# Patient Record
Sex: FEMALE | Race: BLACK OR AFRICAN AMERICAN | NOT HISPANIC OR LATINO | Employment: OTHER | URBAN - METROPOLITAN AREA
[De-identification: names, ages, dates, MRNs, and addresses within clinical notes are randomized per-mention and may not be internally consistent; named-entity substitution may affect disease eponyms.]

---

## 2017-09-17 ENCOUNTER — HOSPITAL ENCOUNTER (EMERGENCY)
Facility: OTHER | Age: 62
Discharge: HOME | End: 2017-09-17
Attending: EMERGENCY MEDICINE
Payer: MEDICAID

## 2017-09-17 VITALS
BODY MASS INDEX: 28.34 KG/M2 | TEMPERATURE: 98 F | DIASTOLIC BLOOD PRESSURE: 62 MMHG | HEART RATE: 58 BPM | WEIGHT: 154 LBS | HEIGHT: 62 IN | OXYGEN SATURATION: 96 % | SYSTOLIC BLOOD PRESSURE: 131 MMHG | RESPIRATION RATE: 19 BRPM

## 2017-09-17 DIAGNOSIS — R07.9 CHEST PAIN OF UNKNOWN ETIOLOGY: Primary | ICD-10-CM

## 2017-09-17 DIAGNOSIS — R07.9 CHEST PAIN: ICD-10-CM

## 2017-09-17 LAB
ANION GAP SERPL CALC-SCNC: 8 MMOL/L
ANISOCYTOSIS BLD QL SMEAR: SLIGHT
BASOPHILS # BLD AUTO: ABNORMAL K/UL
BASOPHILS NFR BLD: 2 %
BUN SERPL-MCNC: 16 MG/DL
CALCIUM SERPL-MCNC: 8.9 MG/DL
CHLORIDE SERPL-SCNC: 111 MMOL/L
CO2 SERPL-SCNC: 24 MMOL/L
CREAT SERPL-MCNC: 0.7 MG/DL
DIFFERENTIAL METHOD: ABNORMAL
EOSINOPHIL # BLD AUTO: ABNORMAL K/UL
EOSINOPHIL NFR BLD: 2 %
ERYTHROCYTE [DISTWIDTH] IN BLOOD BY AUTOMATED COUNT: 14.1 %
EST. GFR  (AFRICAN AMERICAN): >60 ML/MIN/1.73 M^2
EST. GFR  (NON AFRICAN AMERICAN): >60 ML/MIN/1.73 M^2
GIANT PLATELETS BLD QL SMEAR: PRESENT
GLUCOSE SERPL-MCNC: 103 MG/DL
HCT VFR BLD AUTO: 42.8 %
HGB BLD-MCNC: 14.3 G/DL
LYMPHOCYTES # BLD AUTO: ABNORMAL K/UL
LYMPHOCYTES NFR BLD: 36 %
MCH RBC QN AUTO: 30.3 PG
MCHC RBC AUTO-ENTMCNC: 33.4 G/DL
MCV RBC AUTO: 91 FL
MONOCYTES # BLD AUTO: ABNORMAL K/UL
MONOCYTES NFR BLD: 6 %
NEUTROPHILS NFR BLD: 54 %
PLATELET # BLD AUTO: 244 K/UL
PLATELET BLD QL SMEAR: ABNORMAL
PMV BLD AUTO: 11 FL
POTASSIUM SERPL-SCNC: 3.3 MMOL/L
RBC # BLD AUTO: 4.72 M/UL
SODIUM SERPL-SCNC: 143 MMOL/L
TROPONIN I SERPL DL<=0.01 NG/ML-MCNC: <0.006 NG/ML
TROPONIN I SERPL DL<=0.01 NG/ML-MCNC: <0.006 NG/ML
WBC # BLD AUTO: 14.06 K/UL

## 2017-09-17 PROCEDURE — 93010 ELECTROCARDIOGRAM REPORT: CPT | Mod: ,,, | Performed by: INTERNAL MEDICINE

## 2017-09-17 PROCEDURE — 99284 EMERGENCY DEPT VISIT MOD MDM: CPT | Mod: 25

## 2017-09-17 PROCEDURE — 84484 ASSAY OF TROPONIN QUANT: CPT | Mod: 91

## 2017-09-17 PROCEDURE — 80048 BASIC METABOLIC PNL TOTAL CA: CPT

## 2017-09-17 PROCEDURE — 93005 ELECTROCARDIOGRAM TRACING: CPT

## 2017-09-17 PROCEDURE — 85027 COMPLETE CBC AUTOMATED: CPT

## 2017-09-17 PROCEDURE — 25000003 PHARM REV CODE 250: Performed by: EMERGENCY MEDICINE

## 2017-09-17 PROCEDURE — 36415 COLL VENOUS BLD VENIPUNCTURE: CPT

## 2017-09-17 PROCEDURE — 85007 BL SMEAR W/DIFF WBC COUNT: CPT

## 2017-09-17 RX ORDER — METFORMIN HYDROCHLORIDE 500 MG/1
500 TABLET ORAL 2 TIMES DAILY WITH MEALS
COMMUNITY
End: 2019-08-30 | Stop reason: SDUPTHER

## 2017-09-17 RX ORDER — IBUPROFEN 400 MG/1
800 TABLET ORAL
Status: COMPLETED | OUTPATIENT
Start: 2017-09-17 | End: 2017-09-17

## 2017-09-17 RX ORDER — ASPIRIN 81 MG/1
81 TABLET ORAL DAILY
COMMUNITY

## 2017-09-17 RX ORDER — ASPIRIN 325 MG
TABLET ORAL
Status: DISCONTINUED
Start: 2017-09-17 | End: 2017-09-17 | Stop reason: HOSPADM

## 2017-09-17 RX ORDER — NITROGLYCERIN 0.4 MG/1
TABLET SUBLINGUAL
Status: DISCONTINUED
Start: 2017-09-17 | End: 2017-09-17 | Stop reason: HOSPADM

## 2017-09-17 RX ADMIN — IBUPROFEN 800 MG: 400 TABLET, FILM COATED ORAL at 05:09

## 2017-09-17 NOTE — ED PROVIDER NOTES
Encounter Date: 9/17/2017    SCRIBE #1 NOTE: I, Jagruti Eddy , am scribing for, and in the presence of, Dr. Mota.       History     Chief Complaint   Patient presents with    Chest Pain     L side chest pressuer with bilateral shoulder pain x 2 days.      Time seen by provider: 1:55 AM    This is a 62 y.o. female who presents with complaint of chest pain that began yesterday. Onset of pain occurred four hours ago while the patient was lying down. Chest pain is described as intermittent and radiating to the shoulders. Pt has no other complaints, and denies fever, chills, diaphoresis, nausea, vomiting, abdominal pain, cough, leg swelling, numbness, weakness, or myalgias. She reports taking aspirin daily. Pain is consistent with a previous episode. Pt last underwent stress test six months ago at [facility]. She admits to daily use of tobacco.        The history is provided by the patient.     Review of patient's allergies indicates:  No Known Allergies  Past Medical History:   Diagnosis Date    Diabetes mellitus     High cholesterol      No past surgical history on file.  No family history on file.  Social History   Substance Use Topics    Smoking status: Current Every Day Smoker     Packs/day: 0.25     Types: Cigarettes    Smokeless tobacco: Never Used    Alcohol use No     Review of Systems   Constitutional: Negative for chills, diaphoresis and fever.   HENT: Negative for congestion and sore throat.    Eyes: Negative for photophobia and redness.   Respiratory: Negative for cough and shortness of breath.    Cardiovascular: Positive for chest pain. Negative for leg swelling.   Gastrointestinal: Negative for abdominal pain, nausea and vomiting.   Genitourinary: Negative for dysuria.   Musculoskeletal: Negative for back pain and myalgias.   Skin: Negative for rash.   Neurological: Negative for weakness, light-headedness, numbness and headaches.   Psychiatric/Behavioral: Negative for confusion.       Physical Exam      Initial Vitals [09/17/17 0146]   BP Pulse Resp Temp SpO2   (!) 182/76 80 18 98.1 °F (36.7 °C) 100 %      MAP       111.33         Physical Exam    Nursing note and vitals reviewed.  Constitutional: She appears well-developed and well-nourished. She is not diaphoretic. No distress.   Well-appearing.   HENT:   Head: Normocephalic and atraumatic.   Right Ear: External ear normal.   Left Ear: External ear normal.   Eyes: EOM are normal. Pupils are equal, round, and reactive to light. Right eye exhibits no discharge. Left eye exhibits no discharge.   Neck: Normal range of motion.   Cardiovascular: Normal rate, regular rhythm and normal heart sounds. Exam reveals no gallop and no friction rub.    No murmur heard.  Pulses:       Radial pulses are 2+ on the right side, and 2+ on the left side.        Dorsalis pedis pulses are 2+ on the right side, and 2+ on the left side.   Pulmonary/Chest: Breath sounds normal. No respiratory distress. She has no wheezes. She has no rhonchi. She has no rales. She exhibits no tenderness.   Abdominal: Soft. She exhibits no mass. There is no tenderness. There is no rebound and no guarding.   No pulsatile mass.   Musculoskeletal: Normal range of motion. She exhibits no edema or tenderness.   Neurological: She is alert and oriented to person, place, and time.   Skin: Skin is warm and dry. No rash and no abscess noted. No erythema. No pallor.   Psychiatric: She has a normal mood and affect. Her behavior is normal. Judgment and thought content normal.         ED Course   Procedures  Labs Reviewed   BASIC METABOLIC PANEL - Abnormal; Notable for the following:        Result Value    Potassium 3.3 (*)     Chloride 111 (*)     All other components within normal limits   CBC W/ AUTO DIFFERENTIAL - Abnormal; Notable for the following:     WBC 14.06 (*)     Basophil% 2.0 (*)     All other components within normal limits   TROPONIN I   TROPONIN I     Imaging Results          X-Ray Chest 1 View (In  process)                 EKG Readings: (Independently Interpreted)   Initial Reading: No STEMI.   Normal sinus rhythm at a rate of 68 bpm. No ST-T wave changes. No prior EKG for comparison.           Medical Decision Making:   Clinical Tests:   Lab Tests: Ordered and Reviewed  Radiological Study: Ordered and Reviewed  Medical Tests: Ordered and Reviewed    Additional MDM:   Comments: 62-year-old female with history of diabetes and hyperlipidemia presents complaining of chest pressure like pain that radiates into both shoulders.  She reports having the first episode yesterday and then again tonight.  The time of my assessment she had pain for approximately 3-1/2-4 hours.  She denied any other associated symptoms.  Her exam was unremarkable.  Her initial EKG showed no acute ischemic changes.  Troponin was obtained and within normal limits.  Chest x-ray was also unremarkable.  Repeat troponin was again within normal limits.  Repeat EKG also remained unchanged.  I did attempt to obtain her records from [facility], however, there were no records found through [facility].  She does report having a recent stress test (approximately 6 months ago) which was normal, but I could not confirm this.  At this time I do feel the patient is appropriate for discharge home with instructions to follow-up with her primary care doctor for further evaluation of this chest pain as the etiology at this time is unclear..          Scribe Attestation:   Scribe #1: I performed the above scribed service and the documentation accurately describes the services I performed. I attest to the accuracy of the note.    Attending Attestation:           Physician Attestation for Scribe:  Physician Attestation Statement for Scribe #1: I, Dr. Mota, reviewed documentation, as scribed by Jagruti Eddy  in my presence, and it is both accurate and complete.                 ED Course      Clinical Impression:     1. Chest pain                                  Ivet Mota MD  09/17/17 0592

## 2017-09-17 NOTE — ED TRIAGE NOTES
Pt presents to ED with c/o left sided chest pain that radiates to her shoulders starting yesterday. Pain is constant. Pt has hx of chest pain, denies previous MI.

## 2019-08-15 ENCOUNTER — HOSPITAL ENCOUNTER (EMERGENCY)
Facility: OTHER | Age: 64
Discharge: HOME | End: 2019-08-15
Attending: EMERGENCY MEDICINE
Payer: COMMERCIAL

## 2019-08-15 VITALS
TEMPERATURE: 99 F | WEIGHT: 157 LBS | RESPIRATION RATE: 18 BRPM | DIASTOLIC BLOOD PRESSURE: 66 MMHG | OXYGEN SATURATION: 99 % | SYSTOLIC BLOOD PRESSURE: 160 MMHG | HEIGHT: 62 IN | BODY MASS INDEX: 28.89 KG/M2 | HEART RATE: 52 BPM

## 2019-08-15 DIAGNOSIS — M79.604 LEG PAIN, MEDIAL, RIGHT: ICD-10-CM

## 2019-08-15 DIAGNOSIS — R07.9 CHEST PAIN: ICD-10-CM

## 2019-08-15 DIAGNOSIS — M54.12 CERVICAL RADICULOPATHY: Primary | ICD-10-CM

## 2019-08-15 LAB
ANION GAP SERPL CALC-SCNC: 9 MMOL/L (ref 8–16)
BASOPHILS # BLD AUTO: 0.05 K/UL (ref 0–0.2)
BASOPHILS NFR BLD: 0.5 % (ref 0–1.9)
BUN SERPL-MCNC: 18 MG/DL (ref 8–23)
CALCIUM SERPL-MCNC: 9.6 MG/DL (ref 8.7–10.5)
CHLORIDE SERPL-SCNC: 108 MMOL/L (ref 95–110)
CO2 SERPL-SCNC: 25 MMOL/L (ref 23–29)
CREAT SERPL-MCNC: 0.8 MG/DL (ref 0.5–1.4)
DIFFERENTIAL METHOD: NORMAL
EOSINOPHIL # BLD AUTO: 0.2 K/UL (ref 0–0.5)
EOSINOPHIL NFR BLD: 2.3 % (ref 0–8)
ERYTHROCYTE [DISTWIDTH] IN BLOOD BY AUTOMATED COUNT: 13.2 % (ref 11.5–14.5)
EST. GFR  (AFRICAN AMERICAN): >60 ML/MIN/1.73 M^2
EST. GFR  (NON AFRICAN AMERICAN): >60 ML/MIN/1.73 M^2
GLUCOSE SERPL-MCNC: 89 MG/DL (ref 70–110)
HCT VFR BLD AUTO: 39.4 % (ref 37–48.5)
HGB BLD-MCNC: 12.9 G/DL (ref 12–16)
IMM GRANULOCYTES # BLD AUTO: 0.04 K/UL (ref 0–0.04)
IMM GRANULOCYTES NFR BLD AUTO: 0.4 % (ref 0–0.5)
LYMPHOCYTES # BLD AUTO: 3.9 K/UL (ref 1–4.8)
LYMPHOCYTES NFR BLD: 40.2 % (ref 18–48)
MCH RBC QN AUTO: 29.9 PG (ref 27–31)
MCHC RBC AUTO-ENTMCNC: 32.7 G/DL (ref 32–36)
MCV RBC AUTO: 91 FL (ref 82–98)
MONOCYTES # BLD AUTO: 0.5 K/UL (ref 0.3–1)
MONOCYTES NFR BLD: 4.7 % (ref 4–15)
NEUTROPHILS # BLD AUTO: 5 K/UL (ref 1.8–7.7)
NEUTROPHILS NFR BLD: 51.9 % (ref 38–73)
NRBC BLD-RTO: 0 /100 WBC
PLATELET # BLD AUTO: 277 K/UL (ref 150–350)
PMV BLD AUTO: 10.4 FL (ref 9.2–12.9)
POTASSIUM SERPL-SCNC: 4.1 MMOL/L (ref 3.5–5.1)
RBC # BLD AUTO: 4.31 M/UL (ref 4–5.4)
SODIUM SERPL-SCNC: 142 MMOL/L (ref 136–145)
TROPONIN I SERPL DL<=0.01 NG/ML-MCNC: <0.006 NG/ML (ref 0–0.03)
TROPONIN I SERPL DL<=0.01 NG/ML-MCNC: <0.006 NG/ML (ref 0–0.03)
WBC # BLD AUTO: 9.71 K/UL (ref 3.9–12.7)

## 2019-08-15 PROCEDURE — 93010 EKG 12-LEAD: ICD-10-PCS | Mod: ,,, | Performed by: INTERNAL MEDICINE

## 2019-08-15 PROCEDURE — 93010 ELECTROCARDIOGRAM REPORT: CPT | Mod: ,,, | Performed by: INTERNAL MEDICINE

## 2019-08-15 PROCEDURE — 99285 EMERGENCY DEPT VISIT HI MDM: CPT

## 2019-08-15 PROCEDURE — 85025 COMPLETE CBC W/AUTO DIFF WBC: CPT

## 2019-08-15 PROCEDURE — 93005 ELECTROCARDIOGRAM TRACING: CPT

## 2019-08-15 PROCEDURE — 25000003 PHARM REV CODE 250: Performed by: PHYSICIAN ASSISTANT

## 2019-08-15 PROCEDURE — 84484 ASSAY OF TROPONIN QUANT: CPT | Mod: 91

## 2019-08-15 PROCEDURE — 36415 COLL VENOUS BLD VENIPUNCTURE: CPT

## 2019-08-15 PROCEDURE — 80048 BASIC METABOLIC PNL TOTAL CA: CPT

## 2019-08-15 RX ORDER — ASPIRIN 325 MG
325 TABLET ORAL
Status: COMPLETED | OUTPATIENT
Start: 2019-08-15 | End: 2019-08-15

## 2019-08-15 RX ORDER — DICLOFENAC SODIUM 10 MG/G
2 GEL TOPICAL
Status: COMPLETED | OUTPATIENT
Start: 2019-08-15 | End: 2019-08-15

## 2019-08-15 RX ORDER — LISINOPRIL 10 MG/1
10 TABLET ORAL
Status: COMPLETED | OUTPATIENT
Start: 2019-08-15 | End: 2019-08-15

## 2019-08-15 RX ORDER — ATORVASTATIN CALCIUM 10 MG/1
10 TABLET, FILM COATED ORAL DAILY
COMMUNITY
End: 2019-08-30 | Stop reason: ALTCHOICE

## 2019-08-15 RX ADMIN — ASPIRIN 325 MG ORAL TABLET 325 MG: 325 PILL ORAL at 11:08

## 2019-08-15 RX ADMIN — LISINOPRIL 10 MG: 10 TABLET ORAL at 11:08

## 2019-08-15 RX ADMIN — DICLOFENAC 2 G: 10 GEL TOPICAL at 11:08

## 2019-08-15 NOTE — ED PROVIDER NOTES
Encounter Date: 8/15/2019       History     Chief Complaint   Patient presents with    Shoulder Pain     Pt reports right shoulder pain, right leg pain for the past three weeks with chest pain since last night. Pt was seen at [facility] on 8/3/ and 8/9 for the same s/s.      Patient is a 64-year-old female with pre-diabetes, hypertension, and high cholesterol who presents to the emergency department with multiple complaints, including chronic right shoulder pain, right thigh pain and acute chest pain.   Patient reports chronic right shoulder pain that radiates into her right neck.  She states she has had an EMG, MRI and CT of her neck and does not know what is wrong with her.  She does admit to being diagnosed with arthritis.  Patient also reports right thigh pain for the past 3 weeks.  She denies injury.  She believes she had an ultrasound that ruled out DVT.  She was evaluated for this at [facility] approximately 1 week ago and was diagnosed with a musculoskeletal strain.  She states she received 2 injections in this temporarily helped.  Patient also reports left-sided chest wall pressure upon waking this morning.  She denies radiation of the pain.  She denies nausea or emesis.  She denies previous cardiac history but states she has experienced similar chest pain and has been evaluated in the emergency department before.    The history is provided by the patient.     Review of patient's allergies indicates:  No Known Allergies  Past Medical History:   Diagnosis Date    Diabetes mellitus     High cholesterol     Hypertension      History reviewed. No pertinent surgical history.  History reviewed. No pertinent family history.  Social History     Tobacco Use    Smoking status: Current Every Day Smoker     Packs/day: 0.25     Types: Cigarettes    Smokeless tobacco: Never Used   Substance Use Topics    Alcohol use: No    Drug use: No     Review of Systems   Constitutional: Negative for chills and fever.   Eyes: Negative for  pain.   Respiratory: Negative for shortness of breath.    Cardiovascular: Positive for chest pain.   Gastrointestinal: Negative for abdominal pain, diarrhea, nausea and vomiting.   Genitourinary: Negative for dysuria.   Musculoskeletal: Positive for arthralgias and myalgias.   Skin: Negative for rash.   Neurological: Negative for headaches.       Physical Exam     Initial Vitals [08/15/19 0957]   BP Pulse Resp Temp SpO2   (!) 206/105 65 18 98.1 °F (36.7 °C) 100 %      MAP       --         Physical Exam    Constitutional: Vital signs are normal. She is cooperative. No distress.   HENT:   Head: Normocephalic and atraumatic.   Eyes: EOM are normal. Pupils are equal, round, and reactive to light.   Neck: Normal range of motion. Neck supple.   Cardiovascular: Normal rate, regular rhythm and intact distal pulses.   Pulmonary/Chest: Breath sounds normal. She has no wheezes. She has no rhonchi. She has no rales.   Abdominal: Soft. Bowel sounds are normal. There is no tenderness.   Musculoskeletal:   Right shoulder pain reproduced with flexion, internal rotation external rotation of the right shoulder.  No CTL midline tenderness, crepitus, masses, step-offs or deformities.  No focal right hip tenderness.  Right lower extremity  Has normal range of motion with no edema or bony tenderness.   Neurological: She is alert and oriented to person, place, and time. GCS eye subscore is 4. GCS verbal subscore is 5. GCS motor subscore is 6.   Skin: Skin is warm and dry. No rash noted.         ED Course   Procedures  Labs Reviewed   TROPONIN I   CBC W/ AUTO DIFFERENTIAL   BASIC METABOLIC PANEL   TROPONIN I     EKG Readings: (Independently Interpreted)     Normal sinus rhythm at a rate of 63 beats per minute.  No STEMI.  T-wave inversion to V1.  No previous EKG to compare to.       Imaging Results          US Lower Extremity Veins Right (Final result)  Result time 08/15/19 12:19:31    Final result by Reyes Garland MD (08/15/19 12:19:31)                  Impression:      No evidence of deep venous thrombosis in the right lower extremity.      Electronically signed by: Reyes Garland MD  Date:    08/15/2019  Time:    12:19             Narrative:    EXAMINATION:  US LOWER EXTREMITY VEINS RIGHT    CLINICAL HISTORY:  Pain in right leg    TECHNIQUE:  Duplex and color flow Doppler evaluation and graded compression of the right lower extremity veins was performed.    COMPARISON:  None    FINDINGS:  Right thigh veins: The common femoral, femoral, popliteal, upper greater saphenous, and deep femoral veins are patent and free of thrombus. The veins are normally compressible and have normal phasic flow and augmentation response.    Right calf veins: The visualized calf veins are patent.    Contralateral CFV: The contralateral (left) common femoral vein is patent and free of thrombus.    Miscellaneous: None                               X-Ray Chest PA And Lateral (Final result)  Result time 08/15/19 11:35:54    Final result by Nicko Lopez MD (08/15/19 11:35:54)                 Impression:      No acute abnormality.      Electronically signed by: Nicko Lopez MD  Date:    08/15/2019  Time:    11:35             Narrative:    EXAMINATION:  XR CHEST PA AND LATERAL    CLINICAL HISTORY:  Chest pain, unspecified    TECHNIQUE:  PA and lateral views of the chest were performed.    COMPARISON:  None    FINDINGS:  The lungs are clear, with normal appearance of pulmonary vasculature and no pleural effusion or pneumothorax.    The cardiac silhouette is normal in size. The hilar and mediastinal contours are unremarkable.    Bones are intact.                                 Medical Decision Making:   History:   Old Records Summarized: other records.       <> Summary of Records: Patient evaluated at [facility] 1 week ago for right thigh pain, diagnosed with musculoskeletal strain.  No imaging performed.  Initial Assessment:   Urgent evaluation of a 64 y.o. female presenting  to the emergency department complaining of   Chronic right shoulder pain, right thigh pain for 3 weeks and onset of mild chest pressure this morning upon waking.. Patient is afebrile, nontoxic appearing and hemodynamically stable.  EKG reveals no STEMI with isolated T-wave inversion to lead V1.  Patient is describing atypical chest pain.  She does have risk factors for ACS, will obtain 2 troponins to rule out.  Patient has had extensive workup for right shoulder and neck pain, including MRI, CT and EMG studies is been diagnosed with cervical spinal stenosis and degenerative disc disease.  ED Management:  Chest x-ray reveals no acute cardiopulmonary process.   Venous ultrasound of right lower extremity reveals no evidence of DVT.  CBC and chemistry within normal limits.  Initial troponin is negative.  Patient's heart score is 3.  She was given aspirin in the emergency department and states her chest pain resolved.  Repeat troponin is negative.  Patient was advised on symptomatic care and advised follow up with her specialist.    She has no other complaints today and was stable at discharge.  Other:   I have discussed this case with another health care provider.                      Clinical Impression:     1. Cervical radiculopathy    2. Chest pain    3. Leg pain, medial, right                               Manuel Cuevas PA-C  08/15/19 [id number]       Manuel Cuevas PA-C  08/15/19 [id number]

## 2019-08-15 NOTE — ED TRIAGE NOTES
"Pt presents to ED for evaluation of right shoulder and right inner thigh pain x 3 weeks. She reports that she has been seen and treated at [facility] ED, followed up w. PCP, had an MRI, "but nobody is telling me what's wrong with me." Pt reports some relief from prescribed anti-inflammatories and muscle relaxer, she reports 6 out of 10 pain. Pt is AAO x 3, answers questions appropriately, ambulatory w/ steady gait  "

## 2019-08-15 NOTE — ED NOTES
"ROUNDING:  Sitting in RWR; AAOx4.  States chest pressure 0/10 "it went away when I went to ultrasound." States R shoulder pain 3/10 and RLE pain 6/10. Denies sob, dizziness, lightheadedness, n/v or any other discomfort at this time. Skin is warm and dry. Resp:18even and unlabored. Comfort and BR needs addressed. Plan of care updated. NADN. Recliner wheels locked and call light within reach. Will continue to monitor.    "

## 2019-08-28 NOTE — PROGRESS NOTES
Subjective:       Patient ID: Leticia Brown is a 64 y.o. female with a PMH significant for T2D, HLD, HTN who presents today for a Hospital Discharge Follow up.  Patient is followed by Dr. Dudley at [facility] and last seen 8/9/2019.    Chief Complaint: Establish Care and Generalized Body Aches    HPI   Patient with complaints of generalized body aches - shoulder pain 1-2 months ago and s/p PT and improved.  Then with right inner thigh pain and seen at [facility] ED and [facility] - this has now resolved.  Then with right wrist pain - s/p EMG/NCS.  States she was told she had CTS and wrist improved with wrist brace.m  Now with 3 days or right Sternoclavicular joint pain.      Patient denies f/c, n/v/d.  No chest pain or SOB.  No abdominal pain or dysuria.  No headaches or change in vision.  No dizziness.  No significant  weight gain or weight loss.  Remaining ROS negative.    Review of Systems   Constitutional: Negative for appetite change, chills, diaphoresis, fatigue, fever and unexpected weight change.   HENT: Negative for ear pain, hearing loss, sinus pain, tinnitus, trouble swallowing and voice change.    Eyes: Negative for photophobia, pain and visual disturbance.   Respiratory: Negative for chest tightness, shortness of breath and wheezing.    Cardiovascular: Negative for chest pain, palpitations and leg swelling.   Gastrointestinal: Negative for abdominal pain, blood in stool, constipation, diarrhea, nausea and vomiting.   Endocrine: Negative for cold intolerance, heat intolerance, polydipsia, polyphagia and polyuria.   Genitourinary: Negative for decreased urine volume, difficulty urinating, dysuria, flank pain, hematuria, pelvic pain, vaginal bleeding, vaginal discharge and vaginal pain.   Musculoskeletal: Positive for arthralgias and joint swelling (right SC joint). Negative for gait problem, myalgias and neck pain.   Skin: Negative for rash.   Neurological: Negative for dizziness, tremors, syncope,  "weakness, numbness and headaches.   Hematological: Does not bruise/bleed easily.   Psychiatric/Behavioral: Negative for agitation, confusion and sleep disturbance. The patient is not nervous/anxious.        Objective:      Physical Exam   Constitutional: She is oriented to person, place, and time. She appears well-developed and well-nourished. No distress.   HENT:   Head: Normocephalic and atraumatic.   Nose: Nose normal.   Mouth/Throat: Oropharynx is clear and moist.   Eyes: Pupils are equal, round, and reactive to light. Conjunctivae and EOM are normal. No scleral icterus.   Neck: Normal range of motion. Neck supple. No JVD present. No thyromegaly present.   Cardiovascular: Normal rate, regular rhythm and intact distal pulses. Exam reveals no gallop and no friction rub.   No murmur heard.  Pulmonary/Chest: Effort normal and breath sounds normal. No respiratory distress. She has no wheezes. She has no rales.   Abdominal: Soft. Bowel sounds are normal. She exhibits no distension. There is no tenderness. There is no rebound and no guarding.   Musculoskeletal: Normal range of motion. She exhibits no edema.   Lymphadenopathy:     She has no cervical adenopathy.   Neurological: She is alert and oriented to person, place, and time. No cranial nerve deficit or sensory deficit.   Skin: Skin is warm and dry. No rash noted. No erythema.   Psychiatric: She has a normal mood and affect. Her behavior is normal. Thought content normal.       Assessment:       1. Hospital discharge follow-up    2. Breast cancer screening    3. Essential hypertension    4. High cholesterol    5. Type 2 diabetes mellitus without complication, without long-term current use of insulin    6. Postmenopausal        Plan:   -Hospital Discharge Follow Up - Seen in ED at [facility] on 8/3/2019 for Leg Pain - "Leticia Brown is a 64 y.o. female who presents to the ED complaining of right thigh pain x 2 weeks. Denies trauma or injury. Woke up with the " "pain. No recent travel or sitting for long periods of time. No groin pain. No bony TTP. No erythema, warmth or swelling. Pain with touch and walking. No numbness or tingling. No chest pain or SOB. No other associated symptoms to report".      She was seen at [facility] ED on 8/15/2019 with chest pain - "Urgent evaluation of a 64 y.o. female presenting to the emergency department complaining of   Chronic right shoulder pain, right thigh pain for 3 weeks and onset of mild chest pressure this morning upon waking.. Patient is afebrile, nontoxic appearing and hemodynamically stable.  EKG reveals no STEMI with isolated T-wave inversion to lead V1.  Patient is describing atypical chest pain.  She does have risk factors for ACS, will obtain 2 troponins to rule out.  Patient has had extensive workup for right shoulder and neck pain, including MRI, CT and EMG studies is been diagnosed with cervical spinal stenosis and degenerative disc disease.  Chest x-ray reveals no acute cardiopulmonary process.   Venous ultrasound of right lower extremity reveals no evidence of DVT.  CBC and chemistry within normal limits.  Initial troponin is negative.  Patient's heart score is 3.  She was given aspirin in the emergency department and states her chest pain resolved.  Repeat troponin is negative.  Patient was advised on symptomatic care and advised follow up with her specialist.    She has no other complaints today and was stable at discharge".    -Cards - HTN - on Lisinopril.  BP today is 138/68.  Will discuss change off the Lisinopril when she returns in 4 weeks.  Given chest pain above, will repeat stress Echo.    EKG on 8/15/2019 with NSR and possible LAE.  Stress Echo on 6/3/2014 - 1. Good exercise tolerance for age.  2. No clinical, electrocardiographic, or echocardiographic evidence for ischemia at the workload achieved on treadmill testing.  Repeat on 2/13/2017 . Excellent exercise tolerance for age.  2. No clinical, " electrocardiographic, or echocardiographic evidence for ischemia at the workload achieved on treadmill testing.    HLD - on Atorvastatin.  Lipids on 8/9/2019 with , TG 75, HDL 46, LDL 54.    -Endocrine - Pre-Diabetes  -A1C was 6.0 on 8/9/2019.  On Metformin twice a day, but only taking once a day.  Will change to once a day for now.    Microalbumin - UA in 5/2018 negative for protein.  Order today.   Eye - 7/2019 - [facility].  Normal.  Has Mild Cataracts.  Foot - Good today.    -MSK - Polyarthralgias - Cervical DD - MRI of C-spine done 3/2019 - Cervical spondylosis with prominent anterior osteophytes.  Osteophyte/disc complexes causing moderate stenosis of the spinal canal and mild compression of the cord. No evidence of myelopathy is observed.  Multiple neural foramina stenosis as above.    Patient with complaints of generalized body aches - shoulder pain 1-2 months ago and s/p PT and improved.  Then with right inner thigh pain and seen at [facility] ED and [facility] ED - this has now resolved.  Then with right wrist pain - s/p EMG/NCS.  States she was told she had CTS and wrist improved with wrist brace.m  Now with 3 days or right Sternoclavicular joint pain.      Will order ESR, CRP, RF, DANIELLE, CPK (given statin).  Will refer to Rheumatology given above and per patient wishes.    Aleve twice a day for now.  Monitor.    -GI -  We discussed colon cancer screening - colonoscopy done 3/8/2017 - Surgeon: Kaushal Irving MD; Location: [facility] GI LAB; Service: Gastroenterology.  Told to repeat in 10 years.    S/p Cholecystectomy in 2016.    -GYN - Last Pap was negative on 2/9/2018.    Last Mammo was BI-RADS 2 benign on 6/8/2018.  Repeat.  We discussed DXA screening for osteoporosis - DEXA normal on 7/10/2015.  Discussed repeat.  Check Vitamin D.    -Psych - Depression - was on Wellbutrin, but off a year and stable.      Smoker - quit 1 year ago and smoked for 30 years.  LDCT Scan in 10/2018 - No clearly suspicious nodules  identified.  BI-RADS Category 1 benign finding: No suspicious nodules. Less than 1 percent chance of malignancy. Routine follow-up in one year's time low-dose CT is recommended.  Needs follow up    -HCM - We discussed Flu (today) and Tdap (today) vaccinations.  We discussed Shingles (waiting for Shingrix Availability - advised to get on a waiting list at pharmacy) and Pneumococcal (23 done in 1/2015 and 13 at age 65) vaccinations.     -Follow Up - 1 month

## 2019-08-30 ENCOUNTER — LAB VISIT (OUTPATIENT)
Dept: LAB | Facility: HOSPITAL | Age: 64
End: 2019-08-30
Attending: INTERNAL MEDICINE
Payer: COMMERCIAL

## 2019-08-30 ENCOUNTER — OFFICE VISIT (OUTPATIENT)
Dept: PRIMARY CARE CLINIC | Facility: CLINIC | Age: 64
End: 2019-08-30
Payer: COMMERCIAL

## 2019-08-30 VITALS
DIASTOLIC BLOOD PRESSURE: 68 MMHG | SYSTOLIC BLOOD PRESSURE: 138 MMHG | HEART RATE: 76 BPM | BODY MASS INDEX: 31.2 KG/M2 | WEIGHT: 158.94 LBS | HEIGHT: 60 IN

## 2019-08-30 DIAGNOSIS — Z09 HOSPITAL DISCHARGE FOLLOW-UP: Primary | ICD-10-CM

## 2019-08-30 DIAGNOSIS — Z12.39 BREAST CANCER SCREENING: ICD-10-CM

## 2019-08-30 DIAGNOSIS — E78.00 HIGH CHOLESTEROL: ICD-10-CM

## 2019-08-30 DIAGNOSIS — M25.50 POLYARTHRALGIA: ICD-10-CM

## 2019-08-30 DIAGNOSIS — I10 ESSENTIAL HYPERTENSION: ICD-10-CM

## 2019-08-30 DIAGNOSIS — E11.9 TYPE 2 DIABETES MELLITUS WITHOUT COMPLICATION, WITHOUT LONG-TERM CURRENT USE OF INSULIN: ICD-10-CM

## 2019-08-30 DIAGNOSIS — R07.89 ATYPICAL CHEST PAIN: ICD-10-CM

## 2019-08-30 DIAGNOSIS — Z12.2 ENCOUNTER FOR SCREENING FOR LUNG CANCER: ICD-10-CM

## 2019-08-30 DIAGNOSIS — Z12.9 SCREENING FOR CANCER: ICD-10-CM

## 2019-08-30 DIAGNOSIS — Z23 NEED FOR TDAP VACCINATION: ICD-10-CM

## 2019-08-30 DIAGNOSIS — Z23 NEED FOR INFLUENZA VACCINATION: ICD-10-CM

## 2019-08-30 DIAGNOSIS — Z78.0 POSTMENOPAUSAL: ICD-10-CM

## 2019-08-30 DIAGNOSIS — E55.9 VITAMIN D INSUFFICIENCY: ICD-10-CM

## 2019-08-30 LAB
ALBUMIN/CREAT UR: 4.3 UG/MG (ref 0–30)
CREAT UR-MCNC: 138 MG/DL (ref 15–325)
MICROALBUMIN UR DL<=1MG/L-MCNC: 6 UG/ML

## 2019-08-30 PROCEDURE — 99999 PR PBB SHADOW E&M-EST. PATIENT-LVL V: CPT | Mod: PBBFAC,,, | Performed by: INTERNAL MEDICINE

## 2019-08-30 PROCEDURE — 3008F PR BODY MASS INDEX (BMI) DOCUMENTED: ICD-10-PCS | Mod: CPTII,S$GLB,, | Performed by: INTERNAL MEDICINE

## 2019-08-30 PROCEDURE — 90715 TDAP VACCINE GREATER THAN OR EQUAL TO 7YO IM: ICD-10-PCS | Mod: S$GLB,,, | Performed by: INTERNAL MEDICINE

## 2019-08-30 PROCEDURE — 99204 OFFICE O/P NEW MOD 45 MIN: CPT | Mod: 25,S$GLB,, | Performed by: INTERNAL MEDICINE

## 2019-08-30 PROCEDURE — 90472 IMMUNIZATION ADMIN EACH ADD: CPT | Mod: S$GLB,,, | Performed by: INTERNAL MEDICINE

## 2019-08-30 PROCEDURE — 90686 FLU VACCINE (QUAD) GREATER THAN OR EQUAL TO 3YO PRESERVATIVE FREE IM: ICD-10-PCS | Mod: S$GLB,,, | Performed by: INTERNAL MEDICINE

## 2019-08-30 PROCEDURE — 99999 PR PBB SHADOW E&M-EST. PATIENT-LVL V: ICD-10-PCS | Mod: PBBFAC,,, | Performed by: INTERNAL MEDICINE

## 2019-08-30 PROCEDURE — 82043 UR ALBUMIN QUANTITATIVE: CPT

## 2019-08-30 PROCEDURE — 90686 IIV4 VACC NO PRSV 0.5 ML IM: CPT | Mod: S$GLB,,, | Performed by: INTERNAL MEDICINE

## 2019-08-30 PROCEDURE — 90472 TDAP VACCINE GREATER THAN OR EQUAL TO 7YO IM: ICD-10-PCS | Mod: S$GLB,,, | Performed by: INTERNAL MEDICINE

## 2019-08-30 PROCEDURE — 3008F BODY MASS INDEX DOCD: CPT | Mod: CPTII,S$GLB,, | Performed by: INTERNAL MEDICINE

## 2019-08-30 PROCEDURE — 90715 TDAP VACCINE 7 YRS/> IM: CPT | Mod: S$GLB,,, | Performed by: INTERNAL MEDICINE

## 2019-08-30 PROCEDURE — 99204 PR OFFICE/OUTPT VISIT, NEW, LEVL IV, 45-59 MIN: ICD-10-PCS | Mod: 25,S$GLB,, | Performed by: INTERNAL MEDICINE

## 2019-08-30 PROCEDURE — 90471 IMMUNIZATION ADMIN: CPT | Mod: S$GLB,,, | Performed by: INTERNAL MEDICINE

## 2019-08-30 PROCEDURE — 90471 FLU VACCINE (QUAD) GREATER THAN OR EQUAL TO 3YO PRESERVATIVE FREE IM: ICD-10-PCS | Mod: S$GLB,,, | Performed by: INTERNAL MEDICINE

## 2019-08-30 RX ORDER — OXYBUTYNIN CHLORIDE 10 MG/1
10 TABLET, EXTENDED RELEASE ORAL
COMMUNITY
Start: 2019-08-09 | End: 2019-08-30 | Stop reason: SDUPTHER

## 2019-08-30 RX ORDER — LISINOPRIL 10 MG/1
10 TABLET ORAL
COMMUNITY
Start: 2019-01-22 | End: 2019-09-27 | Stop reason: ALTCHOICE

## 2019-08-30 RX ORDER — GABAPENTIN 300 MG/1
300 CAPSULE ORAL
COMMUNITY
Start: 2019-01-22 | End: 2021-03-19

## 2019-08-30 RX ORDER — LORATADINE 10 MG/1
10 TABLET ORAL
COMMUNITY
Start: 2014-07-23 | End: 2024-01-09

## 2019-08-30 RX ORDER — OXYBUTYNIN CHLORIDE 10 MG/1
10 TABLET, EXTENDED RELEASE ORAL DAILY
Qty: 30 TABLET | Refills: 3 | Status: SHIPPED | OUTPATIENT
Start: 2019-08-30 | End: 2020-01-21 | Stop reason: SDUPTHER

## 2019-08-30 RX ORDER — METFORMIN HYDROCHLORIDE 500 MG/1
500 TABLET ORAL
Qty: 30 TABLET | Refills: 3 | Status: SHIPPED | OUTPATIENT
Start: 2019-08-30 | End: 2020-01-16 | Stop reason: SDUPTHER

## 2019-08-30 RX ORDER — IBUPROFEN 600 MG/1
TABLET ORAL
Refills: 0 | COMMUNITY
Start: 2019-08-05 | End: 2020-09-10

## 2019-08-30 RX ORDER — ATORVASTATIN CALCIUM 40 MG/1
40 TABLET, FILM COATED ORAL
COMMUNITY
Start: 2018-09-18 | End: 2019-11-19 | Stop reason: SDUPTHER

## 2019-08-30 RX ORDER — METHOCARBAMOL 500 MG/1
TABLET, FILM COATED ORAL
Refills: 0 | COMMUNITY
Start: 2019-08-05 | End: 2020-09-10

## 2019-08-30 NOTE — PROGRESS NOTES
"Patient was given vaccine information sheet for the Tdap immunization. The area of injection was palpated using the acromion process as a landmark. This area was cleaned with alcohol. Using a 25g 1" safety needle, 0.5mL of the vaccine was placed into the right muscle. The injection site was dressed with a bandage. Patient experienced no complications and was discharged in stable condition. Tdap Lot: [id number] Exp: 04/26/2021.    Patient was given vaccine information sheet for the Flu Vaccine. The area of injection was palpated using the acromion process as a landmark. This area was cleaned with alcohol. Using a 25g 1" safety needle, 0.5mL of the vaccine was placed into the left muscle. The injection site was dressed with a bandage. Patient experienced no complications and was discharged in stable condition. Fluzone vaccine Lot: [id number] Exp: 06/30/2020.  Lázaro Mackey LPN        "

## 2019-08-30 NOTE — PATIENT INSTRUCTIONS
Your exam was overall normal today.    Your blood pressure was borderline high.  We will discuss changing your Lisinopril when you return.    I will order routine urine today.    We will give you the Flu Vaccine today.  We will give you the Tdap Vaccine today.  I recommend getting on a waiting list at your local pharmacy for the Shingles vaccine (Shingrix) is interested.    We will order Mammogram.    We will order repeat Bone Density test.    I will refer you to Rheumatology for your arthritis pains.    I will schedule a repeat stress Echocardiogram to work up you chest pain further.    We will check a follow up Lung CT to screen for Lung Cancer.    Continue Metformin 500mg once a day.    Return in 1 month - sooner if needed.  Please come at least 15-20 minutes before your scheduled appointment time.

## 2019-08-31 RX ORDER — ERGOCALCIFEROL 1.25 MG/1
50000 CAPSULE ORAL
Qty: 12 CAPSULE | Refills: 1
Start: 2019-08-31 | End: 2019-09-30 | Stop reason: SDUPTHER

## 2019-09-03 ENCOUNTER — TELEPHONE (OUTPATIENT)
Dept: INTERNAL MEDICINE | Facility: CLINIC | Age: 64
End: 2019-09-03

## 2019-09-03 NOTE — TELEPHONE ENCOUNTER
----- Message from Marcus Simons MD sent at 8/31/2019 10:07 AM CDT -----  Please let patient know the following:    I have reviewed your recent labs and have the following recommendations:    Your vitamin D level was low.   I will prescribe Vitamin D2 at 50,000 units to take once a week for 24 weeks, then change to over the counter Vitamin D3 at 2000 units once a day. We will follow the levels periodically.  Please let me know what pharmacy you would like this sent.    Your inflammatory markers are normal.  Your arthritis screen is negative.  Your muscle enzyme is normal.  Your autoimmune screen is still in process - I will let you know if this returns abnormal.    Please let me know if you have any questions or concerns.

## 2019-09-10 ENCOUNTER — HOSPITAL ENCOUNTER (OUTPATIENT)
Dept: RADIOLOGY | Facility: OTHER | Age: 64
Discharge: HOME | End: 2019-09-10
Attending: INTERNAL MEDICINE
Payer: COMMERCIAL

## 2019-09-10 ENCOUNTER — PATIENT MESSAGE (OUTPATIENT)
Dept: PRIMARY CARE CLINIC | Facility: CLINIC | Age: 64
End: 2019-09-10

## 2019-09-10 DIAGNOSIS — Z12.39 BREAST CANCER SCREENING: ICD-10-CM

## 2019-09-10 DIAGNOSIS — Z12.2 ENCOUNTER FOR SCREENING FOR LUNG CANCER: ICD-10-CM

## 2019-09-10 DIAGNOSIS — Z12.9 SCREENING FOR CANCER: ICD-10-CM

## 2019-09-10 PROCEDURE — 77063 BREAST TOMOSYNTHESIS BI: CPT | Mod: 26,,, | Performed by: RADIOLOGY

## 2019-09-10 PROCEDURE — 77067 SCR MAMMO BI INCL CAD: CPT | Mod: 26,,, | Performed by: RADIOLOGY

## 2019-09-10 PROCEDURE — G0297 CT CHEST LUNG SCREENING LOW DOSE: ICD-10-PCS | Mod: 26,,, | Performed by: RADIOLOGY

## 2019-09-10 PROCEDURE — G0297 LDCT FOR LUNG CA SCREEN: HCPCS | Mod: 26,,, | Performed by: RADIOLOGY

## 2019-09-10 PROCEDURE — 77067 MAMMO DIGITAL SCREENING BILAT WITH TOMOSYNTHESIS_CAD: ICD-10-PCS | Mod: 26,,, | Performed by: RADIOLOGY

## 2019-09-10 PROCEDURE — 77067 SCR MAMMO BI INCL CAD: CPT | Mod: TC

## 2019-09-10 PROCEDURE — G0297 LDCT FOR LUNG CA SCREEN: HCPCS | Mod: TC

## 2019-09-10 PROCEDURE — 77063 MAMMO DIGITAL SCREENING BILAT WITH TOMOSYNTHESIS_CAD: ICD-10-PCS | Mod: 26,,, | Performed by: RADIOLOGY

## 2019-09-13 ENCOUNTER — HOSPITAL ENCOUNTER (OUTPATIENT)
Dept: CARDIOLOGY | Facility: OTHER | Age: 64
Discharge: HOME | End: 2019-09-13
Attending: INTERNAL MEDICINE
Payer: COMMERCIAL

## 2019-09-13 ENCOUNTER — HOSPITAL ENCOUNTER (OUTPATIENT)
Dept: RADIOLOGY | Facility: OTHER | Age: 64
Discharge: HOME | End: 2019-09-13
Attending: INTERNAL MEDICINE
Payer: COMMERCIAL

## 2019-09-13 ENCOUNTER — PATIENT MESSAGE (OUTPATIENT)
Dept: PRIMARY CARE CLINIC | Facility: CLINIC | Age: 64
End: 2019-09-13

## 2019-09-13 DIAGNOSIS — E78.00 HIGH CHOLESTEROL: ICD-10-CM

## 2019-09-13 DIAGNOSIS — Z78.0 POSTMENOPAUSAL: ICD-10-CM

## 2019-09-13 DIAGNOSIS — R07.89 ATYPICAL CHEST PAIN: ICD-10-CM

## 2019-09-13 PROBLEM — E78.5 HYPERLIPIDEMIA: Status: ACTIVE | Noted: 2019-08-30

## 2019-09-13 PROBLEM — M50.30 DEGENERATIVE DISC DISEASE, CERVICAL: Status: ACTIVE | Noted: 2019-07-01

## 2019-09-13 PROBLEM — M54.2 NECK PAIN: Status: ACTIVE | Noted: 2019-03-19

## 2019-09-13 PROBLEM — Z87.891 HISTORY OF TOBACCO ABUSE: Status: ACTIVE | Noted: 2018-09-18

## 2019-09-13 PROCEDURE — 77080 DXA BONE DENSITY AXIAL: CPT | Mod: 26,,, | Performed by: RADIOLOGY

## 2019-09-13 PROCEDURE — 77080 DEXA BONE DENSITY SPINE HIP: ICD-10-PCS | Mod: 26,,, | Performed by: RADIOLOGY

## 2019-09-13 PROCEDURE — 77080 DXA BONE DENSITY AXIAL: CPT | Mod: TC

## 2019-09-25 NOTE — PROGRESS NOTES
Subjective:       Patient ID: Leticia Brown is a 64 y.o. female with a PMH significant for T2D, HLD, HTN who was seen initially by me on 8/30/2019.    Patient is followed by Dr. Dudley at [facility] and last seen 8/9/2019.    Chief Complaint: Hyperlipidemia; Diabetes; Results (discuss test); Generalized Body Aches (right arm pain); and Hypertension    HPI     Patient overall stable.  Continued generalized aches.  No left sided chest pain.  Did not have her Stress Echo yet.  No new complaints.    Patient denies f/c, n/v/d.  No chest pain or SOB.  No abdominal pain or dysuria.  No headaches or change in vision.  No dizziness.  No significant  weight gain or weight loss.  Remaining ROS negative.    Review of Systems   Constitutional: Negative for appetite change, chills, diaphoresis, fatigue, fever and unexpected weight change.   HENT: Negative for ear pain, hearing loss, sinus pain, tinnitus, trouble swallowing and voice change.    Eyes: Negative for photophobia, pain and visual disturbance.   Respiratory: Negative for chest tightness, shortness of breath and wheezing.    Cardiovascular: Negative for chest pain, palpitations and leg swelling.   Gastrointestinal: Negative for abdominal pain, blood in stool, constipation, diarrhea, nausea and vomiting.   Endocrine: Negative for cold intolerance, heat intolerance, polydipsia, polyphagia and polyuria.   Genitourinary: Negative for decreased urine volume, difficulty urinating, dysuria, flank pain, hematuria, pelvic pain, vaginal bleeding, vaginal discharge and vaginal pain.   Musculoskeletal: Positive for arthralgias, joint swelling (right SC joint) and neck pain. Negative for gait problem and myalgias.   Skin: Negative for rash.   Neurological: Negative for dizziness, tremors, syncope, weakness, numbness and headaches.   Hematological: Does not bruise/bleed easily.   Psychiatric/Behavioral: Negative for agitation, confusion and sleep disturbance. The patient is not  "nervous/anxious.        Objective:      Physical Exam   Constitutional: She is oriented to person, place, and time. She appears well-developed and well-nourished. No distress.   HENT:   Head: Normocephalic and atraumatic.   Nose: Nose normal.   Mouth/Throat: Oropharynx is clear and moist.   Eyes: Pupils are equal, round, and reactive to light. Conjunctivae and EOM are normal. No scleral icterus.   Neck: Normal range of motion. Neck supple. No JVD present. No thyromegaly present.   Cardiovascular: Normal rate, regular rhythm and intact distal pulses. Exam reveals no gallop and no friction rub.   No murmur heard.  Pulmonary/Chest: Effort normal and breath sounds normal. No respiratory distress. She has no wheezes. She has no rales.   Abdominal: Soft. Bowel sounds are normal. She exhibits no distension. There is no tenderness. There is no rebound and no guarding.   Musculoskeletal: Normal range of motion. She exhibits no edema.   Lymphadenopathy:     She has no cervical adenopathy.   Neurological: She is alert and oriented to person, place, and time. No cranial nerve deficit or sensory deficit.   Skin: Skin is warm and dry. No rash noted. No erythema.   Psychiatric: She has a normal mood and affect. Her behavior is normal. Thought content normal.       Assessment:       1. Essential hypertension    2. Hyperlipidemia, unspecified hyperlipidemia type    3. Depression, unspecified depression type    4. Anxiety    5. Degenerative disc disease, cervical    6. Prediabetes    7. Gastroesophageal reflux disease, esophagitis presence not specified    8. History of tobacco abuse        Plan:   -Today's Visit -     -----------------------------------------------------  -Hospital Discharge Follow Up - Seen in ED at [facility] on 8/3/2019 for Leg Pain - "Leticia Brown is a 64 y.o. female who presents to the ED complaining of right thigh pain x 2 weeks. Denies trauma or injury. Woke up with the pain. No recent travel or sitting " "for long periods of time. No groin pain. No bony TTP. No erythema, warmth or swelling. Pain with touch and walking. No numbness or tingling. No chest pain or SOB. No other associated symptoms to report".      She was seen at [facility] ED on 8/15/2019 with chest pain - "Urgent evaluation of a 64 y.o. female presenting to the emergency department complaining of   Chronic right shoulder pain, right thigh pain for 3 weeks and onset of mild chest pressure this morning upon waking.. Patient is afebrile, nontoxic appearing and hemodynamically stable.  EKG reveals no STEMI with isolated T-wave inversion to lead V1.  Patient is describing atypical chest pain.  She does have risk factors for ACS, will obtain 2 troponins to rule out.  Patient has had extensive workup for right shoulder and neck pain, including MRI, CT and EMG studies is been diagnosed with cervical spinal stenosis and degenerative disc disease.  Chest x-ray reveals no acute cardiopulmonary process.   Venous ultrasound of right lower extremity reveals no evidence of DVT.  CBC and chemistry within normal limits.  Initial troponin is negative.  Patient's heart score is 3.  She was given aspirin in the emergency department and states her chest pain resolved.  Repeat troponin is negative.  Patient was advised on symptomatic care and advised follow up with her specialist.    She has no other complaints today and was stable at discharge".  ----------------------------------------------------------  -Nutrition - BMI 28.91 today.  Discussed diet and exercise.    -Cards - HTN - on Lisinopril.  BP last visit was 138/68.  Will discuss change off the Lisinopril when she returns in 4 weeks - discussed today (9/2019) and will d/c Lisinopril and start Losartan 50mg once a day.  Return in 2 weeks for BP check.      Given chest pain last visit, will repeat stress Echo - ordered but not yet completed.  Will reschedule.    EKG on 8/15/2019 with NSR and possible LAE.  Stress Echo on " 6/3/2014 - 1. Good exercise tolerance for age.  2. No clinical, electrocardiographic, or echocardiographic evidence for ischemia at the workload achieved on treadmill testing.    Repeat on 2/13/2017 . Excellent exercise tolerance for age.  2. No clinical, electrocardiographic, or echocardiographic evidence for ischemia at the workload achieved on treadmill testing.    HLD - on Atorvastatin.  Lipids on 8/9/2019 with , TG 75, HDL 46, LDL 54.    -Endocrine - Pre-Diabetes  - A1C was 6.0 on 8/9/2019.  On Metformin twice a day, but only taking once a day.  Will change to once a day for now.    Microalbumin - UA in 5/2018 negative for protein.  Microalbumin normal in 8/2019.  Eye - 7/2019 - [place].  Normal.  Has Mild Cataracts.  Foot - done 8/2019.    Vitamin D Deficiency - 11 in 8/2019 - treated with high dose weekly D2.    -MSK - Polyarthralgias - Cervical DD - MRI of C-spine done 3/2019 - Cervical spondylosis with prominent anterior osteophytes.  Osteophyte/disc complexes causing moderate stenosis of the spinal canal and mild compression of the cord. No evidence of myelopathy is observed.  Multiple neural foramina stenosis as above.    Patient with complaints of generalized body aches - shoulder pain 1-2 months ago and s/p PT and improved.  Then with right inner thigh pain and seen at [facility] ED and [facility] - this has now resolved.  Then with right wrist pain - s/p EMG/NCS.  States she was told she had CTS and wrist improved with wrist brace.m  Now with 3 days or right Sternoclavicular joint pain.      Will order ESR, CRP, RF, DANIELLE, CPK (given statin) - all normal in 8/2019.  Will refer to Rheumatology given above and per patient wishes - has appointment on 10/4/2019.    She has a scheduled follow up with Neurosurgery at [facility], but wants to change to [facility].  Will refer to Back and Spine.    Aleve twice a day for now.  Monitor.    -GI -  We discussed colon cancer screening - colonoscopy done 3/8/2017 - Surgeon: Kaushal  Keon Irving MD; Location: [facility] GI LAB; Service: Gastroenterology.  Told to repeat in 10 years.    S/p Cholecystectomy in 2016.    -GYN - Last Pap was negative on 2/9/2018.    Last Mammo was BI-RADS 2 benign on 6/8/2018.  Repeat negative in 9/2019.  We discussed DXA screening for osteoporosis - DEXA normal on 7/10/2015.  Discussed repeat - normal in 9/2019.  Check Vitamin D - level 11 in 8/2019 and treated with high-dose D2 weekly.    -Psych - Depression - was on Wellbutrin, but off a year and stable.      Smoker - quit 1 year ago and smoked for 30 years.  LDCT Scan in 10/2018 - No clearly suspicious nodules identified.  BI-RADS Category 1 benign finding: No suspicious nodules. Less than 1 percent chance of malignancy. Routine follow-up in one year's time low-dose CT is recommended.      Needs follow up - done 9/2019 - Lung-RADS Category:  2 - Benign Appearance or Behavior - continue annual screening with LDCT in 12 months.    -HCM - We discussed Flu (8/30/2019) and Tdap (8/30/2019) vaccinations.  We discussed Shingles (waiting for Shingrix Availability - advised to get on a waiting list at pharmacy) and Pneumococcal (23 done in 1/2015 and 13 at age 65) vaccinations.     -Follow Up - 6 months

## 2019-09-27 ENCOUNTER — OFFICE VISIT (OUTPATIENT)
Dept: PRIMARY CARE CLINIC | Facility: CLINIC | Age: 64
End: 2019-09-27
Payer: COMMERCIAL

## 2019-09-27 VITALS
SYSTOLIC BLOOD PRESSURE: 134 MMHG | BODY MASS INDEX: 29.08 KG/M2 | HEART RATE: 60 BPM | WEIGHT: 158.06 LBS | HEIGHT: 62 IN | DIASTOLIC BLOOD PRESSURE: 78 MMHG

## 2019-09-27 DIAGNOSIS — R07.89 ATYPICAL CHEST PAIN: ICD-10-CM

## 2019-09-27 DIAGNOSIS — E78.5 HYPERLIPIDEMIA, UNSPECIFIED HYPERLIPIDEMIA TYPE: ICD-10-CM

## 2019-09-27 DIAGNOSIS — F32.A DEPRESSION, UNSPECIFIED DEPRESSION TYPE: ICD-10-CM

## 2019-09-27 DIAGNOSIS — M50.30 DEGENERATIVE DISC DISEASE, CERVICAL: ICD-10-CM

## 2019-09-27 DIAGNOSIS — K21.9 GASTROESOPHAGEAL REFLUX DISEASE, ESOPHAGITIS PRESENCE NOT SPECIFIED: ICD-10-CM

## 2019-09-27 DIAGNOSIS — I10 ESSENTIAL HYPERTENSION: Primary | ICD-10-CM

## 2019-09-27 DIAGNOSIS — F41.9 ANXIETY: ICD-10-CM

## 2019-09-27 DIAGNOSIS — Z12.11 COLON CANCER SCREENING: ICD-10-CM

## 2019-09-27 DIAGNOSIS — R73.03 PREDIABETES: ICD-10-CM

## 2019-09-27 DIAGNOSIS — Z87.891 HISTORY OF TOBACCO ABUSE: ICD-10-CM

## 2019-09-27 PROBLEM — E11.9 DIABETES MELLITUS: Status: RESOLVED | Noted: 2019-08-30 | Resolved: 2019-09-27

## 2019-09-27 PROCEDURE — 3078F DIAST BP <80 MM HG: CPT | Mod: CPTII,S$GLB,, | Performed by: INTERNAL MEDICINE

## 2019-09-27 PROCEDURE — 3008F PR BODY MASS INDEX (BMI) DOCUMENTED: ICD-10-PCS | Mod: CPTII,S$GLB,, | Performed by: INTERNAL MEDICINE

## 2019-09-27 PROCEDURE — 3075F PR MOST RECENT SYSTOLIC BLOOD PRESS GE 130-139MM HG: ICD-10-PCS | Mod: CPTII,S$GLB,, | Performed by: INTERNAL MEDICINE

## 2019-09-27 PROCEDURE — 99214 OFFICE O/P EST MOD 30 MIN: CPT | Mod: S$GLB,,, | Performed by: INTERNAL MEDICINE

## 2019-09-27 PROCEDURE — 99999 PR PBB SHADOW E&M-EST. PATIENT-LVL IV: CPT | Mod: PBBFAC,,, | Performed by: INTERNAL MEDICINE

## 2019-09-27 PROCEDURE — 3075F SYST BP GE 130 - 139MM HG: CPT | Mod: CPTII,S$GLB,, | Performed by: INTERNAL MEDICINE

## 2019-09-27 PROCEDURE — 3078F PR MOST RECENT DIASTOLIC BLOOD PRESSURE < 80 MM HG: ICD-10-PCS | Mod: CPTII,S$GLB,, | Performed by: INTERNAL MEDICINE

## 2019-09-27 PROCEDURE — 3008F BODY MASS INDEX DOCD: CPT | Mod: CPTII,S$GLB,, | Performed by: INTERNAL MEDICINE

## 2019-09-27 PROCEDURE — 99999 PR PBB SHADOW E&M-EST. PATIENT-LVL IV: ICD-10-PCS | Mod: PBBFAC,,, | Performed by: INTERNAL MEDICINE

## 2019-09-27 PROCEDURE — 99214 PR OFFICE/OUTPT VISIT, EST, LEVL IV, 30-39 MIN: ICD-10-PCS | Mod: S$GLB,,, | Performed by: INTERNAL MEDICINE

## 2019-09-27 RX ORDER — LOSARTAN POTASSIUM 50 MG/1
50 TABLET ORAL DAILY
Qty: 30 TABLET | Refills: 3 | Status: SHIPPED | OUTPATIENT
Start: 2019-09-27 | End: 2020-01-16 | Stop reason: SDUPTHER

## 2019-09-27 NOTE — PATIENT INSTRUCTIONS
Your weight and BMI are elevated today.  Target BMI is less than 25.  Please start or countinue diet changes and exercise.     Your blood pressure was good.  As discussed, we will stop the Lisinopril and start Losartan 50mg once a day.  Return in 2 weeks for a blood pressure check.  We will reschedule the Stress Echo.    I recommend getting on a waiting list at your local pharmacy for the Shingles vaccine (Shingrix) is interested.    Keep your Rheumatology appointment on 10/7/2019.  I will refer you to Back and Spine as requested.    Return in 6 months - sooner if needed.  Please come at least 15-20 minutes before your scheduled appointment time.      Losartan tablets  What is this medicine?  LOSARTAN (layton TERRELL tan) is used to treat high blood pressure and to reduce the risk of stroke in certain patients. This drug also slows the progression of kidney disease in patients with diabetes.  How should I use this medicine?  Take this medicine by mouth with a glass of water. Follow the directions on the prescription label. This medicine can be taken with or without food. Take your doses at regular intervals. Do not take your medicine more often than directed.  Talk to your pediatrician regarding the use of this medicine in children. Special care may be needed.  What side effects may I notice from receiving this medicine?  Side effects that you should report to your doctor or health care professional as soon as possible:  · confusion, dizziness, light headedness or fainting spells  · decreased amount of urine passed  · difficulty breathing or swallowing, hoarseness, or tightening of the throat  · fast or irregular heart beat, palpitations, or chest pain  · skin rash, itching  · swelling of your face, lips, tongue, hands, or feet  Side effects that usually do not require medical attention (report to your doctor or health care professional if they continue or are bothersome):  · cough  · decreased sexual function or  desire  · headache  · nasal congestion or stuffiness  · nausea or stomach pain  · sore or cramping muscles  What may interact with this medicine?  · blood pressure medicines  · diuretics, especially triamterene, spironolactone, or amiloride  · fluconazole  · NSAIDs, medicines for pain and inflammation, like ibuprofen or naproxen  · potassium salts or potassium supplements  · rifampin  What if I miss a dose?  If you miss a dose, take it as soon as you can. If it is almost time for your next dose, take only that dose. Do not take double or extra doses.  Where should I keep my medicine?  Keep out of the reach of children.  Store at room temperature between 15 and 30 degrees C (59 and 86 degrees F). Protect from light. Keep container tightly closed. Throw away any unused medicine after the expiration date.  What should I tell my health care provider before I take this medicine?  They need to know if you have any of these conditions:  · heart failure  · kidney or liver disease  · an unusual or allergic reaction to losartan, other medicines, foods, dyes, or preservatives  · pregnant or trying to get pregnant  · breast-feeding  What should I watch for while using this medicine?  Visit your doctor or health care professional for regular checks on your progress. Check your blood pressure as directed. Ask your doctor or health care professional what your blood pressure should be and when you should contact him or her. Call your doctor or health care professional if you notice an irregular or fast heart beat.  Women should inform their doctor if they wish to become pregnant or think they might be pregnant. There is a potential for serious side effects to an unborn child, particularly in the second or third trimester. Talk to your health care professional or pharmacist for more information.  You may get drowsy or dizzy. Do not drive, use machinery, or do anything that needs mental alertness until you know how this drug affects  you. Do not stand or sit up quickly, especially if you are an older patient. This reduces the risk of dizzy or fainting spells. Alcohol can make you more drowsy and dizzy. Avoid alcoholic drinks.  Avoid salt substitutes unless you are told otherwise by your doctor or health care professional.  Do not treat yourself for coughs, colds, or pain while you are taking this medicine without asking your doctor or health care professional for advice. Some ingredients may increase your blood pressure.  NOTE:This sheet is a summary. It may not cover all possible information. If you have questions about this medicine, talk to your doctor, pharmacist, or health care provider. Copyright© 2017 [employer]

## 2019-09-30 RX ORDER — ERGOCALCIFEROL 1.25 MG/1
50000 CAPSULE ORAL
Qty: 12 CAPSULE | Refills: 1
Start: 2019-09-30 | End: 2019-10-08 | Stop reason: SDUPTHER

## 2019-10-02 ENCOUNTER — PATIENT OUTREACH (OUTPATIENT)
Dept: ADMINISTRATIVE | Facility: OTHER | Age: 64
End: 2019-10-02

## 2019-10-04 ENCOUNTER — OFFICE VISIT (OUTPATIENT)
Dept: RHEUMATOLOGY | Facility: CLINIC | Age: 64
End: 2019-10-04
Attending: INTERNAL MEDICINE
Payer: COMMERCIAL

## 2019-10-04 ENCOUNTER — HOSPITAL ENCOUNTER (OUTPATIENT)
Dept: RADIOLOGY | Facility: HOSPITAL | Age: 64
Discharge: HOME | End: 2019-10-04
Attending: INTERNAL MEDICINE
Payer: COMMERCIAL

## 2019-10-04 ENCOUNTER — OFFICE VISIT (OUTPATIENT)
Dept: SPINE | Facility: CLINIC | Age: 64
End: 2019-10-04
Payer: COMMERCIAL

## 2019-10-04 VITALS
WEIGHT: 159.63 LBS | BODY MASS INDEX: 29.38 KG/M2 | SYSTOLIC BLOOD PRESSURE: 126 MMHG | DIASTOLIC BLOOD PRESSURE: 62 MMHG | HEART RATE: 65 BPM | HEIGHT: 62 IN

## 2019-10-04 VITALS
HEART RATE: 65 BPM | BODY MASS INDEX: 28.72 KG/M2 | WEIGHT: 156.06 LBS | DIASTOLIC BLOOD PRESSURE: 61 MMHG | HEIGHT: 62 IN | SYSTOLIC BLOOD PRESSURE: 125 MMHG

## 2019-10-04 DIAGNOSIS — M54.12 CERVICAL RADICULOPATHY: ICD-10-CM

## 2019-10-04 DIAGNOSIS — M47.812 SPONDYLOSIS OF CERVICAL REGION WITHOUT MYELOPATHY OR RADICULOPATHY: Primary | ICD-10-CM

## 2019-10-04 DIAGNOSIS — M25.50 POLYARTHRALGIA: ICD-10-CM

## 2019-10-04 DIAGNOSIS — M75.41 ROTATOR CUFF IMPINGEMENT SYNDROME OF RIGHT SHOULDER: ICD-10-CM

## 2019-10-04 DIAGNOSIS — M75.81 TENDINITIS OF RIGHT ROTATOR CUFF: ICD-10-CM

## 2019-10-04 DIAGNOSIS — M25.50 POLYARTHRALGIA: Primary | ICD-10-CM

## 2019-10-04 DIAGNOSIS — M62.838 NECK MUSCLE SPASM: ICD-10-CM

## 2019-10-04 PROCEDURE — 99205 OFFICE O/P NEW HI 60 MIN: CPT | Mod: S$GLB,,, | Performed by: INTERNAL MEDICINE

## 2019-10-04 PROCEDURE — 73130 XR HAND COMPLETE 3 VIEWS BILATERAL: ICD-10-PCS | Mod: 26,,, | Performed by: RADIOLOGY

## 2019-10-04 PROCEDURE — 73110 X-RAY EXAM OF WRIST: CPT | Mod: 26,,, | Performed by: RADIOLOGY

## 2019-10-04 PROCEDURE — 99999 PR PBB SHADOW E&M-EST. PATIENT-LVL III: CPT | Mod: PBBFAC,,, | Performed by: INTERNAL MEDICINE

## 2019-10-04 PROCEDURE — 99999 PR PBB SHADOW E&M-EST. PATIENT-LVL III: ICD-10-PCS | Mod: PBBFAC,,, | Performed by: INTERNAL MEDICINE

## 2019-10-04 PROCEDURE — 99999 PR PBB SHADOW E&M-EST. PATIENT-LVL III: CPT | Mod: PBBFAC,,, | Performed by: PHYSICAL MEDICINE & REHABILITATION

## 2019-10-04 PROCEDURE — 73030 X-RAY EXAM OF SHOULDER: CPT | Mod: TC,50

## 2019-10-04 PROCEDURE — 3008F PR BODY MASS INDEX (BMI) DOCUMENTED: ICD-10-PCS | Mod: CPTII,S$GLB,, | Performed by: PHYSICAL MEDICINE & REHABILITATION

## 2019-10-04 PROCEDURE — 73110 XR WRIST COMPLETE 3 VIEWS BILATERAL: ICD-10-PCS | Mod: 26,,, | Performed by: RADIOLOGY

## 2019-10-04 PROCEDURE — 99204 OFFICE O/P NEW MOD 45 MIN: CPT | Mod: S$GLB,,, | Performed by: PHYSICAL MEDICINE & REHABILITATION

## 2019-10-04 PROCEDURE — 3008F BODY MASS INDEX DOCD: CPT | Mod: CPTII,S$GLB,, | Performed by: INTERNAL MEDICINE

## 2019-10-04 PROCEDURE — 99205 PR OFFICE/OUTPT VISIT, NEW, LEVL V, 60-74 MIN: ICD-10-PCS | Mod: S$GLB,,, | Performed by: INTERNAL MEDICINE

## 2019-10-04 PROCEDURE — 3008F PR BODY MASS INDEX (BMI) DOCUMENTED: ICD-10-PCS | Mod: CPTII,S$GLB,, | Performed by: INTERNAL MEDICINE

## 2019-10-04 PROCEDURE — 73110 X-RAY EXAM OF WRIST: CPT | Mod: 50,TC

## 2019-10-04 PROCEDURE — 73130 X-RAY EXAM OF HAND: CPT | Mod: 26,,, | Performed by: RADIOLOGY

## 2019-10-04 PROCEDURE — 99999 PR PBB SHADOW E&M-EST. PATIENT-LVL III: ICD-10-PCS | Mod: PBBFAC,,, | Performed by: PHYSICAL MEDICINE & REHABILITATION

## 2019-10-04 PROCEDURE — 73130 X-RAY EXAM OF HAND: CPT | Mod: 50,TC

## 2019-10-04 PROCEDURE — 73030 X-RAY EXAM OF SHOULDER: CPT | Mod: 26,,, | Performed by: RADIOLOGY

## 2019-10-04 PROCEDURE — 99204 PR OFFICE/OUTPT VISIT, NEW, LEVL IV, 45-59 MIN: ICD-10-PCS | Mod: S$GLB,,, | Performed by: PHYSICAL MEDICINE & REHABILITATION

## 2019-10-04 PROCEDURE — 3008F BODY MASS INDEX DOCD: CPT | Mod: CPTII,S$GLB,, | Performed by: PHYSICAL MEDICINE & REHABILITATION

## 2019-10-04 PROCEDURE — 73030 XR SHOULDER COMPLETE 2 OR MORE VIEWS BILATERAL: ICD-10-PCS | Mod: 26,,, | Performed by: RADIOLOGY

## 2019-10-04 NOTE — LETTER
October 4, 2019      Marcus Simons MD  [address]   [address]  [facility] [address]           [facility] [place] [address] [facility] [address]  [address], [address]  [facility] [address]  Phone: [phone]  Fax: [phone]          Patient: Leticia Brown   MR Number: [id number]   YOB: 1955   Date of Visit: 10/4/2019       Dear Dr. Marcus Simons:    Thank you for referring Leticia Brown to me for evaluation. Attached you will find relevant portions of my assessment and plan of care.    If you have questions, please do not hesitate to call me. I look forward to following Leticia Brown along with you.    Sincerely,    Jf Akbar, DO    Enclosure  CC:  No Recipients    If you would like to receive this communication electronically, please contact [email or web address] or [phone] to request more information on [employer] Link access.    For providers and/or their staff who would like to refer a patient to [facility], please contact us through our one-stop-shop provider referral line, [facility], at [phone].    If you feel you have received this communication in error or would no longer like to receive these types of communications, please e-mail [email or web address]

## 2019-10-04 NOTE — LETTER
October 7, 2019      Marcus Simons MD  [address] [address]           [facility]  [address]  [place] [address]  Phone: [phone]  Fax: [phone]          Patient: Leticia Brown   MR Number: [id number]   YOB: 1955   Date of Visit: 10/4/2019       Dear Dr. Marcus Simons:    Thank you for referring Leticia Brown to me for evaluation. Attached you will find relevant portions of my assessment and plan of care.    If you have questions, please do not hesitate to call me. I look forward to following Leticia Brown along with you.    Sincerely,    Ivone Rodriguez MD    Enclosure  CC:  No Recipients    If you would like to receive this communication electronically, please contact [email or web address] or [phone] to request more information on [employer] Link access.    For providers and/or their staff who would like to refer a patient to [facility], please contact us through our one-stop-shop provider referral line, [facility], at [phone].    If you feel you have received this communication in error or would no longer like to receive these types of communications, please e-mail [email or web address]

## 2019-10-04 NOTE — PROGRESS NOTES
Subjective:       Patient ID: Leticia Brown is a 64 y.o. female.    Chief Complaint: Disease Management    HPI 64 year old F with PMH of HL (on lipitor for a year), HTN, smoking, pre-diabetic, hepatitis C antibody + here for evaluation.   Reports that she had pain in right inner thigh around august.   She reports that she took tylenol arthritis for 5 days and then it went away. She then had chest pain and pain in right arm and now its goes from one location to other. Reports she has pain in neck and right shoulder.   She also has pain in right sternoclavicular joint, both hands. Pain level now is 3/10 but it can 10/10. Pain can wake her up at night.Reports pain is achy. Gabapentin improves her pain. She takes gabapentin 300mg po qhs.   Denies joint swelling. Denies joint stiffness.  Denies limitations due to her pain.  Denies any rashes, oral ulcers, fevers, hair loss, changes in weight, or night sweats.  She smokes 3 to  4 cigarettes per week and has smoked since her teens. Denies weakness.      family hx: mother: RA     Review of Systems   Constitutional: Negative for activity change, appetite change, chills, diaphoresis, fatigue, fever and unexpected weight change.   HENT: Negative for congestion, ear discharge, ear pain, facial swelling, mouth sores, sinus pressure, sneezing, sore throat, tinnitus and trouble swallowing.    Eyes: Negative for photophobia, pain, discharge, redness, itching and visual disturbance.   Respiratory: Negative for apnea, cough, chest tightness, shortness of breath, wheezing and stridor.    Cardiovascular: Negative for chest pain and leg swelling.   Gastrointestinal: Negative for abdominal distention, abdominal pain, anal bleeding, blood in stool, constipation, diarrhea and nausea.   Endocrine: Negative for cold intolerance and heat intolerance.   Genitourinary: Negative for difficulty urinating, dysuria and genital sores.   Musculoskeletal: Positive for arthralgias and joint  "swelling. Negative for back pain, gait problem, neck pain and neck stiffness.   Skin: Negative for color change, pallor, rash and wound.   Neurological: Negative for dizziness, seizures, light-headedness, numbness and headaches.   Hematological: Negative for adenopathy. Does not bruise/bleed easily.   Psychiatric/Behavioral: Negative for sleep disturbance. The patient is not nervous/anxious.                Objective:   /62   Pulse 65   Ht 5' 2" (1.575 m)   Wt 72.4 kg (159 lb 9.8 oz)   BMI 29.19 kg/m²      Physical Exam     No data to display       Electronically Signed By: Sudeep Moffett MD 5/16/2018 9:35 AM CDT   Result Narrative   EXAM: LCMC XR SHOULDER 2+ VW LEFT, LCMC XR HAND 3+ VW RIGHT, LCMC XR HAND 3+ VW LEFT, LCMC XR SHOULDER 2+ VW RIGHT  HISTORY:  DIAGNOSIS:M25.511   Chronic pain of both shoulders (accession [id number]), M79.641   Bilateral hand pain (accession [id number]), M79.641   Bilateral hand pain (accession [id number]), M25.511   Chronic pain of both shoulders (accession [id number])  REASON FOR STUDY:Bilateral Shoulder Pain  ADDITIONAL HISTORY: None.    COMPARISON:None provided  VIEWS:    FINDINGS:  Left shoulder: 3 views demonstrate normal bone alignment and mineralization. The glenohumeral joint is normal. There are minimal degenerative changes in the acromioclavicular joint. There is no acute fracture or subluxation. The visualized left upper lobe and the left the upper lung are unremarkable.    Right shoulder: 3 views demonstrate normal bone alignment and mineralization. There are mild DJD changes in the acromioclavicular joint. There is no acute fracture or subluxation. The visualized right upper rib in the right upper lungs are unremarkable.    Left hand: 3 views. The bone alignment is anatomical. There is no periarticular erosions or calcification. The soft tissues unremarkable. Mild subcortical cystic changes are noticed along the proximal 3rd interphalangeal joint.    Right " hand: 3 views demonstrate normal bone alignment and mineralization. There is no acute fracture or subluxation. Mild interphalangeal joint degenerative changes are noticed without erosions.               MRI cervical *(3/2019) done at [facility]:IMPRESSION:   Cervical spondylosis with prominent anterior osteophytes.    Osteophyte/disc complexes causing moderate stenosis of the spinal canal and mild compression of the cord. No evidence of myelopathy is observed.    Multiple neural foramina stenosis as above.    Electronically Signed By: Seth Kimball MD 3/12/2019 12         Assessment:     64 year old F with PMH of HL (on lipitor for a year), HTN, smoking, pre-diabetic, hepatitis C antibody + here for evaluation of arthralias.  No diagnosis found.    Will work her up for inflammatory arthritis but have low suspicion. Suspect her discomfort is from cervical radiculopathy and DJD.    Plan:       Problem List Items Addressed This Visit     None      *  labs  xrays

## 2019-10-04 NOTE — PROGRESS NOTES
[facility] Initial Visit Note    Chief Complaint: neck pain    HPI: Leticia Brown is a 64 y.o. female who presents for evaluation of neck pain, referred by Dr. Marcus Simons.    Pain is located at right anterior neck, with radiation to the right shoulder.    It has been present for 1 years, and began without inciting injury.  The pain is described as achey    It is aggravated by turning/rotation  It is alleviated by PT, NSAIDs, gabapentin    (--) numbness/tingling  (+) weakness- difficulty opening jars on the right  (--) bowel/bladder incontinence, + chronic bladder urgency (takes oxybutynin)  (--) recent fevers    Therapeutic interventions thus far:  Medications: NSAID, gabapentin  Bracing/Modalities: ice (helps a little), heat (aggravated the pain)  Therapy: PT (did help significantly, 60%)- hasn't done HEP since  Injections: none  Surgery: none      PMH:   Past Medical History:   Diagnosis Date    Diabetes mellitus     High cholesterol     Hypertension                  Past Surgical History:   Procedure Laterality Date    BREAST BIOPSY         Family Hx:   Family History   Problem Relation Age of Onset    No Known Problems Mother     No Known Problems Father     No Known Problems Sister     No Known Problems Brother     No Known Problems Daughter     No Known Problems Son     No Known Problems Maternal Aunt     No Known Problems Maternal Uncle     No Known Problems Paternal Aunt     No Known Problems Paternal Uncle     No Known Problems Maternal Grandmother     No Known Problems Maternal Grandfather     No Known Problems Paternal Grandmother     No Known Problems Paternal Grandfather         Social History:   Social History     Socioeconomic History    Marital status:      Spouse name: Not on file    Number of children: Not on file    Years of education: Not on file    Highest education level: Not on file   Occupational History    Not on file   Social Needs     Financial resource strain: Not on file    Food insecurity:     Worry: Not on file     Inability: Not on file    Transportation needs:     Medical: Not on file     Non-medical: Not on file   Tobacco Use    Smoking status: Current Every Day Smoker     Packs/day: 0.25     Types: Cigarettes    Smokeless tobacco: Never Used   Substance and Sexual Activity    Alcohol use: No    Drug use: No    Sexual activity: Never   Lifestyle    Physical activity:     Days per week: Not on file     Minutes per session: Not on file    Stress: Not on file   Relationships    Social connections:     Talks on phone: Not on file     Gets together: Not on file     Attends Restoration service: Not on file     Active member of club or organization: Not on file     Attends meetings of clubs or organizations: Not on file     Relationship status: Not on file   Other Topics Concern    Not on file   Social History Narrative    Not on file       Allergies: Review of patient's allergies indicates:  No Known Allergies    Current Meds:   Current Outpatient Medications   Medication Sig Dispense Refill    aspirin (ECOTRIN) 81 MG EC tablet Take 81 mg by mouth once daily.      atorvastatin (LIPITOR) 40 MG tablet Take 40 mg by mouth.      ergocalciferol (ERGOCALCIFEROL) 50,000 unit Cap Take 1 capsule (50,000 Units total) by mouth every 7 days. 12 capsule 1    gabapentin (NEURONTIN) 300 MG capsule Take 300 mg by mouth.      ibuprofen (ADVIL,MOTRIN) 600 MG tablet TK 1 T PO Q 6 H PRN P FOR UP TO 10 DAYS  0    loratadine (CLARITIN) 10 mg tablet Take 10 mg by mouth.      losartan (COZAAR) 50 MG tablet Take 1 tablet (50 mg total) by mouth once daily. 30 tablet 3    metFORMIN (GLUCOPHAGE) 500 MG tablet Take 1 tablet (500 mg total) by mouth daily with breakfast. 30 tablet 3    methocarbamol (ROBAXIN) 500 MG Tab TK 1 T PO TID PRN MUSCLE SPASMS  0    oxybutynin (DITROPAN XL) 10 MG 24 hr tablet Take 1 tablet (10 mg total) by mouth once daily. 30  "tablet 3     No current facility-administered medications for this visit.        Review of Systems:  11 Point ROS (constitutional,HEENT,Resp,CV,GI,,Skin,Endo/Heme/Allergy,Psych,Neuro,MSK) negative aside from what is mentioned in HPI above.      Physical Exam:  /61   Pulse 65   Ht 5' 2" (1.575 m)   Wt 70.8 kg (156 lb 1.4 oz)   BMI 28.55 kg/m²   General/Constitutional: Awake, in no acute distress  Psych: Normal affect  CV: Warm, well perfused extremities  Resp: Normal rate, unlabored breathing  Skin: No erythema or rash on exposed skin  Lymph: No lympedema  Cervical Spine Exam:  Inspection: Shoulder girdles are asymmetric,  Cervical lordotic curvature is decreased.  Palpation: There was not tenderness to palpation.  Right anterior strap muscles are tight  ROM: ROM was Moderately decreased.  There was pain with cervical rotation in anterior neck    Provocative tests:   Spurling's was Negative but does cause anterior neck pain without radiculopathy    Romberg negative  Neurovascular test:  Hands are warm and well perfused.    Reflexes are normal and symmetric in the upper extremities.  (--) Wiggins   Sensation is normal to light touch in all dermatomes and peripheral nerve distributions in the upper extremities  Motor strength is 5/5 in the bilateral shoulder abductors, elbow flexors, elbow extensors, wrist extensors, and hand   Right Shoulder Exam:  Inspection: Shoulder girdles are asymmetric.   Palpation: There was tenderness to palpation over subdeltoid bursa  ROM: ROM was Slightly decreased.  There was pain with ROM.   Provocative tests:   (+) Hawkin-Jose R- concordant shoulder pain  (+) Neers- concordant shoulder pain  (--) Yurgesons  (--) Scarf  (+) Empty Can  (--) Hornblowers  (+) Belly Press    Labs:  Sodium   Date Value Ref Range Status   08/15/2019 142 136 - 145 mmol/L Final     Potassium   Date Value Ref Range Status   08/15/2019 4.1 3.5 - 5.1 mmol/L Final     Chloride   Date Value Ref Range " Status   08/15/2019 108 95 - 110 mmol/L Final     CO2   Date Value Ref Range Status   08/15/2019 25 23 - 29 mmol/L Final     Glucose   Date Value Ref Range Status   08/15/2019 89 70 - 110 mg/dL Final     BUN, Bld   Date Value Ref Range Status   08/15/2019 18 8 - 23 mg/dL Final     Creatinine   Date Value Ref Range Status   08/15/2019 0.8 0.5 - 1.4 mg/dL Final     Calcium   Date Value Ref Range Status   08/15/2019 9.6 8.7 - 10.5 mg/dL Final     Anion Gap   Date Value Ref Range Status   08/15/2019 9 8 - 16 mmol/L Final     eGFR if    Date Value Ref Range Status   08/15/2019 >60 >60 mL/min/1.73 m^2 Final     eGFR if non    Date Value Ref Range Status   08/15/2019 >60 >60 mL/min/1.73 m^2 Final     Comment:     Calculation used to obtain the estimated glomerular filtration  rate (eGFR) is the CKD-EPI equation.        Lab Results   Component Value Date    WBC 9.71 08/15/2019    HGB 12.9 08/15/2019    HCT 39.4 08/15/2019    MCV 91 08/15/2019     08/15/2019     Lab Results   Component Value Date    CRP 3.7 08/30/2019     Lab Results   Component Value Date    SEDRATE 35 08/30/2019     No results found for: LABA1C, HGBA1C      Imaging: I personally reviewed imaging today  MRI C spine (3/12/2019 from [facility]):  There is a straightening of the lordotic curvature. The alignment is normal.  Degenerative changes with prominent anterior osteophytes from C3-C4 through C6-C7, uncovertebral osteophytes causing moderate spinal canal stenosis and mild compression of the cord at C3-C4, C4-C5, and C6-C7.  Small central disc protrusion at T2-T3, contacting spinal cord.  The signal intensity of the cord remains within normal limits.  There is right neural foramina stenosis at C2-C3 secondary to hypertrophy of the zygapophyseal joint.  Bilateral neural foramina stenosis at C3-C4, C4-C5, C5-C6, and C6-C7.  The craniocervical junction is unremarkable.  Soft tissues, without significant findings.   XR  Bilateral shoulder (10/4/2019): Mild DJD    Impression: Leticia Brown is a 64 y.o. female who presents with:   1. Chronic right anterior neck pain without radiculopathy.  MRI done at [facility] showed multilevel degenerative changes (anterior osteophytes, facet artopathy) with mild cord compression at C3-4, C4-5, C6-7, and multilevel bilateral foraminal stenosis.  She has no symptoms of spinal cord compression and no signs on physical exam.  Her anterior strap muscles are moderately tight on the right.      2. Right shoulder pain- exam consistent with rotator cuff tendonitis with impingement.  Specifically, the subscap and supraspinatus are painful to activate.  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  I think this is the most likely cause of her shoulder pain.  However, a C4 radiculopathy is also possible    Plan:   - She just completed PT 1 month ago at [facility] and found it very helpful.  She feels comfortable doing HEP without refresher course, so I encouraged her to do HEP daily  - Currently taking gabapentin 300mg QHS and tylenol for pain.  She tried to uptitrate the gabapentin, but got very sleepy and settled back to QHS dosing only.  I recommend to keep her current pharmacotherapy the same for this reason.      - Heat or ice PRN ok  - Discussed the mild spinal cord compression on MRI in the context of no signs/symptoms of myleopathy.  Discussed that I could refer her to a spine surgeon to discuss decompression, but she prefers to try the conservative management, which is reasonable.   Discussed warning signs and symptoms of cord impingement, and that she should seek immediate medical attention if this should occur.  Discussed that she is at higher risk for cord injury in an accident or fall as well.    - Discussed subacromial bursa injection as next step for the shoulder if the HEP does not help.    - Follow up with me in 3 months    Jf Akbar, DO  Physical  Medicine and Rehabilitation

## 2019-10-07 ENCOUNTER — PATIENT MESSAGE (OUTPATIENT)
Dept: PRIMARY CARE CLINIC | Facility: CLINIC | Age: 64
End: 2019-10-07

## 2019-10-07 ENCOUNTER — PATIENT MESSAGE (OUTPATIENT)
Dept: RHEUMATOLOGY | Facility: CLINIC | Age: 64
End: 2019-10-07

## 2019-10-08 ENCOUNTER — PATIENT MESSAGE (OUTPATIENT)
Dept: RHEUMATOLOGY | Facility: CLINIC | Age: 64
End: 2019-10-08

## 2019-10-08 DIAGNOSIS — M25.50 POLYARTHRALGIA: Primary | ICD-10-CM

## 2019-10-08 RX ORDER — ERGOCALCIFEROL 1.25 MG/1
50000 CAPSULE ORAL
Qty: 12 CAPSULE | Refills: 1 | Status: SHIPPED | OUTPATIENT
Start: 2019-10-08 | End: 2020-01-16 | Stop reason: SDUPTHER

## 2019-10-18 ENCOUNTER — HOSPITAL ENCOUNTER (OUTPATIENT)
Dept: CARDIOLOGY | Facility: OTHER | Age: 64
Discharge: HOME | End: 2019-10-18
Attending: INTERNAL MEDICINE
Payer: COMMERCIAL

## 2019-10-18 ENCOUNTER — CLINICAL SUPPORT (OUTPATIENT)
Dept: PRIMARY CARE CLINIC | Facility: CLINIC | Age: 64
End: 2019-10-18
Payer: COMMERCIAL

## 2019-10-18 VITALS — OXYGEN SATURATION: 98 % | SYSTOLIC BLOOD PRESSURE: 130 MMHG | HEART RATE: 86 BPM | DIASTOLIC BLOOD PRESSURE: 78 MMHG

## 2019-10-18 DIAGNOSIS — R07.89 ATYPICAL CHEST PAIN: ICD-10-CM

## 2019-10-18 DIAGNOSIS — I10 ESSENTIAL HYPERTENSION: ICD-10-CM

## 2019-10-18 PROCEDURE — 99999 PR PBB SHADOW E&M-EST. PATIENT-LVL III: CPT | Mod: PBBFAC,,,

## 2019-10-18 PROCEDURE — 99999 PR PBB SHADOW E&M-EST. PATIENT-LVL III: ICD-10-PCS | Mod: PBBFAC,,,

## 2019-10-18 NOTE — PROGRESS NOTES
Leticia Brown 64 y.o. female is here today for Blood Pressure check.   History of HTN yes.    Review of patient's allergies indicates:  No Known Allergies  Creatinine   Date Value Ref Range Status   08/15/2019 0.8 0.5 - 1.4 mg/dL Final     Sodium   Date Value Ref Range Status   08/15/2019 142 136 - 145 mmol/L Final     Potassium   Date Value Ref Range Status   08/15/2019 4.1 3.5 - 5.1 mmol/L Final   ]  Patient verifies taking blood pressure medications on a regular basis at the same time of the day.     Current Outpatient Medications:     losartan (COZAAR) 50 MG tablet, Take 1 tablet (50 mg total) by mouth once daily., Disp: 30 tablet, Rfl: 3    aspirin (ECOTRIN) 81 MG EC tablet, Take 81 mg by mouth once daily., Disp: , Rfl:     atorvastatin (LIPITOR) 40 MG tablet, Take 40 mg by mouth., Disp: , Rfl:     ergocalciferol (ERGOCALCIFEROL) 50,000 unit Cap, Take 1 capsule (50,000 Units total) by mouth every 7 days., Disp: 12 capsule, Rfl: 1    gabapentin (NEURONTIN) 300 MG capsule, Take 300 mg by mouth., Disp: , Rfl:     ibuprofen (ADVIL,MOTRIN) 600 MG tablet, TK 1 T PO Q 6 H PRN P FOR UP TO 10 DAYS, Disp: , Rfl: 0    loratadine (CLARITIN) 10 mg tablet, Take 10 mg by mouth., Disp: , Rfl:     metFORMIN (GLUCOPHAGE) 500 MG tablet, Take 1 tablet (500 mg total) by mouth daily with breakfast., Disp: 30 tablet, Rfl: 3    methocarbamol (ROBAXIN) 500 MG Tab, TK 1 T PO TID PRN MUSCLE SPASMS, Disp: , Rfl: 0    oxybutynin (DITROPAN XL) 10 MG 24 hr tablet, Take 1 tablet (10 mg total) by mouth once daily., Disp: 30 tablet, Rfl: 3  Does patient have record of home blood pressure readings no. Readings have been averaging N / A.   Last dose of blood pressure medication was taken at 7:30 am.  Patient is asymptomatic.   Denies any c/o headaches, chest pains, dizziness, sob, blurred vision, leg swelling , numbness or tingling to extremities.    BP: 130/78 , Pulse: 86 .    Dr. Simons notified.

## 2019-11-05 ENCOUNTER — PATIENT MESSAGE (OUTPATIENT)
Dept: INTERNAL MEDICINE | Facility: CLINIC | Age: 64
End: 2019-11-05

## 2019-11-05 ENCOUNTER — HOSPITAL ENCOUNTER (OUTPATIENT)
Dept: CARDIOLOGY | Facility: CLINIC | Age: 64
Discharge: HOME | End: 2019-11-05
Attending: INTERNAL MEDICINE
Payer: COMMERCIAL

## 2019-11-05 VITALS — BODY MASS INDEX: 28.71 KG/M2 | WEIGHT: 156 LBS | HEIGHT: 62 IN

## 2019-11-05 LAB
ASCENDING AORTA: 3.02 CM
BSA FOR ECHO PROCEDURE: 1.76 M2
CV ECHO LV RWT: 0.35 CM
CV STRESS BASE HR: 60 BPM
DIASTOLIC BLOOD PRESSURE: 52 MMHG
DOP CALC LVOT AREA: 3 CM2
DOP CALC LVOT DIAMETER: 1.95 CM
DOP CALC LVOT PEAK VEL: 1.02 M/S
DOP CALC LVOT STROKE VOLUME: 63.76 CM3
DOP CALCLVOT PEAK VEL VTI: 21.36 CM
E WAVE DECELERATION TIME: 170.06 MSEC
E/A RATIO: 1.06
E/E' RATIO: 8.29 M/S
ECHO LV POSTERIOR WALL: 0.78 CM (ref 0.6–1.1)
FRACTIONAL SHORTENING: 32 % (ref 28–44)
INTERVENTRICULAR SEPTUM: 0.77 CM (ref 0.6–1.1)
IVRT: 0.09 MSEC
LA MAJOR: 4.43 CM
LA MINOR: 4.26 CM
LA WIDTH: 3.7 CM
LEFT ATRIUM SIZE: 3.82 CM
LEFT ATRIUM VOLUME INDEX: 30.3 ML/M2
LEFT ATRIUM VOLUME: 52.18 CM3
LEFT INTERNAL DIMENSION IN SYSTOLE: 3.01 CM (ref 2.1–4)
LEFT VENTRICLE DIASTOLIC VOLUME INDEX: 52.39 ML/M2
LEFT VENTRICLE DIASTOLIC VOLUME: 90.11 ML
LEFT VENTRICLE MASS INDEX: 62 G/M2
LEFT VENTRICLE SYSTOLIC VOLUME INDEX: 20.5 ML/M2
LEFT VENTRICLE SYSTOLIC VOLUME: 35.19 ML
LEFT VENTRICULAR INTERNAL DIMENSION IN DIASTOLE: 4.45 CM (ref 3.5–6)
LEFT VENTRICULAR MASS: 107 G
LV LATERAL E/E' RATIO: 7.91 M/S
LV SEPTAL E/E' RATIO: 8.7 M/S
MV PEAK A VEL: 0.82 M/S
MV PEAK E VEL: 0.87 M/S
OHS CV CPX 1 MINUTE RECOVERY HEART RATE: 83 BPM
OHS CV CPX 85 PERCENT MAX PREDICTED HEART RATE MALE: 127
OHS CV CPX ESTIMATED METS: 10
OHS CV CPX MAX PREDICTED HEART RATE: 150
OHS CV CPX PATIENT IS FEMALE: 1
OHS CV CPX PATIENT IS MALE: 0
OHS CV CPX PEAK DIASTOLIC BLOOD PRESSURE: 61 MMHG
OHS CV CPX PEAK HEAR RATE: 131 BPM
OHS CV CPX PEAK RATE PRESSURE PRODUCT: NORMAL
OHS CV CPX PEAK SYSTOLIC BLOOD PRESSURE: 166 MMHG
OHS CV CPX PERCENT MAX PREDICTED HEART RATE ACHIEVED: 88
OHS CV CPX RATE PRESSURE PRODUCT PRESENTING: 7080
PULM VEIN S/D RATIO: 1.25
PV PEAK D VEL: 0.4 M/S
PV PEAK S VEL: 0.5 M/S
RA MAJOR: 4.13 CM
RA PRESSURE: 3 MMHG
RA WIDTH: 2.76 CM
RIGHT VENTRICULAR END-DIASTOLIC DIMENSION: 2.71 CM
RV TISSUE DOPPLER FREE WALL SYSTOLIC VELOCITY 1 (APICAL 4 CHAMBER VIEW): 11.89 CM/S
SINUS: 3.23 CM
STJ: 3.05 CM
STRESS ECHO POST EXERCISE DUR MIN: 6 MINUTES
STRESS ECHO POST EXERCISE DUR SEC: 16 SECONDS
SYSTOLIC BLOOD PRESSURE: 118 MMHG
TDI LATERAL: 0.11 M/S
TDI SEPTAL: 0.1 M/S
TDI: 0.11 M/S
TRICUSPID ANNULAR PLANE SYSTOLIC EXCURSION: 1.65 CM

## 2019-11-05 PROCEDURE — 93351 STRESS TTE COMPLETE: CPT | Mod: S$GLB,,, | Performed by: INTERNAL MEDICINE

## 2019-11-05 PROCEDURE — 93351 STRESS ECHO (CUPID ONLY): ICD-10-PCS | Mod: S$GLB,,, | Performed by: INTERNAL MEDICINE

## 2019-11-19 ENCOUNTER — PATIENT MESSAGE (OUTPATIENT)
Dept: INTERNAL MEDICINE | Facility: CLINIC | Age: 64
End: 2019-11-19

## 2019-11-19 RX ORDER — ATORVASTATIN CALCIUM 40 MG/1
40 TABLET, FILM COATED ORAL DAILY
Qty: 90 TABLET | Refills: 1 | Status: SHIPPED | OUTPATIENT
Start: 2019-11-19 | End: 2020-01-16 | Stop reason: SDUPTHER

## 2019-11-22 ENCOUNTER — PATIENT MESSAGE (OUTPATIENT)
Dept: ADMINISTRATIVE | Facility: HOSPITAL | Age: 64
End: 2019-11-22

## 2019-12-05 ENCOUNTER — PATIENT MESSAGE (OUTPATIENT)
Dept: PRIMARY CARE CLINIC | Facility: CLINIC | Age: 64
End: 2019-12-05

## 2019-12-05 NOTE — TELEPHONE ENCOUNTER
lvm to call us back or reply on portal    MD advise to schedule a appt soon with him or if needed  A faster evaluation

## 2019-12-06 ENCOUNTER — PATIENT MESSAGE (OUTPATIENT)
Dept: PRIMARY CARE CLINIC | Facility: CLINIC | Age: 64
End: 2019-12-06

## 2019-12-09 ENCOUNTER — PATIENT MESSAGE (OUTPATIENT)
Dept: PRIMARY CARE CLINIC | Facility: CLINIC | Age: 64
End: 2019-12-09

## 2019-12-18 NOTE — PROGRESS NOTES
Subjective:       Patient ID: Leticia Brown is a 64 y.o. female with a PMH significant for T2D, HLD, HTN who was seen initially by me on 8/30/2019 and for follow up on 9/27/2019.    Patient is followed by Dr. Dudley at [facility] and last seen 8/9/2019.    Chief Complaint: Mass (right neck) and GI Problem    HPI     Patient with complaints of pain in her right clavicle (sternoclavicular joint) and difficulty swallowing for the past 3 weeks.  Able to swallow, but feels like it is sticking (solids only).    Patient denies f/c, n/v/d.  No chest pain or SOB.  No abdominal pain or dysuria.  No headaches or change in vision.  No dizziness.  No significant  weight gain or weight loss.  Remaining ROS negative.    Review of Systems   Constitutional: Negative for appetite change, chills, diaphoresis, fatigue, fever and unexpected weight change.   HENT: Negative for ear pain, hearing loss, sinus pain, tinnitus, trouble swallowing and voice change.    Eyes: Negative for photophobia, pain and visual disturbance.   Respiratory: Negative for chest tightness, shortness of breath and wheezing.    Cardiovascular: Negative for chest pain, palpitations and leg swelling.   Gastrointestinal: Negative for abdominal pain, blood in stool, constipation, diarrhea, nausea and vomiting.   Endocrine: Negative for cold intolerance, heat intolerance, polydipsia, polyphagia and polyuria.   Genitourinary: Negative for decreased urine volume, difficulty urinating, dysuria, flank pain, hematuria, pelvic pain, vaginal bleeding, vaginal discharge and vaginal pain.   Musculoskeletal: Positive for arthralgias, joint swelling (right SC joint) and neck pain. Negative for gait problem and myalgias.   Skin: Negative for rash.   Neurological: Negative for dizziness, tremors, syncope, weakness, numbness and headaches.   Hematological: Does not bruise/bleed easily.   Psychiatric/Behavioral: Negative for agitation, confusion and sleep disturbance. The  patient is not nervous/anxious.        Objective:      Physical Exam   Constitutional: She is oriented to person, place, and time. She appears well-developed and well-nourished. No distress.   HENT:   Head: Normocephalic and atraumatic.   Nose: Nose normal.   Mouth/Throat: Oropharynx is clear and moist.   Eyes: Pupils are equal, round, and reactive to light. Conjunctivae and EOM are normal. No scleral icterus.   Neck: Normal range of motion. Neck supple. No JVD present. No thyromegaly present.   Cardiovascular: Normal rate, regular rhythm and intact distal pulses. Exam reveals no gallop and no friction rub.   No murmur heard.  Pulmonary/Chest: Effort normal and breath sounds normal. No respiratory distress. She has no wheezes. She has no rales.   Abdominal: Soft. Bowel sounds are normal. She exhibits no distension. There is no tenderness. There is no rebound and no guarding.   Musculoskeletal: Normal range of motion. She exhibits no edema.   Lymphadenopathy:     She has no cervical adenopathy.   Neurological: She is alert and oriented to person, place, and time. No cranial nerve deficit or sensory deficit.   Skin: Skin is warm and dry. No rash noted. No erythema.   Psychiatric: She has a normal mood and affect. Her behavior is normal. Thought content normal.       Assessment:       1. Sternoclavicular joint pain, right    2. Esophageal dysphagia        Plan:   -Today's Visit - Patient with complaints of pain in her right clavicle (sternoclavicular joint) and difficulty swallowing for the past 3 weeks.  Able to swallow, but feels like it is sticking (solids only).    Exam with right SC joint prominence and mild tenderness to palpation.      I will order ESR/CRP/  In addition, I would like to order an x-ray.  Please start over the counter Aleve twice a day with food for at least 1 week.  If no improvement, we will consider an Orthopedic consult.    I will refer you to GI to evaluate your swallowing  "(Dysphagia).    Follow up in 3/2020 at [facility].  -----------------------------------------------------  -Hospital Discharge Follow Up - Seen in ED at [facility] on 8/3/2019 for Leg Pain - "Leticia Brown is a 64 y.o. female who presents to the ED complaining of right thigh pain x 2 weeks. Denies trauma or injury. Woke up with the pain. No recent travel or sitting for long periods of time. No groin pain. No bony TTP. No erythema, warmth or swelling. Pain with touch and walking. No numbness or tingling. No chest pain or SOB. No other associated symptoms to report".      She was seen at [facility] ED on 8/15/2019 with chest pain - "Urgent evaluation of a 64 y.o. female presenting to the emergency department complaining of   Chronic right shoulder pain, right thigh pain for 3 weeks and onset of mild chest pressure this morning upon waking.. Patient is afebrile, nontoxic appearing and hemodynamically stable.  EKG reveals no STEMI with isolated T-wave inversion to lead V1.  Patient is describing atypical chest pain.  She does have risk factors for ACS, will obtain 2 troponins to rule out.  Patient has had extensive workup for right shoulder and neck pain, including MRI, CT and EMG studies is been diagnosed with cervical spinal stenosis and degenerative disc disease.  Chest x-ray reveals no acute cardiopulmonary process.   Venous ultrasound of right lower extremity reveals no evidence of DVT.  CBC and chemistry within normal limits.  Initial troponin is negative.  Patient's heart score is 3.  She was given aspirin in the emergency department and states her chest pain resolved.  Repeat troponin is negative.  Patient was advised on symptomatic care and advised follow up with her specialist.    She has no other complaints today and was stable at discharge".  ----------------------------------------------------------  -Nutrition - BMI 28.91 in 9/2019.  Discussed diet and exercise.    -Cards - HTN - on Lisinopril.  BP last " visit was 138/68.  Will discuss change off the Lisinopril when she returns in 4 weeks - discussed today (9/2019) and will d/c Lisinopril and start Losartan 50mg once a day.  Return in 2 weeks for BP check.      Given chest pain last visit, will repeat stress Echo - done 11/5/2019 - Normal left ventricular systolic function. The estimated ejection fraction is 60%  · No wall motion abnormalities.  · Normal LV diastolic function.  · Normal right ventricular systolic function.  · Normal central venous pressure (3 mm Hg).  · The stress echo portion of this study is negative for myocardial ischemia. Target heart rate was achieved.  · The EKG portion of this study is negative for myocardial ischemia.  · There were no arrhythmias during stress.  · Hypertensive response to exercise with max /118.  · The test was stopped because the patient experienced shortness of breath.  The patient's exercise capacity was average. Achieved 10 METs.  EKG on 8/15/2019 with NSR and possible LAE.  Stress Echo on 6/3/2014 - 1. Good exercise tolerance for age.  2. No clinical, electrocardiographic, or echocardiographic evidence for ischemia at the workload achieved on treadmill testing.    Repeat on 2/13/2017 . Excellent exercise tolerance for age.  2. No clinical, electrocardiographic, or echocardiographic evidence for ischemia at the workload achieved on treadmill testing.    HLD - on Atorvastatin.  Lipids on 8/9/2019 with , TG 75, HDL 46, LDL 54.    -Endocrine - Pre-Diabetes  - A1C was 6.0 on 8/9/2019.  On Metformin twice a day, but only taking once a day.  Will change to once a day for now.    Microalbumin - UA in 5/2018 negative for protein.  Microalbumin normal in 8/2019.  Eye - 7/2019 - [place].  Normal.  Has Mild Cataracts.  Foot - done 8/2019.    Vitamin D Deficiency - 11 in 8/2019 - treated with high dose weekly D2.    -MSK - Polyarthralgias - Cervical DD - MRI of C-spine done 3/2019 - Cervical spondylosis with prominent  "anterior osteophytes.  Osteophyte/disc complexes causing moderate stenosis of the spinal canal and mild compression of the cord. No evidence of myelopathy is observed.  Multiple neural foramina stenosis as above.    Patient with complaints of generalized body aches - shoulder pain 1-2 months ago and s/p PT and improved.  Then with right inner thigh pain and seen at [facility] ED and [facility] - this has now resolved.  Then with right wrist pain - s/p EMG/NCS.  States she was told she had CTS and wrist improved with wrist brace.m  Now with 3 days or right Sternoclavicular joint pain.      Will order ESR, CRP, RF, DANIELLE, CPK (given statin) - all normal in 8/2019.  Will refer to Rheumatology given above and per patient wishes - seen 10/4/2019 - "64 year old F with PMH of HL (on lipitor for a year), HTN, smoking, pre-diabetic, hepatitis C antibody + here for evaluation of arthralias.  No diagnosis found.    Will work her up for inflammatory arthritis but have low suspicion. Suspect her discomfort is from cervical radiculopathy and DJD".    She had a scheduled follow up with Neurosurgery at [facility], but [facility].  Will refer to Back and Spine - seen 10/4/2019 - "Leticia Brown is a 64 y.o. female who presents with:   1. Chronic right anterior neck pain without radiculopathy.  MRI done at [facility] showed multilevel degenerative changes (anterior osteophytes, facet artopathy) with mild cord compression at C3-4, C4-5, C6-7, and multilevel bilateral foraminal stenosis.  She has no symptoms of spinal cord compression and no signs on physical exam.  Her anterior strap muscles are moderately tight on the right.      2. Right shoulder pain- exam consistent with rotator cuff tendonitis with impingement.  Specifically, the subscap and supraspinatus are painful to activate.  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  I think this is the most likely cause of her shoulder pain.  " "However, a C4 radiculopathy is also possible.  She just completed PT 1 month ago at [facility] and found it very helpful.  She feels comfortable doing HEP without refresher course, so I encouraged her to do HEP daily  - Currently taking gabapentin 300mg QHS and tylenol for pain.  She tried to uptitrate the gabapentin, but got very sleepy and settled back to QHS dosing only.  I recommend to keep her current pharmacotherapy the same for this reason.      - Heat or ice PRN ok  - Discussed the mild spinal cord compression on MRI in the context of no signs/symptoms of myleopathy.  Discussed that I could refer her to a spine surgeon to discuss decompression, but she prefers to try the conservative management, which is reasonable.   Discussed warning signs and symptoms of cord impingement, and that she should seek immediate medical attention if this should occur.  Discussed that she is at higher risk for cord injury in an accident or fall as well.    - Discussed subacromial bursa injection as next step for the shoulder if the HEP does not help.    - Follow up with me in 3 months".    -GI -  We discussed colon cancer screening - colonoscopy done 3/8/2017 - Surgeon: Kaushal Irving MD; Location: [facility]; Service: Gastroenterology.  Told to repeat in 10 years.  FIT ordered in 9/2019 not yet completed.    S/p Cholecystectomy in 2016.    HCV Ab positive in 10/2019, but PCR negative.  HBsAb positive in 10/2019.    -GYN - Last Pap was negative in 2/2018.      Last Mammo was BI-RADS 2 benign on 6/8/2018.  Repeat negative in 9/2019.    We discussed DXA screening for osteoporosis - DEXA normal on 7/10/2015.  Discussed repeat - normal in 9/2019.  Check Vitamin D - level 11 in 8/2019 and treated with high-dose D2 weekly.    -Psych - Depression - was on Wellbutrin, but off a year and stable.      Smoker - quit 1 year ago and smoked for 30 years.  LDCT Scan in 10/2018 - No clearly suspicious nodules identified.  BI-RADS Category 1 " benign finding: No suspicious nodules. Less than 1 percent chance of malignancy. Routine follow-up in one year's time low-dose CT is recommended.      Needs follow up - done 9/2019 - Lung-RADS Category:  2 - Benign Appearance or Behavior - continue annual screening with LDCT in 12 months.    -HCM - We discussed Flu (8/30/2019) and Tdap (8/30/2019) vaccinations.  We discussed Shingles (waiting for Shingrix Availability - advised to get on a waiting list at pharmacy) and Pneumococcal (23 done in 1/2015 and 13 at age 65) vaccinations.     -Follow Up - 6 months

## 2019-12-23 ENCOUNTER — OFFICE VISIT (OUTPATIENT)
Dept: PRIMARY CARE CLINIC | Facility: CLINIC | Age: 64
End: 2019-12-23
Payer: COMMERCIAL

## 2019-12-23 ENCOUNTER — APPOINTMENT (OUTPATIENT)
Dept: RADIOLOGY | Facility: OTHER | Age: 64
End: 2019-12-23
Attending: INTERNAL MEDICINE
Payer: COMMERCIAL

## 2019-12-23 ENCOUNTER — PATIENT MESSAGE (OUTPATIENT)
Dept: PRIMARY CARE CLINIC | Facility: CLINIC | Age: 64
End: 2019-12-23

## 2019-12-23 VITALS
DIASTOLIC BLOOD PRESSURE: 78 MMHG | WEIGHT: 162.81 LBS | HEIGHT: 62 IN | BODY MASS INDEX: 29.96 KG/M2 | HEART RATE: 72 BPM | OXYGEN SATURATION: 99 % | SYSTOLIC BLOOD PRESSURE: 130 MMHG

## 2019-12-23 DIAGNOSIS — R13.19 ESOPHAGEAL DYSPHAGIA: ICD-10-CM

## 2019-12-23 DIAGNOSIS — M25.511 STERNOCLAVICULAR JOINT PAIN, RIGHT: ICD-10-CM

## 2019-12-23 DIAGNOSIS — M25.511 STERNOCLAVICULAR JOINT PAIN, RIGHT: Primary | ICD-10-CM

## 2019-12-23 PROCEDURE — 73000 XR CLAVICLE RIGHT: ICD-10-PCS | Mod: 26,RT,, | Performed by: RADIOLOGY

## 2019-12-23 PROCEDURE — 99999 PR PBB SHADOW E&M-EST. PATIENT-LVL IV: CPT | Mod: PBBFAC,,, | Performed by: INTERNAL MEDICINE

## 2019-12-23 PROCEDURE — 99213 PR OFFICE/OUTPT VISIT, EST, LEVL III, 20-29 MIN: ICD-10-PCS | Mod: S$GLB,,, | Performed by: INTERNAL MEDICINE

## 2019-12-23 PROCEDURE — 3008F BODY MASS INDEX DOCD: CPT | Mod: CPTII,S$GLB,, | Performed by: INTERNAL MEDICINE

## 2019-12-23 PROCEDURE — 99213 OFFICE O/P EST LOW 20 MIN: CPT | Mod: S$GLB,,, | Performed by: INTERNAL MEDICINE

## 2019-12-23 PROCEDURE — 3008F PR BODY MASS INDEX (BMI) DOCUMENTED: ICD-10-PCS | Mod: CPTII,S$GLB,, | Performed by: INTERNAL MEDICINE

## 2019-12-23 PROCEDURE — 99999 PR PBB SHADOW E&M-EST. PATIENT-LVL IV: ICD-10-PCS | Mod: PBBFAC,,, | Performed by: INTERNAL MEDICINE

## 2019-12-23 PROCEDURE — 73000 X-RAY EXAM OF COLLAR BONE: CPT | Mod: 26,RT,, | Performed by: RADIOLOGY

## 2019-12-23 PROCEDURE — 73000 X-RAY EXAM OF COLLAR BONE: CPT | Mod: TC,RT

## 2019-12-23 NOTE — PATIENT INSTRUCTIONS
Your exam was overall normal today.    Your blood pressure was good.    I will order routine non-fasting labs to assess your collar bone pain.  In addition, I would like to order an x-ray.  Please start over the counter Aleve twice a day with food for at least 1 week.  If no improvement, we will consider an Orthopedic consult.    I will refer you to GI to evaluate your swallowing.    Keep Follow Up in March - sooner if needed.  Please come at least 15-20 minutes before your scheduled appointment time.

## 2019-12-24 ENCOUNTER — TELEPHONE (OUTPATIENT)
Dept: FAMILY MEDICINE | Facility: CLINIC | Age: 64
End: 2019-12-24

## 2020-01-14 ENCOUNTER — TELEPHONE (OUTPATIENT)
Dept: GASTROENTEROLOGY | Facility: CLINIC | Age: 65
End: 2020-01-14

## 2020-01-14 ENCOUNTER — TELEPHONE (OUTPATIENT)
Dept: SURGERY | Facility: CLINIC | Age: 65
End: 2020-01-14

## 2020-01-14 NOTE — TELEPHONE ENCOUNTER
----- Message from Katie Soler RN sent at 1/14/2020  3:01 PM CST -----      ----- Message -----  From: Elin Mackey MA  Sent: 1/14/2020  11:34 AM CST  To: [facility] Gastro Clinical Staff    Needs to be scheduled with GI for Esophageal dysphagia. Incorrectly scheduled with [facility].

## 2020-01-16 ENCOUNTER — PATIENT MESSAGE (OUTPATIENT)
Dept: INTERNAL MEDICINE | Facility: CLINIC | Age: 65
End: 2020-01-16

## 2020-01-21 ENCOUNTER — PATIENT MESSAGE (OUTPATIENT)
Dept: INTERNAL MEDICINE | Facility: CLINIC | Age: 65
End: 2020-01-21

## 2020-01-21 DIAGNOSIS — R32 URINARY INCONTINENCE, UNSPECIFIED TYPE: Primary | ICD-10-CM

## 2020-01-22 RX ORDER — OXYBUTYNIN CHLORIDE 10 MG/1
10 TABLET, EXTENDED RELEASE ORAL DAILY
Qty: 30 TABLET | Refills: 3 | Status: SHIPPED | OUTPATIENT
Start: 2020-01-22 | End: 2020-04-29 | Stop reason: SDUPTHER

## 2020-02-03 ENCOUNTER — TELEPHONE (OUTPATIENT)
Dept: SURGERY | Facility: CLINIC | Age: 65
End: 2020-02-03

## 2020-02-04 ENCOUNTER — OFFICE VISIT (OUTPATIENT)
Dept: SURGERY | Facility: CLINIC | Age: 65
End: 2020-02-04
Payer: COMMERCIAL

## 2020-02-04 VITALS
DIASTOLIC BLOOD PRESSURE: 72 MMHG | SYSTOLIC BLOOD PRESSURE: 145 MMHG | HEIGHT: 62 IN | WEIGHT: 164.25 LBS | BODY MASS INDEX: 30.22 KG/M2 | HEART RATE: 63 BPM

## 2020-02-04 DIAGNOSIS — Z13.9 VISIT FOR SCREENING: Primary | ICD-10-CM

## 2020-02-04 PROCEDURE — 99999 PR PBB SHADOW E&M-EST. PATIENT-LVL III: ICD-10-PCS | Mod: PBBFAC,,, | Performed by: NURSE PRACTITIONER

## 2020-02-04 PROCEDURE — 99024 PR POST-OP FOLLOW-UP VISIT: ICD-10-PCS | Mod: S$GLB,,, | Performed by: NURSE PRACTITIONER

## 2020-02-04 PROCEDURE — 99024 POSTOP FOLLOW-UP VISIT: CPT | Mod: S$GLB,,, | Performed by: NURSE PRACTITIONER

## 2020-02-04 PROCEDURE — 99999 PR PBB SHADOW E&M-EST. PATIENT-LVL III: CPT | Mod: PBBFAC,,, | Performed by: NURSE PRACTITIONER

## 2020-02-04 NOTE — PROGRESS NOTES
Patient scheduled for hemorrhoids. Denies any issues with hemorrhoids, rectal pain, rectal bleeding. Thought she was seeing GI today for dysphagia. No charge for visit. Apt with GI scheduled for next month.

## 2020-02-17 ENCOUNTER — TELEPHONE (OUTPATIENT)
Dept: GASTROENTEROLOGY | Facility: CLINIC | Age: 65
End: 2020-02-17

## 2020-02-17 NOTE — TELEPHONE ENCOUNTER
Message left informing patient that appointment scheduled for 3/2/20 at 3:30 pm will have to be changed due to changes in provider schedule. Appointment will be moved to 8 am on 3/2/20. Instructed patient to call if new appointment time does not and needs to be rescheduled.

## 2020-03-02 ENCOUNTER — TELEPHONE (OUTPATIENT)
Dept: ENDOSCOPY | Facility: HOSPITAL | Age: 65
End: 2020-03-02

## 2020-03-02 ENCOUNTER — TELEPHONE (OUTPATIENT)
Dept: GASTROENTEROLOGY | Facility: CLINIC | Age: 65
End: 2020-03-02

## 2020-03-02 ENCOUNTER — OFFICE VISIT (OUTPATIENT)
Dept: GASTROENTEROLOGY | Facility: CLINIC | Age: 65
End: 2020-03-02
Payer: COMMERCIAL

## 2020-03-02 VITALS
HEIGHT: 62 IN | WEIGHT: 162.25 LBS | HEART RATE: 76 BPM | DIASTOLIC BLOOD PRESSURE: 66 MMHG | SYSTOLIC BLOOD PRESSURE: 132 MMHG | BODY MASS INDEX: 29.86 KG/M2

## 2020-03-02 DIAGNOSIS — Z86.19 HISTORY OF HELICOBACTER PYLORI INFECTION: Primary | ICD-10-CM

## 2020-03-02 DIAGNOSIS — K21.9 GASTROESOPHAGEAL REFLUX DISEASE, ESOPHAGITIS PRESENCE NOT SPECIFIED: ICD-10-CM

## 2020-03-02 DIAGNOSIS — R13.10 DYSPHAGIA, UNSPECIFIED TYPE: ICD-10-CM

## 2020-03-02 PROCEDURE — 3075F PR MOST RECENT SYSTOLIC BLOOD PRESS GE 130-139MM HG: ICD-10-PCS | Mod: CPTII,S$GLB,, | Performed by: NURSE PRACTITIONER

## 2020-03-02 PROCEDURE — 3008F BODY MASS INDEX DOCD: CPT | Mod: CPTII,S$GLB,, | Performed by: NURSE PRACTITIONER

## 2020-03-02 PROCEDURE — 99999 PR PBB SHADOW E&M-EST. PATIENT-LVL III: ICD-10-PCS | Mod: PBBFAC,,, | Performed by: NURSE PRACTITIONER

## 2020-03-02 PROCEDURE — 99204 OFFICE O/P NEW MOD 45 MIN: CPT | Mod: S$GLB,,, | Performed by: NURSE PRACTITIONER

## 2020-03-02 PROCEDURE — 3078F DIAST BP <80 MM HG: CPT | Mod: CPTII,S$GLB,, | Performed by: NURSE PRACTITIONER

## 2020-03-02 PROCEDURE — 99999 PR PBB SHADOW E&M-EST. PATIENT-LVL III: CPT | Mod: PBBFAC,,, | Performed by: NURSE PRACTITIONER

## 2020-03-02 PROCEDURE — 3008F PR BODY MASS INDEX (BMI) DOCUMENTED: ICD-10-PCS | Mod: CPTII,S$GLB,, | Performed by: NURSE PRACTITIONER

## 2020-03-02 PROCEDURE — 3078F PR MOST RECENT DIASTOLIC BLOOD PRESSURE < 80 MM HG: ICD-10-PCS | Mod: CPTII,S$GLB,, | Performed by: NURSE PRACTITIONER

## 2020-03-02 PROCEDURE — 99204 PR OFFICE/OUTPT VISIT, NEW, LEVL IV, 45-59 MIN: ICD-10-PCS | Mod: S$GLB,,, | Performed by: NURSE PRACTITIONER

## 2020-03-02 PROCEDURE — 3075F SYST BP GE 130 - 139MM HG: CPT | Mod: CPTII,S$GLB,, | Performed by: NURSE PRACTITIONER

## 2020-03-02 RX ORDER — OMEPRAZOLE 20 MG/1
20 CAPSULE, DELAYED RELEASE ORAL DAILY
Qty: 90 CAPSULE | Refills: 1 | Status: SHIPPED | OUTPATIENT
Start: 2020-03-02 | End: 2022-08-11

## 2020-03-02 NOTE — TELEPHONE ENCOUNTER
MA contacted pharmacy and spoke with Emily in regards to pt Omeprazole prescription. pharmacy was clarifying sig on prescript because it didn't come over. MA told pharmacist that it was   Sig - Route: Take 1 capsule (20 mg total) by mouth once daily. - Oral . Emily from pharmacy verbalized understanding           ----- Message from Harry Chong sent at 3/2/2020 10:56 AM CST -----  Contact: Rosalba ([facility]): [phone]  Calling to clarify omeprazole (PRILOSEC) 20 MG       Please contact Rosalba ([facility]): [phone]

## 2020-03-02 NOTE — LETTER
March 2, 2020      Marcus Simons MD  [address] [address]           [facility] - Gastroenterology  [address]  [place] [address]  Phone: [phone]  Fax: [phone]          Patient: Leticia Brown   MR Number: [id number]   YOB: 1955   Date of Visit: 3/2/2020       Dear Dr. Marcus Simons:    Thank you for referring Leticia Brown to me for evaluation. Attached you will find relevant portions of my assessment and plan of care.    If you have questions, please do not hesitate to call me. I look forward to following Leticia Brown along with you.    Sincerely,    Raquel Khanna, CASSANDRA    Enclosure  CC:  No Recipients    If you would like to receive this communication electronically, please contact [email or web address] or [phone] to request more information on [facility] access.    For providers and/or their staff who would like to refer a patient to [facility], please contact us through our one-stop-shop provider referral line, [facility], [place], at [phone].    If you feel you have received this communication in error or would no longer like to receive these types of communications, please e-mail [email or web address]

## 2020-03-02 NOTE — PROGRESS NOTES
[facility] Consultation Note    Reason for Consult:  The primary encounter diagnosis was History of Helicobacter pylori infection. Diagnoses of Dysphagia, unspecified type and Gastroesophageal reflux disease, esophagitis presence not specified were also pertinent to this visit.    PCP:   Marcus Simnos   [address] [address] / [facility] [id number]    Referring MD:  Marcus Simons Md  [address] [facility] [address]  [place] [address]    Initial History of Present Illness (HPI):  This is a 64 y.o. female here for evaluation of dysphagia, GERD, and H. Pylori. New patient. Was being managed by GI at [facility] in 2017 for the same.  Today she reports she has supraclavicular dysphagia to both solids and liquids. Happens several times per month. EGD in 2017 unremarkable for sources for dysphagia, but she was pos for H. Pylori. Treated, but was not checked for eradication. Does reports GERD, several year hx. Takes OTC acid suppresser PRN. Denies n/v, regurgitation.   Abdominal pain - no  Bowel habits - normal  GI bleeding - none  NSAID usage - aleve twice a mo, baby ASA Q day      ROS:  Constitutional: No fevers, chills, No weight loss  ENT:  No sore throat, no PND  CV: No chest pain, no palpitation  Pulm:+ cough, No shortness of breath  Ophtho: No vision changes  GI: see HPI  Derm: No rash, no itching  Heme: No easy bruising  MSK:+ arthritis  : No dysuria, No hematuria  Neuro: No syncope, No seizure  Psych: No uncontrolled anxiety, No uncontrolled depression    Medical History:  has a past medical history of Diabetes mellitus, High cholesterol, and Hypertension.    Surgical History:  has a past surgical history that includes Breast biopsy.    Family History: family history includes No Known Problems in her brother, daughter, father, maternal aunt, maternal grandfather, maternal grandmother, maternal uncle, mother, paternal aunt, paternal grandfather, paternal grandmother,  "paternal uncle, sister, and son..     Social History:  reports that she has been smoking cigarettes. She has been smoking about 0.25 packs per day. She has never used smokeless tobacco. She reports that she does not drink alcohol or use drugs.    Review of patient's allergies indicates:  No Known Allergies    Medication List with Changes/Refills   New Medications    OMEPRAZOLE (PRILOSEC) 20 MG CAPSULE    Take 1 capsule (20 mg total) by mouth once daily.   Current Medications    ASPIRIN (ECOTRIN) 81 MG EC TABLET    Take 81 mg by mouth once daily.    ATORVASTATIN (LIPITOR) 40 MG TABLET    Take 1 tablet (40 mg total) by mouth once daily.    ERGOCALCIFEROL (ERGOCALCIFEROL) 50,000 UNIT CAP    Take 1 capsule (50,000 Units total) by mouth every 7 days.    GABAPENTIN (NEURONTIN) 300 MG CAPSULE    Take 300 mg by mouth.    IBUPROFEN (ADVIL,MOTRIN) 600 MG TABLET    TK 1 T PO Q 6 H PRN P FOR UP TO 10 DAYS    LORATADINE (CLARITIN) 10 MG TABLET    Take 10 mg by mouth.    LOSARTAN (COZAAR) 50 MG TABLET    Take 1 tablet (50 mg total) by mouth once daily.    METFORMIN (GLUCOPHAGE) 500 MG TABLET    Take 1 tablet (500 mg total) by mouth daily with breakfast.    METHOCARBAMOL (ROBAXIN) 500 MG TAB    TK 1 T PO TID PRN MUSCLE SPASMS    OXYBUTYNIN (DITROPAN XL) 10 MG 24 HR TABLET    Take 1 tablet (10 mg total) by mouth once daily.         Objective Findings:    Vital Signs:  /66   Pulse 76   Ht 5' 2" (1.575 m)   Wt 73.6 kg (162 lb 4.1 oz)   BMI 29.68 kg/m²   Body mass index is 29.68 kg/m².    Physical Exam:  General Appearance: Well appearing, NAD noted  Eyes:    No scleral icterus  ENT:  No lesions or masses   Lungs: BBS CTA ,  no wheezes  Heart:  HRRR, S1 & S2 normal, no murmurs heard  Abdomen:  Non distended, soft, no guarding, no rebound, no tenderness, no appreciated ascites  Musculoskeletal:  No major joint deformities  Skin: No petechiae or rash on exposed skin areas  Neurologic:  Alert and oriented x4  Psychiatric:  " Normal speech mentation and affect    Labs reviewed:  Lab Results   Component Value Date    WBC 9.71 08/15/2019    HGB 12.9 08/15/2019    HCT 39.4 08/15/2019     08/15/2019     08/15/2019    K 4.1 08/15/2019     08/15/2019    CREATININE 0.8 08/15/2019    BUN 18 08/15/2019    CO2 25 08/15/2019           Imaging reviewed:  None  Endoscopy reviewed:    EGD and colonoscopy in 2017     Endoscopy 3/8/2017  Findings:  1. Esophagus  -normal visualized esophagus.   2. Stomach  -mild antral erythema. Biopsies performed to evaluate for H. Pylori.   3. Duodenum  -normal visualized duodenum.    Impression:   Gastritis. Biopsies performed to evaluate for H. Pylori.  History of weight loss, early satiety and post prandial fullness without cause found on EGD.    S/P Colonoscopy    Prep Quality: excellent.      Findings:  Anus/EMMETT:   -negative without mass, lesions or tenderness     Rectum:   -polyp(s) #1, 2 mm in size, located in the rectum removed with cold forcep and sent for pathology and placed in Jar 9.  -Random rectal biopsies performed to evaluate for microscopic colitis and placed in Jar 8. The visualized mucosa appeared normal.     Sigmoid:   --Random left colon biopsies performed to evaluate for microscopic colitis and placed in Jar 7. The visualized mucosa appeared normal.   -One sigmoid 3 mm polyp was removed with cold forceps and placed in Jar 7.     Descending Colon:   --Random left colon biopsies performed to evaluate for microscopic colitis and placed in Jar 7. The visualized mucosa appeared normal.     Transverse Colon:   --Random transverse colon biopsies performed to evaluate for microscopic colitis and placed in Jar 6. The visualized mucosa appeared normal.     Ascending Colon:   --Random right colon biopsies performed at the hepatic flexure and ascending colon to evaluate for microscopic colitis and placed in Jar 4 and 5. The visualized mucosa appeared normal.   -One large diverticulum was  seen in the Ascending colon.     Cecum:   --Random Cecal colon biopsies performed to evaluate for microscopic colitis and placed in Jar 3. The visualized mucosa appeared normal.        TI:   --Random terminal ileum biopsies performed to evaluate for microscopic colitis and placed in Jar 2. The visualized mucosa appeared normal.      Medical Decision Making:    Assessment:    Leticia Brown is a 64 y.o. female here for:    1. History of Helicobacter pylori infection    2. Dysphagia, unspecified type    3. Gastroesophageal reflux disease, esophagitis presence not specified       See above GI scopes and path form 2017 at [facility]. Treated for HP but eradication not checked.     Recommendations:  1. Stool for H. Pylori eradication. Once stool is collected she can start omeprazole 20 mg prescribed today  2. EGD. If this is unremarkable we will order esophagram and manometry, this was briefly discussed today in clinic.    F/u pending EGD    Order summary:  Orders Placed This Encounter    H. pylori antigen, stool    omeprazole (PRILOSEC) 20 MG capsule    Case request GI: EGD (ESOPHAGOGASTRODUODENOSCOPY)         Thank you for allowing me to participate in the care of Leticia Brown    Raquel Khanna, MARIBELP-C

## 2020-03-10 ENCOUNTER — LAB VISIT (OUTPATIENT)
Dept: LAB | Facility: OTHER | Age: 65
End: 2020-03-10
Payer: COMMERCIAL

## 2020-03-10 DIAGNOSIS — Z86.19 HISTORY OF HELICOBACTER PYLORI INFECTION: ICD-10-CM

## 2020-03-10 PROCEDURE — 87338 HPYLORI STOOL AG IA: CPT

## 2020-03-13 LAB — H PYLORI AG STL QL IA: NOT DETECTED

## 2020-03-26 ENCOUNTER — TELEPHONE (OUTPATIENT)
Dept: ENDOSCOPY | Facility: HOSPITAL | Age: 65
End: 2020-03-26

## 2020-03-26 NOTE — TELEPHONE ENCOUNTER
Called pt. And 's phone several times to confirm appt. With Dr. Montes on 4/9/20.. No response back from leaving several voicemail messages.

## 2020-03-30 ENCOUNTER — TELEPHONE (OUTPATIENT)
Dept: ENDOSCOPY | Facility: HOSPITAL | Age: 65
End: 2020-03-30

## 2020-03-30 NOTE — TELEPHONE ENCOUNTER
Dr Rodrick Montes, should this patient's EGD remain scheduled 4/9/20 or be rescheduled?  If she needs to be rescheduled, would you say the procedure is high priority, medium priority or low priority?  Thank you.

## 2020-04-06 ENCOUNTER — PATIENT MESSAGE (OUTPATIENT)
Dept: INTERNAL MEDICINE | Facility: CLINIC | Age: 65
End: 2020-04-06

## 2020-04-29 PROBLEM — M54.2 NECK PAIN: Status: RESOLVED | Noted: 2019-03-19 | Resolved: 2020-04-29

## 2020-04-29 PROBLEM — E78.2 MIXED HYPERLIPIDEMIA: Status: ACTIVE | Noted: 2019-08-30

## 2020-04-29 PROBLEM — M75.81 RIGHT ROTATOR CUFF TENDINITIS: Status: ACTIVE | Noted: 2020-04-29

## 2020-04-29 PROBLEM — K63.5 COLON POLYP: Status: ACTIVE | Noted: 2020-04-29

## 2020-04-29 PROBLEM — E55.9 VITAMIN D DEFICIENCY: Status: ACTIVE | Noted: 2020-04-29

## 2020-04-29 NOTE — PROGRESS NOTES
The patient location is: home  The chief complaint leading to consultation is: insomnia.  The patient elected for a virtual visit to decrease risk of exposure to Coronavirus.  Visit type: Virtual visit with synchronous audio and video  Total time spent with patient: 20 minutes  Each patient to whom he or she provides medical services by telemedicine is:  (1) informed of the relationship between the physician and patient and the respective role of any other health care provider with respect to management of the patient; and (2) notified that he or she may decline to receive medical services by telemedicine and may withdraw from such care at any time.    Subjective:       Patient ID: Leticia Brown is a 64 y.o. female who  has a past medical history of Diabetes mellitus, H. pylori infection (2017), High cholesterol, and Hypertension.    Chief Complaint: Insomnia     History was obtained from the patient and supplemented through chart review  She was previously seen by Dr. Marcus Simons  for dysphagia.    -Reviewed outside records from [facility] regarding cscope    Was recently laid off her job.    HPI    Insomnia:    Chronic, but worsened x 1 week.  No recent triggers, other than increased worriness.  Mood is OK.  Has racing thoughts at night.  The patient reports difficulty falling asleep.  Goes to bed at 9 PM, but finally falls asleep at 3 AM and wakes up at 6 AM.  Sometimes naps.  Tried turning off the TV at night.  The patient drinks decaffeinated drinks.  She does not drink.  Decreased walking to 2/week.  Has not tried anything OTC for sleep.     Tobacco use:  Quit in 2018 cold turkey.  Smoked for 30 years.  However, restarted recently d/t stress from being laid off.  Spiral CT  benign.     DM2 is controlled.  A1C was 6.0 on 8/9/2019.  Currently pt is taking metformin 500 q.a.m.     Without elevated microalbumin creatinine ratio.  On an ACE/ARB.   Retinal exams: 7/2019 - [facility].  Normal.  Has  Mild Cataracts.  Foot exams:    No results found for: HGBA1C    HTN:    Stress test  with no WMA, negative for ischemia.  On losartan 50.     Vitamin D deficiency: Is taking ergocalciferol.  DEXA normal .  Lab Results   Component Value Date    [id number] 11 (L) 08/30/2019     HLD:  Is currently taking Lipitor 40 and ASA 81 daily.  Lipids on 8/9/2019 with , TG 75, HDL 46, LDL 54.  No results found for: LDLCALC  The ASCVD Risk score ([place] NASH Jr., et al., 2013) failed to calculate for the following reasons:    Cannot find a previous HDL lab    Cannot find a previous total cholesterol lab    Urinary incontinence:  On oxybutynin            Not addressed today.  HA:  X 3 days    H pylori, dysphagia to solids:  EGD 2017 normal other than positive biopsies.  Test of eradication neg.  Started Prilosec.  Planning on repeat EGD; considering esophagram if EGD is normal.    Colon polyp:  C scope 2017.  Unknown pathology.  Following with [facility].   Will plan to repeat in 5 years, .    Right rotator cuff tendinitis:  Likely a CJ DJD or C4 radiculopathy.  Completed PT at [facility].  Neurosurgery at [facility] recommended home exercises.    Cervical DDD:  RF, DANIELLE WNL .  Saw Rheumatology and had low suspicion for inflammatory arthritis.  MRI cscope 3/2019 was cervical spondylosis, DDD without cord compression.  EMG with cervical spinal stenosis, degenerative disc disease.      On gabapentin 300 q.h.s., Tylenol p.r.n..  Unable to titrate gabapentin due to sedation.  Robaxin    Depression:  Doing well off of Wellbutrin.    HCV:  Antibody positive, but viral level negative. +HBsAB 10/2019.  [facility] NR at [facility]   Viral level negative.  No treatment needed.    Review of Systems   Constitutional: Negative for activity change and unexpected weight change.   HENT: Negative for hearing loss, rhinorrhea and trouble swallowing.    Eyes: Negative for discharge and visual disturbance.   Respiratory: Negative for chest  tightness and wheezing.    Cardiovascular: Negative for chest pain and palpitations.   Gastrointestinal: Negative for blood in stool, constipation, diarrhea and vomiting.   Endocrine: Negative for polydipsia and polyuria.   Genitourinary: Negative for difficulty urinating, dysuria, hematuria and menstrual problem.   Musculoskeletal: Positive for neck pain. Negative for arthralgias and joint swelling.   Skin: Negative for rash and wound.   Neurological: Positive for headaches. Negative for weakness.   Hematological: Negative for adenopathy.   Psychiatric/Behavioral: Positive for sleep disturbance. Negative for confusion and dysphoric mood. The patient is nervous/anxious.          Past Medical History:   Diagnosis Date    Diabetes mellitus     H. pylori infection 2017    [facility] EGD 2017. test of eradication neg.    High cholesterol     Hypertension      Past Surgical History:   Procedure Laterality Date    BREAST BIOPSY      CHOLECYSTECTOMY  2016     Family History   Problem Relation Age of Onset    No Known Problems Mother     No Known Problems Father     No Known Problems Sister     No Known Problems Brother     No Known Problems Daughter     No Known Problems Son     No Known Problems Maternal Aunt     No Known Problems Maternal Uncle     No Known Problems Paternal Aunt     No Known Problems Paternal Uncle     No Known Problems Maternal Grandmother     No Known Problems Maternal Grandfather     No Known Problems Paternal Grandmother     No Known Problems Paternal Grandfather     Colon cancer Neg Hx     Rectal cancer Neg Hx     Stomach cancer Neg Hx      Social History     Socioeconomic History    Marital status:      Spouse name: Not on file    Number of children: Not on file    Years of education: Not on file    Highest education level: Not on file   Occupational History    Not on file   Social Needs    Financial resource strain: Somewhat hard    Food insecurity:     Worry:  Sometimes true     Inability: Sometimes true    Transportation needs:     Medical: No     Non-medical: No   Tobacco Use    Smoking status: Current Every Day Smoker     Packs/day: 0.25     Types: Cigarettes    Smokeless tobacco: Never Used   Substance and Sexual Activity    Alcohol use: No     Frequency: Monthly or less     Drinks per session: 1 or 2     Binge frequency: Never    Drug use: No    Sexual activity: Never   Lifestyle    Physical activity:     Days per week: 3 days     Minutes per session: 30 min    Stress: To some extent   Relationships    Social connections:     Talks on phone: Twice a week     Gets together: Patient refused     Attends [employer] service: Not on file     Active member of club or organization: Yes     Attends meetings of clubs or organizations: 1 to 4 times per year     Relationship status:    Other Topics Concern    Not on file   Social History Narrative    Not on file     Objective:      There were no vitals filed for this visit.   Physical Exam   Constitutional: She appears well-developed and well-nourished. No distress.   HENT:   Head: Normocephalic and atraumatic.   Eyes: Right eye exhibits no discharge. Left eye exhibits no discharge.   Pulmonary/Chest: Effort normal. No respiratory distress.   Speaking in complete sentences.   Neurological: She is alert.   Skin: She is not diaphoretic. No erythema. No pallor.   Psychiatric: She has a normal mood and affect. Her behavior is normal.         Lab Results   Component Value Date    WBC 9.71 08/15/2019    HGB 12.9 08/15/2019    HCT 39.4 08/15/2019     08/15/2019     08/15/2019    K 4.1 08/15/2019     08/15/2019    CREATININE 0.8 08/15/2019    BUN 18 08/15/2019    CO2 25 08/15/2019       The ASCVD Risk score ([place] NASH Jr., et al., 2013) failed to calculate for the following reasons:    Cannot find a previous HDL lab    Cannot find a previous total cholesterol lab    (Imaging have been independently  reviewed)  Right clavicle x-ray with calcific tendinopathy, DJD of the AC joint    Assessment:       1. Insomnia, unspecified type    2. Light cigarette smoker (1-9 cigs/day)    3. Anxiety    4. Type 2 diabetes mellitus with diabetic polyneuropathy, without long-term current use of insulin    5. Essential hypertension    6. Vitamin D deficiency    7. Mixed hyperlipidemia    8. Urinary incontinence, unspecified type          Plan:       Leticia was seen today for insomnia.    Diagnoses and all orders for this visit:    Insomnia, unspecified type  Comments:  Discussed sleep hygiene, exercise, decrease screen time, avoid naps. Melatonin PRN. Could consider Vistaril PRN d/t anxiety.  Orders:  -     Ambulatory referral/consult to Psychology; Future    Light cigarette smoker (1-9 cigs/day)  Comments:  Restarted d/t stress. Refer to psychology, tobacco cessation. Counseled for 5 min on tobacco cessation.  Will need repeat spiral CT  x15 yrs after quit  Orders:  -     Ambulatory referral/consult to Psychology; Future  -     Ambulatory referral/consult to Smoking Cessation Program; Future    Anxiety  Comments:  Referrals as above to discuss healthy ways of coping with anxiety.  Orders:  -     Ambulatory referral/consult to Psychology; Future  -     Ambulatory referral/consult to Smoking Cessation Program; Future    Type 2 diabetes mellitus with diabetic polyneuropathy, without long-term current use of insulin  Comments:  Controlled.  Continue metformin 500 q.a.m.. Will need to schedule A1c after COVID  Orders:  -     metFORMIN (GLUCOPHAGE) 500 MG tablet; Take 1 tablet (500 mg total) by mouth daily with breakfast.  -     losartan (COZAAR) 50 MG tablet; Take 1 tablet (50 mg total) by mouth once daily.    Essential hypertension  Comments:  Refilled losartan.  Will address during future visit.  Encouraged walking.  Orders:  -     losartan (COZAAR) 50 MG tablet; Take 1 tablet (50 mg total) by mouth once daily.    Vitamin D  deficiency  Comments:  Refilled ergocalciferol.  DEXA normal.  Orders:  -     ergocalciferol (ERGOCALCIFEROL) 50,000 unit Cap; Take 1 capsule (50,000 Units total) by mouth every 7 days.    Mixed hyperlipidemia  Comments:  Will need to check FLP.  Continue Lipitor 40, ASA 81.    Urinary incontinence, unspecified type  Comments:  Refilled oxybutynin.  Orders:  -     oxybutynin (DITROPAN XL) 10 MG 24 hr tablet; Take 1 tablet (10 mg total) by mouth once daily.    Other orders  -     Cancel: Microalbumin/creatinine urine ratio; Future  -     Cancel: Hemoglobin A1c; Future  -     Cancel: Ambulatory referral/consult to Podiatry; Future  -     Cancel: Diabetic Eye Screening Photo; Future  -     Cancel: Ambulatory referral/consult to Diabetes Education; Future  -     Cancel: Lipid panel; Future         Side effects of medication(s) were discussed in detail and patient voiced understanding.  Patient will call back for any issues or complications.     RTC in 1 month(s) or sooner PRN to establish care.

## 2020-04-30 ENCOUNTER — TELEPHONE (OUTPATIENT)
Dept: INTERNAL MEDICINE | Facility: CLINIC | Age: 65
End: 2020-04-30

## 2020-04-30 ENCOUNTER — OFFICE VISIT (OUTPATIENT)
Dept: INTERNAL MEDICINE | Facility: CLINIC | Age: 65
End: 2020-04-30
Payer: COMMERCIAL

## 2020-04-30 DIAGNOSIS — F41.9 ANXIETY: ICD-10-CM

## 2020-04-30 DIAGNOSIS — E11.42 TYPE 2 DIABETES MELLITUS WITH DIABETIC POLYNEUROPATHY, WITHOUT LONG-TERM CURRENT USE OF INSULIN: ICD-10-CM

## 2020-04-30 DIAGNOSIS — E78.2 MIXED HYPERLIPIDEMIA: ICD-10-CM

## 2020-04-30 DIAGNOSIS — I10 ESSENTIAL HYPERTENSION: ICD-10-CM

## 2020-04-30 DIAGNOSIS — F17.210 LIGHT CIGARETTE SMOKER (1-9 CIGS/DAY): ICD-10-CM

## 2020-04-30 DIAGNOSIS — G47.00 INSOMNIA, UNSPECIFIED TYPE: Primary | ICD-10-CM

## 2020-04-30 DIAGNOSIS — E55.9 VITAMIN D DEFICIENCY: ICD-10-CM

## 2020-04-30 DIAGNOSIS — R32 URINARY INCONTINENCE, UNSPECIFIED TYPE: ICD-10-CM

## 2020-04-30 PROCEDURE — 99406 BEHAV CHNG SMOKING 3-10 MIN: CPT | Mod: 95,,, | Performed by: INTERNAL MEDICINE

## 2020-04-30 PROCEDURE — 99214 PR OFFICE/OUTPT VISIT, EST, LEVL IV, 30-39 MIN: ICD-10-PCS | Mod: 95,,, | Performed by: INTERNAL MEDICINE

## 2020-04-30 PROCEDURE — 99214 OFFICE O/P EST MOD 30 MIN: CPT | Mod: 95,,, | Performed by: INTERNAL MEDICINE

## 2020-04-30 PROCEDURE — 99406 PR TOBACCO USE CESSATION INTERMEDIATE 3-10 MINUTES: ICD-10-PCS | Mod: 95,,, | Performed by: INTERNAL MEDICINE

## 2020-04-30 RX ORDER — ERGOCALCIFEROL 1.25 MG/1
50000 CAPSULE ORAL
Qty: 12 CAPSULE | Refills: 1 | Status: SHIPPED | OUTPATIENT
Start: 2020-04-30 | End: 2020-09-28 | Stop reason: SDUPTHER

## 2020-04-30 RX ORDER — LOSARTAN POTASSIUM 50 MG/1
50 TABLET ORAL DAILY
Qty: 30 TABLET | Refills: 3 | Status: SHIPPED | OUTPATIENT
Start: 2020-04-30 | End: 2020-09-28 | Stop reason: SDUPTHER

## 2020-04-30 RX ORDER — METFORMIN HYDROCHLORIDE 500 MG/1
500 TABLET ORAL
Qty: 30 TABLET | Refills: 3 | Status: SHIPPED | OUTPATIENT
Start: 2020-04-30 | End: 2020-10-12

## 2020-04-30 RX ORDER — OXYBUTYNIN CHLORIDE 10 MG/1
10 TABLET, EXTENDED RELEASE ORAL DAILY
Qty: 30 TABLET | Refills: 3 | Status: SHIPPED | OUTPATIENT
Start: 2020-04-30 | End: 2020-10-27

## 2020-04-30 NOTE — TELEPHONE ENCOUNTER
----- Message from Sarah Antonio MD sent at 4/30/2020  8:50 AM CDT -----  1. Please schedule virtual visit in 1 month to establish care  2. Schedule virtual visit with tobacco cessation clinic if possible.  Thanks!

## 2020-04-30 NOTE — PATIENT INSTRUCTIONS
Call 1-800-QUIT-NOW.  [email or web address]  Shellie:  My Quit     May take Melatonin 5 mg by mouth 3-4 hours before sleep.    https://www.cci.health.wa.gov.au/~/media/cci/mental%20health%20professionals/sleep/sleep%20-%20information%20sheets/sleep%20information%20sheet%20-%2004%20-%20sleep%20hygiene.pdf    Sleep Hygiene:  1. Avoid watching TV, eating, and discussing emotional issues in bed. The bed should be used for sleep and sex only. If not, we can associate the bed with other activities and it often becomes difficult to fall asleep.  2. Minimize noise, light, and temperature extremes during sleep with ear plugs, window blinds, or an electric blanket or air conditioner. Even the slightest nighttime noises or luminescent lights can disrupt the quality of your sleep. Try to keep your bedroom at a comfortable temperature -- not too hot (above 75 degrees) or too cold (below 54 degrees).  3. Try not to drink fluids after 8 p.m. This may reduce awakenings due to urination.  4. Avoid naps, but if you do nap, make it no more than about 25 minutes about eight hours after you awake. But if you have problems falling asleep, then no naps for you.  5. Do not expose your self to bright light if you need to get up at night. Use a small night-light instead.  6. Nicotine is a stimulant and should be avoided particularly near bedtime and upon night awakenings. Having a smoke before bed, although it may feel relaxing, is actually putting a stimulant into your bloodstream.  7. Caffeine is also a stimulant and is present in coffee (100-200 mg), soda (50-75 mg), tea (50-75 mg), and various over-the-counter medications. Caffeine should be discontinued at least four to six hours before bedtime. If you consume large amounts of caffeine and you cut your self off too quickly, beware; you may get headaches that could keep you awake.  8. Although alcohol is a depressant and may help you fall asleep, the subsequent  metabolism that clears it from your body when you are sleeping causes a withdrawal syndrome. This withdrawal causes awakenings and is often associated with nightmares and sweats.  9. A light snack may be sleep-inducing, but a heavy meal too close to bedtime interferes with sleep. Stay away from protein and stick to carbohydrates or dairy products. Milk contains the amino acid L-tryptophan, which has been shown in research to help people go to sleep. So milk and cookies or crackers (without chocolate) may be useful and taste good as well.

## 2020-05-04 ENCOUNTER — TELEPHONE (OUTPATIENT)
Dept: PAIN MEDICINE | Facility: OTHER | Age: 65
End: 2020-05-04

## 2020-05-20 ENCOUNTER — TELEPHONE (OUTPATIENT)
Dept: PAIN MEDICINE | Facility: OTHER | Age: 65
End: 2020-05-20

## 2020-06-12 DIAGNOSIS — E11.9 TYPE 2 DIABETES MELLITUS WITHOUT COMPLICATION: ICD-10-CM

## 2020-06-30 DIAGNOSIS — R13.10 DYSPHAGIA, UNSPECIFIED TYPE: Primary | ICD-10-CM

## 2020-07-02 ENCOUNTER — PATIENT OUTREACH (OUTPATIENT)
Dept: ADMINISTRATIVE | Facility: HOSPITAL | Age: 65
End: 2020-07-02

## 2020-07-18 ENCOUNTER — LAB VISIT (OUTPATIENT)
Dept: URGENT CARE | Facility: CLINIC | Age: 65
End: 2020-07-18
Payer: MEDICARE

## 2020-07-18 VITALS — OXYGEN SATURATION: 96 % | TEMPERATURE: 98 F | RESPIRATION RATE: 16 BRPM | HEART RATE: 97 BPM

## 2020-07-18 DIAGNOSIS — R13.10 DYSPHAGIA, UNSPECIFIED TYPE: ICD-10-CM

## 2020-07-18 PROCEDURE — U0003 INFECTIOUS AGENT DETECTION BY NUCLEIC ACID (DNA OR RNA); SEVERE ACUTE RESPIRATORY SYNDROME CORONAVIRUS 2 (SARS-COV-2) (CORONAVIRUS DISEASE [COVID-19]), AMPLIFIED PROBE TECHNIQUE, MAKING USE OF HIGH THROUGHPUT TECHNOLOGIES AS DESCRIBED BY CMS-2020-01-R: HCPCS

## 2020-07-18 NOTE — PROGRESS NOTES
Subjective:       Patient ID: Leticia Brown is a 65 y.o. female.    Vitals:  temperature is 98.3 °F (36.8 °C). Her pulse is 97. Her respiration is 16 and oxygen saturation is 96%.     Chief Complaint: COVID-19 Concerns    Pt is here for pre-op COVID swab.  Asymptomatic.    ROS    Objective:      Physical Exam      Assessment:       1. Dysphagia, unspecified type        Plan:         Dysphagia, unspecified type  -     COVID-19 Routine Screening

## 2020-07-19 LAB — SARS-COV-2 RNA RESP QL NAA+PROBE: NOT DETECTED

## 2020-07-20 ENCOUNTER — TELEPHONE (OUTPATIENT)
Dept: ENDOSCOPY | Facility: HOSPITAL | Age: 65
End: 2020-07-20

## 2020-07-20 NOTE — TELEPHONE ENCOUNTER
Returned Pt call, no answer, left message for Pt to call endoscopy schedulers with any questions regarding the scheduled procedure at [phone].

## 2020-07-21 ENCOUNTER — ANESTHESIA (OUTPATIENT)
Dept: ENDOSCOPY | Facility: HOSPITAL | Age: 65
End: 2020-07-21
Payer: MEDICARE

## 2020-07-21 ENCOUNTER — ANESTHESIA EVENT (OUTPATIENT)
Dept: ENDOSCOPY | Facility: HOSPITAL | Age: 65
End: 2020-07-21
Payer: MEDICARE

## 2020-07-21 ENCOUNTER — HOSPITAL ENCOUNTER (OUTPATIENT)
Facility: HOSPITAL | Age: 65
Discharge: HOME | End: 2020-07-21
Attending: INTERNAL MEDICINE | Admitting: INTERNAL MEDICINE
Payer: MEDICARE

## 2020-07-21 VITALS
HEART RATE: 66 BPM | WEIGHT: 163 LBS | BODY MASS INDEX: 30 KG/M2 | RESPIRATION RATE: 19 BRPM | TEMPERATURE: 98 F | DIASTOLIC BLOOD PRESSURE: 60 MMHG | SYSTOLIC BLOOD PRESSURE: 135 MMHG | OXYGEN SATURATION: 96 % | HEIGHT: 62 IN

## 2020-07-21 DIAGNOSIS — R13.10 DYSPHAGIA: ICD-10-CM

## 2020-07-21 LAB — POCT GLUCOSE: 117 MG/DL (ref 70–110)

## 2020-07-21 PROCEDURE — 82962 GLUCOSE BLOOD TEST: CPT | Performed by: INTERNAL MEDICINE

## 2020-07-21 PROCEDURE — 27201012 HC FORCEPS, HOT/COLD, DISP: Performed by: INTERNAL MEDICINE

## 2020-07-21 PROCEDURE — 88305 TISSUE EXAM BY PATHOLOGIST: CPT | Performed by: PATHOLOGY

## 2020-07-21 PROCEDURE — E9220 PRA ENDO ANESTHESIA: ICD-10-PCS | Mod: ,,, | Performed by: NURSE ANESTHETIST, CERTIFIED REGISTERED

## 2020-07-21 PROCEDURE — E9220 PRA ENDO ANESTHESIA: HCPCS | Mod: ,,, | Performed by: NURSE ANESTHETIST, CERTIFIED REGISTERED

## 2020-07-21 PROCEDURE — 43450 DILATE ESOPHAGUS 1/MULT PASS: CPT | Mod: 59,,, | Performed by: INTERNAL MEDICINE

## 2020-07-21 PROCEDURE — 88305 TISSUE EXAM BY PATHOLOGIST: ICD-10-PCS | Mod: 26,,, | Performed by: PATHOLOGY

## 2020-07-21 PROCEDURE — 88342 IMHCHEM/IMCYTCHM 1ST ANTB: CPT | Performed by: PATHOLOGY

## 2020-07-21 PROCEDURE — 63600175 PHARM REV CODE 636 W HCPCS: Performed by: NURSE ANESTHETIST, CERTIFIED REGISTERED

## 2020-07-21 PROCEDURE — 88305 TISSUE EXAM BY PATHOLOGIST: CPT | Mod: 26,,, | Performed by: PATHOLOGY

## 2020-07-21 PROCEDURE — 88342 IMHCHEM/IMCYTCHM 1ST ANTB: CPT | Mod: 26,,, | Performed by: PATHOLOGY

## 2020-07-21 PROCEDURE — 88342 CHG IMMUNOCYTOCHEMISTRY: ICD-10-PCS | Mod: 26,,, | Performed by: PATHOLOGY

## 2020-07-21 PROCEDURE — 37000009 HC ANESTHESIA EA ADD 15 MINS: Performed by: INTERNAL MEDICINE

## 2020-07-21 PROCEDURE — 43239 PR EGD, FLEX, W/BIOPSY, SGL/MULTI: ICD-10-PCS | Mod: ,,, | Performed by: INTERNAL MEDICINE

## 2020-07-21 PROCEDURE — 43450 DILATE ESOPHAGUS 1/MULT PASS: CPT | Performed by: INTERNAL MEDICINE

## 2020-07-21 PROCEDURE — 37000008 HC ANESTHESIA 1ST 15 MINUTES: Performed by: INTERNAL MEDICINE

## 2020-07-21 PROCEDURE — 25000003 PHARM REV CODE 250: Performed by: INTERNAL MEDICINE

## 2020-07-21 PROCEDURE — 43239 EGD BIOPSY SINGLE/MULTIPLE: CPT | Mod: ,,, | Performed by: INTERNAL MEDICINE

## 2020-07-21 PROCEDURE — 43239 EGD BIOPSY SINGLE/MULTIPLE: CPT | Performed by: INTERNAL MEDICINE

## 2020-07-21 PROCEDURE — 43450 PR DILATE ESOPHAGUS: ICD-10-PCS | Mod: 59,,, | Performed by: INTERNAL MEDICINE

## 2020-07-21 RX ORDER — LIDOCAINE HYDROCHLORIDE 20 MG/ML
INJECTION INTRAVENOUS
Status: DISCONTINUED | OUTPATIENT
Start: 2020-07-21 | End: 2020-07-21

## 2020-07-21 RX ORDER — SODIUM CHLORIDE 9 MG/ML
INJECTION, SOLUTION INTRAVENOUS CONTINUOUS
Status: DISCONTINUED | OUTPATIENT
Start: 2020-07-21 | End: 2020-07-21 | Stop reason: HOSPADM

## 2020-07-21 RX ORDER — PROPOFOL 10 MG/ML
VIAL (ML) INTRAVENOUS
Status: DISCONTINUED | OUTPATIENT
Start: 2020-07-21 | End: 2020-07-21

## 2020-07-21 RX ORDER — PROPOFOL 10 MG/ML
VIAL (ML) INTRAVENOUS CONTINUOUS PRN
Status: DISCONTINUED | OUTPATIENT
Start: 2020-07-21 | End: 2020-07-21

## 2020-07-21 RX ADMIN — PROPOFOL 20 MG: 10 INJECTION, EMULSION INTRAVENOUS at 09:07

## 2020-07-21 RX ADMIN — PROPOFOL 150 MCG/KG/MIN: 10 INJECTION, EMULSION INTRAVENOUS at 09:07

## 2020-07-21 RX ADMIN — LIDOCAINE HYDROCHLORIDE 80 MG: 20 INJECTION, SOLUTION INTRAVENOUS at 09:07

## 2020-07-21 RX ADMIN — SODIUM CHLORIDE: 0.9 INJECTION, SOLUTION INTRAVENOUS at 08:07

## 2020-07-21 RX ADMIN — PROPOFOL 70 MG: 10 INJECTION, EMULSION INTRAVENOUS at 09:07

## 2020-07-21 NOTE — H&P
[facility]  Gastroenterology  H&P    Patient Name: Leticia Brown  MRN: [id number]  Admission Date: 7/21/2020  Code Status: No Order    Attending Provider: shanika valle  Primary Care Physician: Sarah Antonio MD  Principal Problem:<principal problem not specified>    Subjective:     History of Present Illness: dysphagia    Past Medical History:   Diagnosis Date    Diabetes mellitus     H. pylori infection 2017    [facility] EGD 2017. test of eradication neg.    High cholesterol     Hypertension        Past Surgical History:   Procedure Laterality Date    BREAST BIOPSY      CHOLECYSTECTOMY  2016       Review of patient's allergies indicates:  No Known Allergies  Family History     Problem Relation (Age of Onset)    No Known Problems Mother, Father, Sister, Brother, Daughter, Son, Maternal Aunt, Maternal Uncle, Paternal Aunt, Paternal Uncle, Maternal Grandmother, Maternal Grandfather, Paternal Grandmother, Paternal Grandfather        Tobacco Use    Smoking status: Current Every Day Smoker     Packs/day: 0.25     Types: Cigarettes    Smokeless tobacco: Never Used   Substance and Sexual Activity    Alcohol use: No     Frequency: Monthly or less     Drinks per session: 1 or 2     Binge frequency: Never    Drug use: No    Sexual activity: Never     Review of Systems   Respiratory: Negative.    Cardiovascular: Negative.      Objective:     Vital Signs (Most Recent):  Temp: 97.9 °F (36.6 °C) (07/21/20 0821)  Pulse: 91 (07/21/20 0821)  Resp: 14 (07/21/20 0821)  BP: (!) 154/70 (07/21/20 0821)  SpO2: 99 % (07/21/20 0821) Vital Signs (24h Range):  Temp:  [97.9 °F (36.6 °C)] 97.9 °F (36.6 °C)  Pulse:  [91] 91  Resp:  [14] 14  SpO2:  [99 %] 99 %  BP: (154)/(70) 154/70     Weight: 73.9 kg (163 lb) (07/21/20 0821)  Body mass index is 29.81 kg/m².    No intake or output data in the 24 hours ending 07/21/20 0859    Lines/Drains/Airways     Peripheral Intravenous Line                 Peripheral IV -  Single Lumen 07/21/20 0823 22 G Right Hand less than 1 day                Physical Exam  Cardiovascular:      Rate and Rhythm: Normal rate and regular rhythm.   Pulmonary:      Effort: Pulmonary effort is normal.      Breath sounds: Normal breath sounds.         Significant Labs:      Significant Imaging:      Assessment/Plan:     There are no hospital problems to display for this patient.      Indication for procedure:    ASA:II  Airway normal  Malampati class:per anes    Personal and family history negative for anesthesia problems    Plan:egd  Anesthesia plan: general      Rodrick Montes MD  Gastroenterology  [facility]

## 2020-07-21 NOTE — ANESTHESIA PREPROCEDURE EVALUATION
07/21/2020  Leticia Brown is a 65 y.o., female.    Patient Active Problem List   Diagnosis    Essential hypertension    Mixed hyperlipidemia    Anxiety    Bilateral shoulder pain    Hepatitis C antibody test positive    H/O allergic rhinitis    GERD (gastroesophageal reflux disease)    Recurrent major depressive disorder, in full remission    DDD (degenerative disc disease), cervical    Right wrist pain    Type 2 diabetes mellitus with diabetic polyneuropathy, without long-term current use of insulin    Persistent cough    Incontinence of urine    History of tobacco abuse    Vitamin D deficiency    Colon polyp    Right rotator cuff tendinitis    Insomnia    Light cigarette smoker (1-9 cigs/day)     Past Surgical History:   Procedure Laterality Date    BREAST BIOPSY      CHOLECYSTECTOMY  2016     Past Medical History:   Diagnosis Date    Diabetes mellitus     H. pylori infection 2017    [facility]GD 2017. test of eradication neg.    High cholesterol     Hypertension          Anesthesia Evaluation    I have reviewed the Patient Summary Reports.    I have reviewed the Nursing Notes.    I have reviewed the Medications.     Review of Systems  Anesthesia Hx:  No problems with previous Anesthesia Denies Hx of Anesthetic complications    Social:  Smoker    Hematology/Oncology:  Hematology Normal   Oncology Normal     EENT/Dental:EENT/Dental Normal   Cardiovascular:   Hypertension    Pulmonary:  Pulmonary Normal    Renal/:  Renal/ Normal     Hepatic/GI:   GERD    Musculoskeletal:   Arthritis     Neurological:   Neuromuscular Disease,    Endocrine:   Diabetes    Dermatological:  Skin Normal    Psych:   Psychiatric History          Physical Exam  General:  Well nourished    Airway/Jaw/Neck:  Airway Findings: Mouth Opening: Normal Tongue: Normal  General Airway Assessment: Adult   Mallampati: II  TM Distance: 4 - 6 cm        Eyes/Ears/Nose:  EYES/EARS/NOSE FINDINGS: Normal   Dental:  DENTAL FINDINGS: Normal   Chest/Lungs:  Chest/Lungs Clear    Heart/Vascular:  Heart Findings: Normal Heart murmur: negative Vascular Findings: Normal    Abdomen:  Abdomen Findings: Normal    Musculoskeletal:  Musculoskeletal Findings: Normal   Skin:  Skin Findings: Normal    Mental Status:  Mental Status Findings: Normal        Anesthesia Plan  Type of Anesthesia, risks & benefits discussed:  Anesthesia Type:  general  Patient's Preference: General  Intra-op Monitoring Plan: standard ASA monitors  Intra-op Monitoring Plan Comments:   Post Op Pain Control Plan:   Post Op Pain Control Plan Comments:   Induction:   IV  Beta Blocker:  Patient is not currently on a Beta-Blocker (No further documentation required).       Informed Consent: Patient understands risks and agrees with Anesthesia plan.  Questions answered. Anesthesia consent signed with patient.  ASA Score: 2     Day of Surgery Review of History & Physical: I have interviewed and examined the patient. I have reviewed the patient's H&P dated:  There are no significant changes.  H&P update referred to the surgeon.         Ready For Surgery From Anesthesia Perspective.

## 2020-07-21 NOTE — TRANSFER OF CARE
"Anesthesia Transfer of Care Note    Patient: Leticia Brown    Procedure(s) Performed: Procedure(s) (LRB):  EGD (ESOPHAGOGASTRODUODENOSCOPY) (N/A)    Patient location: GI    Anesthesia Type: general    Transport from OR: Transported from OR on 6-10 L/min O2 by face mask with adequate spontaneous ventilation. Transported from OR on 2-3 L/min O2 by NC with adequate spontaneous ventilation    Post pain: adequate analgesia    Post assessment: no apparent anesthetic complications and tolerated procedure well    Post vital signs: stable    Level of consciousness: sedated    Nausea/Vomiting: no nausea/vomiting    Complications: none    Transfer of care protocol was followed      Last vitals:   Visit Vitals  BP (!) 154/70 (BP Location: Left arm, Patient Position: Lying)   Pulse 91   Temp 36.6 °C (97.9 °F)   Resp 14   Ht 5' 2" (1.575 m)   Wt 73.9 kg (163 lb)   SpO2 99%   Breastfeeding No   BMI 29.81 kg/m²     "

## 2020-07-21 NOTE — [employer] PATIENT INSTRUCTIONS
Discharge Summary/Instructions after an Endoscopic Procedure  Patient Name: Leticia Brown  Patient MRN: [id number]  Patient YOB: 1955 Tuesday, July 21, 2020  Rodrick Montes MD  RESTRICTIONS:  During your procedure today, you received medications for sedation.  These   medications may affect your judgment, balance and coordination.  Therefore,   for 24 hours, you have the following restrictions:   - DO NOT drive a car, operate machinery, make legal/financial decisions,   sign important papers or drink alcohol.    ACTIVITY:  Today: no heavy lifting, straining or running due to procedural   sedation/anesthesia.  The following day: return to full activity including work.  DIET:  Eat and drink normally unless instructed otherwise.     TREATMENT FOR COMMON SIDE EFFECTS:  - Mild abdominal pain, nausea, belching, bloating or excessive gas:  rest,   eat lightly and use a heating pad.  - Sore Throat: treat with throat lozenges and/or gargle with warm salt   water.  - Because air was used during the procedure, expelling large amounts of air   from your rectum or belching is normal.  - If a bowel prep was taken, you may not have a bowel movement for 1-3 days.    This is normal.  SYMPTOMS TO WATCH FOR AND REPORT TO YOUR PHYSICIAN:  1. Abdominal pain or bloating, other than gas cramps.  2. Chest pain.  3. Back pain.  4. Signs of infection such as: chills or fever occurring within 24 hours   after the procedure.  5. Rectal bleeding, which would show as bright red, maroon, or black stools.   (A tablespoon of blood from the rectum is not serious, especially if   hemorrhoids are present.)  6. Vomiting.  7. Weakness or dizziness.  GO DIRECTLY TO THE NEAREST EMERGENCY ROOM IF YOU HAVE ANY OF THE FOLLOWING:      Difficulty breathing              Chills and/or fever over 101 F   Persistent vomiting and/or vomiting blood   Severe abdominal pain   Severe chest pain   Black, tarry stools   Bleeding- more than one tablespoon   Any  other symptom or condition that you feel may need urgent attention  Your doctor recommends these additional instructions:  If any biopsies were taken, your doctors clinic will contact you in 1 to 2   weeks with any results.  - Patient has a contact number available for emergencies.  The signs and   symptoms of potential delayed complications were discussed with the   patient.  Return to normal activities tomorrow.  Written discharge   instructions were provided to the patient.   - Discharge patient to home (ambulatory).   - Resume previous diet.   - Continue present medications.   - Await pathology results.   - Return to GI clinic after studies are complete.  For questions, problems or results please call your physician - Rodrick Montes MD at Work:  [phone].  [facility], EMERGENCY ROOM PHONE NUMBER: [phone]  IF A COMPLICATION OR EMERGENCY SITUATION ARISES AND YOU ARE UNABLE TO REACH   YOUR PHYSICIAN - GO DIRECTLY TO THE EMERGENCY ROOM.  Rodrick Montes MD  7/21/2020 9:18:44 AM  This report has been verified and signed electronically.  [employer]

## 2020-07-22 NOTE — ANESTHESIA POSTPROCEDURE EVALUATION
Anesthesia Post Evaluation    Patient: Leticia Brown    Procedure(s) Performed: Procedure(s) (LRB):  EGD (ESOPHAGOGASTRODUODENOSCOPY) (N/A)    Final Anesthesia Type: general    Patient location during evaluation: GI PACU  Patient participation: Yes- Able to Participate  Level of consciousness: awake  Post-procedure vital signs: reviewed and stable  Pain management: adequate  Airway patency: patent    PONV status at discharge: No PONV  Anesthetic complications: no      Cardiovascular status: blood pressure returned to baseline  Respiratory status: unassisted  Hydration status: euvolemic  Follow-up not needed.          Vitals Value Taken Time   /60 07/21/20 0947   Temp 36.6 °C (97.9 °F) 07/21/20 0922   Pulse 66 07/21/20 0947   Resp 19 07/21/20 0947   SpO2 96 % 07/21/20 0947         Event Time   Out of Recovery 10:14:44         Pain/Vilma Score: Vilma Score: 10 (7/21/2020  9:47 AM)

## 2020-07-24 LAB
FINAL PATHOLOGIC DIAGNOSIS: NORMAL
GROSS: NORMAL

## 2020-08-21 DIAGNOSIS — E11.9 TYPE 2 DIABETES MELLITUS WITHOUT COMPLICATION: ICD-10-CM

## 2020-09-08 ENCOUNTER — TELEPHONE (OUTPATIENT)
Dept: INTERNAL MEDICINE | Facility: CLINIC | Age: 65
End: 2020-09-08

## 2020-09-08 DIAGNOSIS — Z12.31 ENCOUNTER FOR SCREENING MAMMOGRAM FOR BREAST CANCER: Primary | ICD-10-CM

## 2020-09-08 NOTE — TELEPHONE ENCOUNTER
----- Message from Frida Landaverde sent at 9/8/2020  8:11 AM CDT -----  Regarding: mammo order  Pt is requesting mammo order. Please call patient and advise thanks

## 2020-09-08 NOTE — TELEPHONE ENCOUNTER
lov 04/2020  Last mammo due 09/09/2020  Order placed lvm to call [phone] to schedule mammogram

## 2020-09-10 ENCOUNTER — HOSPITAL ENCOUNTER (EMERGENCY)
Facility: OTHER | Age: 65
Discharge: HOME | End: 2020-09-10
Attending: EMERGENCY MEDICINE
Payer: MEDICARE

## 2020-09-10 ENCOUNTER — PATIENT MESSAGE (OUTPATIENT)
Dept: INTERNAL MEDICINE | Facility: CLINIC | Age: 65
End: 2020-09-10

## 2020-09-10 VITALS
OXYGEN SATURATION: 98 % | HEIGHT: 62 IN | BODY MASS INDEX: 30.36 KG/M2 | RESPIRATION RATE: 18 BRPM | DIASTOLIC BLOOD PRESSURE: 74 MMHG | HEART RATE: 59 BPM | WEIGHT: 165 LBS | SYSTOLIC BLOOD PRESSURE: 183 MMHG | TEMPERATURE: 99 F

## 2020-09-10 DIAGNOSIS — S46.911A RIGHT SHOULDER STRAIN, INITIAL ENCOUNTER: Primary | ICD-10-CM

## 2020-09-10 DIAGNOSIS — T14.90XA INJURY: ICD-10-CM

## 2020-09-10 DIAGNOSIS — M25.519 SHOULDER PAIN: ICD-10-CM

## 2020-09-10 PROCEDURE — 93010 EKG 12-LEAD: ICD-10-PCS | Mod: ,,, | Performed by: INTERNAL MEDICINE

## 2020-09-10 PROCEDURE — 93010 ELECTROCARDIOGRAM REPORT: CPT | Mod: ,,, | Performed by: INTERNAL MEDICINE

## 2020-09-10 PROCEDURE — 93005 ELECTROCARDIOGRAM TRACING: CPT

## 2020-09-10 PROCEDURE — 99284 EMERGENCY DEPT VISIT MOD MDM: CPT | Mod: 25

## 2020-09-10 RX ORDER — IBUPROFEN 600 MG/1
600 TABLET ORAL EVERY 6 HOURS PRN
Qty: 30 TABLET | Refills: 0 | Status: SHIPPED | OUTPATIENT
Start: 2020-09-10 | End: 2020-11-18

## 2020-09-10 RX ORDER — METHOCARBAMOL 500 MG/1
1000 TABLET, FILM COATED ORAL 3 TIMES DAILY PRN
Qty: 20 TABLET | Refills: 0 | Status: SHIPPED | OUTPATIENT
Start: 2020-09-10 | End: 2020-09-15

## 2020-09-10 NOTE — ED PROVIDER NOTES
Encounter Date: 9/10/2020    SCRIBE #1 NOTE: I, Nishi Gautam, am scribing for, and in the presence of, Dr. Argueta.       History     Chief Complaint   Patient presents with    Shoulder Pain     pt with c/o right shoulder , neck pain starting today. pt states pain increases with movenent of right shoulder and arm      Time seen by provider: 5:00 PM    This is a 65 y.o. female with hx of HTN, DM, and reported arthritis who presents with complaint of sudden onset right shoulder pain approximately 3 hours ago. The pain has been constant, is described as a pressure, and worsens with ranging the shoulder. No alleviating factors. No recent repeated arm movements or heavy lifting. She had similar episode in the past and has had acupuncture with temporary relief. She denies having arthritis to the left shoulder. She is right hand dominant. She denies fever, chills, shortness of breath, chest pain, numbness, or weakness. NKDA. She reports smoking 1 pack of cigarettes in 3 days. This is the extent of the patient's complaints at this time.    The history is provided by the patient.     Review of patient's allergies indicates:  No Known Allergies  Past Medical History:   Diagnosis Date    Diabetes mellitus     H. pylori infection 2017    [facility] EGD 2017. test of eradication neg.    High cholesterol     Hypertension      Past Surgical History:   Procedure Laterality Date    BREAST BIOPSY      CHOLECYSTECTOMY  2016    ESOPHAGOGASTRODUODENOSCOPY N/A 7/21/2020    Procedure: EGD (ESOPHAGOGASTRODUODENOSCOPY);  Surgeon: Rodrick Montes MD;  Location: [address]);  Service: Endoscopy;  Laterality: N/A;  3/30/20 - removed from 4/9/20, 3/30/20 & 3/31/20LM pt &  ph & sent email, request call back to reschedule June/July.  Pt called back, rescheduled 7/21/20 - pg  7/6/20 - 7/9/20- LM x 2 - waiting for cb to set up COVID appt. - [facility]  COVID screening on     Family History   Problem Relation Age of Onset    No Known  Problems Mother     No Known Problems Father     No Known Problems Sister     No Known Problems Brother     No Known Problems Daughter     No Known Problems Son     No Known Problems Maternal Aunt     No Known Problems Maternal Uncle     No Known Problems Paternal Aunt     No Known Problems Paternal Uncle     No Known Problems Maternal Grandmother     No Known Problems Maternal Grandfather     No Known Problems Paternal Grandmother     No Known Problems Paternal Grandfather     Colon cancer Neg Hx     Rectal cancer Neg Hx     Stomach cancer Neg Hx      Social History     Tobacco Use    Smoking status: Current Every Day Smoker     Packs/day: 0.25     Types: Cigarettes    Smokeless tobacco: Never Used   Substance Use Topics    Alcohol use: No     Frequency: Monthly or less     Drinks per session: 1 or 2     Binge frequency: Never    Drug use: No     Review of Systems   Constitutional: Negative for chills and fever.   HENT: Negative for sore throat.    Respiratory: Negative for shortness of breath.    Cardiovascular: Negative for chest pain.   Gastrointestinal: Negative for nausea.   Genitourinary: Negative for dysuria.   Musculoskeletal: Positive for arthralgias (right shoulder). Negative for back pain.   Skin: Negative for rash.   Neurological: Negative for weakness and numbness.   Hematological: Does not bruise/bleed easily.       Physical Exam     Initial Vitals [09/10/20 1636]   BP Pulse Resp Temp SpO2   (!) 169/71 83 18 99.1 °F (37.3 °C) 96 %      MAP       --         Physical Exam    Nursing note and vitals reviewed.  Constitutional: She appears well-developed and well-nourished. She is not diaphoretic. No distress.   HENT:   Head: Normocephalic and atraumatic.   Eyes: EOM are normal.   Neck: Normal range of motion. Neck supple.   Cardiovascular: Normal rate, regular rhythm and normal heart sounds. Exam reveals no gallop and no friction rub.    No murmur heard.  Pulmonary/Chest: Breath sounds  normal. No respiratory distress. She has no wheezes. She has no rhonchi. She has no rales.   Musculoskeletal:      Comments: RUE: Right shoulder pain reproduced by internal and external rotation as well as abduction beyond 90 degrees, but able to perform full ROM. No focal bony tenderness. NVID. No distal edema.   Neurological: She is alert and oriented to person, place, and time.   Skin: Skin is warm and dry.   Psychiatric: She has a normal mood and affect. Her behavior is normal. Judgment and thought content normal.         ED Course   Procedures  Labs Reviewed - No data to display  EKG Readings: (Independently Interpreted)   Sinus rhythm at rate of 80. No ischemia or arrhythmia.       Imaging Results          X-Ray Shoulder Trauma Right (Final result)  Result time 09/10/20 18:33:15    Final result by Reyes Garland MD (09/10/20 18:33:15)                 Impression:      No acute displaced fracture-dislocation identified.      Electronically signed by: Reyes Garland MD  Date:    09/10/2020  Time:    18:33             Narrative:    EXAMINATION:  XR SHOULDER TRAUMA 3 VIEW RIGHT    CLINICAL HISTORY:  Injury, unspecified, initial encounter    TECHNIQUE:  Three or four views of the right shoulder were performed.    COMPARISON:  Right shoulder series 10/04/2019 and right clavicle series 12/23/2019    FINDINGS:  Overall alignment is within normal limits.  No displaced fracture, dislocation or destructive osseous process.  Mild degenerative change about the shoulder.  No subcutaneous emphysema or radiodense retained foreign body.                              X-Rays:   Independently Interpreted Readings:   Other Readings:  Right Shoulder: No fracture or dislocation.    Medical Decision Making:   History:   Old Medical Records: I decided to obtain old medical records.  Independently Interpreted Test(s):   I have ordered and independently interpreted X-rays - see prior notes.  I have ordered and independently interpreted EKG  Reading(s) - see prior notes  Clinical Tests:   Radiological Study: Ordered and Reviewed  Medical Tests: Ordered and Reviewed            Scribe Attestation:   Scribe #1: I performed the above scribed service and the documentation accurately describes the services I performed. I attest to the accuracy of the note.    Attending Attestation:           Physician Attestation for Scribe:  Physician Attestation Statement for Scribe #1: I, Dr. Argueta, reviewed documentation, as scribed by Nishi Gautam in my presence, and it is both accurate and complete.                    Patient presents with atraumatic right shoulder pain.  Started earlier today and since then has been exacerbated with movement especially abduction beyond 90°.  No chest pain shortness of breath nausea diaphoresis.  No weakness of the arm.  She has had similar pain in the past has previously been referred to physical therapy which helped his also had acupuncture which helped.  My exam she does have pain with rotation as well as abduction no focal bony tenderness however and x-ray negative for fracture.  Treat with anti-inflammatory, muscle relaxant for shoulder strain.  Encouraged follow-up with primary care, especially symptoms persist as symptoms could come from tendonitis rotator cuff or other similar process and patient may need further workup, or referral to physical therapy again.  Return precautions discussed for the interim       Clinical Impression:     1. Right shoulder strain, initial encounter    2. Shoulder pain    3. Injury              ED Disposition Condition    Discharge Stable        ED Prescriptions     Medication Sig Dispense Start Date End Date Auth. Provider    ibuprofen (ADVIL,MOTRIN) 600 MG tablet Take 1 tablet (600 mg total) by mouth every 6 (six) hours as needed. 30 tablet 9/10/2020  Clay Argueta II, MD    methocarbamoL (ROBAXIN) 500 MG Tab Take 2 tablets (1,000 mg total) by mouth 3 (three) times daily as needed. 20 tablet  9/10/2020 9/15/2020 Clay Argueta II, MD        Follow-up Information     Follow up With Specialties Details Why Contact Info    Sarah Antonio MD Internal Medicine In 1 week  [address] [address]  [phone]                                         Clay Argueta II, MD  09/10/20 2021

## 2020-09-10 NOTE — ED TRIAGE NOTES
The pt presents to ed c/o Neck pain that started today. She states not taking anything for the pain and that it is an 8/10, radaiting down to her r shoulder. She denies any trauma to that side. She denies any n/v/d, fever or chills. Pt is awake and alert, resp pattern even and non labored.

## 2020-09-10 NOTE — FIRST PROVIDER EVALUATION
Emergency Department TeleTriage Encounter Note      CHIEF COMPLAINT    Chief Complaint   Patient presents with    Shoulder Pain     pt with c/o right shoulder , neck pain starting today. pt states pain increases with movenent of right shoulder and arm        VITAL SIGNS   Initial Vitals [09/10/20 1636]   BP Pulse Resp Temp SpO2   (!) 169/71 83 18 99.1 °F (37.3 °C) 96 %      MAP       --            ALLERGIES    Review of patient's allergies indicates:  No Known Allergies    PROVIDER TRIAGE NOTE  Pt is a 66 yo female presenting for right sided neck, shoulder and chest pain today while at work.  Pt states pain worse with movement.  No change with inspiration.  Pt denies taking anything for symptoms.        ORDERS  Labs Reviewed - No data to display    ED Orders (720h ago, onward)    Start Ordered     Status Ordering Provider    09/10/20 1710 09/10/20 1709  X-Ray Shoulder Trauma Right  1 time imaging      Ordered DALIA ALEXANDER II    09/10/20 1639 09/10/20 1639  EKG 12-lead  Once  Completed    Completed by VIRGINIA SANDOVAL on 9/10/2020 at  4:53 PM DALIA ALEXANDER II            Virtual Visit Note: The provider triage portion of this emergency department evaluation and documentation was performed via [employer], a HIPAA-compliant telemedicine application, in concert with a tele-presenter in the room. A face to face patient evaluation with one of my colleagues will occur once the patient is placed in an emergency department room.      DISCLAIMER: This note was prepared with [email or web address] voice recognition transcription software. Garbled syntax, mangled pronouns, and other bizarre constructions may be attributed to that software system.

## 2020-09-11 DIAGNOSIS — E11.9 TYPE 2 DIABETES MELLITUS WITHOUT COMPLICATION, UNSPECIFIED WHETHER LONG TERM INSULIN USE: ICD-10-CM

## 2020-09-14 ENCOUNTER — HOSPITAL ENCOUNTER (OUTPATIENT)
Dept: RADIOLOGY | Facility: OTHER | Age: 65
Discharge: HOME | End: 2020-09-14
Attending: INTERNAL MEDICINE
Payer: MEDICARE

## 2020-09-14 DIAGNOSIS — Z12.31 ENCOUNTER FOR SCREENING MAMMOGRAM FOR BREAST CANCER: ICD-10-CM

## 2020-09-14 PROCEDURE — 77063 MAMMO DIGITAL SCREENING BILAT WITH TOMOSYNTHESIS_CAD: ICD-10-PCS | Mod: 26,,, | Performed by: RADIOLOGY

## 2020-09-14 PROCEDURE — 77067 MAMMO DIGITAL SCREENING BILAT WITH TOMOSYNTHESIS_CAD: ICD-10-PCS | Mod: 26,,, | Performed by: RADIOLOGY

## 2020-09-14 PROCEDURE — 77063 BREAST TOMOSYNTHESIS BI: CPT | Mod: 26,,, | Performed by: RADIOLOGY

## 2020-09-14 PROCEDURE — 77067 SCR MAMMO BI INCL CAD: CPT | Mod: TC

## 2020-09-14 PROCEDURE — 77067 SCR MAMMO BI INCL CAD: CPT | Mod: 26,,, | Performed by: RADIOLOGY

## 2020-09-16 ENCOUNTER — PATIENT MESSAGE (OUTPATIENT)
Dept: INTERNAL MEDICINE | Facility: CLINIC | Age: 65
End: 2020-09-16

## 2020-09-24 ENCOUNTER — PATIENT MESSAGE (OUTPATIENT)
Dept: INTERNAL MEDICINE | Facility: CLINIC | Age: 65
End: 2020-09-24

## 2020-09-28 ENCOUNTER — PATIENT MESSAGE (OUTPATIENT)
Dept: INTERNAL MEDICINE | Facility: CLINIC | Age: 65
End: 2020-09-28

## 2020-09-28 ENCOUNTER — CLINICAL SUPPORT (OUTPATIENT)
Dept: INTERNAL MEDICINE | Facility: CLINIC | Age: 65
End: 2020-09-28
Payer: MEDICARE

## 2020-09-28 VITALS
RESPIRATION RATE: 12 BRPM | SYSTOLIC BLOOD PRESSURE: 152 MMHG | DIASTOLIC BLOOD PRESSURE: 70 MMHG | BODY MASS INDEX: 31.24 KG/M2 | HEART RATE: 68 BPM | HEIGHT: 62 IN | WEIGHT: 169.75 LBS | OXYGEN SATURATION: 98 %

## 2020-09-28 DIAGNOSIS — E55.9 VITAMIN D DEFICIENCY: ICD-10-CM

## 2020-09-28 DIAGNOSIS — I10 ESSENTIAL HYPERTENSION: ICD-10-CM

## 2020-09-28 DIAGNOSIS — E11.42 TYPE 2 DIABETES MELLITUS WITH DIABETIC POLYNEUROPATHY, WITHOUT LONG-TERM CURRENT USE OF INSULIN: ICD-10-CM

## 2020-09-28 PROCEDURE — 99999 PR PBB SHADOW E&M-EST. PATIENT-LVL III: CPT | Mod: PBBFAC,,,

## 2020-09-28 PROCEDURE — 99999 PR PBB SHADOW E&M-EST. PATIENT-LVL III: ICD-10-PCS | Mod: PBBFAC,,,

## 2020-09-28 RX ORDER — ERGOCALCIFEROL 1.25 MG/1
50000 CAPSULE ORAL
Qty: 12 CAPSULE | Refills: 1 | Status: SHIPPED | OUTPATIENT
Start: 2020-09-28 | End: 2021-03-14 | Stop reason: SDUPTHER

## 2020-09-28 RX ORDER — LOSARTAN POTASSIUM 100 MG/1
100 TABLET ORAL DAILY
Qty: 30 TABLET | Refills: 3 | Status: SHIPPED | OUTPATIENT
Start: 2020-09-28 | End: 2020-11-18

## 2020-09-28 NOTE — Clinical Note
INA. We negotiated on her follow up appointments, so she's going to see you on 10/12/20 for f/u instead of two appts.

## 2020-09-28 NOTE — PROGRESS NOTES
Leticia Brown 65 y.o. female is here today for Blood Pressure check.   History of HTN yes.    Review of patient's allergies indicates:  No Known Allergies  Creatinine   Date Value Ref Range Status   08/15/2019 0.8 0.5 - 1.4 mg/dL Final     Sodium   Date Value Ref Range Status   08/15/2019 142 136 - 145 mmol/L Final     Potassium   Date Value Ref Range Status   08/15/2019 4.1 3.5 - 5.1 mmol/L Final   ]  Patient verifies taking blood pressure medications on a regular bases at the same time of the day.     Current Outpatient Medications:     aspirin (ECOTRIN) 81 MG EC tablet, Take 81 mg by mouth once daily., Disp: , Rfl:     atorvastatin (LIPITOR) 40 MG tablet, Take 1 tablet (40 mg total) by mouth once daily., Disp: 90 tablet, Rfl: 1    ergocalciferol (ERGOCALCIFEROL) 50,000 unit Cap, Take 1 capsule (50,000 Units total) by mouth every 7 days., Disp: 12 capsule, Rfl: 1    gabapentin (NEURONTIN) 300 MG capsule, Take 300 mg by mouth., Disp: , Rfl:     ibuprofen (ADVIL,MOTRIN) 600 MG tablet, Take 1 tablet (600 mg total) by mouth every 6 (six) hours as needed., Disp: 30 tablet, Rfl: 0    loratadine (CLARITIN) 10 mg tablet, Take 10 mg by mouth., Disp: , Rfl:     losartan (COZAAR) 50 MG tablet, Take 1 tablet (50 mg total) by mouth once daily., Disp: 30 tablet, Rfl: 3    metFORMIN (GLUCOPHAGE) 500 MG tablet, Take 1 tablet (500 mg total) by mouth daily with breakfast., Disp: 30 tablet, Rfl: 3    omeprazole (PRILOSEC) 20 MG capsule, Take 1 capsule (20 mg total) by mouth once daily., Disp: 90 capsule, Rfl: 1    oxybutynin (DITROPAN XL) 10 MG 24 hr tablet, Take 1 tablet (10 mg total) by mouth once daily., Disp: 30 tablet, Rfl: 3  Does patient have record of home blood pressure readings yes. Readings have been ranging from 160-177/68-71.   Last dose of blood pressure medication was taken at 0800 this morning (losartan).  Patient is symptomatic.   Complains of Headache, chest tightness, and swelling in bilateral  ankles. She reports she has been having these symptoms since she drove to [place], [place] on 9/6/20. She was seen in the ED on 9/10/20 as she was concerned about her symptoms. Denies any additional salt in diet and reports she'll have a coffee in the morning and water throughout the day. She had coffee this morning. She has not eaten since dinner last night. Last night she had lamb chops, potato salad, and mac and cheese. She has had approximately 34 oz of water already today. She reports her urine is running clear.    BP: (!) 152/70 , Pulse: 68.    Blood pressure reading after 15 minutes was 148/62, Pulse 66.  Dr. Antonio notified. She advised the patient increase her losartan to 100mg qdaily. She will take two 50mg tablets until she runs out and Dr. Antonio sent in a new rx for her. Dr. Antonio requested she  return to clinic in one week and follow up with Dr. Antonio in one month as it has been a while since she was seen. Patient reports she only has Mondays off and was supposed to see Dr. Antonio on 10/5/20 but her appt was rescheduled. Scheduled a f/u with Dr. Antonio on 10/12/20 for BP check. She was happy with this.

## 2020-10-05 ENCOUNTER — PATIENT MESSAGE (OUTPATIENT)
Dept: INTERNAL MEDICINE | Facility: CLINIC | Age: 65
End: 2020-10-05

## 2020-10-12 ENCOUNTER — OFFICE VISIT (OUTPATIENT)
Dept: INTERNAL MEDICINE | Facility: CLINIC | Age: 65
End: 2020-10-12
Payer: MEDICARE

## 2020-10-12 VITALS
WEIGHT: 168.63 LBS | HEART RATE: 68 BPM | HEIGHT: 62 IN | SYSTOLIC BLOOD PRESSURE: 162 MMHG | OXYGEN SATURATION: 99 % | DIASTOLIC BLOOD PRESSURE: 84 MMHG | BODY MASS INDEX: 31.03 KG/M2

## 2020-10-12 DIAGNOSIS — Z12.2 ENCOUNTER FOR SCREENING FOR MALIGNANT NEOPLASM OF RESPIRATORY ORGANS: ICD-10-CM

## 2020-10-12 DIAGNOSIS — F17.210 LIGHT CIGARETTE SMOKER (1-9 CIGS/DAY): ICD-10-CM

## 2020-10-12 DIAGNOSIS — E55.9 VITAMIN D DEFICIENCY: ICD-10-CM

## 2020-10-12 DIAGNOSIS — E11.42 TYPE 2 DIABETES MELLITUS WITH DIABETIC POLYNEUROPATHY, WITHOUT LONG-TERM CURRENT USE OF INSULIN: Primary | ICD-10-CM

## 2020-10-12 DIAGNOSIS — Z23 INFLUENZA VACCINE NEEDED: ICD-10-CM

## 2020-10-12 DIAGNOSIS — I10 ESSENTIAL HYPERTENSION: ICD-10-CM

## 2020-10-12 DIAGNOSIS — Z87.891 PERSONAL HISTORY OF NICOTINE DEPENDENCE: ICD-10-CM

## 2020-10-12 DIAGNOSIS — Z11.4 SCREENING FOR HIV (HUMAN IMMUNODEFICIENCY VIRUS): ICD-10-CM

## 2020-10-12 DIAGNOSIS — E78.2 MIXED HYPERLIPIDEMIA: ICD-10-CM

## 2020-10-12 PROCEDURE — 3008F BODY MASS INDEX DOCD: CPT | Mod: S$GLB,,, | Performed by: INTERNAL MEDICINE

## 2020-10-12 PROCEDURE — 3008F PR BODY MASS INDEX (BMI) DOCUMENTED: ICD-10-PCS | Mod: S$GLB,,, | Performed by: INTERNAL MEDICINE

## 2020-10-12 PROCEDURE — 3077F SYST BP >= 140 MM HG: CPT | Mod: S$GLB,,, | Performed by: INTERNAL MEDICINE

## 2020-10-12 PROCEDURE — 3077F PR MOST RECENT SYSTOLIC BLOOD PRESSURE >= 140 MM HG: ICD-10-PCS | Mod: S$GLB,,, | Performed by: INTERNAL MEDICINE

## 2020-10-12 PROCEDURE — 1101F PR PT FALLS ASSESS DOC 0-1 FALLS W/OUT INJ PAST YR: ICD-10-PCS | Mod: S$GLB,,, | Performed by: INTERNAL MEDICINE

## 2020-10-12 PROCEDURE — 99215 PR OFFICE/OUTPT VISIT, EST, LEVL V, 40-54 MIN: ICD-10-PCS | Mod: S$GLB,,, | Performed by: INTERNAL MEDICINE

## 2020-10-12 PROCEDURE — 3079F PR MOST RECENT DIASTOLIC BLOOD PRESSURE 80-89 MM HG: ICD-10-PCS | Mod: S$GLB,,, | Performed by: INTERNAL MEDICINE

## 2020-10-12 PROCEDURE — 99999 PR PBB SHADOW E&M-EST. PATIENT-LVL IV: CPT | Mod: PBBFAC,,, | Performed by: INTERNAL MEDICINE

## 2020-10-12 PROCEDURE — 99999 PR PBB SHADOW E&M-EST. PATIENT-LVL IV: ICD-10-PCS | Mod: PBBFAC,,, | Performed by: INTERNAL MEDICINE

## 2020-10-12 PROCEDURE — 99215 OFFICE O/P EST HI 40 MIN: CPT | Mod: S$GLB,,, | Performed by: INTERNAL MEDICINE

## 2020-10-12 PROCEDURE — 99406 PR TOBACCO USE CESSATION INTERMEDIATE 3-10 MINUTES: ICD-10-PCS | Mod: S$GLB,,, | Performed by: INTERNAL MEDICINE

## 2020-10-12 PROCEDURE — 3079F DIAST BP 80-89 MM HG: CPT | Mod: S$GLB,,, | Performed by: INTERNAL MEDICINE

## 2020-10-12 PROCEDURE — 99406 BEHAV CHNG SMOKING 3-10 MIN: CPT | Mod: S$GLB,,, | Performed by: INTERNAL MEDICINE

## 2020-10-12 PROCEDURE — 1101F PT FALLS ASSESS-DOCD LE1/YR: CPT | Mod: S$GLB,,, | Performed by: INTERNAL MEDICINE

## 2020-10-12 RX ORDER — ATORVASTATIN CALCIUM 40 MG/1
40 TABLET, FILM COATED ORAL DAILY
Qty: 90 TABLET | Refills: 3 | Status: SHIPPED | OUTPATIENT
Start: 2020-10-12 | End: 2021-03-14 | Stop reason: SDUPTHER

## 2020-10-12 RX ORDER — AMLODIPINE BESYLATE 5 MG/1
5 TABLET ORAL DAILY
Qty: 30 TABLET | Refills: 3 | Status: SHIPPED | OUTPATIENT
Start: 2020-10-12 | End: 2021-02-04 | Stop reason: SDUPTHER

## 2020-10-12 RX ORDER — HYDROCHLOROTHIAZIDE 12.5 MG/1
12.5 TABLET ORAL DAILY
Qty: 30 TABLET | Refills: 3 | Status: CANCELLED | OUTPATIENT
Start: 2020-10-12 | End: 2021-10-12

## 2020-10-12 RX ORDER — METFORMIN HYDROCHLORIDE 500 MG/1
500 TABLET, EXTENDED RELEASE ORAL
Qty: 30 TABLET | Refills: 11 | Status: SHIPPED | OUTPATIENT
Start: 2020-10-12 | End: 2021-02-18

## 2020-10-12 NOTE — PROGRESS NOTES
Subjective:       Patient ID: Leticia Brown is a 65 y.o. female who  has a past medical history of Diabetes mellitus, H. pylori infection (2017), High cholesterol, and Hypertension.    Chief Complaint: Hypertension     History was obtained from the patient and supplemented through chart review  She was previously seen by Dr. Marcus Simons  for dysphagia.    -s/p EGD with web.    Was laid off her job. 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  Lost her mom, sister recently.    HPI    DM2 is controlled.  A1C was 6.0 on 8/9/2019.  Stopped taking metformin 500 q.a.m. d/t stomach upset.     Breakfast: not often. Maybe a slice of toast  Dinner: fish. Eats rice, but not daily.  Mainly 1 meal a day. sometimes beef.  Snacks: cookies  Drinks: water, no juices.  ETOH:  none    Exercise: Used to walk 3 miles. walks sometimes.     Without elevated microalbumin creatinine ratio.  On an ACE/ARB.   Retinal exams: 10/2020 [place].  Normal.  Has Mild Cataracts.  Foot exams:    No results found for: HGBA1C    HTN:    Stress test  with no WMA, negative for ischemia.  On losartan increased to 100. Compliant with meds. Pt denies CP, SOB, lightheadedness, dizziness, leg edema.  Denies HA, visual changes, weakness.   Nurse /62, Pulse 66.    Home /70s    Vitamin D deficiency:   Is taking ergocalciferol.  DEXA normal .  Lab Results   Component Value Date    BLFNZZHY35SS 11 (L) 08/30/2019     HLD:  Is currently taking Lipitor 40 and ASA 81 daily.  Lipids on 8/9/2019 with , TG 75, HDL 46, LDL 54.  No results found for: LDLCALC  The ASCVD Risk score ([place] NASH Jr., et al., 2013) failed to calculate for the following reasons:    Cannot find a previous HDL lab    Cannot find a previous total cholesterol lab    Tobacco use:  Smoked close to 1 ppd for 30 years.  Quit in 2018 cold turkey.  Now 1/2 ppd d/t stress. Mom and sister passed away. Spiral CT  benign.  Tobacco cessation clinic was not  helpful in the past.                Not addressed today.  Insomnia:    Chronic, but worsened. No recent triggers, other than increased worriness.  Mood is OK.  Has racing thoughts at night.  The patient reports difficulty falling asleep.  Goes to bed at 9 PM, but finally falls asleep at 3 AM and wakes up at 6 AM.  Sometimes naps.  Tried turning off the TV at night.  The patient drinks decaffeinated drinks.  She does not drink.  Decreased walking to 2/week.  Has not tried anything OTC for sleep.  Discussed sleep hygiene, exercise, decrease screen time, avoid naps. Melatonin PRN. Could consider Vistaril PRN d/t anxiety.     Anxiety:  Referred to therapy.  Referrals as above to discuss healthy ways of coping with anxiety.    Urinary incontinence:  On oxybutynin    H/o H pylori, dysphagia to solids:  EGD 2017 normal other than positive biopsies.  Test of eradication neg.  Started Prilosec.  EGD  with web at the cricopharyngeus I was dilated.  Erythema mucosa in the stomach.  Biopsy was negative.    Colon polyp:  C scope 2017.  Unknown pathology.  Following with [facility].   Will plan to repeat in 5 years, .    Right rotator cuff tendinitis:  Likely a CJ DJD or C4 radiculopathy.  Completed PT at [facility].  Neurosurgery at [facility] recommended home exercises.    Cervical DDD:  RF, DANIELLE WNL .  Saw Rheumatology and had low suspicion for inflammatory arthritis.  MRI cscope 3/2019 was cervical spondylosis, DDD without cord compression.  EMG with cervical spinal stenosis, degenerative disc disease.      On gabapentin 300 q.h.s., Tylenol p.r.n..  Unable to titrate gabapentin due to sedation.  Robaxin    Depression:    Doing well off of Wellbutrin.    HCV:    Antibody positive, but viral level negative. +HBsAB 10/2019.  [facility] NR at [facility]   Viral level negative.  No treatment needed.    Review of Systems   Constitutional: Negative for fever and unexpected weight change.   HENT: Negative for rhinorrhea and sneezing.   "  Eyes: Negative for redness and itching.   Respiratory: Negative for shortness of breath and wheezing.    Cardiovascular: Negative for chest pain and leg swelling.   Gastrointestinal: Negative for abdominal pain and constipation.   Genitourinary: Negative for dysuria and hematuria.   Musculoskeletal: Negative for gait problem and joint swelling.   Skin: Negative for color change and rash.   Neurological: Negative for dizziness and light-headedness.   Hematological: Negative for adenopathy.   Psychiatric/Behavioral: Negative for confusion. The patient is not nervous/anxious.        I personally reviewed Past Medical History, Past Surgical History, Social History, and Family History.    Objective:      Vitals:    10/12/20 1021   BP: (!) 162/84   Pulse: 68   SpO2: 99%   Weight: 76.5 kg (168 lb 10.4 oz)   Height: 5' 2" (1.575 m)      Physical Exam  Constitutional:       General: She is not in acute distress.     Appearance: She is well-developed. She is not diaphoretic.   HENT:      Head: Normocephalic and atraumatic.      Nose: Nose normal.      Mouth/Throat:      Pharynx: No oropharyngeal exudate.   Eyes:      General: No scleral icterus.        Right eye: No discharge.         Left eye: No discharge.   Neck:      Musculoskeletal: Neck supple.      Thyroid: No thyromegaly.      Trachea: No tracheal deviation.   Cardiovascular:      Rate and Rhythm: Normal rate and regular rhythm.      Heart sounds: Normal heart sounds. No murmur.   Pulmonary:      Effort: Pulmonary effort is normal. No respiratory distress.      Breath sounds: Normal breath sounds. No wheezing.   Abdominal:      General: Bowel sounds are normal. There is no distension.      Palpations: Abdomen is soft.      Tenderness: There is no abdominal tenderness.   Musculoskeletal:         General: No deformity.      Right lower leg: No edema.      Left lower leg: No edema.   Lymphadenopathy:      Cervical: No cervical adenopathy.   Skin:     General: Skin is " warm and dry.      Findings: No erythema.   Neurological:      Mental Status: She is alert.      Cranial Nerves: No cranial nerve deficit.      Gait: Gait normal.   Psychiatric:         Behavior: Behavior normal.           Lab Results   Component Value Date    WBC 9.71 08/15/2019    HGB 12.9 08/15/2019    HCT 39.4 08/15/2019     08/15/2019     08/15/2019    K 4.1 08/15/2019     08/15/2019    CREATININE 0.8 08/15/2019    BUN 18 08/15/2019    CO2 25 08/15/2019       The ASCVD Risk score ([place] NASH Jr., et al., 2013) failed to calculate for the following reasons:    Cannot find a previous HDL lab    Cannot find a previous total cholesterol lab    (Imaging have been independently reviewed)  Shoulder x-ray without fracture or dislocation.    Assessment:       1. Type 2 diabetes mellitus with diabetic polyneuropathy, without long-term current use of insulin    2. Essential hypertension    3. Vitamin D deficiency    4. Mixed hyperlipidemia    5. Light cigarette smoker (1-9 cigs/day)    6. Screening for HIV (human immunodeficiency virus)    7. Encounter for screening for malignant neoplasm of respiratory organs    8. Personal history of nicotine dependence     9. Influenza vaccine needed          Plan:       Leticia was seen today for hypertension.    Diagnoses and all orders for this visit:    Type 2 diabetes mellitus with diabetic polyneuropathy, without long-term current use of insulin  Comments:  Controlled. Unable to tolerate metformin d/t GI SE; try Metformin 500 XR. Reschedule A1c, foot, exam. Discussed well balanced diet, reduce sweets  Orders:  -     CBC auto differential; Future  -     Comprehensive Metabolic Panel; Future  -     Ambulatory referral/consult to Podiatry; Future  -     metFORMIN (GLUCOPHAGE-XR) 500 MG ER 24hr tablet; Take 1 tablet (500 mg total) by mouth daily with breakfast.  -     Hemoglobin A1C; Future  -     Microalbumin/Creatinine Ratio, Urine; Future    Essential  hypertension  Comments:  Uncontrolled. Cont losartan 100.  Add Norvasc 5. Nurse visit for BP check in 1 week. Encouraged walking regularly.   Orders:  -     Comprehensive Metabolic Panel; Future  -     amLODIPine (NORVASC) 5 MG tablet; Take 1 tablet (5 mg total) by mouth once daily.    Vitamin D deficiency  Comments:  Stable. On ergocalciferol.  DEXA normal.  Recheck vitamin-D level.  Orders:  -     Vitamin D; Future    Mixed hyperlipidemia  Comments:  Controlled. Continue Lipitor 40, ASA 81. Check FLP. Discussed well balanced diet.  Orders:  -     Lipid Panel; Future  -     atorvastatin (LIPITOR) 40 MG tablet; Take 1 tablet (40 mg total) by mouth once daily.    Light cigarette smoker (1-9 cigs/day)  Comments:  Persistent. Counseled for 5 min on tobacco cessation.  Check annual spiral CT.    Screening for HIV (human immunodeficiency virus)  -     HIV 1/2 Ag/Ab (4th Gen); Future    Encounter for screening for malignant neoplasm of respiratory organs  -     CT Chest Lung Screening Low Dose; Future    Personal history of nicotine dependence   -     CT Chest Lung Screening Low Dose; Future    Influenza vaccine needed  Comments:  Advised to obtain vaccine at Pharmacy.    Other orders  -     Cancel: hydroCHLOROthiazide (HYDRODIURIL) 12.5 MG Tab; Take 1 tablet (12.5 mg total) by mouth once daily.         Side effects of medication(s) were discussed in detail and patient voiced understanding.  Patient will call back for any issues or complications.     RTC in 1-2 month(s) or sooner PRN for HTN, establish care. Nurse visit for BP check in 1 week.

## 2020-10-13 ENCOUNTER — IMMUNIZATION (OUTPATIENT)
Dept: PHARMACY | Facility: CLINIC | Age: 65
End: 2020-10-13
Payer: MEDICARE

## 2020-10-19 ENCOUNTER — HOSPITAL ENCOUNTER (OUTPATIENT)
Dept: RADIOLOGY | Facility: OTHER | Age: 65
Discharge: HOME | End: 2020-10-19
Attending: INTERNAL MEDICINE
Payer: MEDICARE

## 2020-10-19 ENCOUNTER — TELEPHONE (OUTPATIENT)
Dept: INTERNAL MEDICINE | Facility: CLINIC | Age: 65
End: 2020-10-19

## 2020-10-19 ENCOUNTER — CLINICAL SUPPORT (OUTPATIENT)
Dept: INTERNAL MEDICINE | Facility: CLINIC | Age: 65
End: 2020-10-19
Payer: MEDICARE

## 2020-10-19 VITALS — HEART RATE: 68 BPM | OXYGEN SATURATION: 100 % | SYSTOLIC BLOOD PRESSURE: 134 MMHG | DIASTOLIC BLOOD PRESSURE: 64 MMHG

## 2020-10-19 DIAGNOSIS — R93.89 ABNORMAL CHEST CT: Primary | ICD-10-CM

## 2020-10-19 DIAGNOSIS — Z87.891 PERSONAL HISTORY OF NICOTINE DEPENDENCE: ICD-10-CM

## 2020-10-19 DIAGNOSIS — Z12.2 ENCOUNTER FOR SCREENING FOR MALIGNANT NEOPLASM OF RESPIRATORY ORGANS: ICD-10-CM

## 2020-10-19 PROCEDURE — G0297 LDCT FOR LUNG CA SCREEN: HCPCS | Mod: TC

## 2020-10-19 PROCEDURE — 99999 PR PBB SHADOW E&M-EST. PATIENT-LVL III: ICD-10-PCS | Mod: PBBFAC,,,

## 2020-10-19 PROCEDURE — 99999 PR PBB SHADOW E&M-EST. PATIENT-LVL III: CPT | Mod: PBBFAC,,,

## 2020-10-19 PROCEDURE — G0297 CT CHEST LUNG SCREENING LOW DOSE: ICD-10-PCS | Mod: 26,,, | Performed by: RADIOLOGY

## 2020-10-19 PROCEDURE — G0297 LDCT FOR LUNG CA SCREEN: HCPCS | Mod: 26,,, | Performed by: RADIOLOGY

## 2020-10-19 NOTE — PROGRESS NOTES
Leticia Brown 65 y.o. female is here today for Blood Pressure check.   History of HTN yes.    Review of patient's allergies indicates:  No Known Allergies  Creatinine   Date Value Ref Range Status   10/19/2020 0.8 0.5 - 1.4 mg/dL Final     Sodium   Date Value Ref Range Status   10/19/2020 141 136 - 145 mmol/L Final     Potassium   Date Value Ref Range Status   10/19/2020 4.0 3.5 - 5.1 mmol/L Final   ]  Patient verifies taking blood pressure medications on a regular bases at the same time of the day.     Current Outpatient Medications:     amLODIPine (NORVASC) 5 MG tablet, Take 1 tablet (5 mg total) by mouth once daily., Disp: 30 tablet, Rfl: 3    losartan (COZAAR) 100 MG tablet, Take 1 tablet (100 mg total) by mouth once daily., Disp: 30 tablet, Rfl: 3    aspirin (ECOTRIN) 81 MG EC tablet, Take 81 mg by mouth once daily., Disp: , Rfl:     atorvastatin (LIPITOR) 40 MG tablet, Take 1 tablet (40 mg total) by mouth once daily., Disp: 90 tablet, Rfl: 3    ergocalciferol (ERGOCALCIFEROL) 50,000 unit Cap, Take 1 capsule (50,000 Units total) by mouth every 7 days., Disp: 12 capsule, Rfl: 1    gabapentin (NEURONTIN) 300 MG capsule, Take 300 mg by mouth., Disp: , Rfl:     ibuprofen (ADVIL,MOTRIN) 600 MG tablet, Take 1 tablet (600 mg total) by mouth every 6 (six) hours as needed., Disp: 30 tablet, Rfl: 0    loratadine (CLARITIN) 10 mg tablet, Take 10 mg by mouth., Disp: , Rfl:     metFORMIN (GLUCOPHAGE-XR) 500 MG ER 24hr tablet, Take 1 tablet (500 mg total) by mouth daily with breakfast., Disp: 30 tablet, Rfl: 11    omeprazole (PRILOSEC) 20 MG capsule, Take 1 capsule (20 mg total) by mouth once daily., Disp: 90 capsule, Rfl: 1    oxybutynin (DITROPAN XL) 10 MG 24 hr tablet, Take 1 tablet (10 mg total) by mouth once daily., Disp: 30 tablet, Rfl: 3  Does patient have record of home blood pressure readings yes. Readings have been averaging high sbp 160s and dbp wnl below 80, 60-80.   Last dose of blood  pressure medication was taken at 8:30am.  Patient is asymptomatic.   Pt denies h/a, dizziness, shortness of breath, chest pain, numbness and tingling of hands and feet.    BP: 134/64 , Pulse: 68.    Dr. Antonio notified.

## 2020-10-19 NOTE — TELEPHONE ENCOUNTER
Does patient have record of home blood pressure readings yes. Readings have been averaging high sbp 160s and dbp wnl below 80, 60-80.   Last dose of blood pressure medication was taken at 8:30am.  Patient is asymptomatic.   Pt denies h/a, dizziness, shortness of breath, chest pain, numbness and tingling of hands and feet.    BP: 134/64 , Pulse: 68.    Dr. Antonio notified.

## 2020-10-20 ENCOUNTER — PATIENT MESSAGE (OUTPATIENT)
Dept: INTERNAL MEDICINE | Facility: CLINIC | Age: 65
End: 2020-10-20

## 2020-10-20 ENCOUNTER — TELEPHONE (OUTPATIENT)
Dept: INTERNAL MEDICINE | Facility: CLINIC | Age: 65
End: 2020-10-20

## 2020-10-20 NOTE — TELEPHONE ENCOUNTER
----- Message from Sarah Antonio MD sent at 10/19/2020  2:18 PM CDT -----  -Please schedule repeat CT chest.  Thanks!          -------------------------  Tobacco use:  Spiral CT chest with soft tissue focus along the right major fissure; difficult to distinguish from pulmonary artery.  May be vascular.  Will repeat CT chest with contrast to exclude nodule.

## 2020-10-20 NOTE — TELEPHONE ENCOUNTER
----- Message from Niyah Lynch sent at 10/20/2020  1:03 PM CDT -----  Regarding: Self/  [phone]  Type: Patient Call Back    Who called:  Patient    What is the request in detail:   Pt returned staff call and  is needing a call back.  Thank you    Would the patient rather a call back or a response via [facility]?   Call back    Best call back number:  [phone] (home)     Thank you

## 2020-10-26 ENCOUNTER — HOSPITAL ENCOUNTER (OUTPATIENT)
Dept: RADIOLOGY | Facility: OTHER | Age: 65
Discharge: HOME | End: 2020-10-26
Attending: INTERNAL MEDICINE
Payer: MEDICARE

## 2020-10-26 DIAGNOSIS — R93.89 ABNORMAL CHEST CT: ICD-10-CM

## 2020-10-26 PROCEDURE — 71260 CT THORAX DX C+: CPT | Mod: 26,,, | Performed by: RADIOLOGY

## 2020-10-26 PROCEDURE — 71260 CT CHEST WITH CONTRAST: ICD-10-PCS | Mod: 26,,, | Performed by: RADIOLOGY

## 2020-10-26 PROCEDURE — 25500020 PHARM REV CODE 255: Performed by: INTERNAL MEDICINE

## 2020-10-26 PROCEDURE — 71260 CT THORAX DX C+: CPT | Mod: TC

## 2020-10-26 RX ADMIN — IOHEXOL 75 ML: 350 INJECTION, SOLUTION INTRAVENOUS at 03:10

## 2020-10-27 ENCOUNTER — PATIENT MESSAGE (OUTPATIENT)
Dept: INTERNAL MEDICINE | Facility: CLINIC | Age: 65
End: 2020-10-27

## 2020-10-27 DIAGNOSIS — F17.210 LIGHT CIGARETTE SMOKER (1-9 CIGS/DAY): ICD-10-CM

## 2020-10-27 DIAGNOSIS — R93.89 ABNORMAL CHEST CT: Primary | ICD-10-CM

## 2020-10-29 ENCOUNTER — TELEPHONE (OUTPATIENT)
Dept: INTERNAL MEDICINE | Facility: CLINIC | Age: 65
End: 2020-10-29

## 2020-10-29 NOTE — LETTER
October 30, 2020    Leticia Brown  [address] [facility] [place] [address]             [facility] [id number] [facility] [place] [address]  Phone: [phone]  Fax: [phone] Dear Ms. Leticia Brown:    Below are the results from your recent visit:    Resulted Orders   CT Chest With Contrast    Narrative    EXAMINATION:  CT CHEST WITH CONTRAST    CLINICAL HISTORY:  Tobacco use. Screening spiral CT with difficulty distinguishing nodule vs vascular; Abnormal findings on diagnostic imaging of other specified body structures    TECHNIQUE:  Low dose axial images, sagittal and coronal reformations were obtained from the thoracic inlet to the lung bases following the IV administration of 75 mL of Omnipaque 350.    COMPARISON:  Noncontrast low-dose CT 10/19/2020    FINDINGS  Heart and mediastinum: Aortic and coronary atherosclerosis is present.  There are multiple noncalcified borderline caliber to mildly enlarged bilateral hilar lymph nodes measuring up to 13 mm in short axis at the right hilum and the area in question in on the prior study corresponds to a 9 mm short axis right infrahilar lymph node.  There is a 1 cm right paratracheal lymph node.    Lungs/Pleura: Emphysematous changes are present.  A a a no intraparenchymal nodules.    Upper Abdomen: No abnormality of the partially imaged upper abdomen.    Bones: Unremarkable.      Impression    Borderline caliber mediastinal and hilar lymphadenopathy, which could be reactive or neoplastic in nature.  Given the patient's history of smoking, short-term follow-up study with repeat contrast enhanced chest CT in 3 months is suggested.    Emphysema and atherosclerosis      Electronically signed by: Fredy Stone Jr  Date:    10/26/2020  Time:    16:18     Dr. Antonio recommends that we schedule a CT in 3 months. Please contact he office so we can schedule you with the CT department. Please don't hesitate to call our office if you  have any questions or concerns.      Sincerely,        Sarah Antonio MD

## 2020-10-29 NOTE — TELEPHONE ENCOUNTER
----- Message from Sarah Antonio MD sent at 10/27/2020  5:52 PM CDT -----  -Please schedule CT chest in 3 months.  Thanks!        -------------------------  Tobacco use:  Spiral CT chest with soft tissue focus along the right major fissure; difficult to distinguish from pulmonary artery.  May be vascular.    Repeat CT chest  with contrast: unsure LAD was reactive or neoplastic.  Recommended repeat CT chest in 3 months.

## 2020-11-01 ENCOUNTER — PATIENT OUTREACH (OUTPATIENT)
Dept: ADMINISTRATIVE | Facility: OTHER | Age: 65
End: 2020-11-01

## 2020-11-01 NOTE — PROGRESS NOTES
Health Maintenance Due   Topic Date Due    Foot Exam  05/04/1965    Shingles Vaccine (1 of 2) 05/04/2005    Pneumococcal Vaccine (65+ Low/Medium Risk) (1 of 2 - PCV13) 05/04/2020     Updates were requested from care everywhere.  Chart was reviewed for overdue Proactive [facility] Encounters ([facility]) topics (CRS, Breast Cancer Screening, Eye exam)  Health Maintenance has been updated.  LINKS immunization registry triggered.  Immunizations were reconciled.

## 2020-11-02 ENCOUNTER — OFFICE VISIT (OUTPATIENT)
Dept: PODIATRY | Facility: CLINIC | Age: 65
End: 2020-11-02
Payer: MEDICARE

## 2020-11-02 VITALS
HEART RATE: 69 BPM | WEIGHT: 168.63 LBS | HEIGHT: 62 IN | BODY MASS INDEX: 31.03 KG/M2 | SYSTOLIC BLOOD PRESSURE: 164 MMHG | DIASTOLIC BLOOD PRESSURE: 69 MMHG

## 2020-11-02 DIAGNOSIS — E11.9 ENCOUNTER FOR DIABETIC FOOT EXAM: Primary | ICD-10-CM

## 2020-11-02 DIAGNOSIS — E11.42 TYPE 2 DIABETES MELLITUS WITH DIABETIC POLYNEUROPATHY, WITHOUT LONG-TERM CURRENT USE OF INSULIN: ICD-10-CM

## 2020-11-02 PROCEDURE — 99203 OFFICE O/P NEW LOW 30 MIN: CPT | Mod: S$GLB,,, | Performed by: PODIATRIST

## 2020-11-02 PROCEDURE — 3008F PR BODY MASS INDEX (BMI) DOCUMENTED: ICD-10-PCS | Mod: S$GLB,,, | Performed by: PODIATRIST

## 2020-11-02 PROCEDURE — 3044F HG A1C LEVEL LT 7.0%: CPT | Mod: S$GLB,,, | Performed by: PODIATRIST

## 2020-11-02 PROCEDURE — 99999 PR PBB SHADOW E&M-EST. PATIENT-LVL V: CPT | Mod: PBBFAC,,, | Performed by: PODIATRIST

## 2020-11-02 PROCEDURE — 99203 PR OFFICE/OUTPT VISIT, NEW, LEVL III, 30-44 MIN: ICD-10-PCS | Mod: S$GLB,,, | Performed by: PODIATRIST

## 2020-11-02 PROCEDURE — 3008F BODY MASS INDEX DOCD: CPT | Mod: S$GLB,,, | Performed by: PODIATRIST

## 2020-11-02 PROCEDURE — 3078F PR MOST RECENT DIASTOLIC BLOOD PRESSURE < 80 MM HG: ICD-10-PCS | Mod: S$GLB,,, | Performed by: PODIATRIST

## 2020-11-02 PROCEDURE — 1101F PT FALLS ASSESS-DOCD LE1/YR: CPT | Mod: S$GLB,,, | Performed by: PODIATRIST

## 2020-11-02 PROCEDURE — 3077F SYST BP >= 140 MM HG: CPT | Mod: S$GLB,,, | Performed by: PODIATRIST

## 2020-11-02 PROCEDURE — 3077F PR MOST RECENT SYSTOLIC BLOOD PRESSURE >= 140 MM HG: ICD-10-PCS | Mod: S$GLB,,, | Performed by: PODIATRIST

## 2020-11-02 PROCEDURE — 1101F PR PT FALLS ASSESS DOC 0-1 FALLS W/OUT INJ PAST YR: ICD-10-PCS | Mod: S$GLB,,, | Performed by: PODIATRIST

## 2020-11-02 PROCEDURE — 3078F DIAST BP <80 MM HG: CPT | Mod: S$GLB,,, | Performed by: PODIATRIST

## 2020-11-02 PROCEDURE — 3044F PR MOST RECENT HEMOGLOBIN A1C LEVEL <7.0%: ICD-10-PCS | Mod: S$GLB,,, | Performed by: PODIATRIST

## 2020-11-02 PROCEDURE — 99999 PR PBB SHADOW E&M-EST. PATIENT-LVL V: ICD-10-PCS | Mod: PBBFAC,,, | Performed by: PODIATRIST

## 2020-11-02 NOTE — LETTER
November 2, 2020      Sarah Antonio MD  [address]  [address]  [facility] [address]           [facility]  [address] [address]  Phone: [phone]  Fax: [phone]          Patient: Leticia Brown   MR Number: [id number]   YOB: 1955   Date of Visit: 11/2/2020       Dear Dr. Sarah Antonio:    Thank you for referring Leticia Brown to me for evaluation. Attached you will find relevant portions of my assessment and plan of care.    If you have questions, please do not hesitate to call me. I look forward to following Leticia Brown along with you.    Sincerely,    Mignon Trivedi, STACEY    Enclosure  CC:  No Recipients    If you would like to receive this communication electronically, please contact [email or web address] or [phone] to request more information on [facility] Link access.    For providers and/or their staff who would like to refer a patient to [facility], please contact us through our one-stop-shop provider referral line, [facility], at [phone].    If you feel you have received this communication in error or would no longer like to receive these types of communications, please e-mail [email or web address]

## 2020-11-02 NOTE — PATIENT INSTRUCTIONS
How to Check Your Feet    Below are tips to help you look for foot problems. Try to check your feet at the same time each day, such as when you get out of bed in the morning.    · Check the top of each foot. The tops of toes, back of the heel, and outer edge of the foot can get a lot of rubbing from poor-fitting shoes.    · Check the bottom of each foot. Daily wear and tear often leads to problems at pressure spots.    · Check the toes and nails. Fungal infections often occur between toes. Toenail problems can also be a sign of fungal infections or lead to breaks in the skin.    · Check your shoes, too. Loose objects inside a shoe can injure the foot. Use your hand to feel inside your shoes for things like milvia, loose stitching, or rough areas that could irritate your skin.        Diabetic Foot Care    Diabetes can lead to a number of different foot complications. Fortunately, most of these complications can be prevented with a little extra foot care. If diabetes is not well controlled, the high blood sugar can cause damage to blood vessels and result in poor circulation to the foot. When the skin does not get enough blood flow, it becomes prone to pressure sores and ulcers, which heal slowly.  High blood sugar can also damage nerves, interfering with the ability to feel pain and pressure. When you cant feel your foot normally, it is easy to injure your skin, bones and joints without knowing it. For these reasons diabetes increases the risk of fungal infections, bunions and ulcers. Deep ulcers can lead to bone infection. Gangrene is the most serious foot complication of diabetes. It usually occurs on the tips of the toes as blacked areas of skin. The black area is dead tissue. In severe cases, gangrene spreads to involve the entire toe, other toes and the entire foot. Foot or toe amputation may be required. Good foot care and blood sugar control can prevent this.    Home Care  1. Wear comfortable, proper fitting  shoes.  2. Wash your feet daily with warm water and mild soap.  3. After drying, apply a moisturizing cream or lotion.  4. Check your feet daily for skin breaks, blisters, swelling, or redness. Look between your toes also.  5. Wear cotton socks and change them every day.  6. Trim toe nails carefully and do not cut your cuticles.  7. Strive to keep your blood sugar under control with a combination of medicines, diet and activity.  8. If you smoke and have diabetes, it is very important that you stop. Smoking reduces blood flow to your foot.  9. Avoid activities that increase your risk of foot injury:  · Do not walk barefoot.  · Do not use heating pads or hot water bottles on your feet.  · Do not put your foot in a hot tub without first checking the temperature with your hand.  10) Schedule yearly foot exams.    Follow Up  with your doctor or as advised by our staff. Report any cut, puncture, scrape, other injury, blister, ingrown toenail or ulcer on your foot.    Get Prompt Medical Attention  if any of the following occur:  -- Open ulcer with pus draining from the wound  -- Increasing foot or leg pain  -- New areas of redness or swelling or tender areas of the foot    © 0300-3612 [employer]. [address] [place] [address], [place] [address]. All rights reserved. This information is not intended as a substitute for professional medical care. Always follow your healthcare professional's instructions.      General nail care measures for abnormal nails include:    ? Keeping nails trimmed short    ? Avoiding trauma     ? Avoiding contact irritants     ? Keeping nails dry (avoiding wet work)    ? Avoiding all nail cosmetics

## 2020-11-02 NOTE — PROGRESS NOTES
Chief Complaint   Patient presents with    Diabetes Mellitus     Sarah Antonio 10/12/2020 A1c 5.9 10/19/2020              HPI:   The patient is a 65 y.o.  female  who presents for a diabetic foot exam.     Patient reports no presence of abnormal sensation to the feet .    History of diabetic foot ulcers: none   History of foot surgery: none.     Shoes worn today:  Flat boots.    She usually gets a salon pedicure about once a month without issues.   She reports occasional pain to the callused area on her feet and the salon files them for her.   She states that she does not walk barefoot.       The patient is under the Active Care of Sarah Antonio MD for the diagnosis of diabetes mellitus.  Patient was last seen on 10/12/2020.            Patient Active Problem List   Diagnosis    Essential hypertension    Mixed hyperlipidemia    Anxiety    Bilateral shoulder pain    Hepatitis C antibody test positive    H/O allergic rhinitis    GERD (gastroesophageal reflux disease)    Recurrent major depressive disorder, in full remission    DDD (degenerative disc disease), cervical    Right wrist pain    Type 2 diabetes mellitus with diabetic polyneuropathy, without long-term current use of insulin    Persistent cough    Incontinence of urine    History of tobacco abuse    Vitamin D deficiency    Colon polyp    Right rotator cuff tendinitis    Insomnia    Light cigarette smoker (1-9 cigs/day)    Dysphagia    Abnormal chest CT           Current Outpatient Medications on File Prior to Visit   Medication Sig Dispense Refill    amLODIPine (NORVASC) 5 MG tablet Take 1 tablet (5 mg total) by mouth once daily. 30 tablet 3    aspirin (ECOTRIN) 81 MG EC tablet Take 81 mg by mouth once daily.      atorvastatin (LIPITOR) 40 MG tablet Take 1 tablet (40 mg total) by mouth once daily. 90 tablet 3    ergocalciferol (ERGOCALCIFEROL) 50,000 unit Cap Take 1 capsule (50,000 Units total) by mouth every 7 days. 12 capsule 1     ibuprofen (ADVIL,MOTRIN) 600 MG tablet Take 1 tablet (600 mg total) by mouth every 6 (six) hours as needed. 30 tablet 0    loratadine (CLARITIN) 10 mg tablet Take 10 mg by mouth.      losartan (COZAAR) 100 MG tablet Take 1 tablet (100 mg total) by mouth once daily. 30 tablet 3    metFORMIN (GLUCOPHAGE-XR) 500 MG ER 24hr tablet Take 1 tablet (500 mg total) by mouth daily with breakfast. 30 tablet 11    oxybutynin (DITROPAN-XL) 10 MG 24 hr tablet TAKE ONE TABLET BY MOUTH ONE TIME DAILY  30 tablet 11    gabapentin (NEURONTIN) 300 MG capsule Take 300 mg by mouth.      omeprazole (PRILOSEC) 20 MG capsule Take 1 capsule (20 mg total) by mouth once daily. 90 capsule 1     No current facility-administered medications on file prior to visit.            Review of patient's allergies indicates:  No Known Allergies        Social History     Socioeconomic History    Marital status:      Spouse name: Not on file    Number of children: Not on file    Years of education: Not on file    Highest education level: Not on file   Occupational History    Not on file   Social Needs    Financial resource strain: Somewhat hard    Food insecurity     Worry: Sometimes true     Inability: Sometimes true    Transportation needs     Medical: No     Non-medical: No   Tobacco Use    Smoking status: Current Every Day Smoker     Packs/day: 1.00     Years: 30.00     Pack years: 30.00     Types: Cigarettes    Smokeless tobacco: Never Used   Substance and Sexual Activity    Alcohol use: No     Frequency: Monthly or less     Drinks per session: 1 or 2     Binge frequency: Never    Drug use: No    Sexual activity: Not Currently   Lifestyle    Physical activity     Days per week: 3 days     Minutes per session: 30 min    Stress: To some extent   Relationships    Social connections     Talks on phone: Twice a week     Gets together: Patient refused     Attends Jain service: Not on file     Active member of club or  "organization: Yes     Attends meetings of clubs or organizations: 1 to 4 times per year     Relationship status:    Other Topics Concern    Not on file   Social History Narrative    Not on file           ROS:  General ROS: negative  Respiratory ROS: no cough, shortness of breath, or wheezing  Cardiovascular ROS: no chest pain or dyspnea on exertion  Musculoskeletal ROS: negative  Neurological ROS:   negative for - impaired coordination/balance or numbness/tingling  Dermatological ROS: negative      LAST HbA1c:   Lab Results   Component Value Date    HGBA1C 5.9 (H) 10/19/2020           EXAM:   Vitals:    11/02/20 1431   BP: (!) 164/69   BP Location: Right arm   Patient Position: Sitting   BP Method: Medium (Automatic)   Pulse: 69   Weight: 76.5 kg (168 lb 10.4 oz)   Height: 5' 2" (1.575 m)       General: alert, no distress, cooperative    Vascular:    Dorsalis Pedis:  present      Posterior Tibial:  present   Capillary refill time:  3 seconds   Temperature of toes cool to touch   Edema: Absent bilaterally      Neurological:      Sharp touch:  normal   Light touch: normal   Tinels Sign:  Absent   Mulders Click:   Absent   [place]:  (no) deficits to sharp/dull, light touch or vibratory sensation feet, ten points tested.   (no)paresthesias (Abnormal spontaneous sensations in feet)        Dermatological:    Skin: thin and shiny   Hair growth:  decreased   Wounds/Ulcers:  Absent   Bruising:  Absent   Erythema:  Absent   Toenails:  Short and polished red. without subungual debris.   Absent paronychia      Musculoskeletal:    Metatarsophalangeal range of motion:   full range of motion   Subtalar joint range of motion: full range of motion   Ankle joint range of motion:  full range of motion   Bunions:  Absent   Hammertoes: Absent               ASSESSMENT/PLAN:          Problem List Items Addressed This Visit     Type 2 diabetes mellitus with diabetic polyneuropathy, without long-term current " use of insulin      Other Visit Diagnoses     Encounter for diabetic foot exam    -  Primary            I counseled the patient on the patient's conditions, their implications and medical management.     Foot exam performed.  Shoe inspection. Diabetic Foot Education. Patient reminded of the importance of good nutrition and blood sugar control to help prevent podiatric complications of diabetes. Patient instructed on proper foot hygiene. We discussed wearing proper shoe gear, daily foot inspections, never walking without protective shoe gear, never putting sharp instruments to feet.    Patient was given written and verbal instructions regarding foot condition.     Annual diabetes foot exam.                Mignon Trivedi DPM  NPI: [id number]       [facility] - PODIATRY  [address] [address]  Dept: [phone]  Dept Fax: [phone]

## 2020-11-04 ENCOUNTER — HOSPITAL ENCOUNTER (OUTPATIENT)
Facility: HOSPITAL | Age: 65
Discharge: HOME | End: 2020-11-05
Attending: EMERGENCY MEDICINE | Admitting: INTERNAL MEDICINE
Payer: MEDICARE

## 2020-11-04 DIAGNOSIS — R07.9 CHEST PAIN: ICD-10-CM

## 2020-11-04 DIAGNOSIS — R55 SYNCOPE: Primary | ICD-10-CM

## 2020-11-04 LAB
ALBUMIN SERPL BCP-MCNC: 4 G/DL (ref 3.5–5.2)
ALP SERPL-CCNC: 98 U/L (ref 55–135)
ALT SERPL W/O P-5'-P-CCNC: 15 U/L (ref 10–44)
ANION GAP SERPL CALC-SCNC: 11 MMOL/L (ref 8–16)
AST SERPL-CCNC: 19 U/L (ref 10–40)
BASOPHILS # BLD AUTO: 0.06 K/UL (ref 0–0.2)
BASOPHILS NFR BLD: 0.5 % (ref 0–1.9)
BILIRUB SERPL-MCNC: 0.4 MG/DL (ref 0.1–1)
BUN SERPL-MCNC: 19 MG/DL (ref 8–23)
CALCIUM SERPL-MCNC: 9.7 MG/DL (ref 8.7–10.5)
CHLORIDE SERPL-SCNC: 106 MMOL/L (ref 95–110)
CO2 SERPL-SCNC: 22 MMOL/L (ref 23–29)
CREAT SERPL-MCNC: 0.8 MG/DL (ref 0.5–1.4)
CTP QC/QA: YES
DIFFERENTIAL METHOD: ABNORMAL
EOSINOPHIL # BLD AUTO: 0.2 K/UL (ref 0–0.5)
EOSINOPHIL NFR BLD: 1.9 % (ref 0–8)
ERYTHROCYTE [DISTWIDTH] IN BLOOD BY AUTOMATED COUNT: 14.2 % (ref 11.5–14.5)
EST. GFR  (AFRICAN AMERICAN): >60 ML/MIN/1.73 M^2
EST. GFR  (NON AFRICAN AMERICAN): >60 ML/MIN/1.73 M^2
GLUCOSE SERPL-MCNC: 87 MG/DL (ref 70–110)
HCT VFR BLD AUTO: 41.1 % (ref 37–48.5)
HGB BLD-MCNC: 12.8 G/DL (ref 12–16)
IMM GRANULOCYTES # BLD AUTO: 0.03 K/UL (ref 0–0.04)
IMM GRANULOCYTES NFR BLD AUTO: 0.3 % (ref 0–0.5)
LYMPHOCYTES # BLD AUTO: 4.9 K/UL (ref 1–4.8)
LYMPHOCYTES NFR BLD: 41.5 % (ref 18–48)
MCH RBC QN AUTO: 28.4 PG (ref 27–31)
MCHC RBC AUTO-ENTMCNC: 31.1 G/DL (ref 32–36)
MCV RBC AUTO: 91 FL (ref 82–98)
MONOCYTES # BLD AUTO: 0.7 K/UL (ref 0.3–1)
MONOCYTES NFR BLD: 5.6 % (ref 4–15)
NEUTROPHILS # BLD AUTO: 6 K/UL (ref 1.8–7.7)
NEUTROPHILS NFR BLD: 50.2 % (ref 38–73)
NRBC BLD-RTO: 0 /100 WBC
PLATELET # BLD AUTO: 261 K/UL (ref 150–350)
PMV BLD AUTO: 10.7 FL (ref 9.2–12.9)
POCT GLUCOSE: 103 MG/DL (ref 70–110)
POCT GLUCOSE: 90 MG/DL (ref 70–110)
POTASSIUM SERPL-SCNC: 3.8 MMOL/L (ref 3.5–5.1)
PROT SERPL-MCNC: 7.6 G/DL (ref 6–8.4)
RBC # BLD AUTO: 4.51 M/UL (ref 4–5.4)
SARS-COV-2 RDRP RESP QL NAA+PROBE: NEGATIVE
SODIUM SERPL-SCNC: 139 MMOL/L (ref 136–145)
TROPONIN I SERPL DL<=0.01 NG/ML-MCNC: <0.006 NG/ML (ref 0–0.03)
WBC # BLD AUTO: 11.86 K/UL (ref 3.9–12.7)

## 2020-11-04 PROCEDURE — 80053 COMPREHEN METABOLIC PANEL: CPT

## 2020-11-04 PROCEDURE — 93010 EKG 12-LEAD: ICD-10-PCS | Mod: ,,, | Performed by: INTERNAL MEDICINE

## 2020-11-04 PROCEDURE — 94761 N-INVAS EAR/PLS OXIMETRY MLT: CPT

## 2020-11-04 PROCEDURE — 99220 PR INITIAL OBSERVATION CARE,LEVL III: ICD-10-PCS | Mod: ,,, | Performed by: FAMILY MEDICINE

## 2020-11-04 PROCEDURE — 82962 GLUCOSE BLOOD TEST: CPT

## 2020-11-04 PROCEDURE — 25000003 PHARM REV CODE 250: Performed by: FAMILY MEDICINE

## 2020-11-04 PROCEDURE — 96361 HYDRATE IV INFUSION ADD-ON: CPT

## 2020-11-04 PROCEDURE — 85025 COMPLETE CBC W/AUTO DIFF WBC: CPT

## 2020-11-04 PROCEDURE — 99285 EMERGENCY DEPT VISIT HI MDM: CPT | Mod: ,,, | Performed by: PHYSICIAN ASSISTANT

## 2020-11-04 PROCEDURE — 93010 ELECTROCARDIOGRAM REPORT: CPT | Mod: ,,, | Performed by: INTERNAL MEDICINE

## 2020-11-04 PROCEDURE — 99285 PR EMERGENCY DEPT VISIT,LEVEL V: ICD-10-PCS | Mod: ,,, | Performed by: PHYSICIAN ASSISTANT

## 2020-11-04 PROCEDURE — 84484 ASSAY OF TROPONIN QUANT: CPT

## 2020-11-04 PROCEDURE — 99285 EMERGENCY DEPT VISIT HI MDM: CPT | Mod: 25

## 2020-11-04 PROCEDURE — G0378 HOSPITAL OBSERVATION PER HR: HCPCS

## 2020-11-04 PROCEDURE — 93005 ELECTROCARDIOGRAM TRACING: CPT

## 2020-11-04 PROCEDURE — 96360 HYDRATION IV INFUSION INIT: CPT

## 2020-11-04 PROCEDURE — 25000003 PHARM REV CODE 250: Performed by: PHYSICIAN ASSISTANT

## 2020-11-04 PROCEDURE — 99220 PR INITIAL OBSERVATION CARE,LEVL III: CPT | Mod: ,,, | Performed by: FAMILY MEDICINE

## 2020-11-04 PROCEDURE — U0002 COVID-19 LAB TEST NON-CDC: HCPCS | Performed by: PHYSICIAN ASSISTANT

## 2020-11-04 RX ORDER — AMLODIPINE BESYLATE 5 MG/1
5 TABLET ORAL DAILY
Status: DISCONTINUED | OUTPATIENT
Start: 2020-11-05 | End: 2020-11-05 | Stop reason: HOSPADM

## 2020-11-04 RX ORDER — IPRATROPIUM BROMIDE AND ALBUTEROL SULFATE 2.5; .5 MG/3ML; MG/3ML
3 SOLUTION RESPIRATORY (INHALATION) EVERY 6 HOURS PRN
Status: DISCONTINUED | OUTPATIENT
Start: 2020-11-04 | End: 2020-11-05 | Stop reason: HOSPADM

## 2020-11-04 RX ORDER — OXYCODONE HYDROCHLORIDE 5 MG/1
5 TABLET ORAL EVERY 6 HOURS PRN
Status: DISCONTINUED | OUTPATIENT
Start: 2020-11-04 | End: 2020-11-05 | Stop reason: HOSPADM

## 2020-11-04 RX ORDER — SODIUM CHLORIDE 0.9 % (FLUSH) 0.9 %
10 SYRINGE (ML) INJECTION
Status: CANCELLED | OUTPATIENT
Start: 2020-11-04

## 2020-11-04 RX ORDER — ACETAMINOPHEN 500 MG
1000 TABLET ORAL EVERY 8 HOURS PRN
Status: DISCONTINUED | OUTPATIENT
Start: 2020-11-04 | End: 2020-11-05 | Stop reason: HOSPADM

## 2020-11-04 RX ORDER — PANTOPRAZOLE SODIUM 40 MG/1
40 TABLET, DELAYED RELEASE ORAL DAILY
Status: DISCONTINUED | OUTPATIENT
Start: 2020-11-05 | End: 2020-11-05 | Stop reason: HOSPADM

## 2020-11-04 RX ORDER — IBUPROFEN 200 MG
24 TABLET ORAL
Status: DISCONTINUED | OUTPATIENT
Start: 2020-11-04 | End: 2020-11-05 | Stop reason: HOSPADM

## 2020-11-04 RX ORDER — LOSARTAN POTASSIUM 50 MG/1
100 TABLET ORAL DAILY
Status: DISCONTINUED | OUTPATIENT
Start: 2020-11-05 | End: 2020-11-05 | Stop reason: HOSPADM

## 2020-11-04 RX ORDER — ASPIRIN 81 MG/1
81 TABLET ORAL DAILY
Status: DISCONTINUED | OUTPATIENT
Start: 2020-11-05 | End: 2020-11-05 | Stop reason: HOSPADM

## 2020-11-04 RX ORDER — OXYBUTYNIN CHLORIDE 10 MG/1
10 TABLET, EXTENDED RELEASE ORAL DAILY
Status: DISCONTINUED | OUTPATIENT
Start: 2020-11-05 | End: 2020-11-05 | Stop reason: HOSPADM

## 2020-11-04 RX ORDER — IBUPROFEN 200 MG
16 TABLET ORAL
Status: DISCONTINUED | OUTPATIENT
Start: 2020-11-04 | End: 2020-11-05 | Stop reason: HOSPADM

## 2020-11-04 RX ORDER — ATORVASTATIN CALCIUM 40 MG/1
40 TABLET, FILM COATED ORAL DAILY
Status: DISCONTINUED | OUTPATIENT
Start: 2020-11-05 | End: 2020-11-05 | Stop reason: HOSPADM

## 2020-11-04 RX ORDER — SODIUM CHLORIDE 0.9 % (FLUSH) 0.9 %
10 SYRINGE (ML) INJECTION
Status: DISCONTINUED | OUTPATIENT
Start: 2020-11-04 | End: 2020-11-05 | Stop reason: HOSPADM

## 2020-11-04 RX ORDER — TALC
6 POWDER (GRAM) TOPICAL NIGHTLY PRN
Status: DISCONTINUED | OUTPATIENT
Start: 2020-11-04 | End: 2020-11-05 | Stop reason: HOSPADM

## 2020-11-04 RX ORDER — GLUCAGON 1 MG
1 KIT INJECTION
Status: DISCONTINUED | OUTPATIENT
Start: 2020-11-04 | End: 2020-11-05 | Stop reason: HOSPADM

## 2020-11-04 RX ORDER — INSULIN ASPART 100 [IU]/ML
0-5 INJECTION, SOLUTION INTRAVENOUS; SUBCUTANEOUS
Status: DISCONTINUED | OUTPATIENT
Start: 2020-11-04 | End: 2020-11-05 | Stop reason: HOSPADM

## 2020-11-04 RX ORDER — ONDANSETRON 2 MG/ML
4 INJECTION INTRAMUSCULAR; INTRAVENOUS EVERY 8 HOURS PRN
Status: DISCONTINUED | OUTPATIENT
Start: 2020-11-04 | End: 2020-11-05 | Stop reason: HOSPADM

## 2020-11-04 RX ADMIN — SODIUM CHLORIDE 1000 ML: 0.9 INJECTION, SOLUTION INTRAVENOUS at 04:11

## 2020-11-04 RX ADMIN — ACETAMINOPHEN 1000 MG: 500 TABLET ORAL at 08:11

## 2020-11-04 RX ADMIN — SODIUM CHLORIDE 500 ML: 0.9 INJECTION, SOLUTION INTRAVENOUS at 07:11

## 2020-11-04 RX ADMIN — MELATONIN TAB 3 MG 6 MG: 3 TAB at 08:11

## 2020-11-04 RX ADMIN — OXYCODONE HYDROCHLORIDE 5 MG: 5 TABLET ORAL at 08:11

## 2020-11-04 NOTE — ED NOTES
Patient provided extra blankets. Patient on stretcher, Bed placed in low/locked position, side rails up x 2, call light is within reach of patient or family, Patient placed into position of comfort. Patient in NAD and offers no complaints at this time. Pain goals addressed. Will continue to monitor.

## 2020-11-04 NOTE — ED NOTES
Attempt to complete orthostatic BP, pt symptomatically orthostatic upon standing. Tech and RN at bedside to catch patient upon standing, without injury. Pt eased and assisted onto bed. Leona HAMILTON at bedside as witness.

## 2020-11-04 NOTE — ED NOTES
Patient on stretcher, Bed placed in low/locked position, side rails up x 2, call light is within reach of patient or family, Patient placed into position of comfort. Patient in NAD and offers no complaints at this time. Pain goals addressed. Will continue to monitor.

## 2020-11-04 NOTE — ED NOTES
Leticia Brown, a 65 y.o. female presents to the ED w/ complaint of normal baseline health recently up until this morning while at work at [email or web address]. Pt states she felt lightheaded, dizzy, and weak when she called out for help and passerbys assisted her onto the ground. LOC+. No injury or trauma. Pt in the ED denies complaints other than light headache and dizziness with position changes. Denies recent illness, CP, SOB, NVD, incontinence.    Triage note:  Chief Complaint   Patient presents with    Loss of Consciousness     works at [employer], became dizzy weak and was assisted to the ground by coworkers; had symcope for a minute.  denies any chest pain or SOB; GCS 15     Review of patient's allergies indicates:  No Known Allergies  Past Medical History:   Diagnosis Date    Diabetes mellitus     H. pylori infection 2017    [facility] EGD 2017. test of eradication neg.    High cholesterol     Hypertension      Adult Physical Assessment  LOC: Leticia Brown, 65 y.o. female verified via two identifiers.  The patient is awake, alert, oriented and speaking appropriately at this time.  APPEARANCE: Patient resting comfortably and appears to be in no acute distress at this time. Patient is clean and well groomed, patient's clothing is properly fastened.  SKIN:The skin is warm and dry, color consistent with ethnicity, patient has normal skin turgor and moist mucus membranes, skin intact, no breakdown or brusing noted.  MUSCULOSKELETAL: Patient moving all extremities well, no obvious swelling or deformities noted.  RESPIRATORY: Airway is open and patent, respirations are spontaneous, patient has a normal effort and rate, no accessory muscle use noted.  CARDIAC: Patient has a normal rate and rhythm, no periphreal edema noted in any extremity, capillary refill < 3 seconds in all extremities  ABDOMEN: Soft and non tender to palpation, no abdominal distention noted. Bowel sounds present in all four  quadrants.  NEUROLOGIC: Eyes open spontaneously, behavior appropriate to situation, follows commands, facial expression symmetrical, bilateral hand grasp equal and even, purposeful motor response noted, normal sensation in all extremities when touched with a finger.

## 2020-11-04 NOTE — ED PROVIDER NOTES
Encounter Date: 11/4/2020       History     Chief Complaint   Patient presents with    Loss of Consciousness     works at [employer], became dizzy weak and was assisted to the ground by coworkers; had symcope for a minute.  denies any chest pain or SOB; GCS 15     This is a 65 year old female with a PMH of HTN and DM who presents to the ED with a chief complaint of syncope. Patient is a [employer] employee who reported to work in her usual state of health around 10 this morning. She states after standing on her feet performing her job tasks she began feeling lightheaded and weak. She motioned for a coworker to come over and she had a syncopal episode at that time. She was guided down to the floor. She reports feeling persistently weak and lightheaded. Patient reports her glucose has been well controlled and she is tolerating oral intake per usual. She denies fever, URI symtpoms, headache, vision changes, chest pain, palpitations, SOB, nausea/vomiting, abdominal pain, dysuria, facial asymmetry, speech changes, focal weakness or numbness. No prior hx of syncope.        Review of patient's allergies indicates:  No Known Allergies  Past Medical History:   Diagnosis Date    Diabetes mellitus     H. pylori infection 2017    [facility] EGD 2017. test of eradication neg.    High cholesterol     Hypertension      Past Surgical History:   Procedure Laterality Date    BREAST BIOPSY Right     CHOLECYSTECTOMY  2016    ESOPHAGOGASTRODUODENOSCOPY N/A 7/21/2020    Procedure: EGD (ESOPHAGOGASTRODUODENOSCOPY);  Surgeon: Rodrick Montes MD;  Location: [address]);  Service: Endoscopy;  Laterality: N/A;  3/30/20 - removed from 4/9/20, 3/30/20 & 3/31/20LM pt &  ph & sent email, request call back to reschedule June/July.  Pt called back, rescheduled 7/21/20 - pg  7/6/20 - 7/9/20- LM x 2 - waiting for cb to set up COVID appt. - ERW  COVID screening on     Family History   Problem Relation Age of Onset    No Known Problems Mother      No Known Problems Father     No Known Problems Sister     No Known Problems Brother     No Known Problems Daughter     No Known Problems Son     No Known Problems Maternal Aunt     No Known Problems Maternal Uncle     No Known Problems Paternal Aunt     No Known Problems Paternal Uncle     No Known Problems Maternal Grandmother     No Known Problems Maternal Grandfather     No Known Problems Paternal Grandmother     No Known Problems Paternal Grandfather     Colon cancer Neg Hx     Rectal cancer Neg Hx     Stomach cancer Neg Hx      Social History     Tobacco Use    Smoking status: Current Every Day Smoker     Packs/day: 0.50     Years: 30.00     Pack years: 15.00     Types: Cigarettes    Smokeless tobacco: Never Used   Substance Use Topics    Alcohol use: No     Frequency: Monthly or less     Drinks per session: 1 or 2     Binge frequency: Never    Drug use: No     Review of Systems   Constitutional: Negative for chills and fever.   HENT: Negative for sore throat.    Respiratory: Negative for shortness of breath.    Cardiovascular: Negative for chest pain.   Gastrointestinal: Negative for nausea and vomiting.   Genitourinary: Negative for dysuria.   Musculoskeletal: Negative for back pain.   Skin: Negative for rash.   Neurological: Positive for syncope, weakness and light-headedness.   Hematological: Does not bruise/bleed easily.       Physical Exam     Initial Vitals [11/04/20 1412]   BP Pulse Resp Temp SpO2   (!) 153/69 65 16 98.5 °F (36.9 °C) 96 %      MAP       --         Physical Exam    Constitutional: She appears well-developed and well-nourished. No distress.   HENT:   Head: Atraumatic.   Eyes: Conjunctivae and EOM are normal. Pupils are equal, round, and reactive to light.   Cardiovascular: Normal rate, regular rhythm and normal heart sounds.   Pulmonary/Chest: Breath sounds normal. No respiratory distress. She has no wheezes. She has no rhonchi. She has no rales.   Abdominal: Soft.  Bowel sounds are normal. There is no abdominal tenderness.   Neurological: She is alert and oriented to person, place, and time. She has normal strength and normal reflexes. No cranial nerve deficit or sensory deficit. Coordination normal.   Skin: Skin is warm and dry. No rash noted.         ED Course   Procedures  Labs Reviewed   CBC W/ AUTO DIFFERENTIAL - Abnormal; Notable for the following components:       Result Value    MCHC 31.1 (*)     Lymph # 4.9 (*)     All other components within normal limits   COMPREHENSIVE METABOLIC PANEL - Abnormal; Notable for the following components:    CO2 22 (*)     All other components within normal limits   TROPONIN I   SARS-COV-2 RDRP GENE    Narrative:     This test utilizes isothermal nucleic acid amplification   technology to detect the SARS-CoV-2 RdRp nucleic acid segment.   The analytical sensitivity (limit of detection) is 125 genome   equivalents/mL.   A POSITIVE result implies infection with the SARS-CoV-2 virus;   the patient is presumed to be contagious.     A NEGATIVE result means that SARS-CoV-2 nucleic acids are not   present above the limit of detection. A NEGATIVE result should be   treated as presumptive. It does not rule out the possibility of   COVID-19 and should not be the sole basis for treatment decisions.   If COVID-19 is strongly suspected based on clinical and exposure   history, re-testing using an alternate molecular assay should be   considered.   This test is only for use under the Food and Drug   Administration s Emergency Use Authorization (EUA).   Commercial kits are provided by [employer].   Performance characteristics of the EUA have been independently   verified by [facility] Department of   Pathology and Laboratory Medicine.   _________________________________________________________________   The authorized Fact Sheet for Healthcare Providers and the authorized Fact   Sheet for Patients of the ID NOW COVID-19 are  available on the FDA   website:     https://www.fda.gov/media/026046/download  https://www.fda.gov/media/738193/download         POCT GLUCOSE   POCT GLUCOSE MONITORING CONTINUOUS        ECG Results          EKG 12-lead (Final result)  Result time 11/04/20 15:22:47    Final result by Interface, Lab In [facility] (11/04/20 15:22:47)                 Narrative:    Test Reason : R55,    Vent. Rate : 072 BPM     Atrial Rate : 072 BPM     P-R Int : 128 ms          QRS Dur : 088 ms      QT Int : 380 ms       P-R-T Axes : 084 070 080 degrees     QTc Int : 416 ms    Age and gender specific analysis  Sinus rhythm with Premature atrial complexes  Nonspecific ST and T wave abnormality  Abnormal ECG  When compared with ECG of 10-SEP-2020 16:40,  Premature atrial complexes are now Present  Confirmed by Phil Andersen MD ([id number]) on 11/4/2020 3:22:34 PM    Referred By: [email or web address]   SELF           Confirmed By:Phil Andersen MD                            Imaging Results    None          Medical Decision Making:   History:   Old Medical Records: I decided to obtain old medical records.  Old Records Summarized: records from clinic visits.  Clinical Tests:   Lab Tests: Ordered and Reviewed       APC / Resident Notes:   65 y.o. year old female presenting with syncope.    DDx includes but is not limited to vasovagal syncope, dehydration, symptomatic anemia, arrhythmia.    Upon entering exam room patient experienced another syncopal event as the nursing staff stood her up to perform orthostatic vitals. She was guided down to the stretcher and came to, returned to baseline immediately.    ED course   Symptomatic orthostatic vitals   Covid negative   Labs are stable    Plan   Given IVFs   Upon standing following ED evaluation patient again collapsed onto stretcher in what appeared to be another syncopal event. She felt very weak. Her BP was reassessed at that time in the 170s. She reported dizziness and weakness.   Will place in observation  overnight on cardiac monitoring    Discussed findings and plan with patient who verbalized understanding and agrees with the plan and course of treatment. I discussed the care of this patient with my supervising physician.                          Clinical Impression:       ICD-10-CM ICD-9-CM   1. Syncope  R55 780.2   2. Chest pain  R07.9 786.50                      Disposition:   Disposition: Placed in Observation  Condition: Stable     ED Disposition Condition    Observation                             Leona Tai PA-C  11/04/20 0043

## 2020-11-05 VITALS
HEART RATE: 66 BPM | TEMPERATURE: 98 F | OXYGEN SATURATION: 93 % | DIASTOLIC BLOOD PRESSURE: 55 MMHG | BODY MASS INDEX: 32.02 KG/M2 | RESPIRATION RATE: 20 BRPM | SYSTOLIC BLOOD PRESSURE: 118 MMHG | HEIGHT: 62 IN | WEIGHT: 174 LBS

## 2020-11-05 LAB
ANION GAP SERPL CALC-SCNC: 10 MMOL/L (ref 8–16)
ASCENDING AORTA: 2.8 CM
AV INDEX (PROSTH): 0.9
AV MEAN GRADIENT: 4 MMHG
AV PEAK GRADIENT: 8 MMHG
AV VALVE AREA: 2.84 CM2
AV VELOCITY RATIO: 0.86
BASOPHILS # BLD AUTO: 0.08 K/UL (ref 0–0.2)
BASOPHILS NFR BLD: 0.7 % (ref 0–1.9)
BILIRUB UR QL STRIP: NEGATIVE
BSA FOR ECHO PROCEDURE: 1.86 M2
BUN SERPL-MCNC: 17 MG/DL (ref 8–23)
CALCIUM SERPL-MCNC: 9 MG/DL (ref 8.7–10.5)
CHLORIDE SERPL-SCNC: 108 MMOL/L (ref 95–110)
CLARITY UR REFRACT.AUTO: CLEAR
CO2 SERPL-SCNC: 21 MMOL/L (ref 23–29)
COLOR UR AUTO: YELLOW
CREAT SERPL-MCNC: 0.8 MG/DL (ref 0.5–1.4)
CV ECHO LV RWT: 0.27 CM
DIFFERENTIAL METHOD: ABNORMAL
DOP CALC AO PEAK VEL: 1.4 M/S
DOP CALC AO VTI: 31.91 CM
DOP CALC LVOT AREA: 3.2 CM2
DOP CALC LVOT DIAMETER: 2.01 CM
DOP CALC LVOT PEAK VEL: 1.2 M/S
DOP CALC LVOT STROKE VOLUME: 90.64 CM3
DOP CALCLVOT PEAK VEL VTI: 28.58 CM
E WAVE DECELERATION TIME: 187.64 MSEC
E/A RATIO: 1.17
E/E' RATIO: 12.8 M/S
ECHO LV POSTERIOR WALL: 0.59 CM (ref 0.6–1.1)
EOSINOPHIL # BLD AUTO: 0.3 K/UL (ref 0–0.5)
EOSINOPHIL NFR BLD: 2.5 % (ref 0–8)
ERYTHROCYTE [DISTWIDTH] IN BLOOD BY AUTOMATED COUNT: 14.4 % (ref 11.5–14.5)
EST. GFR  (AFRICAN AMERICAN): >60 ML/MIN/1.73 M^2
EST. GFR  (NON AFRICAN AMERICAN): >60 ML/MIN/1.73 M^2
FRACTIONAL SHORTENING: 32 % (ref 28–44)
GLUCOSE SERPL-MCNC: 101 MG/DL (ref 70–110)
GLUCOSE UR QL STRIP: NEGATIVE
HCT VFR BLD AUTO: 37.9 % (ref 37–48.5)
HGB BLD-MCNC: 11.7 G/DL (ref 12–16)
HGB UR QL STRIP: NEGATIVE
IMM GRANULOCYTES # BLD AUTO: 0.02 K/UL (ref 0–0.04)
IMM GRANULOCYTES NFR BLD AUTO: 0.2 % (ref 0–0.5)
INTERVENTRICULAR SEPTUM: 0.82 CM (ref 0.6–1.1)
IVRT: 77.07 MSEC
KETONES UR QL STRIP: NEGATIVE
LA MAJOR: 5.02 CM
LA MINOR: 4.94 CM
LA WIDTH: 3.84 CM
LEFT ATRIUM SIZE: 3.38 CM
LEFT ATRIUM VOLUME INDEX: 30.5 ML/M2
LEFT ATRIUM VOLUME: 54.94 CM3
LEFT INTERNAL DIMENSION IN SYSTOLE: 3.03 CM (ref 2.1–4)
LEFT VENTRICLE DIASTOLIC VOLUME INDEX: 49.64 ML/M2
LEFT VENTRICLE DIASTOLIC VOLUME: 89.43 ML
LEFT VENTRICLE MASS INDEX: 52 G/M2
LEFT VENTRICLE SYSTOLIC VOLUME INDEX: 19.9 ML/M2
LEFT VENTRICLE SYSTOLIC VOLUME: 35.93 ML
LEFT VENTRICULAR INTERNAL DIMENSION IN DIASTOLE: 4.44 CM (ref 3.5–6)
LEFT VENTRICULAR MASS: 94.34 G
LEUKOCYTE ESTERASE UR QL STRIP: NEGATIVE
LV LATERAL E/E' RATIO: 10.67 M/S
LV SEPTAL E/E' RATIO: 16 M/S
LYMPHOCYTES # BLD AUTO: 6.1 K/UL (ref 1–4.8)
LYMPHOCYTES NFR BLD: 54.5 % (ref 18–48)
MAGNESIUM SERPL-MCNC: 1.8 MG/DL (ref 1.6–2.6)
MCH RBC QN AUTO: 28.2 PG (ref 27–31)
MCHC RBC AUTO-ENTMCNC: 30.9 G/DL (ref 32–36)
MCV RBC AUTO: 91 FL (ref 82–98)
MONOCYTES # BLD AUTO: 0.6 K/UL (ref 0.3–1)
MONOCYTES NFR BLD: 5.6 % (ref 4–15)
MV PEAK A VEL: 0.82 M/S
MV PEAK E VEL: 0.96 M/S
MV STENOSIS PRESSURE HALF TIME: 54.42 MS
MV VALVE AREA P 1/2 METHOD: 4.04 CM2
NEUTROPHILS # BLD AUTO: 4.1 K/UL (ref 1.8–7.7)
NEUTROPHILS NFR BLD: 36.5 % (ref 38–73)
NITRITE UR QL STRIP: NEGATIVE
NRBC BLD-RTO: 0 /100 WBC
PH UR STRIP: 5 [PH] (ref 5–8)
PISA TR MAX VEL: 2.31 M/S
PLATELET # BLD AUTO: 226 K/UL (ref 150–350)
PMV BLD AUTO: 10.9 FL (ref 9.2–12.9)
POCT GLUCOSE: 105 MG/DL (ref 70–110)
POCT GLUCOSE: 93 MG/DL (ref 70–110)
POCT GLUCOSE: 98 MG/DL (ref 70–110)
POTASSIUM SERPL-SCNC: 4 MMOL/L (ref 3.5–5.1)
PROT UR QL STRIP: NEGATIVE
PULM VEIN S/D RATIO: 1.38
PV PEAK D VEL: 0.34 M/S
PV PEAK S VEL: 0.47 M/S
RA MAJOR: 4.86 CM
RA PRESSURE: 3 MMHG
RA WIDTH: 3.78 CM
RBC # BLD AUTO: 4.15 M/UL (ref 4–5.4)
RIGHT VENTRICULAR END-DIASTOLIC DIMENSION: 3.36 CM
RV TISSUE DOPPLER FREE WALL SYSTOLIC VELOCITY 1 (APICAL 4 CHAMBER VIEW): 10.51 CM/S
SINUS: 2.85 CM
SODIUM SERPL-SCNC: 139 MMOL/L (ref 136–145)
SP GR UR STRIP: 1.02 (ref 1–1.03)
STJ: 2.63 CM
TDI LATERAL: 0.09 M/S
TDI SEPTAL: 0.06 M/S
TDI: 0.08 M/S
TR MAX PG: 21 MMHG
TRICUSPID ANNULAR PLANE SYSTOLIC EXCURSION: 1.83 CM
TV REST PULMONARY ARTERY PRESSURE: 24 MMHG
URN SPEC COLLECT METH UR: NORMAL
WBC # BLD AUTO: 11.16 K/UL (ref 3.9–12.7)

## 2020-11-05 PROCEDURE — 25000003 PHARM REV CODE 250: Performed by: FAMILY MEDICINE

## 2020-11-05 PROCEDURE — 97535 SELF CARE MNGMENT TRAINING: CPT

## 2020-11-05 PROCEDURE — 99217 PR OBSERVATION CARE DISCHARGE: ICD-10-PCS | Mod: ,,, | Performed by: PHYSICIAN ASSISTANT

## 2020-11-05 PROCEDURE — 97165 OT EVAL LOW COMPLEX 30 MIN: CPT

## 2020-11-05 PROCEDURE — 97161 PT EVAL LOW COMPLEX 20 MIN: CPT

## 2020-11-05 PROCEDURE — 82962 GLUCOSE BLOOD TEST: CPT

## 2020-11-05 PROCEDURE — 81003 URINALYSIS AUTO W/O SCOPE: CPT

## 2020-11-05 PROCEDURE — 99217 PR OBSERVATION CARE DISCHARGE: CPT | Mod: ,,, | Performed by: PHYSICIAN ASSISTANT

## 2020-11-05 PROCEDURE — 97116 GAIT TRAINING THERAPY: CPT

## 2020-11-05 PROCEDURE — 85025 COMPLETE CBC W/AUTO DIFF WBC: CPT

## 2020-11-05 PROCEDURE — 80048 BASIC METABOLIC PNL TOTAL CA: CPT

## 2020-11-05 PROCEDURE — 83735 ASSAY OF MAGNESIUM: CPT

## 2020-11-05 PROCEDURE — 36415 COLL VENOUS BLD VENIPUNCTURE: CPT

## 2020-11-05 PROCEDURE — G0378 HOSPITAL OBSERVATION PER HR: HCPCS

## 2020-11-05 RX ORDER — BLOOD-GLUCOSE CONTROL, NORMAL
EACH MISCELLANEOUS
Qty: 200 EACH | Refills: 0 | Status: SHIPPED | OUTPATIENT
Start: 2020-11-05 | End: 2021-03-01 | Stop reason: SDUPTHER

## 2020-11-05 RX ORDER — DEXTROSE 4 G
TABLET,CHEWABLE ORAL
Qty: 1 EACH | Refills: 0 | Status: SHIPPED | OUTPATIENT
Start: 2020-11-05 | End: 2023-08-14

## 2020-11-05 RX ADMIN — OXYBUTYNIN CHLORIDE 10 MG: 10 TABLET, EXTENDED RELEASE ORAL at 10:11

## 2020-11-05 RX ADMIN — AMLODIPINE BESYLATE 5 MG: 5 TABLET ORAL at 10:11

## 2020-11-05 RX ADMIN — PANTOPRAZOLE SODIUM 40 MG: 40 TABLET, DELAYED RELEASE ORAL at 10:11

## 2020-11-05 RX ADMIN — OXYCODONE HYDROCHLORIDE 5 MG: 5 TABLET ORAL at 02:11

## 2020-11-05 RX ADMIN — ACETAMINOPHEN 1000 MG: 500 TABLET ORAL at 01:11

## 2020-11-05 RX ADMIN — ATORVASTATIN CALCIUM 40 MG: 40 TABLET, FILM COATED ORAL at 10:11

## 2020-11-05 RX ADMIN — ASPIRIN 81 MG: 81 TABLET, COATED ORAL at 10:11

## 2020-11-05 NOTE — PT/OT/SLP EVAL
Occupational Therapy   Evaluation and Discharge Note    Name: Leticia Brown  MRN: [id number]  Admitting Diagnosis:  Syncope      Recommendations:     Discharge Recommendations: home  Discharge Equipment Recommendations:  none  Barriers to discharge:       Assessment:     Leticia Brown is a 65 y.o. female with a medical diagnosis of Syncope. At this time, patient is functioning at their prior level of function and does not require further acute OT services.     Plan:     During this hospitalization, patient does not require further acute OT services.  Please re-consult if situation changes.    · Plan of Care Reviewed with: patient, spouse, daughter    Subjective     Chief Complaint: no complaints   Patient/Family Comments/goals: return to home and community access     Occupational Profile:  Living Environment: Pt lives in 1 story house with spouse with 5 steps to enter with Bilateral handrails  Previous level of function: indep  Roles and Routines: Pt works part time at [employer]  Equipment Used at home:  none  Assistance upon Discharge: pt with large family support     Pain/Comfort:  · Pain Rating 1: 0/10    Patients cultural, spiritual, Shinto conflicts given the current situation: no    Objective:     Communicated with: RN prior to session.  Patient found up in chair with telemetry, blood pressure cuff upon OT entry to room.    General Precautions: Standard, fall   Orthopedic Precautions:N/A   Braces: N/A     Occupational Performance:    Bed Mobility:    · Pt indep bed mobility     Activities of Daily Living:  · Pt indep with self care completion     Cognitive/Visual Perceptual:  Cognitive/Psychosocial Skills:     -       Oriented to: Person, Place, Time and Situation   -       Follows Commands/attention:Follows two-step commands  -       Communication: clear/fluent  -       Memory: No Deficits noted  -       Safety awareness/insight to disability: intact   -       Mood/Affect/Coping  skills/emotional control: Appropriate to situation    Physical Exam:  Upper Extremity Range of Motion:     -       Right Upper Extremity: WFL  -       Left Upper Extremity: WFL  Upper Extremity Strength:    -       Right Upper Extremity: WFL  -       Left Upper Extremity: WFL    AMPAC 6 Click ADL:  AMPAC Total Score: 24    Treatment & Education:  Evaluation complete.  Pt educated on safety, role of OT, importance of increased participation in self care for gains , expectations for participation, expectations for gains, POC, energy conservation, caregiver strain. White board updated.   - ADL training and safety   Education:    Patient left up in chair with all lines intact    GOALS:   Multidisciplinary Problems     Occupational Therapy Goals     Not on file                History:     Past Medical History:   Diagnosis Date    Diabetes mellitus     H. pylori infection 2017    [facility] EGD 2017. test of eradication neg.    High cholesterol     Hypertension        Past Surgical History:   Procedure Laterality Date    BREAST BIOPSY Right     CHOLECYSTECTOMY  2016    ESOPHAGOGASTRODUODENOSCOPY N/A 7/21/2020    Procedure: EGD (ESOPHAGOGASTRODUODENOSCOPY);  Surgeon: Rodrick Montes MD;  Location: [address]);  Service: Endoscopy;  Laterality: N/A;  3/30/20 - removed from 4/9/20, 3/30/20 & 3/31/20LM pt &  ph & sent email, request call back to reschedule June/July.  Pt called back, rescheduled 7/21/20 - pg  7/6/20 - 7/9/20- LM x 2 - waiting for cb to set up COVID appt. - ERW  COVID screening on       Time Tracking:     OT Date of Treatment: 11/05/20  OT Start Time: 1100  OT Stop Time: 1120  OT Total Time (min): 20 min    Billable Minutes:Evaluation 10  Self Care/Home Management 10    Brianda Herrera, OT  11/5/2020

## 2020-11-05 NOTE — ED NOTES
[facility] RN accepted report, nurse aware of pt status and last set of vitals. MD cleared patient for admission, pt stable for transport. Nurse informed of orders completed, outstanding orders, and orders unable to be completed in the ED. RN informed if any isolation precautions are required. RN agreeable with report and agrees to accept patient. Pt belongings and valuables remain with patient for transport.

## 2020-11-05 NOTE — H&P
[facility] Hospital Medicine  History & Physical    Patient Name: Leticia Brown  MRN: [id number]  Admission Date: 11/4/2020  Attending Physician: Earl Marc MD   Primary Care Provider: Sarah Antonio MD    [facility] Medicine Team: [facility] HOSP MED E Reyes Tineo MD     Patient information was obtained from patient, past medical records and ER records.     Subjective:     Principal Problem:Syncope    Chief Complaint:   Chief Complaint   Patient presents with    Loss of Consciousness     works at [employer], became dizzy weak and was assisted to the ground by coworkers; had symcope for a minute.  denies any chest pain or SOB; GCS 15        HPI: This is a 65 year old female with a past medical history of urge incontinence, GERD, HTN and DM who presents to the ED with a chief complaint of syncope. Patient is a [email or web address] employee who reported to work in her usual state of health around 10 this morning. She states after standing on her feet performing her job tasks she began feeling lightheaded and weak. She motioned for a coworker to come over and she had a syncopal episode at that time. She was guided down to the floor. She reports feeling persistently weak and lightheaded. Patient reports her glucose has been well controlled and she is tolerating oral intake per usual. She denies fever, URI symtpoms, headache, vision changes, chest pain, palpitations, SOB, nausea/vomiting, abdominal pain, dysuria, facial asymmetry, speech changes, focal weakness or numbness. No prior hx of syncope.    In the ED patient afebrile and hemodynamically stable. CBC and CMP largely unremarkable. Trop negative. EKG sinus rhythm with PACs and without ST elevation. While in the ED patient had a witnessed syncopal episode on standing and collapsed into stretcher without injury. She reported associated lightheadedness and generalized weakness. BP was reassessed at that time and found to be in the 170's. She has been  started on IVFs and admitted to the care of medicine for further evaluation and management.    Past Medical History:   Diagnosis Date    Diabetes mellitus     H. pylori infection 2017    [facility] EGD 2017. test of eradication neg.    High cholesterol     Hypertension        Past Surgical History:   Procedure Laterality Date    BREAST BIOPSY Right     CHOLECYSTECTOMY  2016    ESOPHAGOGASTRODUODENOSCOPY N/A 7/21/2020    Procedure: EGD (ESOPHAGOGASTRODUODENOSCOPY);  Surgeon: Rodrick Montes MD;  Location: [address]);  Service: Endoscopy;  Laterality: N/A;  3/30/20 - removed from 4/9/20, 3/30/20 & 3/31/20LM pt &  ph & sent email, request call back to reschedule June/July.  Pt called back, rescheduled 7/21/20 - pg  7/6/20 - 7/9/20- LM x 2 - waiting for cb to set up COVID appt. - ERW  COVID screening on       Review of patient's allergies indicates:  No Known Allergies    No current facility-administered medications on file prior to encounter.      Current Outpatient Medications on File Prior to Encounter   Medication Sig    amLODIPine (NORVASC) 5 MG tablet Take 1 tablet (5 mg total) by mouth once daily.    aspirin (ECOTRIN) 81 MG EC tablet Take 81 mg by mouth once daily.    atorvastatin (LIPITOR) 40 MG tablet Take 1 tablet (40 mg total) by mouth once daily.    gabapentin (NEURONTIN) 300 MG capsule Take 300 mg by mouth.    losartan (COZAAR) 100 MG tablet Take 1 tablet (100 mg total) by mouth once daily.    metFORMIN (GLUCOPHAGE-XR) 500 MG ER 24hr tablet Take 1 tablet (500 mg total) by mouth daily with breakfast.    omeprazole (PRILOSEC) 20 MG capsule Take 1 capsule (20 mg total) by mouth once daily.    oxybutynin (DITROPAN-XL) 10 MG 24 hr tablet TAKE ONE TABLET BY MOUTH ONE TIME DAILY     ergocalciferol (ERGOCALCIFEROL) 50,000 unit Cap Take 1 capsule (50,000 Units total) by mouth every 7 days.    ibuprofen (ADVIL,MOTRIN) 600 MG tablet Take 1 tablet (600 mg total) by mouth every 6 (six) hours  as needed.    loratadine (CLARITIN) 10 mg tablet Take 10 mg by mouth.     Family History     Problem Relation (Age of Onset)    No Known Problems Mother, Father, Sister, Brother, Daughter, Son, Maternal Aunt, Maternal Uncle, Paternal Aunt, Paternal Uncle, Maternal Grandmother, Maternal Grandfather, Paternal Grandmother, Paternal Grandfather        Tobacco Use    Smoking status: Current Every Day Smoker     Packs/day: 0.50     Years: 30.00     Pack years: 15.00     Types: Cigarettes    Smokeless tobacco: Never Used   Substance and Sexual Activity    Alcohol use: No     Frequency: Monthly or less     Drinks per session: 1 or 2     Binge frequency: Never    Drug use: No    Sexual activity: Not Currently     Review of Systems   Constitutional: Positive for fatigue. Negative for chills and fever.   HENT: Negative for sore throat and trouble swallowing.    Respiratory: Negative for cough, shortness of breath and wheezing.    Cardiovascular: Negative for chest pain, palpitations and leg swelling.   Gastrointestinal: Negative for abdominal distention, abdominal pain, diarrhea, nausea and vomiting.   Genitourinary: Negative for dysuria and hematuria.   Musculoskeletal: Negative for gait problem, neck pain and neck stiffness.   Skin: Negative for rash and wound.   Neurological: Positive for syncope, weakness (generalized) and light-headedness. Negative for seizures, facial asymmetry and headaches.   Psychiatric/Behavioral: Negative for confusion and decreased concentration.     Objective:     Vital Signs (Most Recent):  Temp: 96.6 °F (35.9 °C) (11/04/20 2059)  Pulse: 70 (11/04/20 2059)  Resp: 18 (11/04/20 2059)  BP: (!) 155/72 (11/04/20 2059)  SpO2: (!) 94 % (11/04/20 2059) Vital Signs (24h Range):  Temp:  [96.6 °F (35.9 °C)-98.5 °F (36.9 °C)] 96.6 °F (35.9 °C)  Pulse:  [62-76] 70  Resp:  [16-20] 18  SpO2:  [94 %-100 %] 94 %  BP: (151-166)/(65-72) 155/72     Weight: 78.7 kg (173 lb 8 oz)  Body mass index is 31.73  kg/m².    Physical Exam  Constitutional:       General: She is not in acute distress.     Appearance: She is not toxic-appearing.   HENT:      Head: Normocephalic and atraumatic.      Nose: Nose normal.   Eyes:      General: No scleral icterus.     Extraocular Movements: Extraocular movements intact.      Pupils: Pupils are equal, round, and reactive to light.   Cardiovascular:      Rate and Rhythm: Normal rate and regular rhythm.   Pulmonary:      Effort: Pulmonary effort is normal. No respiratory distress.      Breath sounds: No wheezing or rales.   Abdominal:      General: Abdomen is flat. There is no distension.      Palpations: Abdomen is soft.      Tenderness: There is no abdominal tenderness. There is no guarding.   Musculoskeletal: Normal range of motion.      Right lower leg: No edema.      Left lower leg: No edema.   Skin:     General: Skin is warm and dry.      Capillary Refill: Capillary refill takes less than 2 seconds.   Neurological:      General: No focal deficit present.      Mental Status: She is alert and oriented to person, place, and time.      Motor: No weakness.   Psychiatric:         Mood and Affect: Mood normal.         Behavior: Behavior normal.           CRANIAL NERVES     CN III, IV, VI   Pupils are equal, round, and reactive to light.       Significant Labs:   CBC:   Recent Labs   Lab 11/04/20  1457   WBC 11.86   HGB 12.8   HCT 41.1        CMP:   Recent Labs   Lab 11/04/20  1457      K 3.8      CO2 22*   GLU 87   BUN 19   CREATININE 0.8   CALCIUM 9.7   PROT 7.6   ALBUMIN 4.0   BILITOT 0.4   ALKPHOS 98   AST 19   ALT 15   ANIONGAP 11   EGFRNONAA >60.0     Troponin:   Recent Labs   Lab 11/04/20  1457   TROPONINI <0.006     Urine Studies: No results for input(s): COLORU, APPEARANCEUA, PHUR, SPECGRAV, PROTEINUA, GLUCUA, KETONESU, BILIRUBINUA, OCCULTUA, NITRITE, UROBILINOGEN, LEUKOCYTESUR, RBCUA, WBCUA, BACTERIA, SQUAMEPITHEL, HYALINECASTS in the last 48 hours.    Invalid  input(s): TRISHA  All pertinent labs within the past 24 hours have been reviewed.    Significant Imaging: I have reviewed all pertinent imaging results/findings within the past 24 hours.    Assessment/Plan:     * Syncope  Patient presenting with syncope while standing at work of unclear etiology while standing at work. Had second episode of syncope while in the ED upon on standing. Unclear etiology at this time but suspect vasovagal vs orthostatic. Other etiologies still considered.  - EKG sinus rhythm with PACs and without ST elevation  - CBC and CMP largely unremarkable  - UA pending  - bolused 1500ml NS  - monitor tele  - Orthostatics qshift  - PT/OT consulted  ;  Appreciate recs  - further management pending clinical course and further study review      Type 2 diabetes mellitus with diabetic polyneuropathy, without long-term current use of insulin  - SSI  - hypoglycemic protocol      GERD (gastroesophageal reflux disease)  - home ppi      Mixed hyperlipidemia  - home statin      Essential hypertension  - continue home meds amlodipine, losartan  - hydralazine prn  - home ASA        VTE Risk Mitigation (From admission, onward)         Ordered     IP VTE HIGH RISK PATIENT  Once      11/04/20 1801     Place sequential compression device  Until discontinued      11/04/20 1801     Place VALDEMAR hose  Until discontinued      11/04/20 1801                   Reyes Tineo MD  Department of Hospital Medicine   [facility]

## 2020-11-05 NOTE — ASSESSMENT & PLAN NOTE
Patient presenting with syncope while standing at work of unclear etiology while standing at work. Had second episode of syncope while in the ED upon on standing. Unclear etiology at this time but suspect vasovagal vs orthostatic. Other etiologies still considered.  - EKG sinus rhythm with PACs and without ST elevation  - CBC and CMP largely unremarkable  - UA pending  - bolused 1500ml NS  - monitor tele  - Orthostatics qshift  - PT/OT consulted  ;  Appreciate recs  - further management pending clinical course and further study review

## 2020-11-05 NOTE — PLAN OF CARE
11/05/20 1315   Post-Acute Status   Post-Acute Authorization Other   Other Status No Post-Acute Service Needs     Tavia Lara RN Case Manager   #[id number]

## 2020-11-05 NOTE — PLAN OF CARE
Pt resrting in bed in NAD. C/o feeling weak and shaky this morning, ambulated to bathroom with standby assist. VSS. No safety issues this shift

## 2020-11-05 NOTE — PLAN OF CARE
Problem: Physical Therapy Goal  Goal: Physical Therapy Goal  Description: Pt does not require acute PT services at this time and is safe to return home with family.    Outcome: TAMMY Quinn 11/05/2020

## 2020-11-05 NOTE — PLAN OF CARE
Problem: Adult Inpatient Plan of Care  Goal: Absence of Hospital-Acquired Illness or Injury  Outcome: Ongoing, Progressing     Problem: Adult Inpatient Plan of Care  Goal: Optimal Comfort and Wellbeing  Outcome: Ongoing, Progressing   Awake, alert and responsive. Oriented x 4.  Vitals stable. Able to ambulate with assist.  No distress noted.   Reviewed plan of care, verbalized understanding

## 2020-11-05 NOTE — HPI
This is a 65 year old female with a past medical history of urge incontinence, GERD, HTN and DM who presents to the ED with a chief complaint of syncope. Patient is a [employer] employee who reported to work in her usual state of health around 10 this morning. She states after standing on her feet performing her job tasks she began feeling lightheaded and weak. She motioned for a coworker to come over and she had a syncopal episode at that time. She was guided down to the floor. She reports feeling persistently weak and lightheaded. Patient reports her glucose has been well controlled and she is tolerating oral intake per usual. She denies fever, URI symtpoms, headache, vision changes, chest pain, palpitations, SOB, nausea/vomiting, abdominal pain, dysuria, facial asymmetry, speech changes, focal weakness or numbness. No prior hx of syncope.    In the ED patient afebrile and hemodynamically stable. CBC and CMP largely unremarkable. Trop negative. EKG sinus rhythm with PACs and without ST elevation. While in the ED patient had a witnessed syncopal episode on standing and collapsed into stretcher without injury. She reported associated lightheadedness and generalized weakness. BP was reassessed at that time and found to be in the 170's. She has been started on IVFs and admitted to the care of medicine for further evaluation and management.

## 2020-11-05 NOTE — SUBJECTIVE & OBJECTIVE
Past Medical History:   Diagnosis Date    Diabetes mellitus     H. pylori infection 2017    [facility] EGD 2017. test of eradication neg.    High cholesterol     Hypertension        Past Surgical History:   Procedure Laterality Date    BREAST BIOPSY Right     CHOLECYSTECTOMY  2016    ESOPHAGOGASTRODUODENOSCOPY N/A 7/21/2020    Procedure: EGD (ESOPHAGOGASTRODUODENOSCOPY);  Surgeon: Rodrick Montes MD;  Location: [address]);  Service: Endoscopy;  Laterality: N/A;  3/30/20 - removed from 4/9/20, 3/30/20 & 3/31/20LM pt &  ph & sent email, request call back to reschedule June/July.  Pt called back, rescheduled 7/21/20 - pg  7/6/20 - 7/9/20- LM x 2 - waiting for cb to set up COVID appt. - ERW  COVID screening on       Review of patient's allergies indicates:  No Known Allergies    No current facility-administered medications on file prior to encounter.      Current Outpatient Medications on File Prior to Encounter   Medication Sig    amLODIPine (NORVASC) 5 MG tablet Take 1 tablet (5 mg total) by mouth once daily.    aspirin (ECOTRIN) 81 MG EC tablet Take 81 mg by mouth once daily.    atorvastatin (LIPITOR) 40 MG tablet Take 1 tablet (40 mg total) by mouth once daily.    gabapentin (NEURONTIN) 300 MG capsule Take 300 mg by mouth.    losartan (COZAAR) 100 MG tablet Take 1 tablet (100 mg total) by mouth once daily.    metFORMIN (GLUCOPHAGE-XR) 500 MG ER 24hr tablet Take 1 tablet (500 mg total) by mouth daily with breakfast.    omeprazole (PRILOSEC) 20 MG capsule Take 1 capsule (20 mg total) by mouth once daily.    oxybutynin (DITROPAN-XL) 10 MG 24 hr tablet TAKE ONE TABLET BY MOUTH ONE TIME DAILY     ergocalciferol (ERGOCALCIFEROL) 50,000 unit Cap Take 1 capsule (50,000 Units total) by mouth every 7 days.    ibuprofen (ADVIL,MOTRIN) 600 MG tablet Take 1 tablet (600 mg total) by mouth every 6 (six) hours as needed.    loratadine (CLARITIN) 10 mg tablet Take 10 mg by mouth.     Family History      Problem Relation (Age of Onset)    No Known Problems Mother, Father, Sister, Brother, Daughter, Son, Maternal Aunt, Maternal Uncle, Paternal Aunt, Paternal Uncle, Maternal Grandmother, Maternal Grandfather, Paternal Grandmother, Paternal Grandfather        Tobacco Use    Smoking status: Current Every Day Smoker     Packs/day: 0.50     Years: 30.00     Pack years: 15.00     Types: Cigarettes    Smokeless tobacco: Never Used   Substance and Sexual Activity    Alcohol use: No     Frequency: Monthly or less     Drinks per session: 1 or 2     Binge frequency: Never    Drug use: No    Sexual activity: Not Currently     Review of Systems   Constitutional: Positive for fatigue. Negative for chills and fever.   HENT: Negative for sore throat and trouble swallowing.    Respiratory: Negative for cough, shortness of breath and wheezing.    Cardiovascular: Negative for chest pain, palpitations and leg swelling.   Gastrointestinal: Negative for abdominal distention, abdominal pain, diarrhea, nausea and vomiting.   Genitourinary: Negative for dysuria and hematuria.   Musculoskeletal: Negative for gait problem, neck pain and neck stiffness.   Skin: Negative for rash and wound.   Neurological: Positive for syncope, weakness (generalized) and light-headedness. Negative for seizures, facial asymmetry and headaches.   Psychiatric/Behavioral: Negative for confusion and decreased concentration.     Objective:     Vital Signs (Most Recent):  Temp: 96.6 °F (35.9 °C) (11/04/20 2059)  Pulse: 70 (11/04/20 2059)  Resp: 18 (11/04/20 2059)  BP: (!) 155/72 (11/04/20 2059)  SpO2: (!) 94 % (11/04/20 2059) Vital Signs (24h Range):  Temp:  [96.6 °F (35.9 °C)-98.5 °F (36.9 °C)] 96.6 °F (35.9 °C)  Pulse:  [62-76] 70  Resp:  [16-20] 18  SpO2:  [94 %-100 %] 94 %  BP: (151-166)/(65-72) 155/72     Weight: 78.7 kg (173 lb 8 oz)  Body mass index is 31.73 kg/m².    Physical Exam  Constitutional:       General: She is not in acute distress.      Appearance: She is not toxic-appearing.   HENT:      Head: Normocephalic and atraumatic.      Nose: Nose normal.   Eyes:      General: No scleral icterus.     Extraocular Movements: Extraocular movements intact.      Pupils: Pupils are equal, round, and reactive to light.   Cardiovascular:      Rate and Rhythm: Normal rate and regular rhythm.   Pulmonary:      Effort: Pulmonary effort is normal. No respiratory distress.      Breath sounds: No wheezing or rales.   Abdominal:      General: Abdomen is flat. There is no distension.      Palpations: Abdomen is soft.      Tenderness: There is no abdominal tenderness. There is no guarding.   Musculoskeletal: Normal range of motion.      Right lower leg: No edema.      Left lower leg: No edema.   Skin:     General: Skin is warm and dry.      Capillary Refill: Capillary refill takes less than 2 seconds.   Neurological:      General: No focal deficit present.      Mental Status: She is alert and oriented to person, place, and time.      Motor: No weakness.   Psychiatric:         Mood and Affect: Mood normal.         Behavior: Behavior normal.           CRANIAL NERVES     CN III, IV, VI   Pupils are equal, round, and reactive to light.       Significant Labs:   CBC:   Recent Labs   Lab 11/04/20  1457   WBC 11.86   HGB 12.8   HCT 41.1        CMP:   Recent Labs   Lab 11/04/20  1457      K 3.8      CO2 22*   GLU 87   BUN 19   CREATININE 0.8   CALCIUM 9.7   PROT 7.6   ALBUMIN 4.0   BILITOT 0.4   ALKPHOS 98   AST 19   ALT 15   ANIONGAP 11   EGFRNONAA >60.0     Troponin:   Recent Labs   Lab 11/04/20  1457   TROPONINI <0.006     Urine Studies: No results for input(s): COLORU, APPEARANCEUA, PHUR, SPECGRAV, PROTEINUA, GLUCUA, KETONESU, BILIRUBINUA, OCCULTUA, NITRITE, UROBILINOGEN, LEUKOCYTESUR, RBCUA, WBCUA, BACTERIA, SQUAMEPITHEL, HYALINECASTS in the last 48 hours.    Invalid input(s): WRIGHTSUR  All pertinent labs within the past 24 hours have been  reviewed.    Significant Imaging: I have reviewed all pertinent imaging results/findings within the past 24 hours.

## 2020-11-06 NOTE — ASSESSMENT & PLAN NOTE
- continue home meds amlodipine, losartan  - home ASA  - pt reports losartan increased from 50 to 100 approx 1 month ago. Consider possible etiology of lightheadedness/dizziness. Follow up with PCP in 1 week for BP medication management.

## 2020-11-06 NOTE — HOSPITAL COURSE
Ms. Brown was admitted to hospital medicine for evaluation of syncope. Pt afebrile without leukocytosis and hemodynamically stable on admission. Orthostatic vital signs negative in ED, but pt symptomatic when transitioning from sitting to standing position. EKG on admission with PACs, no further episodes on telemetry. UA unremarkable. TTE with EF 60%, normal systolic and diastolic function, mild tricuspid and aortic regurgitation. Repeat orthostatics negative. Pt stable for discharge with Holter monitor ordered, ambulatory referral to cardiology, and PCP follow up in 1 week. The plan was discussed with the patient, she verbalized understanding, all questions answered.

## 2020-11-06 NOTE — ASSESSMENT & PLAN NOTE
- SSI  - hypoglycemic protocol  - pt does not measure BG at home. Ordered glucometer, lancets, test strips at d/c  - glucose WNL on admission, no hypoglycemia noted

## 2020-11-06 NOTE — NURSING
Patient awake, alert and responsive at 6:30 pm.  Discharge instructions given and explained to patient.  Glucometer and lancets delivered by pharmacy at  Bedside.  Wheelchair provided to patient, escorted by  to parking garage.

## 2020-11-06 NOTE — DISCHARGE SUMMARY
[facility] Hospital Medicine  Discharge Summary      Patient Name: Leticia Brown  MRN: [id number]  Admission Date: 11/4/2020  Hospital Length of Stay: 0 days  Discharge Date and Time: 11/5/2020  6:35 PM  Attending Physician: No att. providers found   Discharging Provider: Conchita Isaac PA-C  Primary Care Provider: Sarah Antonio MD  [facility] Medicine Team: [facility] MED E Conchita Isaac PA-C    HPI:   This is a 65 year old female with a past medical history of urge incontinence, GERD, HTN and DM who presents to the ED with a chief complaint of syncope. Patient is a [employer] employee who reported to work in her usual state of health around 10 this morning. She states after standing on her feet performing her job tasks she began feeling lightheaded and weak. She motioned for a coworker to come over and she had a syncopal episode at that time. She was guided down to the floor. She reports feeling persistently weak and lightheaded. Patient reports her glucose has been well controlled and she is tolerating oral intake per usual. She denies fever, URI symtpoms, headache, vision changes, chest pain, palpitations, SOB, nausea/vomiting, abdominal pain, dysuria, facial asymmetry, speech changes, focal weakness or numbness. No prior hx of syncope.    In the ED patient afebrile and hemodynamically stable. CBC and CMP largely unremarkable. Trop negative. EKG sinus rhythm with PACs and without ST elevation. While in the ED patient had a witnessed syncopal episode on standing and collapsed into stretcher without injury. She reported associated lightheadedness and generalized weakness. BP was reassessed at that time and found to be in the 170's. She has been started on IVFs and admitted to the care of medicine for further evaluation and management.    * No surgery found *      Hospital Course:   Ms. Brown was admitted to hospital medicine for evaluation of syncope. Pt afebrile without  leukocytosis and hemodynamically stable on admission. Orthostatic vital signs negative in ED, but pt symptomatic when transitioning from sitting to standing position. EKG on admission with PACs, no further episodes on telemetry. UA unremarkable. TTE with EF 60%, normal systolic and diastolic function, mild tricuspid and aortic regurgitation. Repeat orthostatics negative. Pt stable for discharge with Holter monitor ordered, ambulatory referral to cardiology, and PCP follow up in 1 week. The plan was discussed with the patient, she verbalized understanding, all questions answered.      Consults:      * Syncope  Patient presenting with syncope while standing at work of unclear etiology while standing at work. Had second episode of syncope while in the ED upon on standing. Unclear etiology at this time but suspect vasovagal vs orthostatic.     - EKG sinus rhythm with PACs and without ST elevation on admission  - troponin WNL  - CBC and CMP largely unremarkable  - UA negative for infection  - bolused 1500ml NS in ED  - Orthostatics vital signs negative x 2  - pt reports dizziness/lightheadedness when standing up from seated position too quickly.   - PT/OT consulted; recommending home   - no further PACs or other arrhythmia noted on tele  - TTE with EF 60%, normal systolic and diastolic function, mild tricuspid and aortic regurgitation.  - Holter monitor ordered at discharge  - ambulatory referral to cardiology  - PCP follow up in 1 week.     Type 2 diabetes mellitus with diabetic polyneuropathy, without long-term current use of insulin  - SSI  - hypoglycemic protocol  - pt does not measure BG at home. Ordered glucometer, lancets, test strips at d/c  - glucose WNL on admission, no hypoglycemia noted    GERD (gastroesophageal reflux disease)  - continue ppi    Mixed hyperlipidemia  - continue statin    Essential hypertension  - continue home meds amlodipine, losartan  - home ASA  - pt reports losartan increased from 50 to  100 approx 1 month ago. Consider possible etiology of lightheadedness/dizziness. Follow up with PCP in 1 week for BP medication management.       Final Active Diagnoses:    Diagnosis Date Noted POA    PRINCIPAL PROBLEM:  Syncope [R55] 11/04/2020 Yes    Mixed hyperlipidemia [E78.2] 08/30/2019 Yes    Essential hypertension [I10] 11/13/2015 Yes    Type 2 diabetes mellitus with diabetic polyneuropathy, without long-term current use of insulin [E11.42] 05/26/2015 Yes    GERD (gastroesophageal reflux disease) [K21.9] 01/20/2015 Yes      Problems Resolved During this Admission:       Discharged Condition: stable    Disposition: Home or Self Care    Follow Up:  Follow-up Information     Sarah Antonio MD In 1 week.    Specialty: Internal Medicine  Contact information:  [address] [facility] [address]  [facility] [id number] [phone]                 Patient Instructions:      Ambulatory referral/consult to Cardiology   Standing Status: Future   Referral Priority: Routine Referral Type: Consultation   Referral Reason: Specialty Services Required   Requested Specialty: Cardiology   Number of Visits Requested: 1     Diet diabetic     Notify your health care provider if you experience any of the following:  difficulty breathing or increased cough     Notify your health care provider if you experience any of the following:  persistent dizziness, light-headedness, or visual disturbances     Notify your health care provider if you experience any of the following:  increased confusion or weakness     Holter Monitor - 3-14 Day Adult   Standing Status: Future Standing Exp. Date: 11/05/21     Activity as tolerated       Significant Diagnostic Studies: Labs:   CMP   Recent Labs   Lab 11/04/20  1457 11/05/20  0335    139   K 3.8 4.0    108   CO2 22* 21*   GLU 87 101   BUN 19 17   CREATININE 0.8 0.8   CALCIUM 9.7 9.0   PROT 7.6  --    ALBUMIN 4.0  --    BILITOT 0.4  --    ALKPHOS 98  --    AST 19  --    ALT 15  --     ANIONGAP 11 10   ESTGFRAFRICA >60.0 >60.0   EGFRNONAA >60.0 >60.0   , CBC   Recent Labs   Lab 11/04/20  1457 11/05/20  0335   WBC 11.86 11.16   HGB 12.8 11.7*   HCT 41.1 37.9    226    and Troponin   Recent Labs   Lab 11/04/20  1457   TROPONINI <0.006     Cardiac Graphics:   Transthoracic echo (TTE) complete (Cupid Only):   Results for orders placed or performed during the hospital encounter of 11/04/20   Echo Color Flow Doppler? Yes   Result Value Ref Range    Ascending aorta 2.80 cm    STJ 2.63 cm    AV mean gradient 4 mmHg    Ao peak alirio 1.40 m/s    Ao VTI 31.91 cm    IVRT 77.07 msec    IVS 0.82 0.6 - 1.1 cm    LA size 3.38 cm    Left Atrium Major Axis 5.02 cm    Left Atrium Minor Axis 4.94 cm    LVIDd 4.44 3.5 - 6.0 cm    LVIDs 3.03 2.1 - 4.0 cm    LVOT diameter 2.01 cm    LVOT peak VTI 28.58 cm    Posterior Wall 0.59 (A) 0.6 - 1.1 cm    MV Peak A Alirio 0.82 m/s    E wave decelartion time 187.64 msec    MV Peak E Alirio 0.96 m/s    PV Peak D Alirio 0.34 m/s    PV Peak S Alirio 0.47 m/s    RA Major Axis 4.86 cm    RA Width 3.78 cm    RVDD 3.36 cm    Sinus 2.85 cm    TAPSE 1.83 cm    TR Max Alirio 2.31 m/s    TDI LATERAL 0.09 m/s    TDI SEPTAL 0.06 m/s    LA WIDTH 3.84 cm    MV stenosis pressure 1/2 time 54.42 ms    LV Diastolic Volume 89.43 mL    LV Systolic Volume 35.93 mL    RV S' 10.51 cm/s    LVOT peak alirio 1.20 m/s    LV LATERAL E/E' RATIO 10.67 m/s    LV SEPTAL E/E' RATIO 16.00 m/s    FS 32 %    LA volume 54.94 cm3    LV mass 94.34 g    Left Ventricle Relative Wall Thickness 0.27 cm    AV valve area 2.84 cm2    AV Velocity Ratio 0.86     AV index (prosthetic) 0.90     MV valve area p 1/2 method 4.04 cm2    E/A ratio 1.17     Mean e' 0.08 m/s    Pulm vein S/D ratio 1.38     LVOT area 3.2 cm2    LVOT stroke volume 90.64 cm3    AV peak gradient 8 mmHg    E/E' ratio 12.80 m/s    LV Systolic Volume Index 19.9 mL/m2    LV Diastolic Volume Index 49.64 mL/m2    LA Volume Index 30.5 mL/m2    LV Mass Index 52 g/m2     Triscuspid Valve Regurgitation Peak Gradient 21 mmHg    BSA 1.86 m2    Right Atrial Pressure (from IVC) 3 mmHg    TV rest pulmonary artery pressure 24 mmHg    Narrative    · The left ventricle is normal in size with normal systolic function. The   estimated ejection fraction is 60%.  · Normal right ventricular size with normal right ventricular systolic   function.  · Normal left ventricular diastolic function.  · Mild tricuspid regurgitation.  · Mild aortic regurgitation.  · The estimated PA systolic pressure is 24 mmHg.  · Normal central venous pressure (3 mmHg).          Pending Diagnostic Studies:     None         Medications:  Reconciled Home Medications:      Medication List      START taking these medications    ONETOUCH DELICA PLUS LANCET 30 gauge Misc  Generic drug: lancets  To check BG 4 times daily.     ONETOUCH VERIO METER Misc  Generic drug: blood-glucose meter  Use as instructed     ONETOUCH VERIO TEST STRIPS Strp  Generic drug: blood sugar diagnostic  To check BG 4 times daily        CONTINUE taking these medications    amLODIPine 5 MG tablet  Commonly known as: NORVASC  Take 1 tablet (5 mg total) by mouth once daily.     aspirin 81 MG EC tablet  Commonly known as: ECOTRIN  Take 81 mg by mouth once daily.     atorvastatin 40 MG tablet  Commonly known as: LIPITOR  Take 1 tablet (40 mg total) by mouth once daily.     ergocalciferol 50,000 unit Cap  Commonly known as: ERGOCALCIFEROL  Take 1 capsule (50,000 Units total) by mouth every 7 days.     gabapentin 300 MG capsule  Commonly known as: NEURONTIN  Take 300 mg by mouth.     ibuprofen 600 MG tablet  Commonly known as: ADVIL,MOTRIN  Take 1 tablet (600 mg total) by mouth every 6 (six) hours as needed.     loratadine 10 mg tablet  Commonly known as: CLARITIN  Take 10 mg by mouth.     losartan 100 MG tablet  Commonly known as: COZAAR  Take 1 tablet (100 mg total) by mouth once daily.     metFORMIN 500 MG ER 24hr tablet  Commonly known as:  GLUCOPHAGE-XR  Take 1 tablet (500 mg total) by mouth daily with breakfast.     omeprazole 20 MG capsule  Commonly known as: PRILOSEC  Take 1 capsule (20 mg total) by mouth once daily.     oxybutynin 10 MG 24 hr tablet  Commonly known as: DITROPAN-XL  TAKE ONE TABLET BY MOUTH ONE TIME DAILY            Indwelling Lines/Drains at time of discharge:   Lines/Drains/Airways     None                 Time spent on the discharge of patient: 37 minutes  Patient was seen and examined on the date of discharge and determined to be suitable for discharge.       Discussed with staff, Phoenix Isaac PA-C  Department of Hospital Medicine  [facility]

## 2020-11-06 NOTE — ASSESSMENT & PLAN NOTE
Patient presenting with syncope while standing at work of unclear etiology while standing at work. Had second episode of syncope while in the ED upon on standing. Unclear etiology at this time but suspect vasovagal vs orthostatic.     - EKG sinus rhythm with PACs and without ST elevation on admission  - troponin WNL  - CBC and CMP largely unremarkable  - UA negative for infection  - bolused 1500ml NS in ED  - Orthostatics vital signs negative x 2  - pt reports dizziness/lightheadedness when standing up from seated position too quickly.   - PT/OT consulted; recommending home   - no further PACs or other arrhythmia noted on tele  - TTE with EF 60%, normal systolic and diastolic function, mild tricuspid and aortic regurgitation.  - Holter monitor ordered at discharge  - ambulatory referral to cardiology  - PCP follow up in 1 week.

## 2020-11-06 NOTE — PLAN OF CARE
11/06/20 1504   Final Note   Assessment Type Final Discharge Note   Anticipated Discharge Disposition Home   What phone number can be called within the next 1-3 days to see how you are doing after discharge? [phone]   Discharge plans and expectations educations in teach back method with documentation complete? Yes   Right Care Referral Info   Post Acute Recommendation No Care   Post-Acute Status   Post-Acute Authorization Other   Other Status No Post-Acute Service Needs     Future Appointments   Date Time Provider Department Center   11/18/2020  3:00 PM Delvis Hawk MD [id number]   1/11/2021  7:30 AM Sarah Antonio MD [facility]     Tavia Lara  RN Case Manager   #[phone]

## 2020-11-09 ENCOUNTER — PATIENT MESSAGE (OUTPATIENT)
Dept: INTERNAL MEDICINE | Facility: CLINIC | Age: 65
End: 2020-11-09

## 2020-11-09 ENCOUNTER — TELEPHONE (OUTPATIENT)
Dept: INTERNAL MEDICINE | Facility: CLINIC | Age: 65
End: 2020-11-09

## 2020-11-09 NOTE — TELEPHONE ENCOUNTER
----- Message from Amelia Shaw sent at 11/9/2020  8:38 AM CST -----  Regarding: Patient Returning Call  Who Called:CAREN NASH [[id number]]    Who Left Message for Patient:Dano     Does the patient know what this is regarding:Holter Monitor scheduling    Best Call Back Number:[phone]

## 2020-11-10 ENCOUNTER — TELEPHONE (OUTPATIENT)
Dept: INTERNAL MEDICINE | Facility: CLINIC | Age: 65
End: 2020-11-10

## 2020-11-10 NOTE — TELEPHONE ENCOUNTER
----- Message from Ana Paula Baltazar sent at 11/10/2020 10:17 AM CST -----  Regarding: Return call    Type:  Patient Returning Call    Who Called: CAREN NASH [[id number]]    Who Left Message for Patient: Dano     Does the patient know what this is regarding?: Y     Can the clinic reply in [facility]: No    Best Call Back Number: [phone]    Additional Information:call center does not schedule Holter monitor

## 2020-11-12 ENCOUNTER — PATIENT MESSAGE (OUTPATIENT)
Dept: INTERNAL MEDICINE | Facility: CLINIC | Age: 65
End: 2020-11-12

## 2020-11-18 ENCOUNTER — CLINICAL SUPPORT (OUTPATIENT)
Dept: CARDIOLOGY | Facility: HOSPITAL | Age: 65
End: 2020-11-18
Attending: PHYSICIAN ASSISTANT
Payer: MEDICARE

## 2020-11-18 ENCOUNTER — TELEPHONE (OUTPATIENT)
Dept: CARDIOLOGY | Facility: HOSPITAL | Age: 65
End: 2020-11-18

## 2020-11-18 ENCOUNTER — OFFICE VISIT (OUTPATIENT)
Dept: CARDIOLOGY | Facility: CLINIC | Age: 65
End: 2020-11-18
Payer: MEDICARE

## 2020-11-18 VITALS
SYSTOLIC BLOOD PRESSURE: 132 MMHG | HEART RATE: 77 BPM | HEIGHT: 62 IN | DIASTOLIC BLOOD PRESSURE: 68 MMHG | WEIGHT: 167.13 LBS | OXYGEN SATURATION: 99 % | BODY MASS INDEX: 30.76 KG/M2

## 2020-11-18 DIAGNOSIS — R55 SYNCOPE: ICD-10-CM

## 2020-11-18 DIAGNOSIS — R55 SYNCOPE, UNSPECIFIED SYNCOPE TYPE: ICD-10-CM

## 2020-11-18 DIAGNOSIS — E11.42 TYPE 2 DIABETES MELLITUS WITH DIABETIC POLYNEUROPATHY, WITHOUT LONG-TERM CURRENT USE OF INSULIN: ICD-10-CM

## 2020-11-18 DIAGNOSIS — I10 ESSENTIAL HYPERTENSION: Primary | ICD-10-CM

## 2020-11-18 PROCEDURE — 99999 PR PBB SHADOW E&M-EST. PATIENT-LVL V: CPT | Mod: PBBFAC,,, | Performed by: INTERNAL MEDICINE

## 2020-11-18 PROCEDURE — 3075F SYST BP GE 130 - 139MM HG: CPT | Mod: S$GLB,,, | Performed by: INTERNAL MEDICINE

## 2020-11-18 PROCEDURE — 1101F PR PT FALLS ASSESS DOC 0-1 FALLS W/OUT INJ PAST YR: ICD-10-PCS | Mod: S$GLB,,, | Performed by: INTERNAL MEDICINE

## 2020-11-18 PROCEDURE — 93272 ECG/REVIEW INTERPRET ONLY: CPT | Mod: ,,, | Performed by: STUDENT IN AN ORGANIZED HEALTH CARE EDUCATION/TRAINING PROGRAM

## 2020-11-18 PROCEDURE — 3008F PR BODY MASS INDEX (BMI) DOCUMENTED: ICD-10-PCS | Mod: S$GLB,,, | Performed by: INTERNAL MEDICINE

## 2020-11-18 PROCEDURE — 3078F PR MOST RECENT DIASTOLIC BLOOD PRESSURE < 80 MM HG: ICD-10-PCS | Mod: S$GLB,,, | Performed by: INTERNAL MEDICINE

## 2020-11-18 PROCEDURE — 93271 ECG/MONITORING AND ANALYSIS: CPT

## 2020-11-18 PROCEDURE — 99204 PR OFFICE/OUTPT VISIT, NEW, LEVL IV, 45-59 MIN: ICD-10-PCS | Mod: S$GLB,,, | Performed by: INTERNAL MEDICINE

## 2020-11-18 PROCEDURE — 93272 CARDIAC EVENT MONITOR (CUPID ONLY): ICD-10-PCS | Mod: ,,, | Performed by: STUDENT IN AN ORGANIZED HEALTH CARE EDUCATION/TRAINING PROGRAM

## 2020-11-18 PROCEDURE — 3044F PR MOST RECENT HEMOGLOBIN A1C LEVEL <7.0%: ICD-10-PCS | Mod: S$GLB,,, | Performed by: INTERNAL MEDICINE

## 2020-11-18 PROCEDURE — 3008F BODY MASS INDEX DOCD: CPT | Mod: S$GLB,,, | Performed by: INTERNAL MEDICINE

## 2020-11-18 PROCEDURE — 3044F HG A1C LEVEL LT 7.0%: CPT | Mod: S$GLB,,, | Performed by: INTERNAL MEDICINE

## 2020-11-18 PROCEDURE — 1101F PT FALLS ASSESS-DOCD LE1/YR: CPT | Mod: S$GLB,,, | Performed by: INTERNAL MEDICINE

## 2020-11-18 PROCEDURE — 99999 PR PBB SHADOW E&M-EST. PATIENT-LVL V: ICD-10-PCS | Mod: PBBFAC,,, | Performed by: INTERNAL MEDICINE

## 2020-11-18 PROCEDURE — 3075F PR MOST RECENT SYSTOLIC BLOOD PRESS GE 130-139MM HG: ICD-10-PCS | Mod: S$GLB,,, | Performed by: INTERNAL MEDICINE

## 2020-11-18 PROCEDURE — 1125F AMNT PAIN NOTED PAIN PRSNT: CPT | Mod: S$GLB,,, | Performed by: INTERNAL MEDICINE

## 2020-11-18 PROCEDURE — 3078F DIAST BP <80 MM HG: CPT | Mod: S$GLB,,, | Performed by: INTERNAL MEDICINE

## 2020-11-18 PROCEDURE — 3288F FALL RISK ASSESSMENT DOCD: CPT | Mod: S$GLB,,, | Performed by: INTERNAL MEDICINE

## 2020-11-18 PROCEDURE — 99204 OFFICE O/P NEW MOD 45 MIN: CPT | Mod: S$GLB,,, | Performed by: INTERNAL MEDICINE

## 2020-11-18 PROCEDURE — 1125F PR PAIN SEVERITY QUANTIFIED, PAIN PRESENT: ICD-10-PCS | Mod: S$GLB,,, | Performed by: INTERNAL MEDICINE

## 2020-11-18 PROCEDURE — 3288F PR FALLS RISK ASSESSMENT DOCUMENTED: ICD-10-PCS | Mod: S$GLB,,, | Performed by: INTERNAL MEDICINE

## 2020-11-18 RX ORDER — LOSARTAN POTASSIUM 50 MG/1
50 TABLET ORAL 2 TIMES DAILY
Qty: 180 TABLET | Refills: 3 | Status: SHIPPED | OUTPATIENT
Start: 2020-11-18 | End: 2021-09-23 | Stop reason: SDUPTHER

## 2020-11-18 NOTE — TELEPHONE ENCOUNTER
Attempted to return pt's call @ preferred # of [phone].  Message was left on the voice mail letting the pt know that it is ok is she comes in a little late as well as that she may have a wait should the other already scheduled pts arrive.  Pt's full name was on the voice mail.

## 2020-11-18 NOTE — LETTER
November 18, 2020      Conchita Isaac PA-C  [address] [facility] [address]           [facility] - Cardiology  [address] [place] [address], [facility] [address]  [facility] [address]  Phone: [phone]  Fax: [phone]          Patient: Leticia Brown   MR Number: [id number]   YOB: 1955   Date of Visit: 11/18/2020       Dear Conchita Isaac:    Thank you for referring Leticia Brown to me for evaluation. Attached you will find relevant portions of my assessment and plan of care.    If you have questions, please do not hesitate to call me. I look forward to following Leticia Brown along with you.    Sincerely,    Delvis Hawk MD    Enclosure  CC:  No Recipients    If you would like to receive this communication electronically, please contact [email or web address] or [phone] to request more information on [facility] access.    For providers and/or their staff who would like to refer a patient to [facility], please contact us through our one-stop-shop provider referral line, [facility], at [phone].    If you feel you have received this communication in error or would no longer like to receive these types of communications, please e-mail [email or web address]

## 2020-11-18 NOTE — TELEPHONE ENCOUNTER
----- Message from Jeana Peter sent at 11/18/2020 10:19 AM CST -----  Regarding: running late  Contact: patient  The pt is calling to see if she will be able to still come if she gets here for 11:00 am? Please call her back @ [phone]. Thanks, Jeana

## 2020-12-06 ENCOUNTER — PATIENT MESSAGE (OUTPATIENT)
Dept: INTERNAL MEDICINE | Facility: CLINIC | Age: 65
End: 2020-12-06

## 2021-01-05 ENCOUNTER — OFFICE VISIT (OUTPATIENT)
Dept: CARDIOLOGY | Facility: CLINIC | Age: 66
End: 2021-01-05
Payer: MEDICARE

## 2021-01-05 VITALS
OXYGEN SATURATION: 97 % | DIASTOLIC BLOOD PRESSURE: 70 MMHG | BODY MASS INDEX: 29.96 KG/M2 | SYSTOLIC BLOOD PRESSURE: 134 MMHG | WEIGHT: 163.81 LBS | HEART RATE: 63 BPM

## 2021-01-05 DIAGNOSIS — R55 SYNCOPE, UNSPECIFIED SYNCOPE TYPE: Primary | ICD-10-CM

## 2021-01-05 DIAGNOSIS — I10 ESSENTIAL HYPERTENSION: ICD-10-CM

## 2021-01-05 DIAGNOSIS — E11.42 TYPE 2 DIABETES MELLITUS WITH DIABETIC POLYNEUROPATHY, WITHOUT LONG-TERM CURRENT USE OF INSULIN: ICD-10-CM

## 2021-01-05 PROCEDURE — 3078F DIAST BP <80 MM HG: CPT | Mod: CPTII,S$GLB,, | Performed by: INTERNAL MEDICINE

## 2021-01-05 PROCEDURE — 3078F PR MOST RECENT DIASTOLIC BLOOD PRESSURE < 80 MM HG: ICD-10-PCS | Mod: CPTII,S$GLB,, | Performed by: INTERNAL MEDICINE

## 2021-01-05 PROCEDURE — 99214 OFFICE O/P EST MOD 30 MIN: CPT | Mod: S$GLB,,, | Performed by: INTERNAL MEDICINE

## 2021-01-05 PROCEDURE — 3075F SYST BP GE 130 - 139MM HG: CPT | Mod: CPTII,S$GLB,, | Performed by: INTERNAL MEDICINE

## 2021-01-05 PROCEDURE — 99999 PR PBB SHADOW E&M-EST. PATIENT-LVL IV: CPT | Mod: PBBFAC,,, | Performed by: INTERNAL MEDICINE

## 2021-01-05 PROCEDURE — 99999 PR PBB SHADOW E&M-EST. PATIENT-LVL IV: ICD-10-PCS | Mod: PBBFAC,,, | Performed by: INTERNAL MEDICINE

## 2021-01-05 PROCEDURE — 3008F PR BODY MASS INDEX (BMI) DOCUMENTED: ICD-10-PCS | Mod: CPTII,S$GLB,, | Performed by: INTERNAL MEDICINE

## 2021-01-05 PROCEDURE — 3075F PR MOST RECENT SYSTOLIC BLOOD PRESS GE 130-139MM HG: ICD-10-PCS | Mod: CPTII,S$GLB,, | Performed by: INTERNAL MEDICINE

## 2021-01-05 PROCEDURE — 3008F BODY MASS INDEX DOCD: CPT | Mod: CPTII,S$GLB,, | Performed by: INTERNAL MEDICINE

## 2021-01-05 PROCEDURE — 3044F HG A1C LEVEL LT 7.0%: CPT | Mod: CPTII,S$GLB,, | Performed by: INTERNAL MEDICINE

## 2021-01-05 PROCEDURE — 1126F PR PAIN SEVERITY QUANTIFIED, NO PAIN PRESENT: ICD-10-PCS | Mod: S$GLB,,, | Performed by: INTERNAL MEDICINE

## 2021-01-05 PROCEDURE — 1126F AMNT PAIN NOTED NONE PRSNT: CPT | Mod: S$GLB,,, | Performed by: INTERNAL MEDICINE

## 2021-01-05 PROCEDURE — 3044F PR MOST RECENT HEMOGLOBIN A1C LEVEL <7.0%: ICD-10-PCS | Mod: CPTII,S$GLB,, | Performed by: INTERNAL MEDICINE

## 2021-01-05 PROCEDURE — 99214 PR OFFICE/OUTPT VISIT, EST, LEVL IV, 30-39 MIN: ICD-10-PCS | Mod: S$GLB,,, | Performed by: INTERNAL MEDICINE

## 2021-02-01 ENCOUNTER — LAB VISIT (OUTPATIENT)
Dept: PRIMARY CARE CLINIC | Facility: OTHER | Age: 66
End: 2021-02-01
Payer: MEDICARE

## 2021-02-01 DIAGNOSIS — Z20.822 ENCOUNTER FOR LABORATORY TESTING FOR COVID-19 VIRUS: ICD-10-CM

## 2021-02-01 PROCEDURE — U0003 INFECTIOUS AGENT DETECTION BY NUCLEIC ACID (DNA OR RNA); SEVERE ACUTE RESPIRATORY SYNDROME CORONAVIRUS 2 (SARS-COV-2) (CORONAVIRUS DISEASE [COVID-19]), AMPLIFIED PROBE TECHNIQUE, MAKING USE OF HIGH THROUGHPUT TECHNOLOGIES AS DESCRIBED BY CMS-2020-01-R: HCPCS

## 2021-02-02 LAB — SARS-COV-2 RNA RESP QL NAA+PROBE: NOT DETECTED

## 2021-02-03 ENCOUNTER — PATIENT MESSAGE (OUTPATIENT)
Dept: INTERNAL MEDICINE | Facility: CLINIC | Age: 66
End: 2021-02-03

## 2021-02-03 DIAGNOSIS — I10 ESSENTIAL HYPERTENSION: ICD-10-CM

## 2021-02-03 DIAGNOSIS — R32 URINARY INCONTINENCE, UNSPECIFIED TYPE: ICD-10-CM

## 2021-02-04 RX ORDER — AMLODIPINE BESYLATE 5 MG/1
5 TABLET ORAL DAILY
Qty: 30 TABLET | Refills: 3 | Status: SHIPPED | OUTPATIENT
Start: 2021-02-04 | End: 2021-02-17 | Stop reason: SDUPTHER

## 2021-02-04 RX ORDER — OXYBUTYNIN CHLORIDE 10 MG/1
10 TABLET, EXTENDED RELEASE ORAL DAILY
Qty: 30 TABLET | Refills: 11 | Status: SHIPPED | OUTPATIENT
Start: 2021-02-04 | End: 2021-02-26 | Stop reason: SDUPTHER

## 2021-02-05 DIAGNOSIS — E11.9 TYPE 2 DIABETES MELLITUS WITHOUT COMPLICATION: ICD-10-CM

## 2021-02-10 ENCOUNTER — PATIENT MESSAGE (OUTPATIENT)
Dept: INTERNAL MEDICINE | Facility: CLINIC | Age: 66
End: 2021-02-10

## 2021-02-17 ENCOUNTER — LAB VISIT (OUTPATIENT)
Dept: LAB | Facility: OTHER | Age: 66
End: 2021-02-17
Attending: INTERNAL MEDICINE
Payer: MEDICARE

## 2021-02-17 ENCOUNTER — OFFICE VISIT (OUTPATIENT)
Dept: INTERNAL MEDICINE | Facility: CLINIC | Age: 66
End: 2021-02-17
Payer: MEDICARE

## 2021-02-17 VITALS
HEIGHT: 62 IN | HEART RATE: 66 BPM | WEIGHT: 167.75 LBS | BODY MASS INDEX: 30.87 KG/M2 | SYSTOLIC BLOOD PRESSURE: 135 MMHG | OXYGEN SATURATION: 93 % | DIASTOLIC BLOOD PRESSURE: 76 MMHG

## 2021-02-17 DIAGNOSIS — S16.1XXA STRAIN OF NECK MUSCLE, INITIAL ENCOUNTER: ICD-10-CM

## 2021-02-17 DIAGNOSIS — E11.42 TYPE 2 DIABETES MELLITUS WITH DIABETIC POLYNEUROPATHY, WITHOUT LONG-TERM CURRENT USE OF INSULIN: Primary | ICD-10-CM

## 2021-02-17 DIAGNOSIS — E11.42 TYPE 2 DIABETES MELLITUS WITH DIABETIC POLYNEUROPATHY, WITHOUT LONG-TERM CURRENT USE OF INSULIN: ICD-10-CM

## 2021-02-17 DIAGNOSIS — I10 ESSENTIAL HYPERTENSION: ICD-10-CM

## 2021-02-17 DIAGNOSIS — R13.10 DYSPHAGIA, UNSPECIFIED TYPE: ICD-10-CM

## 2021-02-17 DIAGNOSIS — E78.2 MIXED HYPERLIPIDEMIA: ICD-10-CM

## 2021-02-17 DIAGNOSIS — R93.89 ABNORMAL CHEST CT: ICD-10-CM

## 2021-02-17 DIAGNOSIS — E55.9 VITAMIN D DEFICIENCY: ICD-10-CM

## 2021-02-17 DIAGNOSIS — R32 URINARY INCONTINENCE, UNSPECIFIED TYPE: ICD-10-CM

## 2021-02-17 LAB
ESTIMATED AVG GLUCOSE: 120 MG/DL (ref 68–131)
HBA1C MFR BLD: 5.8 % (ref 4–5.6)

## 2021-02-17 PROCEDURE — 3044F PR MOST RECENT HEMOGLOBIN A1C LEVEL <7.0%: ICD-10-PCS | Mod: S$GLB,,, | Performed by: INTERNAL MEDICINE

## 2021-02-17 PROCEDURE — 83036 HEMOGLOBIN GLYCOSYLATED A1C: CPT

## 2021-02-17 PROCEDURE — 3078F DIAST BP <80 MM HG: CPT | Mod: S$GLB,,, | Performed by: INTERNAL MEDICINE

## 2021-02-17 PROCEDURE — 99215 OFFICE O/P EST HI 40 MIN: CPT | Mod: S$GLB,,, | Performed by: INTERNAL MEDICINE

## 2021-02-17 PROCEDURE — 3008F BODY MASS INDEX DOCD: CPT | Mod: S$GLB,,, | Performed by: INTERNAL MEDICINE

## 2021-02-17 PROCEDURE — 3078F PR MOST RECENT DIASTOLIC BLOOD PRESSURE < 80 MM HG: ICD-10-PCS | Mod: S$GLB,,, | Performed by: INTERNAL MEDICINE

## 2021-02-17 PROCEDURE — 3008F PR BODY MASS INDEX (BMI) DOCUMENTED: ICD-10-PCS | Mod: S$GLB,,, | Performed by: INTERNAL MEDICINE

## 2021-02-17 PROCEDURE — 3075F SYST BP GE 130 - 139MM HG: CPT | Mod: S$GLB,,, | Performed by: INTERNAL MEDICINE

## 2021-02-17 PROCEDURE — 1126F PR PAIN SEVERITY QUANTIFIED, NO PAIN PRESENT: ICD-10-PCS | Mod: S$GLB,,, | Performed by: INTERNAL MEDICINE

## 2021-02-17 PROCEDURE — 3044F HG A1C LEVEL LT 7.0%: CPT | Mod: S$GLB,,, | Performed by: INTERNAL MEDICINE

## 2021-02-17 PROCEDURE — 99215 PR OFFICE/OUTPT VISIT, EST, LEVL V, 40-54 MIN: ICD-10-PCS | Mod: S$GLB,,, | Performed by: INTERNAL MEDICINE

## 2021-02-17 PROCEDURE — 1126F AMNT PAIN NOTED NONE PRSNT: CPT | Mod: S$GLB,,, | Performed by: INTERNAL MEDICINE

## 2021-02-17 PROCEDURE — 99999 PR PBB SHADOW E&M-EST. PATIENT-LVL III: ICD-10-PCS | Mod: PBBFAC,,, | Performed by: INTERNAL MEDICINE

## 2021-02-17 PROCEDURE — 36415 COLL VENOUS BLD VENIPUNCTURE: CPT

## 2021-02-17 PROCEDURE — 99999 PR PBB SHADOW E&M-EST. PATIENT-LVL III: CPT | Mod: PBBFAC,,, | Performed by: INTERNAL MEDICINE

## 2021-02-17 PROCEDURE — 3075F PR MOST RECENT SYSTOLIC BLOOD PRESS GE 130-139MM HG: ICD-10-PCS | Mod: S$GLB,,, | Performed by: INTERNAL MEDICINE

## 2021-02-17 RX ORDER — METFORMIN HYDROCHLORIDE 500 MG/1
500 TABLET, EXTENDED RELEASE ORAL
Qty: 30 TABLET | Refills: 11 | Status: CANCELLED | OUTPATIENT
Start: 2021-02-17 | End: 2022-02-17

## 2021-02-17 RX ORDER — AMLODIPINE BESYLATE 5 MG/1
5 TABLET ORAL DAILY
Qty: 90 TABLET | Refills: 3 | Status: SHIPPED | OUTPATIENT
Start: 2021-02-17 | End: 2021-02-26 | Stop reason: SDUPTHER

## 2021-02-17 RX ORDER — CYCLOBENZAPRINE HCL 5 MG
5 TABLET ORAL 3 TIMES DAILY PRN
Qty: 30 TABLET | Refills: 3 | Status: SHIPPED | OUTPATIENT
Start: 2021-02-17 | End: 2021-09-23

## 2021-02-18 ENCOUNTER — PATIENT MESSAGE (OUTPATIENT)
Dept: INTERNAL MEDICINE | Facility: CLINIC | Age: 66
End: 2021-02-18

## 2021-02-24 ENCOUNTER — TELEPHONE (OUTPATIENT)
Dept: INTERNAL MEDICINE | Facility: CLINIC | Age: 66
End: 2021-02-24

## 2021-02-24 DIAGNOSIS — R93.89 ABNORMAL CHEST CT: Primary | ICD-10-CM

## 2021-02-25 ENCOUNTER — HOSPITAL ENCOUNTER (OUTPATIENT)
Dept: RADIOLOGY | Facility: OTHER | Age: 66
Discharge: HOME | End: 2021-02-25
Attending: INTERNAL MEDICINE
Payer: MEDICARE

## 2021-02-25 DIAGNOSIS — R93.89 ABNORMAL CHEST CT: ICD-10-CM

## 2021-02-25 DIAGNOSIS — F17.210 LIGHT CIGARETTE SMOKER (1-9 CIGS/DAY): ICD-10-CM

## 2021-02-25 PROCEDURE — 71260 CT THORAX DX C+: CPT | Mod: 26,,, | Performed by: RADIOLOGY

## 2021-02-25 PROCEDURE — 25500020 PHARM REV CODE 255: Performed by: INTERNAL MEDICINE

## 2021-02-25 PROCEDURE — 71260 CT CHEST WITH CONTRAST: ICD-10-PCS | Mod: 26,,, | Performed by: RADIOLOGY

## 2021-02-25 PROCEDURE — 71260 CT THORAX DX C+: CPT | Mod: TC

## 2021-02-25 RX ADMIN — IOHEXOL 75 ML: 350 INJECTION, SOLUTION INTRAVENOUS at 09:02

## 2021-02-26 ENCOUNTER — PATIENT MESSAGE (OUTPATIENT)
Dept: INTERNAL MEDICINE | Facility: CLINIC | Age: 66
End: 2021-02-26

## 2021-02-26 DIAGNOSIS — E11.42 TYPE 2 DIABETES MELLITUS WITH DIABETIC POLYNEUROPATHY, WITHOUT LONG-TERM CURRENT USE OF INSULIN: Primary | ICD-10-CM

## 2021-03-01 RX ORDER — BLOOD-GLUCOSE CONTROL, NORMAL
EACH MISCELLANEOUS
Qty: 200 EACH | Refills: 0 | Status: CANCELLED | OUTPATIENT
Start: 2021-03-01

## 2021-03-02 RX ORDER — BLOOD-GLUCOSE CONTROL, NORMAL
EACH MISCELLANEOUS
Qty: 200 EACH | Refills: 11 | Status: SHIPPED | OUTPATIENT
Start: 2021-03-02 | End: 2023-08-14 | Stop reason: SDUPTHER

## 2021-03-09 ENCOUNTER — PATIENT MESSAGE (OUTPATIENT)
Dept: INTERNAL MEDICINE | Facility: CLINIC | Age: 66
End: 2021-03-09

## 2021-03-10 ENCOUNTER — TELEPHONE (OUTPATIENT)
Dept: SMOKING CESSATION | Facility: CLINIC | Age: 66
End: 2021-03-10

## 2021-03-11 ENCOUNTER — TELEPHONE (OUTPATIENT)
Dept: SMOKING CESSATION | Facility: CLINIC | Age: 66
End: 2021-03-11

## 2021-03-18 ENCOUNTER — PATIENT OUTREACH (OUTPATIENT)
Dept: ADMINISTRATIVE | Facility: OTHER | Age: 66
End: 2021-03-18

## 2021-03-19 ENCOUNTER — OFFICE VISIT (OUTPATIENT)
Dept: UROGYNECOLOGY | Facility: CLINIC | Age: 66
End: 2021-03-19
Payer: MEDICARE

## 2021-03-19 VITALS
SYSTOLIC BLOOD PRESSURE: 166 MMHG | BODY MASS INDEX: 29.82 KG/M2 | WEIGHT: 162.06 LBS | HEIGHT: 62 IN | DIASTOLIC BLOOD PRESSURE: 74 MMHG

## 2021-03-19 DIAGNOSIS — N95.2 VAGINAL ATROPHY: ICD-10-CM

## 2021-03-19 DIAGNOSIS — Z01.419 WELL WOMAN EXAM: Primary | ICD-10-CM

## 2021-03-19 DIAGNOSIS — R32 URINARY INCONTINENCE, UNSPECIFIED TYPE: ICD-10-CM

## 2021-03-19 DIAGNOSIS — Z12.4 ENCOUNTER FOR SCREENING FOR CERVICAL CANCER: ICD-10-CM

## 2021-03-19 PROCEDURE — 3008F PR BODY MASS INDEX (BMI) DOCUMENTED: ICD-10-PCS | Mod: S$GLB,,, | Performed by: NURSE PRACTITIONER

## 2021-03-19 PROCEDURE — 99999 PR PBB SHADOW E&M-EST. PATIENT-LVL IV: ICD-10-PCS | Mod: PBBFAC,,, | Performed by: NURSE PRACTITIONER

## 2021-03-19 PROCEDURE — 3288F FALL RISK ASSESSMENT DOCD: CPT | Mod: S$GLB,,, | Performed by: NURSE PRACTITIONER

## 2021-03-19 PROCEDURE — 1126F AMNT PAIN NOTED NONE PRSNT: CPT | Mod: S$GLB,,, | Performed by: NURSE PRACTITIONER

## 2021-03-19 PROCEDURE — 3008F BODY MASS INDEX DOCD: CPT | Mod: S$GLB,,, | Performed by: NURSE PRACTITIONER

## 2021-03-19 PROCEDURE — G0101 CA SCREEN;PELVIC/BREAST EXAM: HCPCS | Mod: S$GLB,,, | Performed by: NURSE PRACTITIONER

## 2021-03-19 PROCEDURE — 87624 HPV HI-RISK TYP POOLED RSLT: CPT | Performed by: NURSE PRACTITIONER

## 2021-03-19 PROCEDURE — 1126F PR PAIN SEVERITY QUANTIFIED, NO PAIN PRESENT: ICD-10-PCS | Mod: S$GLB,,, | Performed by: NURSE PRACTITIONER

## 2021-03-19 PROCEDURE — 1101F PR PT FALLS ASSESS DOC 0-1 FALLS W/OUT INJ PAST YR: ICD-10-PCS | Mod: S$GLB,,, | Performed by: NURSE PRACTITIONER

## 2021-03-19 PROCEDURE — 3288F PR FALLS RISK ASSESSMENT DOCUMENTED: ICD-10-PCS | Mod: S$GLB,,, | Performed by: NURSE PRACTITIONER

## 2021-03-19 PROCEDURE — 88175 CYTOPATH C/V AUTO FLUID REDO: CPT | Performed by: NURSE PRACTITIONER

## 2021-03-19 PROCEDURE — G0101 PR CA SCREEN;PELVIC/BREAST EXAM: ICD-10-PCS | Mod: S$GLB,,, | Performed by: NURSE PRACTITIONER

## 2021-03-19 PROCEDURE — 99999 PR PBB SHADOW E&M-EST. PATIENT-LVL IV: CPT | Mod: PBBFAC,,, | Performed by: NURSE PRACTITIONER

## 2021-03-19 PROCEDURE — 1101F PT FALLS ASSESS-DOCD LE1/YR: CPT | Mod: S$GLB,,, | Performed by: NURSE PRACTITIONER

## 2021-03-26 ENCOUNTER — PATIENT MESSAGE (OUTPATIENT)
Dept: UROGYNECOLOGY | Facility: CLINIC | Age: 66
End: 2021-03-26

## 2021-03-26 LAB
CLINICAL INFO: NORMAL
CYTO CVX: NORMAL
CYTOLOGIST CVX/VAG CYTO: NORMAL
CYTOLOGY CMNT CVX/VAG CYTO-IMP: NORMAL
CYTOLOGY PAP THIN PREP EXPLANATION: NORMAL
DATE OF PREVIOUS PAP: NORMAL
DATE PREVIOUS BX: NO
HPV I/H RISK 4 DNA CVX QL NAA+PROBE: NOT DETECTED
LMP START DATE: NORMAL
SPECIMEN SOURCE CVX/VAG CYTO: NORMAL
STAT OF ADQ CVX/VAG CYTO-IMP: NORMAL

## 2021-03-27 ENCOUNTER — PATIENT MESSAGE (OUTPATIENT)
Dept: UROGYNECOLOGY | Facility: CLINIC | Age: 66
End: 2021-03-27

## 2021-03-27 ENCOUNTER — PATIENT MESSAGE (OUTPATIENT)
Dept: INTERNAL MEDICINE | Facility: CLINIC | Age: 66
End: 2021-03-27

## 2021-03-29 ENCOUNTER — TELEPHONE (OUTPATIENT)
Dept: SMOKING CESSATION | Facility: CLINIC | Age: 66
End: 2021-03-29

## 2021-03-30 ENCOUNTER — TELEPHONE (OUTPATIENT)
Dept: INTERNAL MEDICINE | Facility: CLINIC | Age: 66
End: 2021-03-30

## 2021-04-06 ENCOUNTER — OFFICE VISIT (OUTPATIENT)
Dept: INTERNAL MEDICINE | Facility: CLINIC | Age: 66
End: 2021-04-06
Payer: MEDICARE

## 2021-04-06 ENCOUNTER — LAB VISIT (OUTPATIENT)
Dept: LAB | Facility: OTHER | Age: 66
End: 2021-04-06
Attending: INTERNAL MEDICINE
Payer: MEDICARE

## 2021-04-06 VITALS
HEIGHT: 62 IN | DIASTOLIC BLOOD PRESSURE: 58 MMHG | OXYGEN SATURATION: 97 % | SYSTOLIC BLOOD PRESSURE: 138 MMHG | WEIGHT: 157.88 LBS | HEART RATE: 75 BPM | BODY MASS INDEX: 29.05 KG/M2

## 2021-04-06 DIAGNOSIS — F33.42 RECURRENT MAJOR DEPRESSIVE DISORDER, IN FULL REMISSION: ICD-10-CM

## 2021-04-06 DIAGNOSIS — R13.10 DYSPHAGIA, UNSPECIFIED TYPE: ICD-10-CM

## 2021-04-06 DIAGNOSIS — G47.00 INSOMNIA, UNSPECIFIED TYPE: ICD-10-CM

## 2021-04-06 DIAGNOSIS — R63.0 DECREASED APPETITE: Primary | ICD-10-CM

## 2021-04-06 DIAGNOSIS — F17.210 LIGHT CIGARETTE SMOKER (1-9 CIGS/DAY): ICD-10-CM

## 2021-04-06 DIAGNOSIS — F41.9 ANXIETY: ICD-10-CM

## 2021-04-06 DIAGNOSIS — R63.0 DECREASED APPETITE: ICD-10-CM

## 2021-04-06 LAB
ALBUMIN SERPL BCP-MCNC: 3.8 G/DL (ref 3.5–5.2)
ALP SERPL-CCNC: 97 U/L (ref 55–135)
ALT SERPL W/O P-5'-P-CCNC: 12 U/L (ref 10–44)
ANION GAP SERPL CALC-SCNC: 10 MMOL/L (ref 8–16)
AST SERPL-CCNC: 15 U/L (ref 10–40)
BASOPHILS # BLD AUTO: 0.05 K/UL (ref 0–0.2)
BASOPHILS NFR BLD: 0.5 % (ref 0–1.9)
BILIRUB SERPL-MCNC: 0.2 MG/DL (ref 0.1–1)
BUN SERPL-MCNC: 13 MG/DL (ref 8–23)
CALCIUM SERPL-MCNC: 9.3 MG/DL (ref 8.7–10.5)
CHLORIDE SERPL-SCNC: 106 MMOL/L (ref 95–110)
CO2 SERPL-SCNC: 26 MMOL/L (ref 23–29)
CREAT SERPL-MCNC: 1 MG/DL (ref 0.5–1.4)
DIFFERENTIAL METHOD: ABNORMAL
EOSINOPHIL # BLD AUTO: 0.3 K/UL (ref 0–0.5)
EOSINOPHIL NFR BLD: 2.7 % (ref 0–8)
ERYTHROCYTE [DISTWIDTH] IN BLOOD BY AUTOMATED COUNT: 14.1 % (ref 11.5–14.5)
EST. GFR  (AFRICAN AMERICAN): >60 ML/MIN/1.73 M^2
EST. GFR  (NON AFRICAN AMERICAN): 59 ML/MIN/1.73 M^2
GLUCOSE SERPL-MCNC: 75 MG/DL (ref 70–110)
HCT VFR BLD AUTO: 40.2 % (ref 37–48.5)
HGB BLD-MCNC: 12.7 G/DL (ref 12–16)
IMM GRANULOCYTES # BLD AUTO: 0.02 K/UL (ref 0–0.04)
IMM GRANULOCYTES NFR BLD AUTO: 0.2 % (ref 0–0.5)
LYMPHOCYTES # BLD AUTO: 4.6 K/UL (ref 1–4.8)
LYMPHOCYTES NFR BLD: 42.5 % (ref 18–48)
MCH RBC QN AUTO: 28.5 PG (ref 27–31)
MCHC RBC AUTO-ENTMCNC: 31.6 G/DL (ref 32–36)
MCV RBC AUTO: 90 FL (ref 82–98)
MONOCYTES # BLD AUTO: 0.7 K/UL (ref 0.3–1)
MONOCYTES NFR BLD: 6.1 % (ref 4–15)
NEUTROPHILS # BLD AUTO: 5.2 K/UL (ref 1.8–7.7)
NEUTROPHILS NFR BLD: 48 % (ref 38–73)
NRBC BLD-RTO: 0 /100 WBC
PLATELET # BLD AUTO: 277 K/UL (ref 150–450)
PMV BLD AUTO: 10.9 FL (ref 9.2–12.9)
POTASSIUM SERPL-SCNC: 3.7 MMOL/L (ref 3.5–5.1)
PROT SERPL-MCNC: 7.3 G/DL (ref 6–8.4)
RBC # BLD AUTO: 4.45 M/UL (ref 4–5.4)
SODIUM SERPL-SCNC: 142 MMOL/L (ref 136–145)
TSH SERPL DL<=0.005 MIU/L-ACNC: 0.54 UIU/ML (ref 0.4–4)
WBC # BLD AUTO: 10.78 K/UL (ref 3.9–12.7)

## 2021-04-06 PROCEDURE — 3288F PR FALLS RISK ASSESSMENT DOCUMENTED: ICD-10-PCS | Mod: S$GLB,,, | Performed by: INTERNAL MEDICINE

## 2021-04-06 PROCEDURE — 99999 PR PBB SHADOW E&M-EST. PATIENT-LVL IV: CPT | Mod: PBBFAC,,, | Performed by: INTERNAL MEDICINE

## 2021-04-06 PROCEDURE — 1101F PR PT FALLS ASSESS DOC 0-1 FALLS W/OUT INJ PAST YR: ICD-10-PCS | Mod: S$GLB,,, | Performed by: INTERNAL MEDICINE

## 2021-04-06 PROCEDURE — 3075F PR MOST RECENT SYSTOLIC BLOOD PRESS GE 130-139MM HG: ICD-10-PCS | Mod: S$GLB,,, | Performed by: INTERNAL MEDICINE

## 2021-04-06 PROCEDURE — 99215 OFFICE O/P EST HI 40 MIN: CPT | Mod: 25,S$GLB,, | Performed by: INTERNAL MEDICINE

## 2021-04-06 PROCEDURE — 99406 PR TOBACCO USE CESSATION INTERMEDIATE 3-10 MINUTES: ICD-10-PCS | Mod: S$GLB,,, | Performed by: INTERNAL MEDICINE

## 2021-04-06 PROCEDURE — 99406 BEHAV CHNG SMOKING 3-10 MIN: CPT | Mod: S$GLB,,, | Performed by: INTERNAL MEDICINE

## 2021-04-06 PROCEDURE — 3288F FALL RISK ASSESSMENT DOCD: CPT | Mod: S$GLB,,, | Performed by: INTERNAL MEDICINE

## 2021-04-06 PROCEDURE — 3075F SYST BP GE 130 - 139MM HG: CPT | Mod: S$GLB,,, | Performed by: INTERNAL MEDICINE

## 2021-04-06 PROCEDURE — 84443 ASSAY THYROID STIM HORMONE: CPT | Performed by: INTERNAL MEDICINE

## 2021-04-06 PROCEDURE — 3008F BODY MASS INDEX DOCD: CPT | Mod: S$GLB,,, | Performed by: INTERNAL MEDICINE

## 2021-04-06 PROCEDURE — 3008F PR BODY MASS INDEX (BMI) DOCUMENTED: ICD-10-PCS | Mod: S$GLB,,, | Performed by: INTERNAL MEDICINE

## 2021-04-06 PROCEDURE — 80053 COMPREHEN METABOLIC PANEL: CPT | Performed by: INTERNAL MEDICINE

## 2021-04-06 PROCEDURE — 85025 COMPLETE CBC W/AUTO DIFF WBC: CPT | Performed by: INTERNAL MEDICINE

## 2021-04-06 PROCEDURE — 99999 PR PBB SHADOW E&M-EST. PATIENT-LVL IV: ICD-10-PCS | Mod: PBBFAC,,, | Performed by: INTERNAL MEDICINE

## 2021-04-06 PROCEDURE — 36415 COLL VENOUS BLD VENIPUNCTURE: CPT | Performed by: INTERNAL MEDICINE

## 2021-04-06 PROCEDURE — 3078F PR MOST RECENT DIASTOLIC BLOOD PRESSURE < 80 MM HG: ICD-10-PCS | Mod: S$GLB,,, | Performed by: INTERNAL MEDICINE

## 2021-04-06 PROCEDURE — 1101F PT FALLS ASSESS-DOCD LE1/YR: CPT | Mod: S$GLB,,, | Performed by: INTERNAL MEDICINE

## 2021-04-06 PROCEDURE — 3078F DIAST BP <80 MM HG: CPT | Mod: S$GLB,,, | Performed by: INTERNAL MEDICINE

## 2021-04-06 PROCEDURE — 99215 PR OFFICE/OUTPT VISIT, EST, LEVL V, 40-54 MIN: ICD-10-PCS | Mod: 25,S$GLB,, | Performed by: INTERNAL MEDICINE

## 2021-04-06 RX ORDER — MIRTAZAPINE 7.5 MG/1
7.5 TABLET, FILM COATED ORAL NIGHTLY
Qty: 30 TABLET | Refills: 11 | Status: SHIPPED | OUTPATIENT
Start: 2021-04-06 | End: 2022-03-24

## 2021-04-26 ENCOUNTER — PATIENT MESSAGE (OUTPATIENT)
Dept: RESEARCH | Facility: HOSPITAL | Age: 66
End: 2021-04-26

## 2021-07-07 ENCOUNTER — PATIENT MESSAGE (OUTPATIENT)
Dept: ADMINISTRATIVE | Facility: HOSPITAL | Age: 66
End: 2021-07-07

## 2021-08-04 ENCOUNTER — PATIENT MESSAGE (OUTPATIENT)
Dept: INTERNAL MEDICINE | Facility: CLINIC | Age: 66
End: 2021-08-04

## 2021-08-04 ENCOUNTER — PATIENT MESSAGE (OUTPATIENT)
Dept: ADMINISTRATIVE | Facility: HOSPITAL | Age: 66
End: 2021-08-04

## 2021-08-12 ENCOUNTER — LAB VISIT (OUTPATIENT)
Dept: LAB | Facility: OTHER | Age: 66
End: 2021-08-12
Attending: INTERNAL MEDICINE
Payer: MEDICARE

## 2021-08-12 DIAGNOSIS — E55.9 VITAMIN D DEFICIENCY: ICD-10-CM

## 2021-08-12 DIAGNOSIS — E11.42 TYPE 2 DIABETES MELLITUS WITH DIABETIC POLYNEUROPATHY, WITHOUT LONG-TERM CURRENT USE OF INSULIN: ICD-10-CM

## 2021-08-12 DIAGNOSIS — E78.2 MIXED HYPERLIPIDEMIA: ICD-10-CM

## 2021-08-12 LAB
25(OH)D3+25(OH)D2 SERPL-MCNC: 23 NG/ML (ref 30–96)
CHOLEST SERPL-MCNC: 174 MG/DL (ref 120–199)
CHOLEST/HDLC SERPL: 4.8 {RATIO} (ref 2–5)
ESTIMATED AVG GLUCOSE: 117 MG/DL (ref 68–131)
HBA1C MFR BLD: 5.7 % (ref 4–5.6)
HDLC SERPL-MCNC: 36 MG/DL (ref 40–75)
HDLC SERPL: 20.7 % (ref 20–50)
LDLC SERPL CALC-MCNC: 118 MG/DL (ref 63–159)
NONHDLC SERPL-MCNC: 138 MG/DL
TRIGL SERPL-MCNC: 100 MG/DL (ref 30–150)

## 2021-08-12 PROCEDURE — 82306 VITAMIN D 25 HYDROXY: CPT | Performed by: INTERNAL MEDICINE

## 2021-08-12 PROCEDURE — 36415 COLL VENOUS BLD VENIPUNCTURE: CPT | Performed by: INTERNAL MEDICINE

## 2021-08-12 PROCEDURE — 83036 HEMOGLOBIN GLYCOSYLATED A1C: CPT | Performed by: INTERNAL MEDICINE

## 2021-08-12 PROCEDURE — 80061 LIPID PANEL: CPT | Performed by: INTERNAL MEDICINE

## 2021-08-17 ENCOUNTER — TELEPHONE (OUTPATIENT)
Dept: INTERNAL MEDICINE | Facility: CLINIC | Age: 66
End: 2021-08-17

## 2021-08-23 ENCOUNTER — TELEPHONE (OUTPATIENT)
Dept: INTERNAL MEDICINE | Facility: CLINIC | Age: 66
End: 2021-08-23

## 2021-09-23 ENCOUNTER — HOSPITAL ENCOUNTER (OUTPATIENT)
Dept: RADIOLOGY | Facility: OTHER | Age: 66
Discharge: HOME | End: 2021-09-23
Attending: INTERNAL MEDICINE
Payer: MEDICARE

## 2021-09-23 ENCOUNTER — OFFICE VISIT (OUTPATIENT)
Dept: INTERNAL MEDICINE | Facility: CLINIC | Age: 66
End: 2021-09-23
Payer: MEDICARE

## 2021-09-23 VITALS
SYSTOLIC BLOOD PRESSURE: 126 MMHG | DIASTOLIC BLOOD PRESSURE: 70 MMHG | HEIGHT: 62 IN | BODY MASS INDEX: 29.82 KG/M2 | WEIGHT: 162.06 LBS

## 2021-09-23 DIAGNOSIS — Z12.31 ENCOUNTER FOR SCREENING MAMMOGRAM FOR MALIGNANT NEOPLASM OF BREAST: ICD-10-CM

## 2021-09-23 DIAGNOSIS — I70.0 AORTIC ATHEROSCLEROSIS: ICD-10-CM

## 2021-09-23 DIAGNOSIS — E55.9 VITAMIN D DEFICIENCY: ICD-10-CM

## 2021-09-23 DIAGNOSIS — I10 ESSENTIAL HYPERTENSION: Primary | ICD-10-CM

## 2021-09-23 DIAGNOSIS — E11.42 TYPE 2 DIABETES MELLITUS WITH DIABETIC POLYNEUROPATHY, WITHOUT LONG-TERM CURRENT USE OF INSULIN: ICD-10-CM

## 2021-09-23 PROCEDURE — 3074F PR MOST RECENT SYSTOLIC BLOOD PRESSURE < 130 MM HG: ICD-10-PCS | Mod: HCNC,CPTII,S$GLB, | Performed by: INTERNAL MEDICINE

## 2021-09-23 PROCEDURE — 3061F NEG MICROALBUMINURIA REV: CPT | Mod: HCNC,CPTII,S$GLB, | Performed by: INTERNAL MEDICINE

## 2021-09-23 PROCEDURE — 77063 BREAST TOMOSYNTHESIS BI: CPT | Mod: 26,HCNC,, | Performed by: RADIOLOGY

## 2021-09-23 PROCEDURE — 3008F BODY MASS INDEX DOCD: CPT | Mod: HCNC,CPTII,S$GLB, | Performed by: INTERNAL MEDICINE

## 2021-09-23 PROCEDURE — 1160F RVW MEDS BY RX/DR IN RCRD: CPT | Mod: HCNC,CPTII,S$GLB, | Performed by: INTERNAL MEDICINE

## 2021-09-23 PROCEDURE — 3288F FALL RISK ASSESSMENT DOCD: CPT | Mod: HCNC,CPTII,S$GLB, | Performed by: INTERNAL MEDICINE

## 2021-09-23 PROCEDURE — 4010F PR ACE/ARB THEARPY RXD/TAKEN: ICD-10-PCS | Mod: HCNC,CPTII,S$GLB, | Performed by: INTERNAL MEDICINE

## 2021-09-23 PROCEDURE — 4010F ACE/ARB THERAPY RXD/TAKEN: CPT | Mod: HCNC,CPTII,S$GLB, | Performed by: INTERNAL MEDICINE

## 2021-09-23 PROCEDURE — 3044F HG A1C LEVEL LT 7.0%: CPT | Mod: HCNC,CPTII,S$GLB, | Performed by: INTERNAL MEDICINE

## 2021-09-23 PROCEDURE — 3074F SYST BP LT 130 MM HG: CPT | Mod: HCNC,CPTII,S$GLB, | Performed by: INTERNAL MEDICINE

## 2021-09-23 PROCEDURE — 1126F PR PAIN SEVERITY QUANTIFIED, NO PAIN PRESENT: ICD-10-PCS | Mod: HCNC,CPTII,S$GLB, | Performed by: INTERNAL MEDICINE

## 2021-09-23 PROCEDURE — 1160F PR REVIEW ALL MEDS BY PRESCRIBER/CLIN PHARMACIST DOCUMENTED: ICD-10-PCS | Mod: HCNC,CPTII,S$GLB, | Performed by: INTERNAL MEDICINE

## 2021-09-23 PROCEDURE — 77067 MAMMO DIGITAL SCREENING BILAT WITH TOMO: ICD-10-PCS | Mod: 26,HCNC,, | Performed by: RADIOLOGY

## 2021-09-23 PROCEDURE — 3044F PR MOST RECENT HEMOGLOBIN A1C LEVEL <7.0%: ICD-10-PCS | Mod: HCNC,CPTII,S$GLB, | Performed by: INTERNAL MEDICINE

## 2021-09-23 PROCEDURE — 1101F PR PT FALLS ASSESS DOC 0-1 FALLS W/OUT INJ PAST YR: ICD-10-PCS | Mod: HCNC,CPTII,S$GLB, | Performed by: INTERNAL MEDICINE

## 2021-09-23 PROCEDURE — 3078F PR MOST RECENT DIASTOLIC BLOOD PRESSURE < 80 MM HG: ICD-10-PCS | Mod: HCNC,CPTII,S$GLB, | Performed by: INTERNAL MEDICINE

## 2021-09-23 PROCEDURE — 99215 OFFICE O/P EST HI 40 MIN: CPT | Mod: HCNC,S$GLB,, | Performed by: INTERNAL MEDICINE

## 2021-09-23 PROCEDURE — 3061F PR NEG MICROALBUMINURIA RESULT DOCUMENTED/REVIEW: ICD-10-PCS | Mod: HCNC,CPTII,S$GLB, | Performed by: INTERNAL MEDICINE

## 2021-09-23 PROCEDURE — 1101F PT FALLS ASSESS-DOCD LE1/YR: CPT | Mod: HCNC,CPTII,S$GLB, | Performed by: INTERNAL MEDICINE

## 2021-09-23 PROCEDURE — 77063 MAMMO DIGITAL SCREENING BILAT WITH TOMO: ICD-10-PCS | Mod: 26,HCNC,, | Performed by: RADIOLOGY

## 2021-09-23 PROCEDURE — 3066F NEPHROPATHY DOC TX: CPT | Mod: HCNC,CPTII,S$GLB, | Performed by: INTERNAL MEDICINE

## 2021-09-23 PROCEDURE — 99999 PR PBB SHADOW E&M-EST. PATIENT-LVL IV: CPT | Mod: PBBFAC,HCNC,, | Performed by: INTERNAL MEDICINE

## 2021-09-23 PROCEDURE — 1159F PR MEDICATION LIST DOCUMENTED IN MEDICAL RECORD: ICD-10-PCS | Mod: HCNC,CPTII,S$GLB, | Performed by: INTERNAL MEDICINE

## 2021-09-23 PROCEDURE — 3288F PR FALLS RISK ASSESSMENT DOCUMENTED: ICD-10-PCS | Mod: HCNC,CPTII,S$GLB, | Performed by: INTERNAL MEDICINE

## 2021-09-23 PROCEDURE — 77067 SCR MAMMO BI INCL CAD: CPT | Mod: TC,HCNC

## 2021-09-23 PROCEDURE — 3008F PR BODY MASS INDEX (BMI) DOCUMENTED: ICD-10-PCS | Mod: HCNC,CPTII,S$GLB, | Performed by: INTERNAL MEDICINE

## 2021-09-23 PROCEDURE — 3078F DIAST BP <80 MM HG: CPT | Mod: HCNC,CPTII,S$GLB, | Performed by: INTERNAL MEDICINE

## 2021-09-23 PROCEDURE — 77067 SCR MAMMO BI INCL CAD: CPT | Mod: 26,HCNC,, | Performed by: RADIOLOGY

## 2021-09-23 PROCEDURE — 99215 PR OFFICE/OUTPT VISIT, EST, LEVL V, 40-54 MIN: ICD-10-PCS | Mod: HCNC,S$GLB,, | Performed by: INTERNAL MEDICINE

## 2021-09-23 PROCEDURE — 1159F MED LIST DOCD IN RCRD: CPT | Mod: HCNC,CPTII,S$GLB, | Performed by: INTERNAL MEDICINE

## 2021-09-23 PROCEDURE — 3066F PR DOCUMENTATION OF TREATMENT FOR NEPHROPATHY: ICD-10-PCS | Mod: HCNC,CPTII,S$GLB, | Performed by: INTERNAL MEDICINE

## 2021-09-23 PROCEDURE — 1126F AMNT PAIN NOTED NONE PRSNT: CPT | Mod: HCNC,CPTII,S$GLB, | Performed by: INTERNAL MEDICINE

## 2021-09-23 PROCEDURE — 99999 PR PBB SHADOW E&M-EST. PATIENT-LVL IV: ICD-10-PCS | Mod: PBBFAC,HCNC,, | Performed by: INTERNAL MEDICINE

## 2021-09-23 RX ORDER — ERGOCALCIFEROL 1.25 MG/1
50000 CAPSULE ORAL
Qty: 12 CAPSULE | Refills: 3 | Status: SHIPPED | OUTPATIENT
Start: 2021-09-23 | End: 2022-05-23 | Stop reason: SDUPTHER

## 2021-09-23 RX ORDER — LOSARTAN POTASSIUM 50 MG/1
50 TABLET ORAL 2 TIMES DAILY
Qty: 180 TABLET | Refills: 3 | Status: SHIPPED | OUTPATIENT
Start: 2021-09-23 | End: 2022-09-23

## 2021-09-29 ENCOUNTER — PATIENT MESSAGE (OUTPATIENT)
Dept: INTERNAL MEDICINE | Facility: CLINIC | Age: 66
End: 2021-09-29

## 2021-09-29 DIAGNOSIS — Z01.00 EYE EXAM, ROUTINE: Primary | ICD-10-CM

## 2021-10-04 ENCOUNTER — PATIENT MESSAGE (OUTPATIENT)
Dept: ADMINISTRATIVE | Facility: HOSPITAL | Age: 66
End: 2021-10-04

## 2021-10-18 ENCOUNTER — PATIENT MESSAGE (OUTPATIENT)
Dept: ADMINISTRATIVE | Facility: HOSPITAL | Age: 66
End: 2021-10-18
Payer: MEDICARE

## 2021-11-04 ENCOUNTER — OFFICE VISIT (OUTPATIENT)
Dept: PODIATRY | Facility: CLINIC | Age: 66
End: 2021-11-04
Payer: MEDICARE

## 2021-11-04 VITALS
HEIGHT: 62 IN | SYSTOLIC BLOOD PRESSURE: 146 MMHG | HEART RATE: 70 BPM | BODY MASS INDEX: 29.81 KG/M2 | DIASTOLIC BLOOD PRESSURE: 63 MMHG | WEIGHT: 162 LBS

## 2021-11-04 DIAGNOSIS — E11.9 ENCOUNTER FOR DIABETIC FOOT EXAM: ICD-10-CM

## 2021-11-04 DIAGNOSIS — E11.42 TYPE 2 DIABETES MELLITUS WITH DIABETIC POLYNEUROPATHY, WITHOUT LONG-TERM CURRENT USE OF INSULIN: Primary | ICD-10-CM

## 2021-11-04 DIAGNOSIS — L84 CORN OR CALLUS: ICD-10-CM

## 2021-11-04 PROCEDURE — 3077F PR MOST RECENT SYSTOLIC BLOOD PRESSURE >= 140 MM HG: ICD-10-PCS | Mod: HCNC,CPTII,S$GLB, | Performed by: PODIATRIST

## 2021-11-04 PROCEDURE — 1125F PR PAIN SEVERITY QUANTIFIED, PAIN PRESENT: ICD-10-PCS | Mod: HCNC,CPTII,S$GLB, | Performed by: PODIATRIST

## 2021-11-04 PROCEDURE — 4010F PR ACE/ARB THEARPY RXD/TAKEN: ICD-10-PCS | Mod: HCNC,CPTII,S$GLB, | Performed by: PODIATRIST

## 2021-11-04 PROCEDURE — 1160F RVW MEDS BY RX/DR IN RCRD: CPT | Mod: HCNC,CPTII,S$GLB, | Performed by: PODIATRIST

## 2021-11-04 PROCEDURE — 1159F PR MEDICATION LIST DOCUMENTED IN MEDICAL RECORD: ICD-10-PCS | Mod: HCNC,CPTII,S$GLB, | Performed by: PODIATRIST

## 2021-11-04 PROCEDURE — 1160F PR REVIEW ALL MEDS BY PRESCRIBER/CLIN PHARMACIST DOCUMENTED: ICD-10-PCS | Mod: HCNC,CPTII,S$GLB, | Performed by: PODIATRIST

## 2021-11-04 PROCEDURE — 99999 PR PBB SHADOW E&M-EST. PATIENT-LVL IV: CPT | Mod: PBBFAC,HCNC,, | Performed by: PODIATRIST

## 2021-11-04 PROCEDURE — 1101F PR PT FALLS ASSESS DOC 0-1 FALLS W/OUT INJ PAST YR: ICD-10-PCS | Mod: HCNC,CPTII,S$GLB, | Performed by: PODIATRIST

## 2021-11-04 PROCEDURE — 1101F PT FALLS ASSESS-DOCD LE1/YR: CPT | Mod: HCNC,CPTII,S$GLB, | Performed by: PODIATRIST

## 2021-11-04 PROCEDURE — 3008F PR BODY MASS INDEX (BMI) DOCUMENTED: ICD-10-PCS | Mod: HCNC,CPTII,S$GLB, | Performed by: PODIATRIST

## 2021-11-04 PROCEDURE — 3078F DIAST BP <80 MM HG: CPT | Mod: HCNC,CPTII,S$GLB, | Performed by: PODIATRIST

## 2021-11-04 PROCEDURE — 3077F SYST BP >= 140 MM HG: CPT | Mod: HCNC,CPTII,S$GLB, | Performed by: PODIATRIST

## 2021-11-04 PROCEDURE — 3078F PR MOST RECENT DIASTOLIC BLOOD PRESSURE < 80 MM HG: ICD-10-PCS | Mod: HCNC,CPTII,S$GLB, | Performed by: PODIATRIST

## 2021-11-04 PROCEDURE — 3066F NEPHROPATHY DOC TX: CPT | Mod: HCNC,CPTII,S$GLB, | Performed by: PODIATRIST

## 2021-11-04 PROCEDURE — 3044F PR MOST RECENT HEMOGLOBIN A1C LEVEL <7.0%: ICD-10-PCS | Mod: HCNC,CPTII,S$GLB, | Performed by: PODIATRIST

## 2021-11-04 PROCEDURE — 3061F NEG MICROALBUMINURIA REV: CPT | Mod: HCNC,CPTII,S$GLB, | Performed by: PODIATRIST

## 2021-11-04 PROCEDURE — 99213 OFFICE O/P EST LOW 20 MIN: CPT | Mod: HCNC,S$GLB,, | Performed by: PODIATRIST

## 2021-11-04 PROCEDURE — 3008F BODY MASS INDEX DOCD: CPT | Mod: HCNC,CPTII,S$GLB, | Performed by: PODIATRIST

## 2021-11-04 PROCEDURE — 99499 UNLISTED E&M SERVICE: CPT | Mod: HCNC,S$GLB,, | Performed by: PODIATRIST

## 2021-11-04 PROCEDURE — 3066F PR DOCUMENTATION OF TREATMENT FOR NEPHROPATHY: ICD-10-PCS | Mod: HCNC,CPTII,S$GLB, | Performed by: PODIATRIST

## 2021-11-04 PROCEDURE — 99499 RISK ADDL DX/OHS AUDIT: ICD-10-PCS | Mod: HCNC,S$GLB,, | Performed by: PODIATRIST

## 2021-11-04 PROCEDURE — 99999 PR PBB SHADOW E&M-EST. PATIENT-LVL IV: ICD-10-PCS | Mod: PBBFAC,HCNC,, | Performed by: PODIATRIST

## 2021-11-04 PROCEDURE — 3061F PR NEG MICROALBUMINURIA RESULT DOCUMENTED/REVIEW: ICD-10-PCS | Mod: HCNC,CPTII,S$GLB, | Performed by: PODIATRIST

## 2021-11-04 PROCEDURE — 4010F ACE/ARB THERAPY RXD/TAKEN: CPT | Mod: HCNC,CPTII,S$GLB, | Performed by: PODIATRIST

## 2021-11-04 PROCEDURE — 3288F PR FALLS RISK ASSESSMENT DOCUMENTED: ICD-10-PCS | Mod: HCNC,CPTII,S$GLB, | Performed by: PODIATRIST

## 2021-11-04 PROCEDURE — 1125F AMNT PAIN NOTED PAIN PRSNT: CPT | Mod: HCNC,CPTII,S$GLB, | Performed by: PODIATRIST

## 2021-11-04 PROCEDURE — 3044F HG A1C LEVEL LT 7.0%: CPT | Mod: HCNC,CPTII,S$GLB, | Performed by: PODIATRIST

## 2021-11-04 PROCEDURE — 99213 PR OFFICE/OUTPT VISIT, EST, LEVL III, 20-29 MIN: ICD-10-PCS | Mod: HCNC,S$GLB,, | Performed by: PODIATRIST

## 2021-11-04 PROCEDURE — 1159F MED LIST DOCD IN RCRD: CPT | Mod: HCNC,CPTII,S$GLB, | Performed by: PODIATRIST

## 2021-11-04 PROCEDURE — 3288F FALL RISK ASSESSMENT DOCD: CPT | Mod: HCNC,CPTII,S$GLB, | Performed by: PODIATRIST

## 2021-11-04 RX ORDER — UREA 200 MG/G
1 CREAM TOPICAL DAILY
Qty: 75 G | Refills: 10 | Status: SHIPPED | OUTPATIENT
Start: 2021-11-04

## 2021-11-15 ENCOUNTER — PATIENT MESSAGE (OUTPATIENT)
Dept: PODIATRY | Facility: CLINIC | Age: 66
End: 2021-11-15
Payer: MEDICARE

## 2021-11-15 ENCOUNTER — PATIENT MESSAGE (OUTPATIENT)
Dept: INTERNAL MEDICINE | Facility: CLINIC | Age: 66
End: 2021-11-15
Payer: MEDICARE

## 2021-11-16 ENCOUNTER — OFFICE VISIT (OUTPATIENT)
Dept: URGENT CARE | Facility: CLINIC | Age: 66
End: 2021-11-16
Payer: MEDICARE

## 2021-11-16 ENCOUNTER — PATIENT MESSAGE (OUTPATIENT)
Dept: URGENT CARE | Facility: CLINIC | Age: 66
End: 2021-11-16

## 2021-11-16 VITALS
RESPIRATION RATE: 18 BRPM | DIASTOLIC BLOOD PRESSURE: 67 MMHG | OXYGEN SATURATION: 99 % | BODY MASS INDEX: 29.44 KG/M2 | WEIGHT: 160 LBS | HEART RATE: 70 BPM | HEIGHT: 62 IN | TEMPERATURE: 97 F | SYSTOLIC BLOOD PRESSURE: 144 MMHG

## 2021-11-16 DIAGNOSIS — M79.622 PAIN IN AXILLA, LEFT: ICD-10-CM

## 2021-11-16 DIAGNOSIS — M19.049 ARTHRITIS OF HAND: ICD-10-CM

## 2021-11-16 DIAGNOSIS — R10.13 EPIGASTRIC PAIN: Primary | ICD-10-CM

## 2021-11-16 DIAGNOSIS — K21.9 GASTROESOPHAGEAL REFLUX DISEASE, UNSPECIFIED WHETHER ESOPHAGITIS PRESENT: ICD-10-CM

## 2021-11-16 PROCEDURE — 4010F ACE/ARB THERAPY RXD/TAKEN: CPT | Mod: CPTII,S$GLB,, | Performed by: NURSE PRACTITIONER

## 2021-11-16 PROCEDURE — 1160F RVW MEDS BY RX/DR IN RCRD: CPT | Mod: CPTII,S$GLB,, | Performed by: NURSE PRACTITIONER

## 2021-11-16 PROCEDURE — 1159F MED LIST DOCD IN RCRD: CPT | Mod: CPTII,S$GLB,, | Performed by: NURSE PRACTITIONER

## 2021-11-16 PROCEDURE — 93005 EKG 12-LEAD: ICD-10-PCS | Mod: S$GLB,,, | Performed by: NURSE PRACTITIONER

## 2021-11-16 PROCEDURE — 3008F BODY MASS INDEX DOCD: CPT | Mod: CPTII,S$GLB,, | Performed by: NURSE PRACTITIONER

## 2021-11-16 PROCEDURE — 1160F PR REVIEW ALL MEDS BY PRESCRIBER/CLIN PHARMACIST DOCUMENTED: ICD-10-PCS | Mod: CPTII,S$GLB,, | Performed by: NURSE PRACTITIONER

## 2021-11-16 PROCEDURE — 3078F DIAST BP <80 MM HG: CPT | Mod: CPTII,S$GLB,, | Performed by: NURSE PRACTITIONER

## 2021-11-16 PROCEDURE — 3061F PR NEG MICROALBUMINURIA RESULT DOCUMENTED/REVIEW: ICD-10-PCS | Mod: CPTII,S$GLB,, | Performed by: NURSE PRACTITIONER

## 2021-11-16 PROCEDURE — 93005 ELECTROCARDIOGRAM TRACING: CPT | Mod: S$GLB,,, | Performed by: NURSE PRACTITIONER

## 2021-11-16 PROCEDURE — 1159F PR MEDICATION LIST DOCUMENTED IN MEDICAL RECORD: ICD-10-PCS | Mod: CPTII,S$GLB,, | Performed by: NURSE PRACTITIONER

## 2021-11-16 PROCEDURE — 3061F NEG MICROALBUMINURIA REV: CPT | Mod: CPTII,S$GLB,, | Performed by: NURSE PRACTITIONER

## 2021-11-16 PROCEDURE — 3066F NEPHROPATHY DOC TX: CPT | Mod: CPTII,S$GLB,, | Performed by: NURSE PRACTITIONER

## 2021-11-16 PROCEDURE — 99203 PR OFFICE/OUTPT VISIT, NEW, LEVL III, 30-44 MIN: ICD-10-PCS | Mod: S$GLB,,, | Performed by: NURSE PRACTITIONER

## 2021-11-16 PROCEDURE — 99203 OFFICE O/P NEW LOW 30 MIN: CPT | Mod: S$GLB,,, | Performed by: NURSE PRACTITIONER

## 2021-11-16 PROCEDURE — 3008F PR BODY MASS INDEX (BMI) DOCUMENTED: ICD-10-PCS | Mod: CPTII,S$GLB,, | Performed by: NURSE PRACTITIONER

## 2021-11-16 PROCEDURE — 3044F HG A1C LEVEL LT 7.0%: CPT | Mod: CPTII,S$GLB,, | Performed by: NURSE PRACTITIONER

## 2021-11-16 PROCEDURE — 3066F PR DOCUMENTATION OF TREATMENT FOR NEPHROPATHY: ICD-10-PCS | Mod: CPTII,S$GLB,, | Performed by: NURSE PRACTITIONER

## 2021-11-16 PROCEDURE — 93010 ELECTROCARDIOGRAM REPORT: CPT | Mod: S$GLB,,, | Performed by: INTERNAL MEDICINE

## 2021-11-16 PROCEDURE — 4010F PR ACE/ARB THEARPY RXD/TAKEN: ICD-10-PCS | Mod: CPTII,S$GLB,, | Performed by: NURSE PRACTITIONER

## 2021-11-16 PROCEDURE — 3078F PR MOST RECENT DIASTOLIC BLOOD PRESSURE < 80 MM HG: ICD-10-PCS | Mod: CPTII,S$GLB,, | Performed by: NURSE PRACTITIONER

## 2021-11-16 PROCEDURE — 93010 EKG 12-LEAD: ICD-10-PCS | Mod: S$GLB,,, | Performed by: INTERNAL MEDICINE

## 2021-11-16 PROCEDURE — 3077F PR MOST RECENT SYSTOLIC BLOOD PRESSURE >= 140 MM HG: ICD-10-PCS | Mod: CPTII,S$GLB,, | Performed by: NURSE PRACTITIONER

## 2021-11-16 PROCEDURE — 3044F PR MOST RECENT HEMOGLOBIN A1C LEVEL <7.0%: ICD-10-PCS | Mod: CPTII,S$GLB,, | Performed by: NURSE PRACTITIONER

## 2021-11-16 PROCEDURE — 3077F SYST BP >= 140 MM HG: CPT | Mod: CPTII,S$GLB,, | Performed by: NURSE PRACTITIONER

## 2021-11-16 RX ORDER — ACETAMINOPHEN 500 MG
1000 TABLET ORAL
Status: COMPLETED | OUTPATIENT
Start: 2021-11-16 | End: 2021-11-16

## 2021-11-16 RX ADMIN — Medication 1000 MG: at 12:11

## 2021-12-28 ENCOUNTER — PATIENT MESSAGE (OUTPATIENT)
Dept: INTERNAL MEDICINE | Facility: CLINIC | Age: 66
End: 2021-12-28
Payer: MEDICARE

## 2021-12-30 ENCOUNTER — IMMUNIZATION (OUTPATIENT)
Dept: INTERNAL MEDICINE | Facility: CLINIC | Age: 66
End: 2021-12-30
Payer: MEDICARE

## 2021-12-30 DIAGNOSIS — Z23 NEED FOR VACCINATION: Primary | ICD-10-CM

## 2021-12-30 PROCEDURE — 0004A COVID-19, MRNA, LNP-S, PF, 30 MCG/0.3 ML DOSE VACCINE: CPT | Mod: HCNC,PBBFAC | Performed by: INTERNAL MEDICINE

## 2022-01-04 ENCOUNTER — PATIENT MESSAGE (OUTPATIENT)
Dept: INTERNAL MEDICINE | Facility: CLINIC | Age: 67
End: 2022-01-04
Payer: MEDICARE

## 2022-01-05 ENCOUNTER — PATIENT OUTREACH (OUTPATIENT)
Dept: ADMINISTRATIVE | Facility: OTHER | Age: 67
End: 2022-01-05
Payer: MEDICARE

## 2022-01-06 ENCOUNTER — OFFICE VISIT (OUTPATIENT)
Dept: OPTOMETRY | Facility: CLINIC | Age: 67
End: 2022-01-06
Payer: COMMERCIAL

## 2022-01-06 DIAGNOSIS — H25.13 SENILE NUCLEAR SCLEROSIS, BILATERAL: ICD-10-CM

## 2022-01-06 DIAGNOSIS — H43.393 VISUAL FLOATERS, BILATERAL: ICD-10-CM

## 2022-01-06 DIAGNOSIS — H52.203 HYPEROPIA WITH ASTIGMATISM AND PRESBYOPIA, BILATERAL: Primary | ICD-10-CM

## 2022-01-06 DIAGNOSIS — Z01.00 EYE EXAM, ROUTINE: ICD-10-CM

## 2022-01-06 DIAGNOSIS — E11.36 TYPE 2 DIABETES MELLITUS WITH CATARACT: ICD-10-CM

## 2022-01-06 DIAGNOSIS — H04.123 DRY EYE SYNDROME OF BOTH EYES: ICD-10-CM

## 2022-01-06 DIAGNOSIS — H52.4 HYPEROPIA WITH ASTIGMATISM AND PRESBYOPIA, BILATERAL: Primary | ICD-10-CM

## 2022-01-06 DIAGNOSIS — H52.03 HYPEROPIA WITH ASTIGMATISM AND PRESBYOPIA, BILATERAL: Primary | ICD-10-CM

## 2022-01-06 DIAGNOSIS — E11.9 TYPE 2 DIABETES MELLITUS WITHOUT RETINOPATHY: ICD-10-CM

## 2022-01-06 PROCEDURE — 99999 PR PBB SHADOW E&M-EST. PATIENT-LVL III: ICD-10-PCS | Mod: PBBFAC,,, | Performed by: OPTOMETRIST

## 2022-01-06 PROCEDURE — 92015 DETERMINE REFRACTIVE STATE: CPT | Mod: S$GLB,,, | Performed by: OPTOMETRIST

## 2022-01-06 PROCEDURE — 92004 PR EYE EXAM, NEW PATIENT,COMPREHESV: ICD-10-PCS | Mod: S$GLB,,, | Performed by: OPTOMETRIST

## 2022-01-06 PROCEDURE — 99999 PR PBB SHADOW E&M-EST. PATIENT-LVL III: CPT | Mod: PBBFAC,,, | Performed by: OPTOMETRIST

## 2022-01-06 PROCEDURE — 92015 PR REFRACTION: ICD-10-PCS | Mod: S$GLB,,, | Performed by: OPTOMETRIST

## 2022-01-06 PROCEDURE — 92004 COMPRE OPH EXAM NEW PT 1/>: CPT | Mod: S$GLB,,, | Performed by: OPTOMETRIST

## 2022-01-06 NOTE — PROGRESS NOTES
HPI     Routine exam / Vision decline   MANASA Freire 1 1/2 year ago   Chief complaint (CC): both eyes vision has been gradually getting worse   since last eye exam she is aware that she has cataracts and it may be   effecting vision.    Glasses? 2 years old (bifocals)  Contacts? No   H/o eye surgery, injections or laser: No   H/o eye injury: No   Known eye conditions? No   Family h/o eye conditions? No   Eye gtts? No       (-) Flashes (+)  Floaters (-) Mucous   (+)  Tearing (-) Itching (-) Burning   (+) Headaches happens randomly  (-) Eye Pain/discomfort (-) Irritation   (-)  Redness (+) Double vision not often (side to side) (+) Blurry vision    Diabetic? Borderline  (+)HTN   A1c?   Hemoglobin A1C       Date                     Value               Ref Range             Status                08/12/2021               5.7 (H)             4.0 - 5.6 %           Final                   02/17/2021               5.8 (H)             4.0 - 5.6 %           Final                  10/19/2020               5.9 (H)             4.0 - 5.6 %           Final                  Last edited by Noemi Stevens on 1/6/2022 11:09 AM. (History)            Assessment /Plan     For exam results, see Encounter Report.    Hyperopia with astigmatism and presbyopia, bilateral    Eye exam, routine  -     Ambulatory referral/consult to Optometry    Type 2 diabetes mellitus without retinopathy    Type 2 diabetes mellitus with cataract    Senile nuclear sclerosis, bilateral    Visual floaters, bilateral    Dry eye syndrome of both eyes      1. Hold Srx.   2-3. BS control. No signs of diabetic retinopathy. Monitor with annual exam.  4-5. Visually significant. Pt notes vision changes and complaints of glare. Educated pt on potential for surgery. Referral to Dr Carrera.   6. No e/o h/b/t 360 degrees OU. Monitor for worsening of symptoms or S/Sx of RD.   7. Recommend Systane Ultra or Refresh Optive BID-TID OU to aid with symptoms of dry eyes.

## 2022-01-06 NOTE — PROGRESS NOTES
Care Everywhere: updated  Immunization: updated  Health Maintenance: updated  Media Review:   Legacy Review:   DIS:  Order placed:   Upcoming appts:optometry 1.6.2022. hemoglobin 3.17.2022  EFAX:  Task Tickets:  Referrals:

## 2022-03-10 ENCOUNTER — PATIENT OUTREACH (OUTPATIENT)
Dept: ADMINISTRATIVE | Facility: OTHER | Age: 67
End: 2022-03-10
Payer: MEDICARE

## 2022-03-10 NOTE — PROGRESS NOTES
Care Everywhere: updated  Immunization: updated  Health Maintenance: updated  Media Review: review for outside colon cancer report   Legacy Review:   DIS:  Order placed:   Upcoming appts:  EFAX:  Task Tickets:  Referrals:

## 2022-03-11 ENCOUNTER — OFFICE VISIT (OUTPATIENT)
Dept: OPHTHALMOLOGY | Facility: CLINIC | Age: 67
End: 2022-03-11
Attending: OPHTHALMOLOGY
Payer: MEDICARE

## 2022-03-11 ENCOUNTER — PATIENT MESSAGE (OUTPATIENT)
Dept: ADMINISTRATIVE | Facility: OTHER | Age: 67
End: 2022-03-11
Payer: MEDICARE

## 2022-03-11 DIAGNOSIS — H25.13 SENILE NUCLEAR SCLEROSIS, BILATERAL: ICD-10-CM

## 2022-03-11 DIAGNOSIS — E11.36 TYPE 2 DIABETES MELLITUS WITH CATARACT: ICD-10-CM

## 2022-03-11 DIAGNOSIS — H25.13 NUCLEAR SCLEROSIS, BILATERAL: Primary | ICD-10-CM

## 2022-03-11 PROCEDURE — 92136 IOL MASTER - OU - BOTH EYES: ICD-10-PCS | Mod: RT,S$GLB,, | Performed by: OPHTHALMOLOGY

## 2022-03-11 PROCEDURE — 99999 PR PBB SHADOW E&M-EST. PATIENT-LVL IV: CPT | Mod: PBBFAC,,, | Performed by: OPHTHALMOLOGY

## 2022-03-11 PROCEDURE — 99999 PR PBB SHADOW E&M-EST. PATIENT-LVL IV: ICD-10-PCS | Mod: PBBFAC,,, | Performed by: OPHTHALMOLOGY

## 2022-03-11 PROCEDURE — 92136 OPHTHALMIC BIOMETRY: CPT | Mod: RT,S$GLB,, | Performed by: OPHTHALMOLOGY

## 2022-03-11 PROCEDURE — 1160F RVW MEDS BY RX/DR IN RCRD: CPT | Mod: CPTII,S$GLB,, | Performed by: OPHTHALMOLOGY

## 2022-03-11 PROCEDURE — 1160F PR REVIEW ALL MEDS BY PRESCRIBER/CLIN PHARMACIST DOCUMENTED: ICD-10-PCS | Mod: CPTII,S$GLB,, | Performed by: OPHTHALMOLOGY

## 2022-03-11 PROCEDURE — 1126F PR PAIN SEVERITY QUANTIFIED, NO PAIN PRESENT: ICD-10-PCS | Mod: CPTII,S$GLB,, | Performed by: OPHTHALMOLOGY

## 2022-03-11 PROCEDURE — 99204 OFFICE O/P NEW MOD 45 MIN: CPT | Mod: S$GLB,,, | Performed by: OPHTHALMOLOGY

## 2022-03-11 PROCEDURE — 99204 PR OFFICE/OUTPT VISIT, NEW, LEVL IV, 45-59 MIN: ICD-10-PCS | Mod: S$GLB,,, | Performed by: OPHTHALMOLOGY

## 2022-03-11 PROCEDURE — 1159F MED LIST DOCD IN RCRD: CPT | Mod: CPTII,S$GLB,, | Performed by: OPHTHALMOLOGY

## 2022-03-11 PROCEDURE — 1126F AMNT PAIN NOTED NONE PRSNT: CPT | Mod: CPTII,S$GLB,, | Performed by: OPHTHALMOLOGY

## 2022-03-11 PROCEDURE — 1159F PR MEDICATION LIST DOCUMENTED IN MEDICAL RECORD: ICD-10-PCS | Mod: CPTII,S$GLB,, | Performed by: OPHTHALMOLOGY

## 2022-03-11 RX ORDER — PHENYLEPHRINE HYDROCHLORIDE 100 MG/ML
1 SOLUTION/ DROPS OPHTHALMIC
Status: CANCELLED | OUTPATIENT
Start: 2022-03-11

## 2022-03-11 RX ORDER — PREDNISOLONE ACETATE-GATIFLOXACIN-BROMFENAC .75; 5; 1 MG/ML; MG/ML; MG/ML
1 SUSPENSION/ DROPS OPHTHALMIC 3 TIMES DAILY
Qty: 5 ML | Refills: 3 | Status: SHIPPED | OUTPATIENT
Start: 2022-03-11 | End: 2022-08-11

## 2022-03-11 RX ORDER — PHENYLEPHRINE HYDROCHLORIDE 25 MG/ML
1 SOLUTION/ DROPS OPHTHALMIC
Status: CANCELLED | OUTPATIENT
Start: 2022-03-11

## 2022-03-11 RX ORDER — TROPICAMIDE 10 MG/ML
1 SOLUTION/ DROPS OPHTHALMIC
Status: CANCELLED | OUTPATIENT
Start: 2022-03-11

## 2022-03-11 RX ORDER — TETRACAINE HYDROCHLORIDE 5 MG/ML
1 SOLUTION OPHTHALMIC
Status: CANCELLED | OUTPATIENT
Start: 2022-03-11

## 2022-03-11 RX ORDER — MOXIFLOXACIN 5 MG/ML
1 SOLUTION/ DROPS OPHTHALMIC
Status: CANCELLED | OUTPATIENT
Start: 2022-03-11

## 2022-03-11 NOTE — PROGRESS NOTES
HPI     67 y/o female presents to clinic for cataract eval    Pt states VA has been really blurry. Told she has cataracts    Last edited by Tracy Lawrence on 3/11/2022  1:19 PM. (History)            Assessment /Plan     For exam results, see Encounter Report.    Nuclear sclerosis, bilateral    Type 2 diabetes mellitus with cataract  -     Ambulatory referral/consult to Ophthalmology    Senile nuclear sclerosis, bilateral  -     Ambulatory referral/consult to Ophthalmology      Visually Significant Cataract: Patient reports decreased vision consistent with the clinical amount of lenticular opacity, which reaches the level of visual significance and affects activities of daily living. Risks, benefits, and alternatives to cataract surgery were discussed and the consent reviewed. IOL options were discussed, including ATIOLs and the associated side effects and additional patient cost associated with them.   IOL Selections:   Right eye  IOL: CNAT0 22.5     Left eye  IOL: CNA0T0 22.5    Pt wishes to have RIGHT eye done first.

## 2022-03-17 ENCOUNTER — LAB VISIT (OUTPATIENT)
Dept: LAB | Facility: OTHER | Age: 67
End: 2022-03-17
Attending: INTERNAL MEDICINE
Payer: MEDICARE

## 2022-03-17 DIAGNOSIS — I10 ESSENTIAL HYPERTENSION: ICD-10-CM

## 2022-03-17 DIAGNOSIS — E11.42 TYPE 2 DIABETES MELLITUS WITH DIABETIC POLYNEUROPATHY, WITHOUT LONG-TERM CURRENT USE OF INSULIN: ICD-10-CM

## 2022-03-17 LAB
ALBUMIN SERPL BCP-MCNC: 3.6 G/DL (ref 3.5–5.2)
ALP SERPL-CCNC: 91 U/L (ref 55–135)
ALT SERPL W/O P-5'-P-CCNC: 10 U/L (ref 10–44)
ANION GAP SERPL CALC-SCNC: 10 MMOL/L (ref 8–16)
AST SERPL-CCNC: 14 U/L (ref 10–40)
BASOPHILS # BLD AUTO: 0.06 K/UL (ref 0–0.2)
BASOPHILS NFR BLD: 0.5 % (ref 0–1.9)
BILIRUB SERPL-MCNC: 0.2 MG/DL (ref 0.1–1)
BUN SERPL-MCNC: 21 MG/DL (ref 8–23)
CALCIUM SERPL-MCNC: 9.1 MG/DL (ref 8.7–10.5)
CHLORIDE SERPL-SCNC: 108 MMOL/L (ref 95–110)
CO2 SERPL-SCNC: 23 MMOL/L (ref 23–29)
CREAT SERPL-MCNC: 0.8 MG/DL (ref 0.5–1.4)
DIFFERENTIAL METHOD: ABNORMAL
EOSINOPHIL # BLD AUTO: 0.2 K/UL (ref 0–0.5)
EOSINOPHIL NFR BLD: 2.1 % (ref 0–8)
ERYTHROCYTE [DISTWIDTH] IN BLOOD BY AUTOMATED COUNT: 13.7 % (ref 11.5–14.5)
EST. GFR  (AFRICAN AMERICAN): >60 ML/MIN/1.73 M^2
EST. GFR  (NON AFRICAN AMERICAN): >60 ML/MIN/1.73 M^2
ESTIMATED AVG GLUCOSE: 114 MG/DL (ref 68–131)
GLUCOSE SERPL-MCNC: 102 MG/DL (ref 70–110)
HBA1C MFR BLD: 5.6 % (ref 4–5.6)
HCT VFR BLD AUTO: 42.4 % (ref 37–48.5)
HGB BLD-MCNC: 13.7 G/DL (ref 12–16)
IMM GRANULOCYTES # BLD AUTO: 0.02 K/UL (ref 0–0.04)
IMM GRANULOCYTES NFR BLD AUTO: 0.2 % (ref 0–0.5)
LYMPHOCYTES # BLD AUTO: 4.9 K/UL (ref 1–4.8)
LYMPHOCYTES NFR BLD: 43.3 % (ref 18–48)
MCH RBC QN AUTO: 29.9 PG (ref 27–31)
MCHC RBC AUTO-ENTMCNC: 32.3 G/DL (ref 32–36)
MCV RBC AUTO: 93 FL (ref 82–98)
MONOCYTES # BLD AUTO: 0.6 K/UL (ref 0.3–1)
MONOCYTES NFR BLD: 5 % (ref 4–15)
NEUTROPHILS # BLD AUTO: 5.5 K/UL (ref 1.8–7.7)
NEUTROPHILS NFR BLD: 48.9 % (ref 38–73)
NRBC BLD-RTO: 0 /100 WBC
PLATELET # BLD AUTO: 247 K/UL (ref 150–450)
PMV BLD AUTO: 10.3 FL (ref 9.2–12.9)
POTASSIUM SERPL-SCNC: 3.7 MMOL/L (ref 3.5–5.1)
PROT SERPL-MCNC: 7.3 G/DL (ref 6–8.4)
RBC # BLD AUTO: 4.58 M/UL (ref 4–5.4)
SODIUM SERPL-SCNC: 141 MMOL/L (ref 136–145)
WBC # BLD AUTO: 11.2 K/UL (ref 3.9–12.7)

## 2022-03-17 PROCEDURE — 36415 COLL VENOUS BLD VENIPUNCTURE: CPT | Performed by: INTERNAL MEDICINE

## 2022-03-17 PROCEDURE — 80053 COMPREHEN METABOLIC PANEL: CPT | Performed by: INTERNAL MEDICINE

## 2022-03-17 PROCEDURE — 85025 COMPLETE CBC W/AUTO DIFF WBC: CPT | Performed by: INTERNAL MEDICINE

## 2022-03-17 PROCEDURE — 83036 HEMOGLOBIN GLYCOSYLATED A1C: CPT | Performed by: INTERNAL MEDICINE

## 2022-03-24 ENCOUNTER — OFFICE VISIT (OUTPATIENT)
Dept: INTERNAL MEDICINE | Facility: CLINIC | Age: 67
End: 2022-03-24
Payer: MEDICARE

## 2022-03-24 VITALS
BODY MASS INDEX: 29.25 KG/M2 | HEART RATE: 71 BPM | SYSTOLIC BLOOD PRESSURE: 124 MMHG | HEIGHT: 62 IN | WEIGHT: 158.94 LBS | OXYGEN SATURATION: 100 % | DIASTOLIC BLOOD PRESSURE: 70 MMHG

## 2022-03-24 DIAGNOSIS — E55.9 VITAMIN D DEFICIENCY: ICD-10-CM

## 2022-03-24 DIAGNOSIS — J43.2 CENTRILOBULAR EMPHYSEMA: ICD-10-CM

## 2022-03-24 DIAGNOSIS — Z00.00 ANNUAL PHYSICAL EXAM: ICD-10-CM

## 2022-03-24 DIAGNOSIS — J00 ACUTE NASOPHARYNGITIS: Primary | ICD-10-CM

## 2022-03-24 DIAGNOSIS — F17.210 LIGHT CIGARETTE SMOKER (1-9 CIGS/DAY): ICD-10-CM

## 2022-03-24 DIAGNOSIS — K63.5 POLYP OF COLON, UNSPECIFIED PART OF COLON, UNSPECIFIED TYPE: ICD-10-CM

## 2022-03-24 DIAGNOSIS — I70.0 AORTIC ATHEROSCLEROSIS: ICD-10-CM

## 2022-03-24 DIAGNOSIS — E11.42 TYPE 2 DIABETES MELLITUS WITH DIABETIC POLYNEUROPATHY, WITHOUT LONG-TERM CURRENT USE OF INSULIN: ICD-10-CM

## 2022-03-24 DIAGNOSIS — F33.42 RECURRENT MAJOR DEPRESSIVE DISORDER, IN FULL REMISSION: ICD-10-CM

## 2022-03-24 DIAGNOSIS — I10 ESSENTIAL HYPERTENSION: ICD-10-CM

## 2022-03-24 PROCEDURE — 3044F HG A1C LEVEL LT 7.0%: CPT | Mod: CPTII,S$GLB,, | Performed by: INTERNAL MEDICINE

## 2022-03-24 PROCEDURE — 99406 BEHAV CHNG SMOKING 3-10 MIN: CPT | Mod: S$GLB,,, | Performed by: INTERNAL MEDICINE

## 2022-03-24 PROCEDURE — 1159F MED LIST DOCD IN RCRD: CPT | Mod: CPTII,S$GLB,, | Performed by: INTERNAL MEDICINE

## 2022-03-24 PROCEDURE — 1101F PT FALLS ASSESS-DOCD LE1/YR: CPT | Mod: CPTII,S$GLB,, | Performed by: INTERNAL MEDICINE

## 2022-03-24 PROCEDURE — 99215 PR OFFICE/OUTPT VISIT, EST, LEVL V, 40-54 MIN: ICD-10-PCS | Mod: 25,S$GLB,, | Performed by: INTERNAL MEDICINE

## 2022-03-24 PROCEDURE — 3061F NEG MICROALBUMINURIA REV: CPT | Mod: CPTII,S$GLB,, | Performed by: INTERNAL MEDICINE

## 2022-03-24 PROCEDURE — 3008F PR BODY MASS INDEX (BMI) DOCUMENTED: ICD-10-PCS | Mod: CPTII,S$GLB,, | Performed by: INTERNAL MEDICINE

## 2022-03-24 PROCEDURE — 99499 UNLISTED E&M SERVICE: CPT | Mod: S$PBB,,, | Performed by: INTERNAL MEDICINE

## 2022-03-24 PROCEDURE — 3044F PR MOST RECENT HEMOGLOBIN A1C LEVEL <7.0%: ICD-10-PCS | Mod: CPTII,S$GLB,, | Performed by: INTERNAL MEDICINE

## 2022-03-24 PROCEDURE — 3066F NEPHROPATHY DOC TX: CPT | Mod: CPTII,S$GLB,, | Performed by: INTERNAL MEDICINE

## 2022-03-24 PROCEDURE — 1126F PR PAIN SEVERITY QUANTIFIED, NO PAIN PRESENT: ICD-10-PCS | Mod: CPTII,S$GLB,, | Performed by: INTERNAL MEDICINE

## 2022-03-24 PROCEDURE — 99999 PR PBB SHADOW E&M-EST. PATIENT-LVL V: CPT | Mod: PBBFAC,,, | Performed by: INTERNAL MEDICINE

## 2022-03-24 PROCEDURE — 3008F BODY MASS INDEX DOCD: CPT | Mod: CPTII,S$GLB,, | Performed by: INTERNAL MEDICINE

## 2022-03-24 PROCEDURE — 3078F DIAST BP <80 MM HG: CPT | Mod: CPTII,S$GLB,, | Performed by: INTERNAL MEDICINE

## 2022-03-24 PROCEDURE — 99406 PR TOBACCO USE CESSATION INTERMEDIATE 3-10 MINUTES: ICD-10-PCS | Mod: S$GLB,,, | Performed by: INTERNAL MEDICINE

## 2022-03-24 PROCEDURE — 3078F PR MOST RECENT DIASTOLIC BLOOD PRESSURE < 80 MM HG: ICD-10-PCS | Mod: CPTII,S$GLB,, | Performed by: INTERNAL MEDICINE

## 2022-03-24 PROCEDURE — 1159F PR MEDICATION LIST DOCUMENTED IN MEDICAL RECORD: ICD-10-PCS | Mod: CPTII,S$GLB,, | Performed by: INTERNAL MEDICINE

## 2022-03-24 PROCEDURE — 3288F PR FALLS RISK ASSESSMENT DOCUMENTED: ICD-10-PCS | Mod: CPTII,S$GLB,, | Performed by: INTERNAL MEDICINE

## 2022-03-24 PROCEDURE — 99499 RISK ADDL DX/OHS AUDIT: ICD-10-PCS | Mod: S$PBB,,, | Performed by: INTERNAL MEDICINE

## 2022-03-24 PROCEDURE — 3061F PR NEG MICROALBUMINURIA RESULT DOCUMENTED/REVIEW: ICD-10-PCS | Mod: CPTII,S$GLB,, | Performed by: INTERNAL MEDICINE

## 2022-03-24 PROCEDURE — 99215 OFFICE O/P EST HI 40 MIN: CPT | Mod: 25,S$GLB,, | Performed by: INTERNAL MEDICINE

## 2022-03-24 PROCEDURE — 99999 PR PBB SHADOW E&M-EST. PATIENT-LVL V: ICD-10-PCS | Mod: PBBFAC,,, | Performed by: INTERNAL MEDICINE

## 2022-03-24 PROCEDURE — 3074F PR MOST RECENT SYSTOLIC BLOOD PRESSURE < 130 MM HG: ICD-10-PCS | Mod: CPTII,S$GLB,, | Performed by: INTERNAL MEDICINE

## 2022-03-24 PROCEDURE — 3074F SYST BP LT 130 MM HG: CPT | Mod: CPTII,S$GLB,, | Performed by: INTERNAL MEDICINE

## 2022-03-24 PROCEDURE — 1126F AMNT PAIN NOTED NONE PRSNT: CPT | Mod: CPTII,S$GLB,, | Performed by: INTERNAL MEDICINE

## 2022-03-24 PROCEDURE — 1160F RVW MEDS BY RX/DR IN RCRD: CPT | Mod: CPTII,S$GLB,, | Performed by: INTERNAL MEDICINE

## 2022-03-24 PROCEDURE — 3288F FALL RISK ASSESSMENT DOCD: CPT | Mod: CPTII,S$GLB,, | Performed by: INTERNAL MEDICINE

## 2022-03-24 PROCEDURE — 1160F PR REVIEW ALL MEDS BY PRESCRIBER/CLIN PHARMACIST DOCUMENTED: ICD-10-PCS | Mod: CPTII,S$GLB,, | Performed by: INTERNAL MEDICINE

## 2022-03-24 PROCEDURE — 3066F PR DOCUMENTATION OF TREATMENT FOR NEPHROPATHY: ICD-10-PCS | Mod: CPTII,S$GLB,, | Performed by: INTERNAL MEDICINE

## 2022-03-24 PROCEDURE — 1101F PR PT FALLS ASSESS DOC 0-1 FALLS W/OUT INJ PAST YR: ICD-10-PCS | Mod: CPTII,S$GLB,, | Performed by: INTERNAL MEDICINE

## 2022-03-24 RX ORDER — FLUTICASONE PROPIONATE 50 MCG
1 SPRAY, SUSPENSION (ML) NASAL DAILY
Qty: 16 G | Refills: 11 | Status: SHIPPED | OUTPATIENT
Start: 2022-03-24

## 2022-03-24 RX ORDER — AMLODIPINE BESYLATE 5 MG/1
5 TABLET ORAL DAILY
Qty: 90 TABLET | Refills: 3 | Status: SHIPPED | OUTPATIENT
Start: 2022-03-24 | End: 2023-01-10 | Stop reason: SDUPTHER

## 2022-03-24 RX ORDER — ATORVASTATIN CALCIUM 40 MG/1
40 TABLET, FILM COATED ORAL DAILY
Qty: 90 TABLET | Refills: 3 | Status: SHIPPED | OUTPATIENT
Start: 2022-03-24 | End: 2023-01-10 | Stop reason: SDUPTHER

## 2022-03-24 NOTE — PATIENT INSTRUCTIONS
1)Antihistamines(Allegra, Claritin, Xzyal, Zyrtec)  2)Nasal Steroids (Nasocort, Rhinocort, Flonase)  3)Distilled salt water sinus rinses via neti pots or products such as Navjot Med Sinus Rinse or Sinugator. Must wash container or device and use bottled water to avoid introducing infection.   You can can use (as directed) any combination of these three things every day of your life if needed in order to treat or control your symptoms. Brand name use of medications is not necessary

## 2022-03-24 NOTE — H&P (VIEW-ONLY)
Subjective:       Patient ID: Leticia Brown is a 66 y.o. female who  has a past medical history of Diabetes mellitus, H. pylori infection (2017), High cholesterol, and Hypertension.    Chief Complaint: Hypertension     History was obtained from the patient and supplemented through chart review  -saw Optometry for diabetic eye exam.    Is a  at [employer].      HPI     AR:  Sx just started. Sore throat, itching. Has rhinorrhea, post-nasal drip.     HTN:    Stress test  with no WMA, negative for ischemia.  Saw Cardiology for syncope likely due to hypotension.    BP is controlled.  On losartan split to 50 BID d/t hypotension and Norvasc 5.  Compliant with meds.  Tolerating meds well.  No snoring, apnea.    DM2 is controlled.  Unable to tolerate metformin 500 XR d/t loose stools.    Diet: not much starches. Doing well. Loves veggies. Eats fish, salmon. Not much red meat.  Drinks: water, no juices. unsweet tea. Rarely has a soft drink.  ETOH:  none    Exercise: on her feet at work, but none outside of work. Used to walk 3 miles, jump rope.      Without elevated microalbumin creatinine ratio.  On an ACE/ARB.   Retinal exams: 3/2022  Foot exams:  11/2021  Lab Results   Component Value Date/Time    HGBA1C 5.6 03/17/2022 08:50 AM    HGBA1C 5.7 (H) 08/12/2021 07:14 AM    HGBA1C 5.8 (H) 02/17/2021 02:30 PM     Vitamin D deficiency:   Is taking ergocalciferol.  DEXA normal .  Lab Results   Component Value Date    OCNQHKMN63IZ 23 (L) 08/12/2021    MBJSNNZA67JO 39 10/19/2020    [id number] 11 (L) 08/30/2019     HLD, Aortic Atherosclerosis:    Is currently taking Lipitor 40 and ASA 81 daily.  Lab Results   Component Value Date    LDLCALC 118.0 08/12/2021     The 10-year ASCVD risk score (Yuli CAO Jr., et al., 2013) is: 34.7%    Values used to calculate the score:      Age: 66 years      Sex: Female      Is Non- : Yes      Diabetic: Yes      Tobacco smoker: Yes       Systolic Blood Pressure: 124 mmHg      Is BP treated: Yes      HDL Cholesterol: 36 mg/dL      Total Cholesterol: 174 mg/dL    Colon polyp:  C scope 2017.  Unknown pathology.      Tobacco use, abnormal CT:  Smoked close to 1 ppd for 30 years.  Quit in 2018 cold turkey.  1/2 ppd d/t stress.  Was on Wellbutrin in the past for depression.     Tobacco cessation clinic was unable to leave her a voicemail.    Spiral CT 10/2020 unsure if LAD was reactive or neoplastic. Repeat CT 2/2021 w/o LAD. Stable emphysema.  Will check CT chest annually, 2/2022    Depression:     As below. Used to be on Wellbutrin. Started Remeron for mood, sleep, appetite.  Currently not taking meds.               Not addressed today.  H/o H pylori, dysphagia to solids:  EGD 2017 normal other than positive biopsies.  Test of eradication neg.  Started Prilosec.  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  Erythema mucosa in the stomach.  Biopsy was negative. Still with mild dysphagia.  Persistent. Provided # for [facility] to reschedule. Consider repeat EGD.    HCV:    Antibody positive, but viral level negative. +HBsAB 10/2019.  HIV NR at [facility]   Viral level negative.  No treatment needed.    Urinary incontinence:   On oxybutynin, but feels that it doesn't work as well.  Follows with Urogynecology.  Rx estrogen cream for vaginal atrophy.    Decreased appetite:  No nausea. Weight 160-170s. Lost 5 lbs x 1 mo.  Only eating coffee, toast for breakfast and dinner. Staying hydrated. Sometimes has with dysphagia, chronic.  No coughing, odynophagia.     Denies fatigue, night sweats, LAD.  UTD on Pap smear, C scope, mammogram. CT chest as below.   Labs, TSH wnl. Reschedule GI for dysphagia. UTD age appropriate cancer screening. Started Remeron for mood, sleep, appetite, but she is no longer taking.    Insomnia:    Chronic. Mood as above. Racing thoughts at night.  The patient reports difficulty falling asleep.  Goes to bed at 9 PM, but finally  "falls asleep at 3 AM and wakes up at 6 AM.  Sometimes naps.  Tried turning off the TV at night.  The patient drinks decaffeinated drinks.  She does not drink.  Decreased walking to 2/week.    Persistent. Has Remeron as above.    Anxiety:  No racing thoughts.   Controlled. Tx as above.    Right rotator cuff tendinitis:  Likely a CJ DJD or C4 radiculopathy.  Completed PT at [facility].  Neurosurgery at [facility] recommended home exercises.   Mild intermittent pain. She declines ortho referral.    R neck pain, h/o Cervical DDD:  RF, DANIELLE WNL .  Saw Rheumatology and had low suspicion for inflammatory arthritis.  MRI cscope 3/2019 was cervical spondylosis, DDD without cord compression.  EMG with cervical spinal stenosis, degenerative disc disease.      On gabapentin 300 q.h.s. PRN, Tylenol p.r.n..  Unable to titrate gabapentin due to sedation.  Robaxin    Sometimes R sided neck pain. Feels tight. Occurs mainly at night.   No weakness, paresthesias, HA. No computer work.  Likely MSK. Rx m relaxant; discussed SE. Provided home exercises. If persistent, consider PT and/or back/spine clinic.    Review of Systems   Constitutional: Negative for activity change and appetite change.   HENT: Positive for postnasal drip and rhinorrhea.    Respiratory: Negative for wheezing.    Cardiovascular: Negative for leg swelling.   Musculoskeletal: Negative for gait problem.   Skin: Negative for rash.   Neurological: Negative for dizziness and light-headedness.   Hematological: Negative for adenopathy.   Psychiatric/Behavioral: Negative for confusion. The patient is not nervous/anxious.        I personally reviewed Past Medical History, Past Surgical History, Social History, and Family History.    Objective:      Vitals:    03/24/22 0832   BP: 124/70   Pulse: 71   SpO2: 100%   Weight: 72.1 kg (158 lb 15.2 oz)   Height: 5' 2" (1.575 m)      Physical Exam  Constitutional:       General: She is not in acute distress.     Appearance: She is " well-developed. She is not diaphoretic.   HENT:      Head: Normocephalic and atraumatic.      Nose: Mucosal edema present. No rhinorrhea.      Right Sinus: No maxillary sinus tenderness or frontal sinus tenderness.      Left Sinus: No maxillary sinus tenderness or frontal sinus tenderness.      Mouth/Throat:      Pharynx: Uvula midline. Posterior oropharyngeal erythema present. No oropharyngeal exudate.   Eyes:      General: No scleral icterus.        Right eye: No discharge.         Left eye: No discharge.      Conjunctiva/sclera:      Right eye: Right conjunctiva is not injected.      Left eye: Left conjunctiva is not injected.   Neck:      Thyroid: No thyromegaly.      Trachea: No tracheal deviation.   Cardiovascular:      Rate and Rhythm: Normal rate and regular rhythm.      Heart sounds: Normal heart sounds. No murmur heard.  Pulmonary:      Effort: Pulmonary effort is normal. No respiratory distress.      Breath sounds: Normal breath sounds. No stridor. No wheezing.   Abdominal:      General: Bowel sounds are normal. There is no distension.      Palpations: Abdomen is soft.      Tenderness: There is no abdominal tenderness.   Musculoskeletal:         General: No deformity.      Cervical back: Neck supple.      Right lower leg: No edema.      Left lower leg: No edema.   Lymphadenopathy:      Cervical: No cervical adenopathy.   Skin:     General: Skin is warm and dry.      Findings: No erythema.   Neurological:      Mental Status: She is alert.      Gait: Gait normal.   Psychiatric:         Behavior: Behavior normal.           Lab Results   Component Value Date    WBC 11.20 03/17/2022    HGB 13.7 03/17/2022    HCT 42.4 03/17/2022     03/17/2022    CHOL 174 08/12/2021    TRIG 100 08/12/2021    HDL 36 (L) 08/12/2021    ALT 10 03/17/2022    AST 14 03/17/2022     03/17/2022    K 3.7 03/17/2022     03/17/2022    CREATININE 0.8 03/17/2022    BUN 21 03/17/2022    CO2 23 03/17/2022    TSH 0.544  04/06/2021    HGBA1C 5.6 03/17/2022       The 10-year ASCVD risk score (Yuli CAO JrJamal, et al., 2013) is: 34.7%    Values used to calculate the score:      Age: 66 years      Sex: Female      Is Non- : Yes      Diabetic: Yes      Tobacco smoker: Yes      Systolic Blood Pressure: 124 mmHg      Is BP treated: Yes      HDL Cholesterol: 36 mg/dL      Total Cholesterol: 174 mg/dL    (Imaging have been independently reviewed)  09/24/2021 Mammogram without evidence of malignancy, BI-RADS 1, TC score low.    Assessment:       1. Acute nasopharyngitis    2. Essential hypertension    3. Type 2 diabetes mellitus with diabetic polyneuropathy, without long-term current use of insulin    4. Vitamin D deficiency    5. Aortic atherosclerosis    6. Polyp of colon, unspecified part of colon, unspecified type    7. Light cigarette smoker (1-9 cigs/day)    8. Centrilobular emphysema    9. Recurrent major depressive disorder, in full remission    10. Annual physical exam          Plan:       Leticia was seen today for hypertension.    Diagnoses and all orders for this visit:    Acute nasopharyngitis  Comments:  Mild sx. Discussed correct use of Flonase daily. Antihistamine PRN.   Orders:  -     fluticasone propionate (FLONASE) 50 mcg/actuation nasal spray; 1 spray (50 mcg total) by Each Nostril route once daily.    Essential hypertension  Comments:  Controlled. Cont losartan 50 BID, Norvasc 5. Encouraged walking regularly. CMP wnl.  Orders:  -     amLODIPine (NORVASC) 5 MG tablet; Take 1 tablet (5 mg total) by mouth once daily.    Type 2 diabetes mellitus with diabetic polyneuropathy, without long-term current use of insulin  Comments:  A1C controlled without meds. Unable to tolerate metformin XR d/t GI SE. A1c q6 mo. Try to exercise regularly.   Orders:  -     Hemoglobin A1C; Future  -     Microalbumin/Creatinine Ratio, Urine; Future    Vitamin D deficiency  Comments:  Slightly low. Cont ergocalciferol.  Check  vitamin-D level with next labs.  DEXA normal.   Orders:  -     Vitamin D; Future    Aortic atherosclerosis  Comments:  FLP increased, but still controlled. Continue Lipitor 40, ASA 81. Monitor FLP annually.  Orders:  -     Lipid Panel; Future  -     atorvastatin (LIPITOR) 40 MG tablet; Take 1 tablet (40 mg total) by mouth once daily.    Polyp of colon, unspecified part of colon, unspecified type  Comments:  Unknown pathology.  Will refer to [facility] for repeat C scope.  Orders:  -     Ambulatory referral/consult to Gastroenterology; Future    Light cigarette smoker (1-9 cigs/day)  Comments:  30 pack year hx.  Monitor CT chest annually. Counseled for 5 minutes is on cessation.  Gave # tobacco cessation Clinic; encouraged to enroll by 7/2022.  Orders:  -     Ambulatory referral/consult to Smoking Cessation Program; Future  -     CT Chest Lung Screening Low Dose; Future    Centrilobular emphysema  Comments:  Seen on imaging.  No acute issues.  Encouraged tobacco cessation.    Recurrent major depressive disorder, in full remission  Comments:  Not on meds.  No acute issues.    Annual physical exam  -     Hemoglobin A1C; Future  -     Microalbumin/Creatinine Ratio, Urine; Future  -     Vitamin D; Future  -     Lipid Panel; Future         Side effects of medication(s) were discussed in detail and patient voiced understanding.  Patient will call back for any issues or complications.     I have spent a total of 45 minutes with the patient as well as reviewing the chart/medical record and placing orders on the day of the visit. Discussed HTN, labs, exercise, colon cancer screening.  5 minute of that time was spent on tobacco cessation.    RTC in 6 months or sooner PRN for DM, HTN with labs prior.

## 2022-03-24 NOTE — PROGRESS NOTES
Subjective:       Patient ID: Leticia Brown is a 66 y.o. female who  has a past medical history of Diabetes mellitus, H. pylori infection (2017), High cholesterol, and Hypertension.    Chief Complaint: Hypertension     History was obtained from the patient and supplemented through chart review  -saw Optometry for diabetic eye exam.    Is a  at "[employer].      HPI     AR:  Sx just started. Sore throat, itching. Has rhinorrhea, post-nasal drip.     HTN:    Stress test  with no WMA, negative for ischemia.  Saw Cardiology for syncope likely due to hypotension.    BP is controlled.  On losartan split to 50 BID d/t hypotension and Norvasc 5.  Compliant with meds.  Tolerating meds well.  No snoring, apnea.    DM2 is controlled.  Unable to tolerate metformin 500 XR d/t loose stools.    Diet: not much starches. Doing well. Loves veggies. Eats fish, salmon. Not much red meat.  Drinks: water, no juices. unsweet tea. Rarely has a soft drink.  ETOH:  none    Exercise: on her feet at work, but none outside of work. Used to walk 3 miles, jump rope.      Without elevated microalbumin creatinine ratio.  On an ACE/ARB.   Retinal exams: 3/2022  Foot exams:  11/2021  Lab Results   Component Value Date/Time    HGBA1C 5.6 03/17/2022 08:50 AM    HGBA1C 5.7 (H) 08/12/2021 07:14 AM    HGBA1C 5.8 (H) 02/17/2021 02:30 PM     Vitamin D deficiency:   Is taking ergocalciferol.  DEXA normal .  Lab Results   Component Value Date    INYIPSUO91OH 23 (L) 08/12/2021    BPTBKBMD06WV 39 10/19/2020    FXNAYGSC66PL 11 (L) 08/30/2019     HLD, Aortic Atherosclerosis:    Is currently taking Lipitor 40 and ASA 81 daily.  Lab Results   Component Value Date    LDLCALC 118.0 08/12/2021     The 10-year ASCVD risk score (Yuli CAO Jr., et al., 2013) is: 34.7%    Values used to calculate the score:      Age: 66 years      Sex: Female      Is Non- : Yes      Diabetic: Yes      Tobacco smoker: Yes       Systolic Blood Pressure: 124 mmHg      Is BP treated: Yes      HDL Cholesterol: 36 mg/dL      Total Cholesterol: 174 mg/dL    Colon polyp:  C scope 2017.  Unknown pathology.      Tobacco use, abnormal CT:  Smoked close to 1 ppd for 30 years.  Quit in 2018 cold turkey.  1/2 ppd d/t stress.  Was on Wellbutrin in the past for depression.     Tobacco cessation clinic was unable to leave her a voicemail.    Spiral CT 10/2020 unsure if LAD was reactive or neoplastic. Repeat CT 2/2021 w/o LAD. Stable emphysema.  Will check CT chest annually, 2/2022    Depression:     As below. Used to be on Wellbutrin. Started Remeron for mood, sleep, appetite.  Currently not taking meds.               Not addressed today.  H/o H pylori, dysphagia to solids:  EGD 2017 normal other than positive biopsies.  Test of eradication neg.  Started Prilosec.  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  Erythema mucosa in the stomach.  Biopsy was negative. Still with mild dysphagia.  Persistent. Provided # for [facility] to reschedule. Consider repeat EGD.    HCV:    Antibody positive, but viral level negative. +HBsAB 10/2019.  HIV NR at [facility]   Viral level negative.  No treatment needed.    Urinary incontinence:   On oxybutynin, but feels that it doesn't work as well.  Follows with Urogynecology.  Rx estrogen cream for vaginal atrophy.    Decreased appetite:  No nausea. Weight 160-170s. Lost 5 lbs x 1 mo.  Only eating coffee, toast for breakfast and dinner. Staying hydrated. Sometimes has with dysphagia, chronic.  No coughing, odynophagia.     Denies fatigue, night sweats, LAD.  UTD on Pap smear, C scope, mammogram. CT chest as below.   Labs, TSH wnl. Reschedule GI for dysphagia. UTD age appropriate cancer screening. Started Remeron for mood, sleep, appetite, but she is no longer taking.    Insomnia:    Chronic. Mood as above. Racing thoughts at night.  The patient reports difficulty falling asleep.  Goes to bed at 9 PM, but finally  "falls asleep at 3 AM and wakes up at 6 AM.  Sometimes naps.  Tried turning off the TV at night.  The patient drinks decaffeinated drinks.  She does not drink.  Decreased walking to 2/week.    Persistent. Has Remeron as above.    Anxiety:  No racing thoughts.   Controlled. Tx as above.    Right rotator cuff tendinitis:  Likely a CJ DJD or C4 radiculopathy.  Completed PT at [facility].  Neurosurgery at [facility] recommended home exercises.   Mild intermittent pain. She declines ortho referral.    R neck pain, h/o Cervical DDD:  RF, DANIELLE WNL .  Saw Rheumatology and had low suspicion for inflammatory arthritis.  MRI cscope 3/2019 was cervical spondylosis, DDD without cord compression.  EMG with cervical spinal stenosis, degenerative disc disease.      On gabapentin 300 q.h.s. PRN, Tylenol p.r.n..  Unable to titrate gabapentin due to sedation.  Robaxin    Sometimes R sided neck pain. Feels tight. Occurs mainly at night.   No weakness, paresthesias, HA. No computer work.  Likely MSK. Rx m relaxant; discussed SE. Provided home exercises. If persistent, consider PT and/or back/spine clinic.    Review of Systems   Constitutional: Negative for activity change and appetite change.   HENT: Positive for postnasal drip and rhinorrhea.    Respiratory: Negative for wheezing.    Cardiovascular: Negative for leg swelling.   Musculoskeletal: Negative for gait problem.   Skin: Negative for rash.   Neurological: Negative for dizziness and light-headedness.   Hematological: Negative for adenopathy.   Psychiatric/Behavioral: Negative for confusion. The patient is not nervous/anxious.        I personally reviewed Past Medical History, Past Surgical History, Social History, and Family History.    Objective:      Vitals:    03/24/22 0832   BP: 124/70   Pulse: 71   SpO2: 100%   Weight: 72.1 kg (158 lb 15.2 oz)   Height: 5' 2" (1.575 m)      Physical Exam  Constitutional:       General: She is not in acute distress.     Appearance: She is " well-developed. She is not diaphoretic.   HENT:      Head: Normocephalic and atraumatic.      Nose: Mucosal edema present. No rhinorrhea.      Right Sinus: No maxillary sinus tenderness or frontal sinus tenderness.      Left Sinus: No maxillary sinus tenderness or frontal sinus tenderness.      Mouth/Throat:      Pharynx: Uvula midline. Posterior oropharyngeal erythema present. No oropharyngeal exudate.   Eyes:      General: No scleral icterus.        Right eye: No discharge.         Left eye: No discharge.      Conjunctiva/sclera:      Right eye: Right conjunctiva is not injected.      Left eye: Left conjunctiva is not injected.   Neck:      Thyroid: No thyromegaly.      Trachea: No tracheal deviation.   Cardiovascular:      Rate and Rhythm: Normal rate and regular rhythm.      Heart sounds: Normal heart sounds. No murmur heard.  Pulmonary:      Effort: Pulmonary effort is normal. No respiratory distress.      Breath sounds: Normal breath sounds. No stridor. No wheezing.   Abdominal:      General: Bowel sounds are normal. There is no distension.      Palpations: Abdomen is soft.      Tenderness: There is no abdominal tenderness.   Musculoskeletal:         General: No deformity.      Cervical back: Neck supple.      Right lower leg: No edema.      Left lower leg: No edema.   Lymphadenopathy:      Cervical: No cervical adenopathy.   Skin:     General: Skin is warm and dry.      Findings: No erythema.   Neurological:      Mental Status: She is alert.      Gait: Gait normal.   Psychiatric:         Behavior: Behavior normal.           Lab Results   Component Value Date    WBC 11.20 03/17/2022    HGB 13.7 03/17/2022    HCT 42.4 03/17/2022     03/17/2022    CHOL 174 08/12/2021    TRIG 100 08/12/2021    HDL 36 (L) 08/12/2021    ALT 10 03/17/2022    AST 14 03/17/2022     03/17/2022    K 3.7 03/17/2022     03/17/2022    CREATININE 0.8 03/17/2022    BUN 21 03/17/2022    CO2 23 03/17/2022    TSH 0.544  04/06/2021    HGBA1C 5.6 03/17/2022       The 10-year ASCVD risk score (Yuli CAO JrJamal, et al., 2013) is: 34.7%    Values used to calculate the score:      Age: 66 years      Sex: Female      Is Non- : Yes      Diabetic: Yes      Tobacco smoker: Yes      Systolic Blood Pressure: 124 mmHg      Is BP treated: Yes      HDL Cholesterol: 36 mg/dL      Total Cholesterol: 174 mg/dL    (Imaging have been independently reviewed)  09/24/2021 Mammogram without evidence of malignancy, BI-RADS 1, TC score low.    Assessment:       1. Acute nasopharyngitis    2. Essential hypertension    3. Type 2 diabetes mellitus with diabetic polyneuropathy, without long-term current use of insulin    4. Vitamin D deficiency    5. Aortic atherosclerosis    6. Polyp of colon, unspecified part of colon, unspecified type    7. Light cigarette smoker (1-9 cigs/day)    8. Centrilobular emphysema    9. Recurrent major depressive disorder, in full remission    10. Annual physical exam          Plan:       Leticia was seen today for hypertension.    Diagnoses and all orders for this visit:    Acute nasopharyngitis  Comments:  Mild sx. Discussed correct use of Flonase daily. Antihistamine PRN.   Orders:  -     fluticasone propionate (FLONASE) 50 mcg/actuation nasal spray; 1 spray (50 mcg total) by Each Nostril route once daily.    Essential hypertension  Comments:  Controlled. Cont losartan 50 BID, Norvasc 5. Encouraged walking regularly. CMP wnl.  Orders:  -     amLODIPine (NORVASC) 5 MG tablet; Take 1 tablet (5 mg total) by mouth once daily.    Type 2 diabetes mellitus with diabetic polyneuropathy, without long-term current use of insulin  Comments:  A1C controlled without meds. Unable to tolerate metformin XR d/t GI SE. A1c q6 mo. Try to exercise regularly.   Orders:  -     Hemoglobin A1C; Future  -     Microalbumin/Creatinine Ratio, Urine; Future    Vitamin D deficiency  Comments:  Slightly low. Cont ergocalciferol.  Check  vitamin-D level with next labs.  DEXA normal.   Orders:  -     Vitamin D; Future    Aortic atherosclerosis  Comments:  FLP increased, but still controlled. Continue Lipitor 40, ASA 81. Monitor FLP annually.  Orders:  -     Lipid Panel; Future  -     atorvastatin (LIPITOR) 40 MG tablet; Take 1 tablet (40 mg total) by mouth once daily.    Polyp of colon, unspecified part of colon, unspecified type  Comments:  Unknown pathology.  Will refer to [facility] for repeat C scope.  Orders:  -     Ambulatory referral/consult to Gastroenterology; Future    Light cigarette smoker (1-9 cigs/day)  Comments:  30 pack year hx.  Monitor CT chest annually. Counseled for 5 minutes is on cessation.  Gave # tobacco cessation Clinic; encouraged to enroll by 7/2022.  Orders:  -     Ambulatory referral/consult to Smoking Cessation Program; Future  -     CT Chest Lung Screening Low Dose; Future    Centrilobular emphysema  Comments:  Seen on imaging.  No acute issues.  Encouraged tobacco cessation.    Recurrent major depressive disorder, in full remission  Comments:  Not on meds.  No acute issues.    Annual physical exam  -     Hemoglobin A1C; Future  -     Microalbumin/Creatinine Ratio, Urine; Future  -     Vitamin D; Future  -     Lipid Panel; Future         Side effects of medication(s) were discussed in detail and patient voiced understanding.  Patient will call back for any issues or complications.     I have spent a total of 45 minutes with the patient as well as reviewing the chart/medical record and placing orders on the day of the visit. Discussed HTN, labs, exercise, colon cancer screening.  5 minute of that time was spent on tobacco cessation.    RTC in 6 months or sooner PRN for DM, HTN with labs prior.

## 2022-03-28 ENCOUNTER — TELEPHONE (OUTPATIENT)
Dept: ENDOSCOPY | Facility: HOSPITAL | Age: 67
End: 2022-03-28
Payer: MEDICARE

## 2022-03-28 DIAGNOSIS — R32 URINARY INCONTINENCE, UNSPECIFIED TYPE: ICD-10-CM

## 2022-03-28 NOTE — TELEPHONE ENCOUNTER
Received inbasket message.  Left message for patient to call Endoscopy Scheduling to schedule for a colonoscopy. Phone number provided.

## 2022-03-30 RX ORDER — OXYBUTYNIN CHLORIDE 10 MG/1
TABLET, EXTENDED RELEASE ORAL
Qty: 30 TABLET | Refills: 11 | Status: SHIPPED | OUTPATIENT
Start: 2022-03-30 | End: 2023-01-12 | Stop reason: DRUGHIGH

## 2022-03-30 NOTE — TELEPHONE ENCOUNTER
This Rx Request does not qualify for assessment with the OR   Please review protocol details and the Care Due Message for extra clinical information    Reasons Rx Request may be deferred:  Medication is non-delegated    Note composed:5:11 AM 03/30/2022

## 2022-03-31 DIAGNOSIS — Z86.010 ENCOUNTER FOR COLONOSCOPY DUE TO HISTORY OF COLONIC POLYP: ICD-10-CM

## 2022-03-31 DIAGNOSIS — Z12.11 SPECIAL SCREENING FOR MALIGNANT NEOPLASMS, COLON: Primary | ICD-10-CM

## 2022-03-31 DIAGNOSIS — Z12.11 ENCOUNTER FOR COLONOSCOPY DUE TO HISTORY OF COLONIC POLYP: ICD-10-CM

## 2022-03-31 RX ORDER — POLYETHYLENE GLYCOL 3350, SODIUM SULFATE ANHYDROUS, SODIUM BICARBONATE, SODIUM CHLORIDE, POTASSIUM CHLORIDE 236; 22.74; 6.74; 5.86; 2.97 G/4L; G/4L; G/4L; G/4L; G/4L
4 POWDER, FOR SOLUTION ORAL ONCE
Qty: 4000 ML | Refills: 0 | Status: SHIPPED | OUTPATIENT
Start: 2022-03-31 | End: 2022-03-31

## 2022-04-05 ENCOUNTER — HOSPITAL ENCOUNTER (OUTPATIENT)
Dept: RADIOLOGY | Facility: OTHER | Age: 67
Discharge: HOME | End: 2022-04-05
Attending: INTERNAL MEDICINE
Payer: MEDICARE

## 2022-04-05 DIAGNOSIS — F17.210 LIGHT CIGARETTE SMOKER (1-9 CIGS/DAY): ICD-10-CM

## 2022-04-05 PROCEDURE — 71271 CT CHEST LUNG SCREENING LOW DOSE: ICD-10-PCS | Mod: 26,,, | Performed by: RADIOLOGY

## 2022-04-05 PROCEDURE — 71271 CT THORAX LUNG CANCER SCR C-: CPT | Mod: 26,,, | Performed by: RADIOLOGY

## 2022-04-05 PROCEDURE — 71271 CT THORAX LUNG CANCER SCR C-: CPT | Mod: TC

## 2022-04-09 ENCOUNTER — ANESTHESIA EVENT (OUTPATIENT)
Dept: ENDOSCOPY | Facility: HOSPITAL | Age: 67
End: 2022-04-09
Payer: MEDICARE

## 2022-04-11 ENCOUNTER — ANESTHESIA (OUTPATIENT)
Dept: ENDOSCOPY | Facility: HOSPITAL | Age: 67
End: 2022-04-11
Payer: MEDICARE

## 2022-04-11 ENCOUNTER — HOSPITAL ENCOUNTER (OUTPATIENT)
Facility: HOSPITAL | Age: 67
Discharge: HOME | End: 2022-04-11
Attending: INTERNAL MEDICINE | Admitting: INTERNAL MEDICINE
Payer: MEDICARE

## 2022-04-11 VITALS
DIASTOLIC BLOOD PRESSURE: 64 MMHG | RESPIRATION RATE: 20 BRPM | HEART RATE: 57 BPM | TEMPERATURE: 98 F | SYSTOLIC BLOOD PRESSURE: 157 MMHG | OXYGEN SATURATION: 99 %

## 2022-04-11 DIAGNOSIS — Z13.9 SCREENING DUE: ICD-10-CM

## 2022-04-11 DIAGNOSIS — Z86.010 HISTORY OF COLON POLYPS: Primary | ICD-10-CM

## 2022-04-11 PROCEDURE — 37000008 HC ANESTHESIA 1ST 15 MINUTES: Performed by: INTERNAL MEDICINE

## 2022-04-11 PROCEDURE — 25000003 PHARM REV CODE 250: Performed by: NURSE ANESTHETIST, CERTIFIED REGISTERED

## 2022-04-11 PROCEDURE — D9220A PRA ANESTHESIA: ICD-10-PCS | Mod: CRNA,,, | Performed by: NURSE ANESTHETIST, CERTIFIED REGISTERED

## 2022-04-11 PROCEDURE — D9220A PRA ANESTHESIA: Mod: CRNA,,, | Performed by: NURSE ANESTHETIST, CERTIFIED REGISTERED

## 2022-04-11 PROCEDURE — D9220A PRA ANESTHESIA: ICD-10-PCS | Mod: ANES,,, | Performed by: ANESTHESIOLOGY

## 2022-04-11 PROCEDURE — 37000009 HC ANESTHESIA EA ADD 15 MINS: Performed by: INTERNAL MEDICINE

## 2022-04-11 PROCEDURE — D9220A PRA ANESTHESIA: Mod: ANES,,, | Performed by: ANESTHESIOLOGY

## 2022-04-11 PROCEDURE — G0105 COLORECTAL SCRN; HI RISK IND: HCPCS | Performed by: INTERNAL MEDICINE

## 2022-04-11 PROCEDURE — G0105 COLORECTAL SCRN; HI RISK IND: ICD-10-PCS | Mod: ,,, | Performed by: INTERNAL MEDICINE

## 2022-04-11 PROCEDURE — 63600175 PHARM REV CODE 636 W HCPCS: Performed by: NURSE ANESTHETIST, CERTIFIED REGISTERED

## 2022-04-11 PROCEDURE — G0105 COLORECTAL SCRN; HI RISK IND: HCPCS | Mod: ,,, | Performed by: INTERNAL MEDICINE

## 2022-04-11 RX ORDER — PROPOFOL 10 MG/ML
VIAL (ML) INTRAVENOUS
Status: DISCONTINUED | OUTPATIENT
Start: 2022-04-11 | End: 2022-04-11

## 2022-04-11 RX ORDER — LIDOCAINE HYDROCHLORIDE 20 MG/ML
INJECTION INTRAVENOUS
Status: DISCONTINUED | OUTPATIENT
Start: 2022-04-11 | End: 2022-04-11

## 2022-04-11 RX ORDER — PROPOFOL 10 MG/ML
INJECTION, EMULSION INTRAVENOUS
Status: DISCONTINUED
Start: 2022-04-11 | End: 2022-04-11 | Stop reason: HOSPADM

## 2022-04-11 RX ORDER — LIDOCAINE HYDROCHLORIDE 20 MG/ML
INJECTION, SOLUTION EPIDURAL; INFILTRATION; INTRACAUDAL; PERINEURAL
Status: DISCONTINUED
Start: 2022-04-11 | End: 2022-04-11 | Stop reason: HOSPADM

## 2022-04-11 RX ADMIN — SODIUM CHLORIDE: 0.9 INJECTION, SOLUTION INTRAVENOUS at 02:04

## 2022-04-11 RX ADMIN — PROPOFOL 50 MG: 10 INJECTION, EMULSION INTRAVENOUS at 02:04

## 2022-04-11 RX ADMIN — PROPOFOL 20 MG: 10 INJECTION, EMULSION INTRAVENOUS at 02:04

## 2022-04-11 RX ADMIN — PROPOFOL 80 MG: 10 INJECTION, EMULSION INTRAVENOUS at 02:04

## 2022-04-11 RX ADMIN — PROPOFOL 20 MG: 10 INJECTION, EMULSION INTRAVENOUS at 03:04

## 2022-04-11 RX ADMIN — LIDOCAINE HYDROCHLORIDE 60 MG: 20 INJECTION, SOLUTION INTRAVENOUS at 02:04

## 2022-04-11 NOTE — PROVATION PATIENT INSTRUCTIONS
Discharge Summary/Instructions after an Endoscopic Procedure  Patient Name: Leticia Brown  Patient MRN: [id number]  Patient YOB: 1955 Monday, April 11, 2022  Todd Carrera MD  Dear patient,  As a result of recent federal legislation (The Federal Cures Act), you may   receive lab or pathology results from your procedure in your [facility]   account before your physician is able to contact you. Your physician or   their representative will relay the results to you with their   recommendations at their soonest availability.  Thank you,  RESTRICTIONS:  During your procedure today, you received medications for sedation.  These   medications may affect your judgment, balance and coordination.  Therefore,   for 24 hours, you have the following restrictions:   - DO NOT drive a car, operate machinery, make legal/financial decisions,   sign important papers or drink alcohol.    ACTIVITY:  Today: no heavy lifting, straining or running due to procedural   sedation/anesthesia.  The following day: return to full activity including work.  DIET:  Eat and drink normally unless instructed otherwise.     TREATMENT FOR COMMON SIDE EFFECTS:  - Mild abdominal pain, nausea, belching, bloating or excessive gas:  rest,   eat lightly and use a heating pad.  - Sore Throat: treat with throat lozenges and/or gargle with warm salt   water.  - Because air was used during the procedure, expelling large amounts of air   from your rectum or belching is normal.  - If a bowel prep was taken, you may not have a bowel movement for 1-3 days.    This is normal.  SYMPTOMS TO WATCH FOR AND REPORT TO YOUR PHYSICIAN:  1. Abdominal pain or bloating, other than gas cramps.  2. Chest pain.  3. Back pain.  4. Signs of infection such as: chills or fever occurring within 24 hours   after the procedure.  5. Rectal bleeding, which would show as bright red, maroon, or black stools.   (A tablespoon of blood from the rectum is not serious, especially if    hemorrhoids are present.)  6. Vomiting.  7. Weakness or dizziness.  GO DIRECTLY TO THE NEAREST EMERGENCY ROOM IF YOU HAVE ANY OF THE FOLLOWING:      Difficulty breathing              Chills and/or fever over 101 F   Persistent vomiting and/or vomiting blood   Severe abdominal pain   Severe chest pain   Black, tarry stools   Bleeding- more than one tablespoon   Any other symptom or condition that you feel may need urgent attention  Your doctor recommends these additional instructions:  If any biopsies were taken, your doctors clinic will contact you in 1 to 2   weeks with any results.  - Discharge patient to home (ambulatory).   - Patient has a contact number available for emergencies.  The signs and   symptoms of potential delayed complications were discussed with the   patient.  Return to normal activities tomorrow.  Written discharge   instructions were provided to the patient.   - Resume previous diet.   - Continue present medications.   - Repeat colonoscopy in 10 years for screening purposes.   - Return to primary care physician as previously scheduled.  For questions, problems or results please call your physician - Todd Carrera MD at Work:  [phone].  [facility] can be reached at [phone]     IF A COMPLICATION OR EMERGENCY SITUATION ARISES AND YOU ARE UNABLE TO REACH   YOUR PHYSICIAN - GO DIRECTLY TO THE EMERGENCY ROOM.  Todd Carrera MD  4/11/2022 3:11:35 PM  This report has been verified and signed electronically.  Dear patient,  As a result of recent federal legislation (The Federal Cures Act), you may   receive lab or pathology results from your procedure in your [facility]   account before your physician is able to contact you. Your physician or   their representative will relay the results to you with their   recommendations at their soonest availability.  Thank you,  PROVATION

## 2022-04-11 NOTE — OR NURSING
B/p 170/74. No c/o pain or discomfort. No numbness or weak ness noted. B/p remains within 20% of baseline.

## 2022-04-11 NOTE — ANESTHESIA PREPROCEDURE EVALUATION
04/11/2022  Leticia Brown is a 66 y.o., female.      Pre-op Assessment    I have reviewed the Patient Summary Reports.     I have reviewed the Nursing Notes.       Review of Systems  Anesthesia Hx:  No problems with previous Anesthesia  Denies Family Hx of Anesthesia complications.   Denies Personal Hx of Anesthesia complications.   Social:  Smoker, Alcohol Use    Cardiovascular:   Exercise tolerance: good Hypertension Denies MI.   Denies Dysrhythmias.  hyperlipidemia 2020 Echo: EF 60%; PAP 24   Pulmonary:  Pulmonary Normal    Hepatic/GI:   GERD, well controlled No current symptoms   Musculoskeletal:   Arthritis     Neurological:   Neuromuscular Disease,    Endocrine:   Diabetes    Psych:   Psychiatric History          Physical Exam  General: Well nourished, Cooperative and Alert    Airway:  Mallampati: II   Mouth Opening: Normal  TM Distance: 4 - 6 cm  Tongue: Normal  Neck ROM: Normal ROM    Dental:  Edentulous    Chest/Lungs:  Clear to auscultation, Normal Respiratory Rate    Heart:  Rate: Normal  Rhythm: Regular Rhythm      Wt Readings from Last 3 Encounters:   03/24/22 72.1 kg (158 lb 15.2 oz)   11/16/21 72.6 kg (160 lb)   11/04/21 73.5 kg (162 lb)     Temp Readings from Last 3 Encounters:   11/16/21 36.2 °C (97.2 °F) (Oral)   11/05/20 36.7 °C (98.1 °F) (Oral)   09/10/20 37.3 °C (99.1 °F) (Oral)     BP Readings from Last 3 Encounters:   03/24/22 124/70   11/16/21 (!) 144/67   11/04/21 (!) 146/63     Pulse Readings from Last 3 Encounters:   03/24/22 71   11/16/21 70   11/04/21 70         Anesthesia Plan  Type of Anesthesia, risks & benefits discussed:    Anesthesia Type: Gen Natural Airway, MAC  Intra-op Monitoring Plan: Standard ASA Monitors  Post Op Pain Control Plan: multimodal analgesia and IV/PO Opioids PRN  Induction:  IV  Informed Consent: Informed consent signed with the Patient and all  parties understand the risks and agree with anesthesia plan.  All questions answered.   ASA Score: 3  Day of Surgery Review of History & Physical: H&P Update referred to the surgeon/provider.    Ready For Surgery From Anesthesia Perspective.     .

## 2022-04-11 NOTE — TRANSFER OF CARE
Anesthesia Transfer of Care Note    Patient: Leticia Brown    Procedure(s) Performed: Procedure(s) (LRB):  COLONOSCOPY (N/A)    Patient location: GI    Anesthesia Type: general    Transport from OR: Transported from OR on room air with adequate spontaneous ventilation    Post pain: adequate analgesia    Post assessment: tolerated procedure well and no apparent anesthetic complications    Post vital signs: stable    Level of consciousness: awake, alert and oriented    Nausea/Vomiting: no nausea/vomiting    Complications: none    Transfer of care protocol was followed      Last vitals:   Visit Vitals  /62 (BP Location: Left arm, Patient Position: Lying)   Pulse 71   Temp 36.5 °C (97.7 °F)   Resp 18   SpO2 100%   Breastfeeding No

## 2022-04-11 NOTE — PLAN OF CARE
Procedure and recovery complete. Awake and alert.  at bedside. Discharge instructions given. Verbalized understanding. Gait steady. Able to ambulate without difficulty. No acute distress noted.

## 2022-04-11 NOTE — ANESTHESIA POSTPROCEDURE EVALUATION
Anesthesia Post Evaluation    Patient: Leticia Brown    Procedure(s) Performed: Procedure(s) (LRB):  COLONOSCOPY (N/A)    Final Anesthesia Type: general      Patient location during evaluation: GI PACU  Patient participation: Yes- Able to Participate  Level of consciousness: awake and alert  Post-procedure vital signs: reviewed and stable  Pain management: adequate  Airway patency: patent    PONV status at discharge: No PONV  Anesthetic complications: no      Cardiovascular status: hemodynamically stable  Respiratory status: unassisted and spontaneous ventilation  Hydration status: euvolemic  Follow-up not needed.          Vitals Value Taken Time   /62 04/11/22 1510   Temp 36.5 °C (97.7 °F) 04/11/22 1510   Pulse 71 04/11/22 1510   Resp 18 04/11/22 1510   SpO2 100 % 04/11/22 1510         No case tracking events are documented in the log.      Pain/Vilma Score: Vilma Score: 9 (4/11/2022  3:10 PM)

## 2022-04-14 ENCOUNTER — TELEPHONE (OUTPATIENT)
Dept: OPHTHALMOLOGY | Facility: CLINIC | Age: 67
End: 2022-04-14
Payer: MEDICARE

## 2022-04-14 DIAGNOSIS — H25.11 NUCLEAR SCLEROTIC CATARACT OF RIGHT EYE: Primary | ICD-10-CM

## 2022-04-18 ENCOUNTER — CLINICAL SUPPORT (OUTPATIENT)
Dept: SMOKING CESSATION | Facility: CLINIC | Age: 67
End: 2022-04-18
Payer: COMMERCIAL

## 2022-04-18 DIAGNOSIS — F17.210 LIGHT CIGARETTE SMOKER (1-9 CIGARETTES PER DAY): Primary | ICD-10-CM

## 2022-04-18 PROCEDURE — 99404 PR PREVENT COUNSEL,INDIV,60 MIN: ICD-10-PCS | Mod: S$GLB,,,

## 2022-04-18 PROCEDURE — 99999 PR PBB SHADOW E&M-EST. PATIENT-LVL II: CPT | Mod: PBBFAC,,,

## 2022-04-18 PROCEDURE — 99404 PREV MED CNSL INDIV APPRX 60: CPT | Mod: S$GLB,,,

## 2022-04-18 PROCEDURE — 99999 PR PBB SHADOW E&M-EST. PATIENT-LVL II: ICD-10-PCS | Mod: PBBFAC,,,

## 2022-04-18 RX ORDER — DM/P-EPHED/ACETAMINOPH/DOXYLAM 30-7.5/3
2 LIQUID (ML) ORAL
Qty: 144 LOZENGE | Refills: 0 | Status: SHIPPED | OUTPATIENT
Start: 2022-04-18 | End: 2022-09-27

## 2022-04-18 RX ORDER — IBUPROFEN 200 MG
1 TABLET ORAL DAILY
Qty: 28 PATCH | Refills: 0 | Status: SHIPPED | OUTPATIENT
Start: 2022-04-18 | End: 2022-05-09 | Stop reason: SDUPTHER

## 2022-04-18 NOTE — Clinical Note
Patient was seen for tobacco cessation intake.  Patient will begin on 14 mg nicotine patch and 2 mg nicotine lozenge.  Patient has agreed to bi weekly follow up.

## 2022-04-27 ENCOUNTER — TELEPHONE (OUTPATIENT)
Dept: OPHTHALMOLOGY | Facility: CLINIC | Age: 67
End: 2022-04-27
Payer: MEDICARE

## 2022-04-27 DIAGNOSIS — H25.12 NUCLEAR SCLEROTIC CATARACT OF LEFT EYE: Primary | ICD-10-CM

## 2022-04-28 ENCOUNTER — TELEPHONE (OUTPATIENT)
Dept: OPHTHALMOLOGY | Facility: CLINIC | Age: 67
End: 2022-04-28
Payer: MEDICARE

## 2022-04-28 NOTE — TELEPHONE ENCOUNTER
----- Message from Ileana Arellano sent at 4/28/2022  8:03 AM CDT -----  Regarding: Speak with office  Contact: Leticia Tejeda is calling to found out surgery report time.    [phone]

## 2022-05-01 ENCOUNTER — HOSPITAL ENCOUNTER (EMERGENCY)
Facility: OTHER | Age: 67
Discharge: HOME | End: 2022-05-02
Attending: EMERGENCY MEDICINE
Payer: MEDICARE

## 2022-05-01 DIAGNOSIS — R07.89 ATYPICAL CHEST PAIN: Primary | ICD-10-CM

## 2022-05-01 DIAGNOSIS — R07.9 CHEST PAIN: ICD-10-CM

## 2022-05-01 LAB
ALBUMIN SERPL BCP-MCNC: 3.9 G/DL (ref 3.5–5.2)
ALP SERPL-CCNC: 95 U/L (ref 55–135)
ALT SERPL W/O P-5'-P-CCNC: 12 U/L (ref 10–44)
ANION GAP SERPL CALC-SCNC: 12 MMOL/L (ref 8–16)
AST SERPL-CCNC: 16 U/L (ref 10–40)
BASOPHILS # BLD AUTO: 0.08 K/UL (ref 0–0.2)
BASOPHILS NFR BLD: 0.6 % (ref 0–1.9)
BILIRUB SERPL-MCNC: 0.2 MG/DL (ref 0.1–1)
BUN SERPL-MCNC: 22 MG/DL (ref 8–23)
CALCIUM SERPL-MCNC: 9.6 MG/DL (ref 8.7–10.5)
CHLORIDE SERPL-SCNC: 104 MMOL/L (ref 95–110)
CO2 SERPL-SCNC: 24 MMOL/L (ref 23–29)
CREAT SERPL-MCNC: 0.9 MG/DL (ref 0.5–1.4)
DIFFERENTIAL METHOD: ABNORMAL
EOSINOPHIL # BLD AUTO: 0.3 K/UL (ref 0–0.5)
EOSINOPHIL NFR BLD: 2.6 % (ref 0–8)
ERYTHROCYTE [DISTWIDTH] IN BLOOD BY AUTOMATED COUNT: 14 % (ref 11.5–14.5)
EST. GFR  (AFRICAN AMERICAN): >60 ML/MIN/1.73 M^2
EST. GFR  (NON AFRICAN AMERICAN): >60 ML/MIN/1.73 M^2
GLUCOSE SERPL-MCNC: 88 MG/DL (ref 70–110)
HCT VFR BLD AUTO: 42 % (ref 37–48.5)
HGB BLD-MCNC: 13.7 G/DL (ref 12–16)
IMM GRANULOCYTES # BLD AUTO: 0.02 K/UL (ref 0–0.04)
IMM GRANULOCYTES NFR BLD AUTO: 0.2 % (ref 0–0.5)
LYMPHOCYTES # BLD AUTO: 6.3 K/UL (ref 1–4.8)
LYMPHOCYTES NFR BLD: 48.7 % (ref 18–48)
MCH RBC QN AUTO: 30 PG (ref 27–31)
MCHC RBC AUTO-ENTMCNC: 32.6 G/DL (ref 32–36)
MCV RBC AUTO: 92 FL (ref 82–98)
MONOCYTES # BLD AUTO: 0.7 K/UL (ref 0.3–1)
MONOCYTES NFR BLD: 5.1 % (ref 4–15)
NEUTROPHILS # BLD AUTO: 5.6 K/UL (ref 1.8–7.7)
NEUTROPHILS NFR BLD: 42.8 % (ref 38–73)
NRBC BLD-RTO: 0 /100 WBC
PLATELET # BLD AUTO: 265 K/UL (ref 150–450)
PMV BLD AUTO: 10.1 FL (ref 9.2–12.9)
POTASSIUM SERPL-SCNC: 3.8 MMOL/L (ref 3.5–5.1)
PROT SERPL-MCNC: 7.5 G/DL (ref 6–8.4)
RBC # BLD AUTO: 4.56 M/UL (ref 4–5.4)
SODIUM SERPL-SCNC: 140 MMOL/L (ref 136–145)
TROPONIN I SERPL DL<=0.01 NG/ML-MCNC: <0.006 NG/ML (ref 0–0.03)
WBC # BLD AUTO: 13.02 K/UL (ref 3.9–12.7)

## 2022-05-01 PROCEDURE — 84484 ASSAY OF TROPONIN QUANT: CPT | Mod: 91 | Performed by: EMERGENCY MEDICINE

## 2022-05-01 PROCEDURE — 25000003 PHARM REV CODE 250: Performed by: EMERGENCY MEDICINE

## 2022-05-01 PROCEDURE — 99285 EMERGENCY DEPT VISIT HI MDM: CPT | Mod: 25

## 2022-05-01 PROCEDURE — 36000 PLACE NEEDLE IN VEIN: CPT

## 2022-05-01 PROCEDURE — 80053 COMPREHEN METABOLIC PANEL: CPT | Performed by: EMERGENCY MEDICINE

## 2022-05-01 PROCEDURE — 93005 ELECTROCARDIOGRAM TRACING: CPT

## 2022-05-01 PROCEDURE — 93010 ELECTROCARDIOGRAM REPORT: CPT | Mod: ,,, | Performed by: INTERNAL MEDICINE

## 2022-05-01 PROCEDURE — 85025 COMPLETE CBC W/AUTO DIFF WBC: CPT | Performed by: EMERGENCY MEDICINE

## 2022-05-01 PROCEDURE — 93010 EKG 12-LEAD: ICD-10-PCS | Mod: ,,, | Performed by: INTERNAL MEDICINE

## 2022-05-01 RX ORDER — ASPIRIN 325 MG
325 TABLET ORAL
Status: COMPLETED | OUTPATIENT
Start: 2022-05-01 | End: 2022-05-01

## 2022-05-01 RX ADMIN — ASPIRIN 325 MG ORAL TABLET 325 MG: 325 PILL ORAL at 09:05

## 2022-05-02 VITALS
RESPIRATION RATE: 37 BRPM | OXYGEN SATURATION: 99 % | HEIGHT: 62 IN | SYSTOLIC BLOOD PRESSURE: 153 MMHG | TEMPERATURE: 99 F | HEART RATE: 62 BPM | WEIGHT: 158 LBS | DIASTOLIC BLOOD PRESSURE: 66 MMHG | BODY MASS INDEX: 29.08 KG/M2

## 2022-05-02 LAB — TROPONIN I SERPL DL<=0.01 NG/ML-MCNC: <0.006 NG/ML (ref 0–0.03)

## 2022-05-02 PROCEDURE — 93010 ELECTROCARDIOGRAM REPORT: CPT | Mod: ,,, | Performed by: INTERNAL MEDICINE

## 2022-05-02 PROCEDURE — 93010 EKG 12-LEAD: ICD-10-PCS | Mod: ,,, | Performed by: INTERNAL MEDICINE

## 2022-05-02 PROCEDURE — 93005 ELECTROCARDIOGRAM TRACING: CPT

## 2022-05-02 NOTE — ED TRIAGE NOTES
Patient presents c/o CP that woke her from her sleep approximately 3am. Pt describes pain as pressure to left upper chest that does NOT radiate to any other location of the body. Pt reports taking TUMS and JANICE-SELTZER earlier in the day. AAOx4.

## 2022-05-02 NOTE — DISCHARGE INSTRUCTIONS
Please return to the ER if you have worsening chest pain, difficulty breathing, fevers, altered mental status, dizziness, weakness, or any other concerns.      Follow up with your primary care physician.

## 2022-05-02 NOTE — ED PROVIDER NOTES
Encounter Date: 5/1/2022       History     Chief Complaint   Patient presents with    Chest Pain     3 am woke up with chest tightness thought GERD used otc meds without relief.       Seen by physician at 8:30PM:    Patient is a 66-year-old female who presents to the emergency department with chest pressure.  Patient states that she awoke from sleep at approximately 3:00 a.m. with chest pressure.  Patient states that the pain worsens with she lays down and improves upon standing, and the pain has persisted since 3:00 a.m. until now.  Patient denies any radiation of the pain.  Denies any nausea/vomiting/diarrhea.  Denies any dizziness.  Denies any diaphoresis.  Denies any shortness of breath.  Denies any recent cough or cold, or upper respiratory illness.  Patient had no problems with her daily activities today.  She did take an [place] and Tums with no relief.  Patient has had a stress test within the past 5 years which was negative for any acute findings.  Patient does smoke, and does have a history of high blood pressure, cholesterol, and hyperlipidemia.        Review of patient's allergies indicates:  No Known Allergies  Past Medical History:   Diagnosis Date    Diabetes mellitus     H. pylori infection 2017    [facility] EGD 2017. test of eradication neg.    High cholesterol     Hypertension      Past Surgical History:   Procedure Laterality Date    BREAST BIOPSY Right     CHOLECYSTECTOMY  2016    COLONOSCOPY N/A 4/11/2022    Procedure: COLONOSCOPY;  Surgeon: Todd Carrera MD;  Location: [facility];  Service: Endoscopy;  Laterality: N/A;  order created: referral  Fully vaccinated, prep instr emailed -ml    ESOPHAGOGASTRODUODENOSCOPY N/A 07/21/2020    Procedure: EGD (ESOPHAGOGASTRODUODENOSCOPY);  Surgeon: Rodrick Montes MD;  Location: [facility] ([address] [place] [address]);  Service: Endoscopy;  Laterality: N/A;  3/30/20 - removed from 4/9/20, 3/30/20 & 3/31/20LM pt &  ph & sent email, request call back to  reschedule June/July.  Pt called back, rescheduled 7/21/20 - pg  7/6/20 - 7/9/20- LM x 2 - waiting for cb to set up COVID appt. - [facility]  COVID screening on    TUBAL LIGATION  1982     Family History   Problem Relation Age of Onset    Arthritis Mother     Cancer Mother     Heart disease Mother     No Known Problems Father     No Known Problems Sister     No Known Problems Brother     No Known Problems Daughter     No Known Problems Son     No Known Problems Maternal Aunt     No Known Problems Maternal Uncle     No Known Problems Paternal Aunt     No Known Problems Paternal Uncle     No Known Problems Maternal Grandmother     No Known Problems Maternal Grandfather     No Known Problems Paternal Grandmother     No Known Problems Paternal Grandfather     Colon cancer Neg Hx     Rectal cancer Neg Hx     Stomach cancer Neg Hx     Breast cancer Neg Hx      Social History     Tobacco Use    Smoking status: Light Tobacco Smoker     Packs/day: 0.25     Years: 40.00     Pack years: 10.00     Types: Cigarettes    Smokeless tobacco: Never Used   Substance Use Topics    Alcohol use: Yes     Alcohol/week: 2.0 standard drinks     Types: 2 Glasses of wine per week    Drug use: No     Review of Systems   Constitutional: Negative for chills and fever.   HENT: Negative for congestion and rhinorrhea.    Respiratory: Positive for chest tightness. Negative for shortness of breath.    Gastrointestinal: Negative for abdominal pain, diarrhea, nausea and vomiting.   Genitourinary: Negative for dysuria and flank pain.   Musculoskeletal: Negative for back pain and neck pain.   Skin: Negative for color change and wound.   Neurological: Negative for dizziness and headaches.       Physical Exam     Initial Vitals   BP Pulse Resp Temp SpO2   05/01/22 1934 05/01/22 1934 05/01/22 1934 05/01/22 1936 05/01/22 1934   (!) 195/84 74 16 98.7 °F (37.1 °C) 99 %      MAP       --                Physical Exam    Nursing note and vitals  reviewed.  Constitutional: She appears well-developed and well-nourished.   HENT:   Head: Normocephalic and atraumatic.   Eyes: Conjunctivae and EOM are normal.   Neck: Neck supple.   Normal range of motion.  Cardiovascular: Normal rate, regular rhythm and normal heart sounds.   Pulmonary/Chest: Breath sounds normal. No respiratory distress. She has no wheezes. She has no rhonchi. She has no rales.   Abdominal: Abdomen is soft. Bowel sounds are normal. She exhibits no distension. There is no abdominal tenderness. There is no rebound.   Musculoskeletal:         General: No edema. Normal range of motion.      Cervical back: Normal range of motion and neck supple.     Neurological: She is alert and oriented to person, place, and time.   Skin: Skin is warm and dry. Capillary refill takes less than 2 seconds.         ED Course   Procedures  Labs Reviewed   CBC W/ AUTO DIFFERENTIAL - Abnormal; Notable for the following components:       Result Value    WBC 13.02 (*)     Lymph # 6.3 (*)     Lymph % 48.7 (*)     All other components within normal limits   COMPREHENSIVE METABOLIC PANEL   TROPONIN I   TROPONIN I     EKG Readings: (Independently Interpreted)   7:50PM:  Rate of 67. Normal sinus rhythm.  Normal axis.  Normal intervals.  No ST or ischemic changes.    12:23AM:  Rate of 61.  Normal sinus rhythm.  Normal axis.  Normal intervals.  No ST or ischemic changes.         Imaging Results          X-Ray Chest AP Portable (Final result)  Result time 05/01/22 21:38:02    Final result by Ortiz Johnson MD (05/01/22 21:38:02)                 Impression:      No acute process.      Electronically signed by: Ortiz Johnson MD  Date:    05/01/2022  Time:    21:38             Narrative:    EXAMINATION:  XR CHEST AP PORTABLE    CLINICAL HISTORY:  Chest pain, unspecified    TECHNIQUE:  Single frontal view of the chest was performed.    COMPARISON:  08/15/2019.    FINDINGS:  The trachea is unremarkable.  The cardiomediastinal silhouette is  within normal limits.  The hemidiaphragms are unremarkable.  There are no pleural effusions.  There is no evidence of a pneumothorax.  There is no evidence of pneumomediastinum.  No airspace opacity is present.  The osseous structures are unremarkable.                              X-Rays:   Independently Interpreted Readings:   Chest X-Ray: Trachea midline.  No cardiomegaly.  No effusion, infiltrate, edema.     Medications   aspirin tablet 325 mg (325 mg Oral Given 5/1/22 2102)     Medical Decision Making:   History:   Old Medical Records: I decided to obtain old medical records.  Old Records Summarized: other records and records from clinic visits.  Initial Assessment:   8:30PM:  Patient is a 66-year-old female who presents with limited movement with pressure in her chest.  Patient appears well, nontoxic.  She does have significant cardiac risk factors, including age, smoking and comorbidities.  Will plan for labs, cardiac evaluation, chest x-ray, will continue to follow and reassess.  Independently Interpreted Test(s):   I have ordered and independently interpreted X-rays - see prior notes.  I have ordered and independently interpreted EKG Reading(s) - see prior notes  Clinical Tests:   Lab Tests: Ordered and Reviewed  Radiological Study: Ordered and Reviewed  Medical Tests: Ordered and Reviewed    10:09 PM:  Patient doing well, remains stable.  She is feeling better.  Her labs and EKG are within normal limits with a negative troponin.  However given her risk factors, will plan for a 3 hour repeat troponin.  I updated the patient regarding the results along with plan for repeat labs.  Agreeable to plan and all questions answered.    12:28 AM:  Patient's repeat troponin and EKG are also negative.  Given her reassuring workup, I do not feel that further work up in the ED is indicated at this time.  I updated pt regarding results and I counseled pt regarding supportive care measures.  I have discussed with the pt ED  return warnings and need for close PCP f/u.  Pt agreeable to plan and all questions answered.  I feel that pt is stable for discharge and management as an outpatient and no further intervention is needed at this time.  Pt is comfortable returning to the ED if needed.  Will DC home in stable condition.                        Clinical Impression:   Final diagnoses:  [R07.9] Chest pain  [R07.89] Atypical chest pain (Primary)          ED Disposition Condition    Discharge Stable        ED Prescriptions     None        Follow-up Information     Follow up With Specialties Details Why Contact Info    Sarah Antonio MD Internal Medicine   [address] [address]  [phone]             Aby Moore MD  05/02/22 0033

## 2022-05-03 ENCOUNTER — TELEPHONE (OUTPATIENT)
Dept: SMOKING CESSATION | Facility: CLINIC | Age: 67
End: 2022-05-03
Payer: MEDICARE

## 2022-05-09 ENCOUNTER — CLINICAL SUPPORT (OUTPATIENT)
Dept: SMOKING CESSATION | Facility: CLINIC | Age: 67
End: 2022-05-09
Payer: COMMERCIAL

## 2022-05-09 ENCOUNTER — TELEPHONE (OUTPATIENT)
Dept: OPHTHALMOLOGY | Facility: CLINIC | Age: 67
End: 2022-05-09
Payer: MEDICARE

## 2022-05-09 DIAGNOSIS — F17.210 LIGHT CIGARETTE SMOKER (1-9 CIGS/DAY): Primary | ICD-10-CM

## 2022-05-09 DIAGNOSIS — F17.210 LIGHT CIGARETTE SMOKER (1-9 CIGARETTES PER DAY): ICD-10-CM

## 2022-05-09 PROCEDURE — 99999 PR PBB SHADOW E&M-EST. PATIENT-LVL I: CPT | Mod: PBBFAC,,,

## 2022-05-09 PROCEDURE — 99402 PREV MED CNSL INDIV APPRX 30: CPT | Mod: S$GLB,,,

## 2022-05-09 PROCEDURE — 99402 PR PREVENT COUNSEL,INDIV,30 MIN: ICD-10-PCS | Mod: S$GLB,,,

## 2022-05-09 PROCEDURE — 99999 PR PBB SHADOW E&M-EST. PATIENT-LVL I: ICD-10-PCS | Mod: PBBFAC,,,

## 2022-05-09 RX ORDER — IBUPROFEN 200 MG
1 TABLET ORAL DAILY
Qty: 28 PATCH | Refills: 0 | Status: SHIPPED | OUTPATIENT
Start: 2022-05-09 | End: 2022-09-27

## 2022-05-09 NOTE — PROGRESS NOTES
Individual Follow-Up Form    5/9/2022    Quit Date:     Clinical Status of Patient: Outpatient    Length of Service: 30 minutes    Continuing Medication: yes  Patches    Other Medications: nicotine lozenge     Target Symptoms: Withdrawal and medication side effects. The following were  rated moderate (3) to severe (4) on TCRS:  · Moderate (3): none  · Severe (4): none    Comments: Patient is smoking 4 cpd. Patient continue to use 14 mg nicotine patch daily without any negative side affects at this time.  Patient reports using nicotine lozenge a few time daily.  We discussed cues, triggers, reasons and benefits of quitting.  We discussed motivations to quit and tobacco cessation strategies to aid in cessation.   The patient denies any abnormal behavioral or mental changes at this time. The patient will continue with  therapy sessions and medication monitoring by CTTS. Prescribed medication management will be by physician.        Diagnosis: F17.210    Next Visit: 2 weeks

## 2022-05-09 NOTE — Clinical Note
Patient is smoking 4 cpd. Patient continue to use 14 mg nicotine patch daily without any negative side affects at this time.  Patient reports using nicotine lozenge a few time daily.  We discussed cues, triggers, reasons and benefits of quitting.  We discussed motivations to quit and tobacco cessation strategies to aid in cessation.   The patient denies any abnormal behavioral or mental changes at this time. The patient will continue with  therapy sessions and medication monitoring by CTTS. Prescribed medication management will be by physician.

## 2022-05-09 NOTE — TELEPHONE ENCOUNTER
Patient given arrival time of 8:30 am on Thursday May 12 . Nothing to eat or drink after 9 pm.  Water, Gatorade after 9 pm until leaves home.  Start drops into the operative eye today. [address]

## 2022-05-12 ENCOUNTER — HOSPITAL ENCOUNTER (OUTPATIENT)
Facility: OTHER | Age: 67
Discharge: HOME | End: 2022-05-12
Attending: OPHTHALMOLOGY | Admitting: OPHTHALMOLOGY
Payer: MEDICARE

## 2022-05-12 ENCOUNTER — ANESTHESIA EVENT (OUTPATIENT)
Dept: SURGERY | Facility: OTHER | Age: 67
End: 2022-05-12
Payer: MEDICARE

## 2022-05-12 ENCOUNTER — ANESTHESIA (OUTPATIENT)
Dept: SURGERY | Facility: OTHER | Age: 67
End: 2022-05-12
Payer: MEDICARE

## 2022-05-12 VITALS
RESPIRATION RATE: 16 BRPM | TEMPERATURE: 98 F | DIASTOLIC BLOOD PRESSURE: 54 MMHG | BODY MASS INDEX: 28.9 KG/M2 | OXYGEN SATURATION: 96 % | WEIGHT: 158 LBS | HEART RATE: 62 BPM | SYSTOLIC BLOOD PRESSURE: 139 MMHG

## 2022-05-12 DIAGNOSIS — H25.12 NUCLEAR SCLEROTIC CATARACT OF LEFT EYE: Primary | ICD-10-CM

## 2022-05-12 DIAGNOSIS — H25.13 NUCLEAR SCLEROSIS, BILATERAL: ICD-10-CM

## 2022-05-12 LAB — POCT GLUCOSE: 92 MG/DL (ref 70–110)

## 2022-05-12 PROCEDURE — 66984 PR REMOVAL, CATARACT, W/INSRT INTRAOC LENS, W/O ENDO CYCLO: ICD-10-PCS | Mod: RT,,, | Performed by: OPHTHALMOLOGY

## 2022-05-12 PROCEDURE — 36000706: Performed by: OPHTHALMOLOGY

## 2022-05-12 PROCEDURE — V2632 POST CHMBR INTRAOCULAR LENS: HCPCS | Performed by: OPHTHALMOLOGY

## 2022-05-12 PROCEDURE — 25000003 PHARM REV CODE 250: Performed by: OPHTHALMOLOGY

## 2022-05-12 PROCEDURE — 37000009 HC ANESTHESIA EA ADD 15 MINS: Performed by: OPHTHALMOLOGY

## 2022-05-12 PROCEDURE — 37000008 HC ANESTHESIA 1ST 15 MINUTES: Performed by: OPHTHALMOLOGY

## 2022-05-12 PROCEDURE — 71000015 HC POSTOP RECOV 1ST HR: Performed by: OPHTHALMOLOGY

## 2022-05-12 PROCEDURE — 63600175 PHARM REV CODE 636 W HCPCS: Performed by: NURSE ANESTHETIST, CERTIFIED REGISTERED

## 2022-05-12 PROCEDURE — 66984 XCAPSL CTRC RMVL W/O ECP: CPT | Mod: RT,,, | Performed by: OPHTHALMOLOGY

## 2022-05-12 PROCEDURE — 36000707: Performed by: OPHTHALMOLOGY

## 2022-05-12 DEVICE — LENS CLAREON AUTONOME 22.5D: Type: IMPLANTABLE DEVICE | Site: EYE | Status: FUNCTIONAL

## 2022-05-12 RX ORDER — TETRACAINE HYDROCHLORIDE 5 MG/ML
SOLUTION OPHTHALMIC
Status: DISCONTINUED | OUTPATIENT
Start: 2022-05-12 | End: 2022-05-12 | Stop reason: HOSPADM

## 2022-05-12 RX ORDER — ACETAMINOPHEN 325 MG/1
650 TABLET ORAL EVERY 4 HOURS PRN
Status: DISCONTINUED | OUTPATIENT
Start: 2022-05-12 | End: 2022-05-12 | Stop reason: HOSPADM

## 2022-05-12 RX ORDER — LIDOCAINE HYDROCHLORIDE 40 MG/ML
INJECTION, SOLUTION RETROBULBAR
Status: DISCONTINUED | OUTPATIENT
Start: 2022-05-12 | End: 2022-05-12 | Stop reason: HOSPADM

## 2022-05-12 RX ORDER — TROPICAMIDE 10 MG/ML
1 SOLUTION/ DROPS OPHTHALMIC
Status: COMPLETED | OUTPATIENT
Start: 2022-05-12 | End: 2022-05-12

## 2022-05-12 RX ORDER — PHENYLEPHRINE HYDROCHLORIDE 100 MG/ML
1 SOLUTION/ DROPS OPHTHALMIC
Status: DISCONTINUED | OUTPATIENT
Start: 2022-05-12 | End: 2022-05-12 | Stop reason: HOSPADM

## 2022-05-12 RX ORDER — PROPARACAINE HYDROCHLORIDE 5 MG/ML
1 SOLUTION/ DROPS OPHTHALMIC
Status: DISCONTINUED | OUTPATIENT
Start: 2022-05-12 | End: 2022-05-12 | Stop reason: HOSPADM

## 2022-05-12 RX ORDER — TETRACAINE HYDROCHLORIDE 5 MG/ML
1 SOLUTION OPHTHALMIC
Status: COMPLETED | OUTPATIENT
Start: 2022-05-12 | End: 2022-05-12

## 2022-05-12 RX ORDER — MIDAZOLAM HYDROCHLORIDE 1 MG/ML
INJECTION INTRAMUSCULAR; INTRAVENOUS
Status: DISCONTINUED | OUTPATIENT
Start: 2022-05-12 | End: 2022-05-12

## 2022-05-12 RX ORDER — PHENYLEPHRINE HYDROCHLORIDE 25 MG/ML
1 SOLUTION/ DROPS OPHTHALMIC
Status: COMPLETED | OUTPATIENT
Start: 2022-05-12 | End: 2022-05-12

## 2022-05-12 RX ORDER — MOXIFLOXACIN 5 MG/ML
1 SOLUTION/ DROPS OPHTHALMIC
Status: COMPLETED | OUTPATIENT
Start: 2022-05-12 | End: 2022-05-12

## 2022-05-12 RX ORDER — MOXIFLOXACIN 5 MG/ML
SOLUTION/ DROPS OPHTHALMIC
Status: DISCONTINUED | OUTPATIENT
Start: 2022-05-12 | End: 2022-05-12 | Stop reason: HOSPADM

## 2022-05-12 RX ADMIN — MOXIFLOXACIN 1 DROP: 5 SOLUTION/ DROPS OPHTHALMIC at 12:05

## 2022-05-12 RX ADMIN — TETRACAINE HYDROCHLORIDE 1 DROP: 5 SOLUTION OPHTHALMIC at 10:05

## 2022-05-12 RX ADMIN — PHENYLEPHRINE HYDROCHLORIDE 1 DROP: 25 SOLUTION/ DROPS OPHTHALMIC at 10:05

## 2022-05-12 RX ADMIN — MOXIFLOXACIN 1 DROP: 5 SOLUTION/ DROPS OPHTHALMIC at 10:05

## 2022-05-12 RX ADMIN — TROPICAMIDE 1 DROP: 10 SOLUTION/ DROPS OPHTHALMIC at 10:05

## 2022-05-12 RX ADMIN — MIDAZOLAM HYDROCHLORIDE 2 MG: 1 INJECTION, SOLUTION INTRAMUSCULAR; INTRAVENOUS at 11:05

## 2022-05-12 NOTE — ANESTHESIA PREPROCEDURE EVALUATION
05/12/2022  Leticia Brown is a 67 y.o., female.      Pre-op Assessment    I have reviewed the Patient Summary Reports.     I have reviewed the Nursing Notes.    I have reviewed the Medications.     Review of Systems  Anesthesia Hx:  Denies Family Hx of Anesthesia complications.   Denies Personal Hx of Anesthesia complications.   Social:  Non-Smoker    Hematology/Oncology:  Hematology Normal   Oncology Normal     EENT/Dental:EENT/Dental Normal   Cardiovascular:   Hypertension    Pulmonary:  Pulmonary Normal    Renal/:  Renal/ Normal     Hepatic/GI:   GERD    Musculoskeletal:   Arthritis     Neurological:  Neurology Normal    Endocrine:   Diabetes    Dermatological:  Skin Normal    Psych:  Psychiatric Normal           Physical Exam    Airway:  Mallampati: II           Anesthesia Plan  Type of Anesthesia, risks & benefits discussed:    Anesthesia Type: MAC  Intra-op Monitoring Plan: Standard ASA Monitors  Post Op Pain Control Plan: multimodal analgesia  Informed Consent: Informed consent signed with the Patient and all parties understand the risks and agree with anesthesia plan.  All questions answered.   ASA Score: 3    Ready For Surgery From Anesthesia Perspective.     .

## 2022-05-12 NOTE — DISCHARGE SUMMARY
Outcome: Successful outpatient ophthalmic surgical procedure  Preprinted Instructions given to patient.  Regular diet.  Activity: No restrictions  Meds: see Med Rec  Condition: stable  Follow up: 1 day with Dr Carrera  Disposition: Home  Diagnosis: s/p eye surgery

## 2022-05-12 NOTE — OP NOTE
SURGEON:  Destiny Carrera M.D.    PREOPERATIVE DIAGNOSIS:    Nuclear Sclerotic Cataract Right Eye    POSTOPERATIVE DIAGNOSIS:    Nuclear Sclerotic Cataract Right Eye    PROCEDURES:    Phacoemulsification with  intraocular lens, Right eye ([id number])    DATE OF SURGERY: 05/12/2022    IMPLANT: CNA0T0 22.5    ANESTHESIA:  MAC with topical Lidocaine    COMPLICATIONS:  None    ESTIMATED BLOOD LOSS: None    SPECIMENS: None    INDICATIONS:    The patient has a history of painless progressive visual loss and difficulty with activities of daily living, which specifically include difficult driving at night due to glare and difficulty reading small print, secondary to cataract formation.  After a thorough discussion of the risks, benefits, and alternatives to cataract surgery, including, but not limited to, the rare risks of infection, retinal detachment, hemorrhage, need for additional surgery, loss of vision, and even loss of the eye, the patient voices understanding and desires to proceed.    DESCRIPTION OF PROCEDURE:    The patients IOL calculations were reviewed, and the lens selection confirmed.   After verification and marking of the proper eye in the preop holding area, the patient was brought to the operating room in supine position where the eye was prepped and draped in standard sterile fashion with 5% Betadine and a lid speculum placed in the eye.   Topical 4% Lidocaine was used in addition to the preoperative anesthesia and the procedure was begun by the creation of a paracentesis incision through which viscoelastic was used to fill the anterior chamber.  Next, a keratome blade was used to create a triplanar temporal clear corneal incision and a cystotome and Utrata forceps used to fashion a continuous curvilinear capsulorrhexis.  Hydrodissection was carried out using the Agudelo hydrodissection cannula and the nucleus was found to be mobile.  Phacoemulsification of the nucleus was carried out using a quick chop technique,  and all remaining epinuclear and cortical material was removed.  The eye was then reformed with Viscoelastic and the  intraocular lens was implanted into the capsular bag.  All remaining viscoelastics were removed from the eye and at the end of the case the pupil was round, the lens was well-centered within the capsular bag and all wounds were found to be water tight.  Drops of Vigamox and Pred Forte were instilled and a shield was placed over the eye. The patient will follow up with Dr. Carrera in the morning.

## 2022-05-12 NOTE — ANESTHESIA POSTPROCEDURE EVALUATION
Anesthesia Post Evaluation    Patient: Leticia Brown    Procedure(s) Performed: Procedure(s) (LRB):  EXTRACTION, CATARACT, WITH IOL INSERTION (Right)    Final Anesthesia Type: MAC      Patient location during evaluation: [facility]  Patient participation: Yes- Able to Participate  Level of consciousness: awake and alert and oriented  Post-procedure vital signs: reviewed and stable  Pain management: adequate  Airway patency: patent    PONV status at discharge: No PONV  Anesthetic complications: no      Cardiovascular status: stable, hemodynamically stable and blood pressure returned to baseline  Respiratory status: unassisted, spontaneous ventilation and room air  Hydration status: euvolemic  Follow-up not needed.          Vitals Value Taken Time   BP  05/12/22 1150   Temp  05/12/22 1150   Pulse  05/12/22 1150   Resp  05/12/22 1150   SpO2  05/12/22 1150         No case tracking events are documented in the log.      Pain/Vilma Score: No data recorded

## 2022-05-12 NOTE — PLAN OF CARE
Leticia Brown has met all discharge criteria from Phase II. Vital Signs are stable, ambulating  without difficulty. Discharge instructions given, patient verbalized understanding. Discharged from facility via wheelchair in stable condition.

## 2022-05-13 ENCOUNTER — OFFICE VISIT (OUTPATIENT)
Dept: OPHTHALMOLOGY | Facility: CLINIC | Age: 67
End: 2022-05-13
Attending: OPHTHALMOLOGY
Payer: MEDICARE

## 2022-05-13 DIAGNOSIS — Z98.890 POST-OPERATIVE STATE: Primary | ICD-10-CM

## 2022-05-13 DIAGNOSIS — H25.11 NUCLEAR SCLEROSIS OF RIGHT EYE: ICD-10-CM

## 2022-05-13 PROCEDURE — 1126F AMNT PAIN NOTED NONE PRSNT: CPT | Mod: CPTII,S$GLB,, | Performed by: OPHTHALMOLOGY

## 2022-05-13 PROCEDURE — 3044F HG A1C LEVEL LT 7.0%: CPT | Mod: CPTII,S$GLB,, | Performed by: OPHTHALMOLOGY

## 2022-05-13 PROCEDURE — 1101F PT FALLS ASSESS-DOCD LE1/YR: CPT | Mod: CPTII,S$GLB,, | Performed by: OPHTHALMOLOGY

## 2022-05-13 PROCEDURE — 99024 PR POST-OP FOLLOW-UP VISIT: ICD-10-PCS | Mod: S$GLB,,, | Performed by: OPHTHALMOLOGY

## 2022-05-13 PROCEDURE — 3288F PR FALLS RISK ASSESSMENT DOCUMENTED: ICD-10-PCS | Mod: CPTII,S$GLB,, | Performed by: OPHTHALMOLOGY

## 2022-05-13 PROCEDURE — 3044F PR MOST RECENT HEMOGLOBIN A1C LEVEL <7.0%: ICD-10-PCS | Mod: CPTII,S$GLB,, | Performed by: OPHTHALMOLOGY

## 2022-05-13 PROCEDURE — 3061F PR NEG MICROALBUMINURIA RESULT DOCUMENTED/REVIEW: ICD-10-PCS | Mod: CPTII,S$GLB,, | Performed by: OPHTHALMOLOGY

## 2022-05-13 PROCEDURE — 1160F RVW MEDS BY RX/DR IN RCRD: CPT | Mod: CPTII,S$GLB,, | Performed by: OPHTHALMOLOGY

## 2022-05-13 PROCEDURE — 3288F FALL RISK ASSESSMENT DOCD: CPT | Mod: CPTII,S$GLB,, | Performed by: OPHTHALMOLOGY

## 2022-05-13 PROCEDURE — 3066F PR DOCUMENTATION OF TREATMENT FOR NEPHROPATHY: ICD-10-PCS | Mod: CPTII,S$GLB,, | Performed by: OPHTHALMOLOGY

## 2022-05-13 PROCEDURE — 1101F PR PT FALLS ASSESS DOC 0-1 FALLS W/OUT INJ PAST YR: ICD-10-PCS | Mod: CPTII,S$GLB,, | Performed by: OPHTHALMOLOGY

## 2022-05-13 PROCEDURE — 1159F MED LIST DOCD IN RCRD: CPT | Mod: CPTII,S$GLB,, | Performed by: OPHTHALMOLOGY

## 2022-05-13 PROCEDURE — 99024 POSTOP FOLLOW-UP VISIT: CPT | Mod: S$GLB,,, | Performed by: OPHTHALMOLOGY

## 2022-05-13 PROCEDURE — 1126F PR PAIN SEVERITY QUANTIFIED, NO PAIN PRESENT: ICD-10-PCS | Mod: CPTII,S$GLB,, | Performed by: OPHTHALMOLOGY

## 2022-05-13 PROCEDURE — 1160F PR REVIEW ALL MEDS BY PRESCRIBER/CLIN PHARMACIST DOCUMENTED: ICD-10-PCS | Mod: CPTII,S$GLB,, | Performed by: OPHTHALMOLOGY

## 2022-05-13 PROCEDURE — 3066F NEPHROPATHY DOC TX: CPT | Mod: CPTII,S$GLB,, | Performed by: OPHTHALMOLOGY

## 2022-05-13 PROCEDURE — 1159F PR MEDICATION LIST DOCUMENTED IN MEDICAL RECORD: ICD-10-PCS | Mod: CPTII,S$GLB,, | Performed by: OPHTHALMOLOGY

## 2022-05-13 PROCEDURE — 99999 PR PBB SHADOW E&M-EST. PATIENT-LVL III: CPT | Mod: PBBFAC,,, | Performed by: OPHTHALMOLOGY

## 2022-05-13 PROCEDURE — 99999 PR PBB SHADOW E&M-EST. PATIENT-LVL III: ICD-10-PCS | Mod: PBBFAC,,, | Performed by: OPHTHALMOLOGY

## 2022-05-13 PROCEDURE — 3061F NEG MICROALBUMINURIA REV: CPT | Mod: CPTII,S$GLB,, | Performed by: OPHTHALMOLOGY

## 2022-05-13 NOTE — PROGRESS NOTES
HPI     66 y/o female presents to clinic for 1 day post op OD    5/12/22 CNA0T0 22.5 OD    Pt states VA is good     PGB TID OD     Last edited by Tracy Lawrence on 5/13/2022 11:26 AM. (History)            Assessment /Plan     For exam results, see Encounter Report.    Post-operative state    Nuclear sclerosis of right eye      Slit lamp exam:  L/L: nl  K: clear, wound sealed  AC: 1+ cell  Lens: IOL centered and stable    POD1 s/p Phaco/IOL  Appropriate precautions and post op medications reviewed.  Patient instructed to call or come in if symptoms of redness, decreased vision, or pain are experienced.

## 2022-05-20 ENCOUNTER — TELEPHONE (OUTPATIENT)
Dept: OPHTHALMOLOGY | Facility: CLINIC | Age: 67
End: 2022-05-20
Payer: MEDICARE

## 2022-05-23 ENCOUNTER — PATIENT MESSAGE (OUTPATIENT)
Dept: INTERNAL MEDICINE | Facility: CLINIC | Age: 67
End: 2022-05-23
Payer: MEDICARE

## 2022-05-23 DIAGNOSIS — E55.9 VITAMIN D DEFICIENCY: ICD-10-CM

## 2022-05-23 RX ORDER — ERGOCALCIFEROL 1.25 MG/1
50000 CAPSULE ORAL
Qty: 12 CAPSULE | Refills: 3 | Status: SHIPPED | OUTPATIENT
Start: 2022-05-23 | End: 2022-09-27 | Stop reason: SDUPTHER

## 2022-05-23 NOTE — TELEPHONE ENCOUNTER
Refill Routing Note   Medication(s) are not appropriate for processing by [facility] for the following reason(s):      - Outside of protocol    [facility] action(s):  Route          Medication reconciliation completed: No     Appointments  past 12m or future 3m with PCP    Date Provider   Last Visit   3/24/2022 Sarah Antonio MD   Next Visit   9/27/2022 Sarah Antonio MD   ED visits in past 90 days: 1        Note composed:2:20 PM 05/23/2022

## 2022-05-26 ENCOUNTER — TELEPHONE (OUTPATIENT)
Dept: SMOKING CESSATION | Facility: CLINIC | Age: 67
End: 2022-05-26
Payer: MEDICARE

## 2022-05-30 ENCOUNTER — TELEPHONE (OUTPATIENT)
Dept: SMOKING CESSATION | Facility: CLINIC | Age: 67
End: 2022-05-30
Payer: MEDICARE

## 2022-06-06 ENCOUNTER — PATIENT MESSAGE (OUTPATIENT)
Dept: INTERNAL MEDICINE | Facility: CLINIC | Age: 67
End: 2022-06-06
Payer: MEDICARE

## 2022-06-10 ENCOUNTER — OFFICE VISIT (OUTPATIENT)
Dept: OPHTHALMOLOGY | Facility: CLINIC | Age: 67
End: 2022-06-10
Attending: OPHTHALMOLOGY
Payer: MEDICARE

## 2022-06-10 DIAGNOSIS — H25.13 NUCLEAR SCLEROSIS OF BOTH EYES: ICD-10-CM

## 2022-06-10 DIAGNOSIS — Z98.890 POST-OPERATIVE STATE: Primary | ICD-10-CM

## 2022-06-10 DIAGNOSIS — H25.11 NUCLEAR SCLEROSIS OF RIGHT EYE: ICD-10-CM

## 2022-06-10 PROCEDURE — 3061F PR NEG MICROALBUMINURIA RESULT DOCUMENTED/REVIEW: ICD-10-PCS | Mod: CPTII,S$GLB,, | Performed by: OPHTHALMOLOGY

## 2022-06-10 PROCEDURE — 3044F HG A1C LEVEL LT 7.0%: CPT | Mod: CPTII,S$GLB,, | Performed by: OPHTHALMOLOGY

## 2022-06-10 PROCEDURE — 1101F PR PT FALLS ASSESS DOC 0-1 FALLS W/OUT INJ PAST YR: ICD-10-PCS | Mod: CPTII,S$GLB,, | Performed by: OPHTHALMOLOGY

## 2022-06-10 PROCEDURE — 1159F PR MEDICATION LIST DOCUMENTED IN MEDICAL RECORD: ICD-10-PCS | Mod: CPTII,S$GLB,, | Performed by: OPHTHALMOLOGY

## 2022-06-10 PROCEDURE — 3066F NEPHROPATHY DOC TX: CPT | Mod: CPTII,S$GLB,, | Performed by: OPHTHALMOLOGY

## 2022-06-10 PROCEDURE — 1126F AMNT PAIN NOTED NONE PRSNT: CPT | Mod: CPTII,S$GLB,, | Performed by: OPHTHALMOLOGY

## 2022-06-10 PROCEDURE — 1160F RVW MEDS BY RX/DR IN RCRD: CPT | Mod: CPTII,S$GLB,, | Performed by: OPHTHALMOLOGY

## 2022-06-10 PROCEDURE — 99999 PR PBB SHADOW E&M-EST. PATIENT-LVL III: CPT | Mod: PBBFAC,,, | Performed by: OPHTHALMOLOGY

## 2022-06-10 PROCEDURE — 99024 PR POST-OP FOLLOW-UP VISIT: ICD-10-PCS | Mod: S$GLB,,, | Performed by: OPHTHALMOLOGY

## 2022-06-10 PROCEDURE — 99999 PR PBB SHADOW E&M-EST. PATIENT-LVL III: ICD-10-PCS | Mod: PBBFAC,,, | Performed by: OPHTHALMOLOGY

## 2022-06-10 PROCEDURE — 3044F PR MOST RECENT HEMOGLOBIN A1C LEVEL <7.0%: ICD-10-PCS | Mod: CPTII,S$GLB,, | Performed by: OPHTHALMOLOGY

## 2022-06-10 PROCEDURE — 3066F PR DOCUMENTATION OF TREATMENT FOR NEPHROPATHY: ICD-10-PCS | Mod: CPTII,S$GLB,, | Performed by: OPHTHALMOLOGY

## 2022-06-10 PROCEDURE — 1159F MED LIST DOCD IN RCRD: CPT | Mod: CPTII,S$GLB,, | Performed by: OPHTHALMOLOGY

## 2022-06-10 PROCEDURE — 99024 POSTOP FOLLOW-UP VISIT: CPT | Mod: S$GLB,,, | Performed by: OPHTHALMOLOGY

## 2022-06-10 PROCEDURE — 3061F NEG MICROALBUMINURIA REV: CPT | Mod: CPTII,S$GLB,, | Performed by: OPHTHALMOLOGY

## 2022-06-10 PROCEDURE — 1160F PR REVIEW ALL MEDS BY PRESCRIBER/CLIN PHARMACIST DOCUMENTED: ICD-10-PCS | Mod: CPTII,S$GLB,, | Performed by: OPHTHALMOLOGY

## 2022-06-10 PROCEDURE — 3288F PR FALLS RISK ASSESSMENT DOCUMENTED: ICD-10-PCS | Mod: CPTII,S$GLB,, | Performed by: OPHTHALMOLOGY

## 2022-06-10 PROCEDURE — 1126F PR PAIN SEVERITY QUANTIFIED, NO PAIN PRESENT: ICD-10-PCS | Mod: CPTII,S$GLB,, | Performed by: OPHTHALMOLOGY

## 2022-06-10 PROCEDURE — 3288F FALL RISK ASSESSMENT DOCD: CPT | Mod: CPTII,S$GLB,, | Performed by: OPHTHALMOLOGY

## 2022-06-10 PROCEDURE — 1101F PT FALLS ASSESS-DOCD LE1/YR: CPT | Mod: CPTII,S$GLB,, | Performed by: OPHTHALMOLOGY

## 2022-06-10 RX ORDER — TETRACAINE HYDROCHLORIDE 5 MG/ML
1 SOLUTION OPHTHALMIC
Status: CANCELLED | OUTPATIENT
Start: 2022-06-10

## 2022-06-10 RX ORDER — TROPICAMIDE 10 MG/ML
1 SOLUTION/ DROPS OPHTHALMIC
Status: CANCELLED | OUTPATIENT
Start: 2022-06-10

## 2022-06-10 RX ORDER — MOXIFLOXACIN 5 MG/ML
1 SOLUTION/ DROPS OPHTHALMIC
Status: CANCELLED | OUTPATIENT
Start: 2022-06-10

## 2022-06-10 RX ORDER — PHENYLEPHRINE HYDROCHLORIDE 100 MG/ML
1 SOLUTION/ DROPS OPHTHALMIC
Status: CANCELLED | OUTPATIENT
Start: 2022-06-10

## 2022-06-10 RX ORDER — PHENYLEPHRINE HYDROCHLORIDE 25 MG/ML
1 SOLUTION/ DROPS OPHTHALMIC
Status: CANCELLED | OUTPATIENT
Start: 2022-06-10

## 2022-06-10 NOTE — PROGRESS NOTES
HPI     66 y/o female presents to clinic for 1 week post op OD.    5/12/22 CNA0T0 22.5 OD    VA occasionally cloudy OD, no pain and no f/f.      PGB TID OD     Last edited by Noemi Lawrence on 6/10/2022 10:35 AM. (History)            Assessment /Plan     For exam results, see Encounter Report.    Post-operative state    Nuclear sclerosis of right eye      Slit lamp exam:  L/L: nl  K: clear, wound sealed  AC: trace cell  Iris/Lens: IOL centered and stable    POW1 s/p phaco: Surgery healing well with no signs of infection or abnormal inflammation.    Patient wishes to proceed with surgery in the second eye. Risks, benefits, alternatives reviewed. IOL selection reviewed.    Left eye  IOL: CNA0T0 22.5

## 2022-06-10 NOTE — H&P (VIEW-ONLY)
HPI     68 y/o female presents to clinic for 1 week post op OD.    5/12/22 CNA0T0 22.5 OD    VA occasionally cloudy OD, no pain and no f/f.      PGB TID OD     Last edited by Noemi Lawrence on 6/10/2022 10:35 AM. (History)            Assessment /Plan     For exam results, see Encounter Report.    Post-operative state    Nuclear sclerosis of right eye      Slit lamp exam:  L/L: nl  K: clear, wound sealed  AC: trace cell  Iris/Lens: IOL centered and stable    POW1 s/p phaco: Surgery healing well with no signs of infection or abnormal inflammation.    Patient wishes to proceed with surgery in the second eye. Risks, benefits, alternatives reviewed. IOL selection reviewed.    Left eye  IOL: CNA0T0 22.5

## 2022-06-13 ENCOUNTER — TELEPHONE (OUTPATIENT)
Dept: OPHTHALMOLOGY | Facility: CLINIC | Age: 67
End: 2022-06-13
Payer: MEDICARE

## 2022-06-13 NOTE — TELEPHONE ENCOUNTER
Patient given arrival time of 9:00 am on Thursday June 16 . Nothing to eat or drink after 9 pm.  Water, Gatorade after 9 pm until leaves home.  Start drops into the operative eye today. [address]

## 2022-06-14 ENCOUNTER — TELEPHONE (OUTPATIENT)
Dept: OPHTHALMOLOGY | Facility: CLINIC | Age: 67
End: 2022-06-14
Payer: MEDICARE

## 2022-06-14 NOTE — TELEPHONE ENCOUNTER
----- Message from Kim Davis sent at 6/14/2022  4:20 PM CDT -----  Regarding: Surgery Inquiry  Patient called  in regards to surgery time and pre op instructions.       Requesting Call back number:[phone]

## 2022-06-16 ENCOUNTER — ANESTHESIA EVENT (OUTPATIENT)
Dept: SURGERY | Facility: OTHER | Age: 67
End: 2022-06-16
Payer: MEDICARE

## 2022-06-16 ENCOUNTER — HOSPITAL ENCOUNTER (OUTPATIENT)
Facility: OTHER | Age: 67
Discharge: HOME | End: 2022-06-16
Attending: OPHTHALMOLOGY | Admitting: OPHTHALMOLOGY
Payer: MEDICARE

## 2022-06-16 ENCOUNTER — ANESTHESIA (OUTPATIENT)
Dept: SURGERY | Facility: OTHER | Age: 67
End: 2022-06-16
Payer: MEDICARE

## 2022-06-16 VITALS
HEIGHT: 62 IN | WEIGHT: 158 LBS | RESPIRATION RATE: 16 BRPM | SYSTOLIC BLOOD PRESSURE: 170 MMHG | OXYGEN SATURATION: 98 % | TEMPERATURE: 98 F | DIASTOLIC BLOOD PRESSURE: 79 MMHG | HEART RATE: 60 BPM | BODY MASS INDEX: 29.08 KG/M2

## 2022-06-16 DIAGNOSIS — Z98.890 POST-OPERATIVE STATE: ICD-10-CM

## 2022-06-16 DIAGNOSIS — H25.12 NUCLEAR SCLEROTIC CATARACT OF LEFT EYE: Primary | ICD-10-CM

## 2022-06-16 DIAGNOSIS — H25.13 NUCLEAR SCLEROSIS OF BOTH EYES: ICD-10-CM

## 2022-06-16 PROCEDURE — 37000009 HC ANESTHESIA EA ADD 15 MINS: Performed by: OPHTHALMOLOGY

## 2022-06-16 PROCEDURE — 25000003 PHARM REV CODE 250: Performed by: NURSE ANESTHETIST, CERTIFIED REGISTERED

## 2022-06-16 PROCEDURE — 66984 XCAPSL CTRC RMVL W/O ECP: CPT | Mod: 79,LT,, | Performed by: OPHTHALMOLOGY

## 2022-06-16 PROCEDURE — 25000003 PHARM REV CODE 250: Performed by: OPHTHALMOLOGY

## 2022-06-16 PROCEDURE — 63600175 PHARM REV CODE 636 W HCPCS: Performed by: NURSE ANESTHETIST, CERTIFIED REGISTERED

## 2022-06-16 PROCEDURE — V2632 POST CHMBR INTRAOCULAR LENS: HCPCS | Performed by: OPHTHALMOLOGY

## 2022-06-16 PROCEDURE — 66984 PR REMOVAL, CATARACT, W/INSRT INTRAOC LENS, W/O ENDO CYCLO: ICD-10-PCS | Mod: 79,LT,, | Performed by: OPHTHALMOLOGY

## 2022-06-16 PROCEDURE — 36000707: Performed by: OPHTHALMOLOGY

## 2022-06-16 PROCEDURE — 37000008 HC ANESTHESIA 1ST 15 MINUTES: Performed by: OPHTHALMOLOGY

## 2022-06-16 PROCEDURE — 36000706: Performed by: OPHTHALMOLOGY

## 2022-06-16 PROCEDURE — A4216 STERILE WATER/SALINE, 10 ML: HCPCS | Performed by: NURSE ANESTHETIST, CERTIFIED REGISTERED

## 2022-06-16 PROCEDURE — 71000015 HC POSTOP RECOV 1ST HR: Performed by: OPHTHALMOLOGY

## 2022-06-16 DEVICE — LENS CLAREON AUTONOME 22.5D: Type: IMPLANTABLE DEVICE | Site: EYE | Status: FUNCTIONAL

## 2022-06-16 RX ORDER — TETRACAINE HYDROCHLORIDE 5 MG/ML
1 SOLUTION OPHTHALMIC
Status: COMPLETED | OUTPATIENT
Start: 2022-06-16 | End: 2022-06-16

## 2022-06-16 RX ORDER — MOXIFLOXACIN 5 MG/ML
SOLUTION/ DROPS OPHTHALMIC
Status: DISCONTINUED | OUTPATIENT
Start: 2022-06-16 | End: 2022-06-16 | Stop reason: HOSPADM

## 2022-06-16 RX ORDER — PHENYLEPHRINE HYDROCHLORIDE 25 MG/ML
1 SOLUTION/ DROPS OPHTHALMIC
Status: COMPLETED | OUTPATIENT
Start: 2022-06-16 | End: 2022-06-16

## 2022-06-16 RX ORDER — MOXIFLOXACIN 5 MG/ML
1 SOLUTION/ DROPS OPHTHALMIC
Status: COMPLETED | OUTPATIENT
Start: 2022-06-16 | End: 2022-06-16

## 2022-06-16 RX ORDER — SODIUM CHLORIDE 0.9 % (FLUSH) 0.9 %
SYRINGE (ML) INJECTION
Status: DISCONTINUED | OUTPATIENT
Start: 2022-06-16 | End: 2022-06-16

## 2022-06-16 RX ORDER — ACETAMINOPHEN 325 MG/1
650 TABLET ORAL EVERY 4 HOURS PRN
Status: DISCONTINUED | OUTPATIENT
Start: 2022-06-16 | End: 2022-06-16 | Stop reason: HOSPADM

## 2022-06-16 RX ORDER — TROPICAMIDE 10 MG/ML
1 SOLUTION/ DROPS OPHTHALMIC
Status: COMPLETED | OUTPATIENT
Start: 2022-06-16 | End: 2022-06-16

## 2022-06-16 RX ORDER — MIDAZOLAM HYDROCHLORIDE 1 MG/ML
INJECTION INTRAMUSCULAR; INTRAVENOUS
Status: DISCONTINUED | OUTPATIENT
Start: 2022-06-16 | End: 2022-06-16

## 2022-06-16 RX ORDER — TETRACAINE HYDROCHLORIDE 5 MG/ML
SOLUTION OPHTHALMIC
Status: DISCONTINUED | OUTPATIENT
Start: 2022-06-16 | End: 2022-06-16 | Stop reason: HOSPADM

## 2022-06-16 RX ORDER — PHENYLEPHRINE HYDROCHLORIDE 100 MG/ML
1 SOLUTION/ DROPS OPHTHALMIC
Status: DISCONTINUED | OUTPATIENT
Start: 2022-06-16 | End: 2022-06-16 | Stop reason: HOSPADM

## 2022-06-16 RX ORDER — LIDOCAINE HYDROCHLORIDE 40 MG/ML
INJECTION, SOLUTION RETROBULBAR
Status: DISCONTINUED | OUTPATIENT
Start: 2022-06-16 | End: 2022-06-16 | Stop reason: HOSPADM

## 2022-06-16 RX ORDER — PROPARACAINE HYDROCHLORIDE 5 MG/ML
1 SOLUTION/ DROPS OPHTHALMIC
Status: DISCONTINUED | OUTPATIENT
Start: 2022-06-16 | End: 2022-06-16 | Stop reason: HOSPADM

## 2022-06-16 RX ADMIN — SODIUM CHLORIDE, PRESERVATIVE FREE 10 ML: 5 INJECTION INTRAVENOUS at 10:06

## 2022-06-16 RX ADMIN — TROPICAMIDE 1 DROP: 10 SOLUTION/ DROPS OPHTHALMIC at 09:06

## 2022-06-16 RX ADMIN — MOXIFLOXACIN 1 DROP: 5 SOLUTION/ DROPS OPHTHALMIC at 09:06

## 2022-06-16 RX ADMIN — TETRACAINE HYDROCHLORIDE 1 DROP: 5 SOLUTION OPHTHALMIC at 09:06

## 2022-06-16 RX ADMIN — MOXIFLOXACIN 1 DROP: 5 SOLUTION/ DROPS OPHTHALMIC at 11:06

## 2022-06-16 RX ADMIN — MIDAZOLAM HYDROCHLORIDE 2 MG: 1 INJECTION, SOLUTION INTRAMUSCULAR; INTRAVENOUS at 10:06

## 2022-06-16 RX ADMIN — PHENYLEPHRINE HYDROCHLORIDE 1 DROP: 25 SOLUTION/ DROPS OPHTHALMIC at 09:06

## 2022-06-16 NOTE — ANESTHESIA POSTPROCEDURE EVALUATION
Anesthesia Post Evaluation    Patient: Leticia Brown    Procedure(s) Performed: Procedure(s) (LRB):  EXTRACTION, CATARACT, WITH IOL INSERTION (Left)    Final Anesthesia Type: MAC      Patient location during evaluation: [facility]  Patient participation: Yes- Able to Participate  Level of consciousness: awake and alert  Post-procedure vital signs: reviewed and stable (B/P-188/82, HR-063, RR-22, O2%-100)  Pain management: adequate  Airway patency: patent    PONV status at discharge: No PONV  Anesthetic complications: no      Cardiovascular status: blood pressure returned to baseline, bradycardic and hemodynamically stable  Respiratory status: unassisted, room air and spontaneous ventilation  Hydration status: euvolemic  Follow-up not needed.          Vitals Value Taken Time   /71 06/16/22 0943   Temp 36.7 °C (98.1 °F) 06/16/22 0943   Pulse 60 06/16/22 0943   Resp 18 06/16/22 0943   SpO2 97 % 06/16/22 0943         No case tracking events are documented in the log.      Pain/Vilma Score: No data recorded

## 2022-06-16 NOTE — OP NOTE
SURGEON:  Destiny Carrera M.D.    PREOPERATIVE DIAGNOSIS:    Nuclear Sclerotic Cataract Left Eye    POSTOPERATIVE DIAGNOSIS:    Nuclear Sclerotic Cataract Left Eye    PROCEDURES:    Phacoemulsification with  intraocular lens, Left eye (52396)    DATE OF SURGERY: 06/16/2022    IMPLANT: CNA0T0 22.5    ANESTHESIA:  MAC with topical Lidocaine    COMPLICATIONS:  None    ESTIMATED BLOOD LOSS: None    SPECIMENS: None    INDICATIONS:    The patient has a history of painless progressive visual loss and difficulty with activities of daily living, which specifically include difficult driving at night due to glare and difficulty reading small print, secondary to cataract formation.  After a thorough discussion of the risks, benefits, and alternatives to cataract surgery, including, but not limited to, the rare risks of infection, retinal detachment, hemorrhage, need for additional surgery, loss of vision, and even loss of the eye, the patient voices understanding and desires to proceed.    DESCRIPTION OF PROCEDURE:    The patients IOL calculations were reviewed, and the lens selection confirmed.   After verification and marking of the proper eye in the preop holding area, the patient was brought to the operating room in supine position where the eye was prepped and draped in standard sterile fashion with 5% Betadine and a lid speculum placed in the eye.   Topical 4% Lidocaine was used in addition to the preoperative anesthesia and the procedure was begun by the creation of a paracentesis incision through which viscoelastic was used to fill the anterior chamber.  Next, a keratome blade was used to create a triplanar temporal clear corneal incision and a cystotome and Utrata forceps used to fashion a continuous curvilinear capsulorrhexis.  Hydrodissection was carried out using the Agudelo hydrodissection cannula and the nucleus was found to be mobile.  Phacoemulsification of the nucleus was carried out using a quick chop technique,  and all remaining epinuclear and cortical material was removed.  The eye was then reformed with Viscoelastic and the  intraocular lens was implanted into the capsular bag.  All remaining viscoelastics were removed from the eye and at the end of the case the pupil was round, the lens was well-centered within the capsular bag and all wounds were found to be water tight.  Drops of Vigamox and Pred Forte were instilled and a shield was placed over the eye. The patient will follow up with Dr. Carrera in the morning.

## 2022-06-16 NOTE — ANESTHESIA PREPROCEDURE EVALUATION
06/16/2022  Leticia Brown is a 67 y.o., female.      Pre-op Assessment    I have reviewed the Patient Summary Reports.     I have reviewed the Nursing Notes.    I have reviewed the Medications.     Review of Systems  Anesthesia Hx:  Denies Family Hx of Anesthesia complications.   Denies Personal Hx of Anesthesia complications.   Social:  Non-Smoker    Hematology/Oncology:  Hematology Normal   Oncology Normal     EENT/Dental:EENT/Dental Normal   Cardiovascular:   Hypertension    Pulmonary:  Pulmonary Normal    Renal/:  Renal/ Normal     Hepatic/GI:   GERD    Musculoskeletal:   Arthritis     Neurological:  Neurology Normal    Endocrine:   Diabetes    Dermatological:  Skin Normal    Psych:  Psychiatric Normal           Physical Exam    Airway:  Mallampati: II           Anesthesia Plan  Type of Anesthesia, risks & benefits discussed:    Anesthesia Type: MAC  Intra-op Monitoring Plan: Standard ASA Monitors  Post Op Pain Control Plan: multimodal analgesia  Informed Consent: Informed consent signed with the Patient and all parties understand the risks and agree with anesthesia plan.  All questions answered.   ASA Score: 3    Ready For Surgery From Anesthesia Perspective.     .

## 2022-06-17 ENCOUNTER — OFFICE VISIT (OUTPATIENT)
Dept: OPHTHALMOLOGY | Facility: CLINIC | Age: 67
End: 2022-06-17
Attending: OPHTHALMOLOGY
Payer: MEDICARE

## 2022-06-17 DIAGNOSIS — H25.13 NUCLEAR SCLEROSIS, BILATERAL: Primary | ICD-10-CM

## 2022-06-17 DIAGNOSIS — Z98.890 POST-OPERATIVE STATE: ICD-10-CM

## 2022-06-17 PROCEDURE — 99024 PR POST-OP FOLLOW-UP VISIT: ICD-10-PCS | Mod: S$GLB,,, | Performed by: OPHTHALMOLOGY

## 2022-06-17 PROCEDURE — 3066F PR DOCUMENTATION OF TREATMENT FOR NEPHROPATHY: ICD-10-PCS | Mod: CPTII,S$GLB,, | Performed by: OPHTHALMOLOGY

## 2022-06-17 PROCEDURE — 3061F NEG MICROALBUMINURIA REV: CPT | Mod: CPTII,S$GLB,, | Performed by: OPHTHALMOLOGY

## 2022-06-17 PROCEDURE — 1160F RVW MEDS BY RX/DR IN RCRD: CPT | Mod: CPTII,S$GLB,, | Performed by: OPHTHALMOLOGY

## 2022-06-17 PROCEDURE — 3061F PR NEG MICROALBUMINURIA RESULT DOCUMENTED/REVIEW: ICD-10-PCS | Mod: CPTII,S$GLB,, | Performed by: OPHTHALMOLOGY

## 2022-06-17 PROCEDURE — 1126F AMNT PAIN NOTED NONE PRSNT: CPT | Mod: CPTII,S$GLB,, | Performed by: OPHTHALMOLOGY

## 2022-06-17 PROCEDURE — 1101F PR PT FALLS ASSESS DOC 0-1 FALLS W/OUT INJ PAST YR: ICD-10-PCS | Mod: CPTII,S$GLB,, | Performed by: OPHTHALMOLOGY

## 2022-06-17 PROCEDURE — 3044F PR MOST RECENT HEMOGLOBIN A1C LEVEL <7.0%: ICD-10-PCS | Mod: CPTII,S$GLB,, | Performed by: OPHTHALMOLOGY

## 2022-06-17 PROCEDURE — 99999 PR PBB SHADOW E&M-EST. PATIENT-LVL III: ICD-10-PCS | Mod: PBBFAC,,, | Performed by: OPHTHALMOLOGY

## 2022-06-17 PROCEDURE — 99999 PR PBB SHADOW E&M-EST. PATIENT-LVL III: CPT | Mod: PBBFAC,,, | Performed by: OPHTHALMOLOGY

## 2022-06-17 PROCEDURE — 3044F HG A1C LEVEL LT 7.0%: CPT | Mod: CPTII,S$GLB,, | Performed by: OPHTHALMOLOGY

## 2022-06-17 PROCEDURE — 99024 POSTOP FOLLOW-UP VISIT: CPT | Mod: S$GLB,,, | Performed by: OPHTHALMOLOGY

## 2022-06-17 PROCEDURE — 3288F FALL RISK ASSESSMENT DOCD: CPT | Mod: CPTII,S$GLB,, | Performed by: OPHTHALMOLOGY

## 2022-06-17 PROCEDURE — 3288F PR FALLS RISK ASSESSMENT DOCUMENTED: ICD-10-PCS | Mod: CPTII,S$GLB,, | Performed by: OPHTHALMOLOGY

## 2022-06-17 PROCEDURE — 3066F NEPHROPATHY DOC TX: CPT | Mod: CPTII,S$GLB,, | Performed by: OPHTHALMOLOGY

## 2022-06-17 PROCEDURE — 1126F PR PAIN SEVERITY QUANTIFIED, NO PAIN PRESENT: ICD-10-PCS | Mod: CPTII,S$GLB,, | Performed by: OPHTHALMOLOGY

## 2022-06-17 PROCEDURE — 1160F PR REVIEW ALL MEDS BY PRESCRIBER/CLIN PHARMACIST DOCUMENTED: ICD-10-PCS | Mod: CPTII,S$GLB,, | Performed by: OPHTHALMOLOGY

## 2022-06-17 PROCEDURE — 1159F MED LIST DOCD IN RCRD: CPT | Mod: CPTII,S$GLB,, | Performed by: OPHTHALMOLOGY

## 2022-06-17 PROCEDURE — 1101F PT FALLS ASSESS-DOCD LE1/YR: CPT | Mod: CPTII,S$GLB,, | Performed by: OPHTHALMOLOGY

## 2022-06-17 PROCEDURE — 1159F PR MEDICATION LIST DOCUMENTED IN MEDICAL RECORD: ICD-10-PCS | Mod: CPTII,S$GLB,, | Performed by: OPHTHALMOLOGY

## 2022-06-17 NOTE — PROGRESS NOTES
HPI     68 y/o female is here for 1 day po phaco/IOL OS.    Vision is good and no pain.    PGB TID OS    DATE OF SURGERY: 06/16/2022    CNA0T0 22.5  OS    Last edited by Ingrid Rivera on 6/17/2022  8:24 AM. (History)            Assessment /Plan     For exam results, see Encounter Report.    Nuclear sclerosis, bilateral    Post-operative state      Slit lamp exam:  L/L: nl  K: clear, wound sealed  AC: 1+ cell  Lens: IOL centered and stable    POD1 s/p Phaco/IOL  Appropriate precautions and post op medications reviewed.  Patient instructed to call or come in if symptoms of redness, decreased vision, or pain are experienced.

## 2022-06-23 ENCOUNTER — TELEPHONE (OUTPATIENT)
Dept: SMOKING CESSATION | Facility: CLINIC | Age: 67
End: 2022-06-23
Payer: MEDICARE

## 2022-06-27 ENCOUNTER — TELEPHONE (OUTPATIENT)
Dept: SMOKING CESSATION | Facility: CLINIC | Age: 67
End: 2022-06-27
Payer: MEDICARE

## 2022-07-06 ENCOUNTER — PATIENT OUTREACH (OUTPATIENT)
Dept: ADMINISTRATIVE | Facility: HOSPITAL | Age: 67
End: 2022-07-06
Payer: MEDICARE

## 2022-07-29 ENCOUNTER — TELEPHONE (OUTPATIENT)
Dept: SMOKING CESSATION | Facility: CLINIC | Age: 67
End: 2022-07-29
Payer: MEDICARE

## 2022-08-10 ENCOUNTER — PES CALL (OUTPATIENT)
Dept: ADMINISTRATIVE | Facility: CLINIC | Age: 67
End: 2022-08-10
Payer: MEDICARE

## 2022-08-11 ENCOUNTER — OFFICE VISIT (OUTPATIENT)
Dept: OPTOMETRY | Facility: CLINIC | Age: 67
End: 2022-08-11
Payer: MEDICARE

## 2022-08-11 DIAGNOSIS — Z98.42 S/P BILATERAL CATARACT EXTRACTION: Primary | ICD-10-CM

## 2022-08-11 DIAGNOSIS — Z98.41 S/P BILATERAL CATARACT EXTRACTION: Primary | ICD-10-CM

## 2022-08-11 PROCEDURE — 1126F AMNT PAIN NOTED NONE PRSNT: CPT | Mod: CPTII,S$GLB,, | Performed by: OPTOMETRIST

## 2022-08-11 PROCEDURE — 99999 PR PBB SHADOW E&M-EST. PATIENT-LVL III: ICD-10-PCS | Mod: PBBFAC,,, | Performed by: OPTOMETRIST

## 2022-08-11 PROCEDURE — 3061F PR NEG MICROALBUMINURIA RESULT DOCUMENTED/REVIEW: ICD-10-PCS | Mod: CPTII,S$GLB,, | Performed by: OPTOMETRIST

## 2022-08-11 PROCEDURE — 3288F PR FALLS RISK ASSESSMENT DOCUMENTED: ICD-10-PCS | Mod: CPTII,S$GLB,, | Performed by: OPTOMETRIST

## 2022-08-11 PROCEDURE — 3066F PR DOCUMENTATION OF TREATMENT FOR NEPHROPATHY: ICD-10-PCS | Mod: CPTII,S$GLB,, | Performed by: OPTOMETRIST

## 2022-08-11 PROCEDURE — 99024 PR POST-OP FOLLOW-UP VISIT: ICD-10-PCS | Mod: S$GLB,,, | Performed by: OPTOMETRIST

## 2022-08-11 PROCEDURE — 3288F FALL RISK ASSESSMENT DOCD: CPT | Mod: CPTII,S$GLB,, | Performed by: OPTOMETRIST

## 2022-08-11 PROCEDURE — 1159F PR MEDICATION LIST DOCUMENTED IN MEDICAL RECORD: ICD-10-PCS | Mod: CPTII,S$GLB,, | Performed by: OPTOMETRIST

## 2022-08-11 PROCEDURE — 1101F PT FALLS ASSESS-DOCD LE1/YR: CPT | Mod: CPTII,S$GLB,, | Performed by: OPTOMETRIST

## 2022-08-11 PROCEDURE — 1126F PR PAIN SEVERITY QUANTIFIED, NO PAIN PRESENT: ICD-10-PCS | Mod: CPTII,S$GLB,, | Performed by: OPTOMETRIST

## 2022-08-11 PROCEDURE — 3066F NEPHROPATHY DOC TX: CPT | Mod: CPTII,S$GLB,, | Performed by: OPTOMETRIST

## 2022-08-11 PROCEDURE — 1101F PR PT FALLS ASSESS DOC 0-1 FALLS W/OUT INJ PAST YR: ICD-10-PCS | Mod: CPTII,S$GLB,, | Performed by: OPTOMETRIST

## 2022-08-11 PROCEDURE — 99999 PR PBB SHADOW E&M-EST. PATIENT-LVL III: CPT | Mod: PBBFAC,,, | Performed by: OPTOMETRIST

## 2022-08-11 PROCEDURE — 99024 POSTOP FOLLOW-UP VISIT: CPT | Mod: S$GLB,,, | Performed by: OPTOMETRIST

## 2022-08-11 PROCEDURE — 3044F HG A1C LEVEL LT 7.0%: CPT | Mod: CPTII,S$GLB,, | Performed by: OPTOMETRIST

## 2022-08-11 PROCEDURE — 1159F MED LIST DOCD IN RCRD: CPT | Mod: CPTII,S$GLB,, | Performed by: OPTOMETRIST

## 2022-08-11 PROCEDURE — 3061F NEG MICROALBUMINURIA REV: CPT | Mod: CPTII,S$GLB,, | Performed by: OPTOMETRIST

## 2022-08-11 PROCEDURE — 3044F PR MOST RECENT HEMOGLOBIN A1C LEVEL <7.0%: ICD-10-PCS | Mod: CPTII,S$GLB,, | Performed by: OPTOMETRIST

## 2022-08-11 NOTE — PROGRESS NOTES
HPI     Post-op Evaluation      Additional comments: 2 month phaco OS              Comments     Last eye exam was 6/17/22 with Dr. Carrera.  Patient states vision OU is better but OD jones a lot.    05/12/2022 IMPLANT: CNA0T0 22.5 OD  06/16/2022 IMPLANT: CNA0T0 22.5 OS          Last edited by Lynne Mccann MA on 8/11/2022  9:46 AM. (History)            Assessment /Plan     For exam results, see Encounter Report.    S/P bilateral cataract extraction  -     OCT- Retina            1.  Pt doing well.  Reading rx given.  Recommend artificial tears bid OU.  No CME OU.  Retina flat and intact OU--no holes, tears, breaks, or RDs.  Return care to Dr. Ochoa.

## 2022-08-17 ENCOUNTER — TELEPHONE (OUTPATIENT)
Dept: SMOKING CESSATION | Facility: CLINIC | Age: 67
End: 2022-08-17
Payer: MEDICARE

## 2022-08-24 ENCOUNTER — PATIENT MESSAGE (OUTPATIENT)
Dept: ADMINISTRATIVE | Facility: HOSPITAL | Age: 67
End: 2022-08-24
Payer: MEDICARE

## 2022-09-20 ENCOUNTER — LAB VISIT (OUTPATIENT)
Dept: LAB | Facility: OTHER | Age: 67
End: 2022-09-20
Attending: INTERNAL MEDICINE
Payer: MEDICARE

## 2022-09-20 DIAGNOSIS — I70.0 AORTIC ATHEROSCLEROSIS: ICD-10-CM

## 2022-09-20 DIAGNOSIS — E78.49 OTHER HYPERLIPIDEMIA: ICD-10-CM

## 2022-09-20 DIAGNOSIS — Z00.00 ANNUAL PHYSICAL EXAM: ICD-10-CM

## 2022-09-20 DIAGNOSIS — E55.9 VITAMIN D DEFICIENCY: ICD-10-CM

## 2022-09-20 DIAGNOSIS — E11.42 TYPE 2 DIABETES MELLITUS WITH DIABETIC POLYNEUROPATHY, WITHOUT LONG-TERM CURRENT USE OF INSULIN: ICD-10-CM

## 2022-09-20 LAB
25(OH)D3+25(OH)D2 SERPL-MCNC: 21 NG/ML (ref 30–96)
CHOLEST SERPL-MCNC: 191 MG/DL (ref 120–199)
CHOLEST/HDLC SERPL: 4.7 {RATIO} (ref 2–5)
ESTIMATED AVG GLUCOSE: 120 MG/DL (ref 68–131)
HBA1C MFR BLD: 5.8 % (ref 4–5.6)
HDLC SERPL-MCNC: 41 MG/DL (ref 40–75)
HDLC SERPL: 21.5 % (ref 20–50)
LDLC SERPL CALC-MCNC: 128.6 MG/DL (ref 63–159)
NONHDLC SERPL-MCNC: 150 MG/DL
TRIGL SERPL-MCNC: 107 MG/DL (ref 30–150)
TSH SERPL DL<=0.005 MIU/L-ACNC: 1.45 UIU/ML (ref 0.4–4)

## 2022-09-20 PROCEDURE — 83036 HEMOGLOBIN GLYCOSYLATED A1C: CPT | Performed by: INTERNAL MEDICINE

## 2022-09-20 PROCEDURE — 36415 COLL VENOUS BLD VENIPUNCTURE: CPT | Performed by: INTERNAL MEDICINE

## 2022-09-20 PROCEDURE — 80061 LIPID PANEL: CPT | Performed by: INTERNAL MEDICINE

## 2022-09-20 PROCEDURE — 84443 ASSAY THYROID STIM HORMONE: CPT | Performed by: INTERNAL MEDICINE

## 2022-09-20 PROCEDURE — 82306 VITAMIN D 25 HYDROXY: CPT | Performed by: INTERNAL MEDICINE

## 2022-09-21 ENCOUNTER — IMMUNIZATION (OUTPATIENT)
Dept: PHARMACY | Facility: CLINIC | Age: 67
End: 2022-09-21
Payer: MEDICARE

## 2022-09-23 DIAGNOSIS — I10 ESSENTIAL HYPERTENSION: ICD-10-CM

## 2022-09-23 DIAGNOSIS — R63.0 DECREASED APPETITE: ICD-10-CM

## 2022-09-23 DIAGNOSIS — F41.9 ANXIETY: ICD-10-CM

## 2022-09-23 DIAGNOSIS — G47.00 INSOMNIA, UNSPECIFIED TYPE: ICD-10-CM

## 2022-09-23 DIAGNOSIS — F33.42 RECURRENT MAJOR DEPRESSIVE DISORDER, IN FULL REMISSION: ICD-10-CM

## 2022-09-23 RX ORDER — LOSARTAN POTASSIUM 50 MG/1
TABLET ORAL
Qty: 180 TABLET | Refills: 3 | Status: SHIPPED | OUTPATIENT
Start: 2022-09-23 | End: 2023-08-14 | Stop reason: SDUPTHER

## 2022-09-23 RX ORDER — MIRTAZAPINE 7.5 MG/1
TABLET, FILM COATED ORAL
Qty: 30 TABLET | Refills: 11 | OUTPATIENT
Start: 2022-09-23

## 2022-09-23 NOTE — TELEPHONE ENCOUNTER
No new care gaps identified.  [facility] Embedded Care Gaps. Reference number: [id number]. 9/23/2022   9:15:37 AM DONNAT

## 2022-09-27 ENCOUNTER — OFFICE VISIT (OUTPATIENT)
Dept: INTERNAL MEDICINE | Facility: CLINIC | Age: 67
End: 2022-09-27
Payer: MEDICARE

## 2022-09-27 ENCOUNTER — TELEPHONE (OUTPATIENT)
Dept: UROGYNECOLOGY | Facility: CLINIC | Age: 67
End: 2022-09-27
Payer: MEDICARE

## 2022-09-27 DIAGNOSIS — E55.9 VITAMIN D DEFICIENCY: ICD-10-CM

## 2022-09-27 DIAGNOSIS — E78.2 MIXED HYPERLIPIDEMIA: ICD-10-CM

## 2022-09-27 DIAGNOSIS — Z00.00 ANNUAL PHYSICAL EXAM: ICD-10-CM

## 2022-09-27 DIAGNOSIS — F17.210 LIGHT CIGARETTE SMOKER (1-9 CIGS/DAY): ICD-10-CM

## 2022-09-27 DIAGNOSIS — E11.42 TYPE 2 DIABETES MELLITUS WITH DIABETIC POLYNEUROPATHY, WITHOUT LONG-TERM CURRENT USE OF INSULIN: ICD-10-CM

## 2022-09-27 DIAGNOSIS — I10 ESSENTIAL HYPERTENSION: Primary | ICD-10-CM

## 2022-09-27 DIAGNOSIS — Z13.820 SCREENING FOR OSTEOPOROSIS: ICD-10-CM

## 2022-09-27 DIAGNOSIS — N39.46 MIXED STRESS AND URGE URINARY INCONTINENCE: ICD-10-CM

## 2022-09-27 DIAGNOSIS — Z12.31 ENCOUNTER FOR SCREENING MAMMOGRAM FOR MALIGNANT NEOPLASM OF BREAST: ICD-10-CM

## 2022-09-27 PROCEDURE — 3044F PR MOST RECENT HEMOGLOBIN A1C LEVEL <7.0%: ICD-10-PCS | Mod: CPTII,S$GLB,, | Performed by: INTERNAL MEDICINE

## 2022-09-27 PROCEDURE — 99499 RISK ADDL DX/OHS AUDIT: ICD-10-PCS | Mod: HCNC,S$GLB,, | Performed by: INTERNAL MEDICINE

## 2022-09-27 PROCEDURE — 99215 PR OFFICE/OUTPT VISIT, EST, LEVL V, 40-54 MIN: ICD-10-PCS | Mod: 25,S$GLB,, | Performed by: INTERNAL MEDICINE

## 2022-09-27 PROCEDURE — 99406 BEHAV CHNG SMOKING 3-10 MIN: CPT | Mod: S$GLB,,, | Performed by: INTERNAL MEDICINE

## 2022-09-27 PROCEDURE — 1160F PR REVIEW ALL MEDS BY PRESCRIBER/CLIN PHARMACIST DOCUMENTED: ICD-10-PCS | Mod: CPTII,S$GLB,, | Performed by: INTERNAL MEDICINE

## 2022-09-27 PROCEDURE — 3066F NEPHROPATHY DOC TX: CPT | Mod: CPTII,S$GLB,, | Performed by: INTERNAL MEDICINE

## 2022-09-27 PROCEDURE — 99215 OFFICE O/P EST HI 40 MIN: CPT | Mod: 25,S$GLB,, | Performed by: INTERNAL MEDICINE

## 2022-09-27 PROCEDURE — 3066F PR DOCUMENTATION OF TREATMENT FOR NEPHROPATHY: ICD-10-PCS | Mod: CPTII,S$GLB,, | Performed by: INTERNAL MEDICINE

## 2022-09-27 PROCEDURE — 1159F MED LIST DOCD IN RCRD: CPT | Mod: CPTII,S$GLB,, | Performed by: INTERNAL MEDICINE

## 2022-09-27 PROCEDURE — 1159F PR MEDICATION LIST DOCUMENTED IN MEDICAL RECORD: ICD-10-PCS | Mod: CPTII,S$GLB,, | Performed by: INTERNAL MEDICINE

## 2022-09-27 PROCEDURE — 3044F HG A1C LEVEL LT 7.0%: CPT | Mod: CPTII,S$GLB,, | Performed by: INTERNAL MEDICINE

## 2022-09-27 PROCEDURE — 4010F ACE/ARB THERAPY RXD/TAKEN: CPT | Mod: CPTII,S$GLB,, | Performed by: INTERNAL MEDICINE

## 2022-09-27 PROCEDURE — 1101F PT FALLS ASSESS-DOCD LE1/YR: CPT | Mod: CPTII,S$GLB,, | Performed by: INTERNAL MEDICINE

## 2022-09-27 PROCEDURE — 1101F PR PT FALLS ASSESS DOC 0-1 FALLS W/OUT INJ PAST YR: ICD-10-PCS | Mod: CPTII,S$GLB,, | Performed by: INTERNAL MEDICINE

## 2022-09-27 PROCEDURE — 99499 UNLISTED E&M SERVICE: CPT | Mod: HCNC,S$GLB,, | Performed by: INTERNAL MEDICINE

## 2022-09-27 PROCEDURE — 3061F NEG MICROALBUMINURIA REV: CPT | Mod: CPTII,S$GLB,, | Performed by: INTERNAL MEDICINE

## 2022-09-27 PROCEDURE — 99999 PR PBB SHADOW E&M-EST. PATIENT-LVL V: CPT | Mod: PBBFAC,,, | Performed by: INTERNAL MEDICINE

## 2022-09-27 PROCEDURE — 3288F FALL RISK ASSESSMENT DOCD: CPT | Mod: CPTII,S$GLB,, | Performed by: INTERNAL MEDICINE

## 2022-09-27 PROCEDURE — 3061F PR NEG MICROALBUMINURIA RESULT DOCUMENTED/REVIEW: ICD-10-PCS | Mod: CPTII,S$GLB,, | Performed by: INTERNAL MEDICINE

## 2022-09-27 PROCEDURE — 1160F RVW MEDS BY RX/DR IN RCRD: CPT | Mod: CPTII,S$GLB,, | Performed by: INTERNAL MEDICINE

## 2022-09-27 PROCEDURE — 99999 PR PBB SHADOW E&M-EST. PATIENT-LVL V: ICD-10-PCS | Mod: PBBFAC,,, | Performed by: INTERNAL MEDICINE

## 2022-09-27 PROCEDURE — 4010F PR ACE/ARB THEARPY RXD/TAKEN: ICD-10-PCS | Mod: CPTII,S$GLB,, | Performed by: INTERNAL MEDICINE

## 2022-09-27 PROCEDURE — 99406 PR TOBACCO USE CESSATION INTERMEDIATE 3-10 MINUTES: ICD-10-PCS | Mod: S$GLB,,, | Performed by: INTERNAL MEDICINE

## 2022-09-27 PROCEDURE — 3288F PR FALLS RISK ASSESSMENT DOCUMENTED: ICD-10-PCS | Mod: CPTII,S$GLB,, | Performed by: INTERNAL MEDICINE

## 2022-09-27 RX ORDER — ERGOCALCIFEROL 1.25 MG/1
50000 CAPSULE ORAL
Qty: 12 CAPSULE | Refills: 3 | Status: SHIPPED | OUTPATIENT
Start: 2022-09-27 | End: 2023-03-15 | Stop reason: SDUPTHER

## 2022-09-27 NOTE — PATIENT INSTRUCTIONS
Tests to Keep You Healthy    Mammogram: ORDERED BUT NOT SCHEDULED  Eye Exam: Met on 1/6/2022  Colon Cancer Screening: Met on 4/11/2022  Last Blood Pressure <= 139/89 (9/27/2022): Yes  Last HbA1c < 8 (09/20/2022): Yes  Tobacco Cessation: NO      @[facility]@

## 2022-09-27 NOTE — TELEPHONE ENCOUNTER
Called patient in response to appointment request via TouchMail. Patient scheduled for 12/6/22 with Emma Zhang.

## 2022-09-27 NOTE — PROGRESS NOTES
Subjective:       Patient ID: Leticia Brown is a 67 y.o. female who  has a past medical history of Cataract, Diabetes mellitus, H. pylori infection (2017), High cholesterol, and Hypertension.    Chief Complaint: Hypertension, Labs Only, and Urinary Incontinence     History was obtained from the patient and supplemented through chart review  Following with ophthalmology for cataract extraction.    Is a  at [employer].      HPI     HTN:    Stress test  with no WMA, negative for ischemia.  Saw Cardiology for syncope likely due to hypotension.  No snoring, apnea.    BP is controlled.  On losartan split to 50 BID d/t hypotension and Norvasc 5.  Compliant with meds.  Tolerating meds well.  D/C'd from Digital HTN d/t lack of participation.    DM2 is controlled.  Unable to tolerate metformin 500 XR d/t loose stools.    Diet: not much starches. Doing well. Loves veggies. Eats fish, salmon. Not much red meat.  Drinks: water, no juices. unsweet tea. Rarely has a soft drink.  ETOH:  none    Exercise: on her feet at work, but none outside of work. Used to walk 3 miles, jump rope.      Without elevated microalbumin creatinine ratio.  On an ACE/ARB.   Retinal exams:  .  Foot exams:  11/2021  Lab Results   Component Value Date/Time    HGBA1C 5.8 (H) 09/20/2022 07:03 AM    HGBA1C 5.6 03/17/2022 08:50 AM    HGBA1C 5.7 (H) 08/12/2021 07:14 AM     Vitamin D deficiency:   Ran out of ergocalciferol.  DEXA normal .  Lab Results   Component Value Date    VUOCELXE01NY 21 (L) 09/20/2022    OOAUGNWR08TF 23 (L) 08/12/2021    IAHYBKIK76HK 39 10/19/2020     HLD, Aortic Atherosclerosis:    Is currently taking Lipitor 40 and ASA 81 daily.  Lab Results   Component Value Date    LDLCALC 128.6 09/20/2022     The 10-year ASCVD risk score (Akhil AHMADI, et al., 2019) is: 41%    Values used to calculate the score:      Age: 67 years      Sex: Female      Is Non- : Yes      Diabetic:  Yes      Tobacco smoker: Yes      Systolic Blood Pressure: 130 mmHg      Is BP treated: Yes      HDL Cholesterol: 41 mg/dL      Total Cholesterol: 191 mg/dL    Tobacco use, abnormal CT:  Smoked close to 1 ppd for 30 years. Quit in 2018 cold turkey.  1/2 ppd d/t stress.  Has decreased. Was on Wellbutrin in the past for depression.     Spiral CT 04/05/2022 for lung cancer screening with small nodules.  Atherosclerosis, moderate emphysematous changes.    Stress, urge UIC:  H/o vaginal delivery x 5.  No constipation. Denies dysuria, hematuria, cloudy appearing urine, urinary odor.              Not addressed today.  H/o H pylori, dysphagia to solids:  EGD 2017 normal other than positive biopsies.  Test of eradication neg.  Started Prilosec.  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  Erythema mucosa in the stomach.  Biopsy was negative. Still with mild dysphagia.  Persistent. Provided # for [facility] to reschedule. Consider repeat EGD.    HCV:    Antibody positive, but viral level negative. +HBsAB 10/2019.  HIV NR at [facility]   Viral level negative.  No treatment needed.    H/o Colon polyp:  C scope  was normal.  C scope in 10 years.    Urinary incontinence:   On oxybutynin, but feels that it doesn't work as well.  Follows with Urogynecology.  Rx estrogen cream for vaginal atrophy.    Decreased appetite:  No nausea. Weight 160-170s. Only eating coffee, toast for breakfast and dinner. Staying hydrated. Sometimes has with dysphagia, chronic.  No coughing, odynophagia. Denies fatigue, night sweats, LAD.    UTD on Pap smear, C scope, mammogram. CT chest as above.   Labs, TSH wnl. Reschedule GI for dysphagia. UTD age appropriate cancer screening. Started Remeron for mood, sleep, appetite, but she is no longer taking.    Depression, anxiety:     Used to be on Wellbutrin. Started Remeron for mood, sleep, appetite.  Currently not taking meds.   Not on meds.  No acute issues.    Insomnia:    Chronic. Mood  "as above. Racing thoughts at night.  The patient reports difficulty falling asleep.  Goes to bed at 9 PM, but finally falls asleep at 3 AM and wakes up at 6 AM.  Sometimes naps.  Tried turning off the TV at night.  The patient drinks decaffeinated drinks.  She does not drink.  Decreased walking to 2/week.    Persistent. Rx'd Remeron as above.    Right rotator cuff tendinitis:  Likely a CJ DJD or C4 radiculopathy.  Completed PT at [facility].  Neurosurgery at [facility] recommended home exercises.   Mild intermittent pain. She declined ortho referral.    R neck pain, h/o Cervical DDD:  RF, DANIELLE WNL .  Saw Rheumatology and had low suspicion for inflammatory arthritis.  MRI cscope 3/2019 was cervical spondylosis, DDD without cord compression.  EMG with cervical spinal stenosis, degenerative disc disease.      On gabapentin 300 q.h.s. PRN, Tylenol p.r.n..  Unable to titrate gabapentin due to sedation.    Sometimes R sided neck pain. Feels tight. Occurs mainly at night.   No weakness, paresthesias, HA. No computer work.  Provided home exercises. If persistent, consider PT and/or back/spine clinic.    Review of Systems   Constitutional:  Negative for activity change and appetite change.   Respiratory:  Negative for wheezing.    Cardiovascular:  Negative for leg swelling.   Gastrointestinal:  Negative for constipation.   Genitourinary:  Positive for urgency. Negative for dysuria and hematuria.   Musculoskeletal:  Negative for gait problem.   Skin:  Negative for rash and wound.   Neurological:  Negative for dizziness and light-headedness.   Hematological:  Negative for adenopathy.   Psychiatric/Behavioral:  Negative for confusion.        I personally reviewed Past Medical History, Past Surgical History, Social History, and Family History.    Objective:      Vitals:    09/27/22 0937   BP: (P) 130/62   Pulse: (P) 61   SpO2: (P) 100%   Weight: (P) 73.3 kg (161 lb 9.6 oz)   Height: (P) 5' 2" (1.575 m)        Physical " Exam  Constitutional:       General: She is not in acute distress.     Appearance: She is well-developed. She is not diaphoretic.   HENT:      Head: Normocephalic and atraumatic.      Nose: Nose normal.      Mouth/Throat:      Pharynx: No oropharyngeal exudate.      Comments: Mallampati IV  Neck:      Thyroid: No thyromegaly.      Trachea: No tracheal deviation.   Cardiovascular:      Rate and Rhythm: Normal rate and regular rhythm.      Heart sounds: Normal heart sounds. No murmur heard.  Pulmonary:      Effort: Pulmonary effort is normal. No respiratory distress.      Breath sounds: Normal breath sounds. No wheezing.   Abdominal:      General: Bowel sounds are normal. There is no distension.      Palpations: Abdomen is soft.      Tenderness: There is no abdominal tenderness.   Musculoskeletal:         General: No deformity.      Cervical back: Neck supple.      Right lower leg: No edema.      Left lower leg: No edema.   Lymphadenopathy:      Cervical: No cervical adenopathy.   Skin:     General: Skin is warm and dry.      Findings: No erythema.   Neurological:      Mental Status: She is alert.      Gait: Gait normal.   Psychiatric:         Behavior: Behavior normal.         Lab Results   Component Value Date    WBC 13.02 (H) 05/01/2022    HGB 13.7 05/01/2022    HCT 42.0 05/01/2022     05/01/2022    CHOL 191 09/20/2022    TRIG 107 09/20/2022    HDL 41 09/20/2022    ALT 12 05/01/2022    AST 16 05/01/2022     05/01/2022    K 3.8 05/01/2022     05/01/2022    CREATININE 0.9 05/01/2022    BUN 22 05/01/2022    CO2 24 05/01/2022    TSH 1.445 09/20/2022    HGBA1C 5.8 (H) 09/20/2022       The 10-year ASCVD risk score (Akhil AHMADI, et al., 2019) is: 41%    Values used to calculate the score:      Age: 67 years      Sex: Female      Is Non- : Yes      Diabetic: Yes      Tobacco smoker: Yes      Systolic Blood Pressure: 130 mmHg      Is BP treated: Yes      HDL Cholesterol: 41 mg/dL       Total Cholesterol: 191 mg/dL    (Imaging have been independently reviewed)  CXR without acute abnormality.    Assessment:       1. Essential hypertension    2. Type 2 diabetes mellitus with diabetic polyneuropathy, without long-term current use of insulin    3. Vitamin D deficiency    4. Screening for osteoporosis    5. Mixed hyperlipidemia    6. Light cigarette smoker (1-9 cigs/day)    7. Mixed stress and urge urinary incontinence    8. Encounter for screening mammogram for malignant neoplasm of breast    9. Annual physical exam            Plan:       Leticia was seen today for hypertension, labs only and urinary incontinence.    Diagnoses and all orders for this visit:    Essential hypertension  Comments:  Controlled. Cont losartan 50 BID, Norvasc 5. Monitor CMP.  Orders:  -     Comprehensive Metabolic Panel; Future    Type 2 diabetes mellitus with diabetic polyneuropathy, without long-term current use of insulin  Comments:  A1C controlled without meds. Unable to tolerate metformin XR d/t GI SE. A1c q6 mo. Schedule foot exam with podiatry.  Orders:  -     Hemoglobin A1C; Future  -     Microalbumin/Creatinine Ratio, Urine; Future    Vitamin D deficiency  Comments:  Persistently low. Restart ergocalciferol. DEXA normal; due for repeat.  Orders:  -     Vitamin D; Future  -     ergocalciferol (ERGOCALCIFEROL) 50,000 unit Cap; Take 1 capsule (50,000 Units total) by mouth every 7 days.    Screening for osteoporosis  -     DXA Bone Density Spine And Hip; Future    Mixed hyperlipidemia  Comments:  FLP controlled. Continue Lipitor 40, ASA 81. Monitor FLP annually.    Light cigarette smoker (1-9 cigs/day)  Comments:  Decreased. 30 pack year hx.  Monitor CT chest annually. Counseled for 5 minutes is on cessation.  She declined tobacco cessation Clinic at this time.    Mixed stress and urge urinary incontinence  Comments:  Referred to Urogynecology for pelvic exam.  Possible vaginal atrophy, prolapse.  Discussed timed  voiding, [place] exercises.  Orders:  -     Ambulatory referral/consult to Urogynecology; Future    Encounter for screening mammogram for malignant neoplasm of breast  -     Mammo Digital Screening Bilat w/ Antoni; Future    Annual physical exam  -     Hemoglobin A1C; Future  -     Microalbumin/Creatinine Ratio, Urine; Future  -     Vitamin D; Future  -     ergocalciferol (ERGOCALCIFEROL) 50,000 unit Cap; Take 1 capsule (50,000 Units total) by mouth every 7 days.  -     DXA Bone Density Spine And Hip; Future  -     CBC Auto Differential; Future  -     Comprehensive Metabolic Panel; Future         Side effects of medication(s) were discussed in detail and patient voiced understanding.  Patient will call back for any issues or complications.     I have spent a total of 45 minutes with the patient as well as reviewing the chart/medical record and placing orders on the day of the visit. Discussed lab results, Vit D deficiency, UIC.  5 minute of that time was spent on tobacco cessation.    RTC in 6 months or sooner PRN for DM, HTN with labs prior.

## 2022-10-17 ENCOUNTER — TELEPHONE (OUTPATIENT)
Dept: SMOKING CESSATION | Facility: CLINIC | Age: 67
End: 2022-10-17
Payer: MEDICARE

## 2022-10-25 ENCOUNTER — HOSPITAL ENCOUNTER (OUTPATIENT)
Dept: RADIOLOGY | Facility: OTHER | Age: 67
Discharge: HOME | End: 2022-10-25
Attending: INTERNAL MEDICINE
Payer: MEDICARE

## 2022-10-25 DIAGNOSIS — Z00.00 ANNUAL PHYSICAL EXAM: ICD-10-CM

## 2022-10-25 DIAGNOSIS — Z13.820 SCREENING FOR OSTEOPOROSIS: ICD-10-CM

## 2022-10-25 PROCEDURE — 77080 DXA BONE DENSITY AXIAL: CPT | Mod: 26,,, | Performed by: RADIOLOGY

## 2022-10-25 PROCEDURE — 77080 DEXA BONE DENSITY SPINE HIP: ICD-10-PCS | Mod: 26,,, | Performed by: RADIOLOGY

## 2022-10-25 PROCEDURE — 77080 DXA BONE DENSITY AXIAL: CPT | Mod: TC

## 2022-11-10 ENCOUNTER — PATIENT MESSAGE (OUTPATIENT)
Dept: UROGYNECOLOGY | Facility: CLINIC | Age: 67
End: 2022-11-10
Payer: MEDICARE

## 2022-11-15 ENCOUNTER — OFFICE VISIT (OUTPATIENT)
Dept: UROGYNECOLOGY | Facility: CLINIC | Age: 67
End: 2022-11-15
Payer: MEDICARE

## 2022-11-15 VITALS
BODY MASS INDEX: 28.88 KG/M2 | WEIGHT: 156.94 LBS | DIASTOLIC BLOOD PRESSURE: 70 MMHG | HEIGHT: 62 IN | SYSTOLIC BLOOD PRESSURE: 150 MMHG

## 2022-11-15 DIAGNOSIS — F17.200 SMOKING ADDICTION: ICD-10-CM

## 2022-11-15 DIAGNOSIS — N39.46 MIXED STRESS AND URGE URINARY INCONTINENCE: ICD-10-CM

## 2022-11-15 DIAGNOSIS — R39.15 URINARY URGENCY: Primary | ICD-10-CM

## 2022-11-15 DIAGNOSIS — N95.2 VAGINAL ATROPHY: ICD-10-CM

## 2022-11-15 PROCEDURE — 1159F PR MEDICATION LIST DOCUMENTED IN MEDICAL RECORD: ICD-10-PCS | Mod: CPTII,S$GLB,, | Performed by: OBSTETRICS & GYNECOLOGY

## 2022-11-15 PROCEDURE — 3078F PR MOST RECENT DIASTOLIC BLOOD PRESSURE < 80 MM HG: ICD-10-PCS | Mod: CPTII,S$GLB,, | Performed by: OBSTETRICS & GYNECOLOGY

## 2022-11-15 PROCEDURE — 4010F ACE/ARB THERAPY RXD/TAKEN: CPT | Mod: CPTII,S$GLB,, | Performed by: OBSTETRICS & GYNECOLOGY

## 2022-11-15 PROCEDURE — 1101F PR PT FALLS ASSESS DOC 0-1 FALLS W/OUT INJ PAST YR: ICD-10-PCS | Mod: CPTII,S$GLB,, | Performed by: OBSTETRICS & GYNECOLOGY

## 2022-11-15 PROCEDURE — 4010F PR ACE/ARB THEARPY RXD/TAKEN: ICD-10-PCS | Mod: CPTII,S$GLB,, | Performed by: OBSTETRICS & GYNECOLOGY

## 2022-11-15 PROCEDURE — 1159F MED LIST DOCD IN RCRD: CPT | Mod: CPTII,S$GLB,, | Performed by: OBSTETRICS & GYNECOLOGY

## 2022-11-15 PROCEDURE — 3288F FALL RISK ASSESSMENT DOCD: CPT | Mod: CPTII,S$GLB,, | Performed by: OBSTETRICS & GYNECOLOGY

## 2022-11-15 PROCEDURE — 88112 PR  CYTOPATH, CELL ENHANCE TECH: ICD-10-PCS | Mod: 26,,, | Performed by: STUDENT IN AN ORGANIZED HEALTH CARE EDUCATION/TRAINING PROGRAM

## 2022-11-15 PROCEDURE — 3061F PR NEG MICROALBUMINURIA RESULT DOCUMENTED/REVIEW: ICD-10-PCS | Mod: CPTII,S$GLB,, | Performed by: OBSTETRICS & GYNECOLOGY

## 2022-11-15 PROCEDURE — 3066F PR DOCUMENTATION OF TREATMENT FOR NEPHROPATHY: ICD-10-PCS | Mod: CPTII,S$GLB,, | Performed by: OBSTETRICS & GYNECOLOGY

## 2022-11-15 PROCEDURE — 87086 URINE CULTURE/COLONY COUNT: CPT | Performed by: OBSTETRICS & GYNECOLOGY

## 2022-11-15 PROCEDURE — 3066F NEPHROPATHY DOC TX: CPT | Mod: CPTII,S$GLB,, | Performed by: OBSTETRICS & GYNECOLOGY

## 2022-11-15 PROCEDURE — 3044F PR MOST RECENT HEMOGLOBIN A1C LEVEL <7.0%: ICD-10-PCS | Mod: CPTII,S$GLB,, | Performed by: OBSTETRICS & GYNECOLOGY

## 2022-11-15 PROCEDURE — 3008F PR BODY MASS INDEX (BMI) DOCUMENTED: ICD-10-PCS | Mod: CPTII,S$GLB,, | Performed by: OBSTETRICS & GYNECOLOGY

## 2022-11-15 PROCEDURE — 1101F PT FALLS ASSESS-DOCD LE1/YR: CPT | Mod: CPTII,S$GLB,, | Performed by: OBSTETRICS & GYNECOLOGY

## 2022-11-15 PROCEDURE — 3288F PR FALLS RISK ASSESSMENT DOCUMENTED: ICD-10-PCS | Mod: CPTII,S$GLB,, | Performed by: OBSTETRICS & GYNECOLOGY

## 2022-11-15 PROCEDURE — 3078F DIAST BP <80 MM HG: CPT | Mod: CPTII,S$GLB,, | Performed by: OBSTETRICS & GYNECOLOGY

## 2022-11-15 PROCEDURE — 99999 PR PBB SHADOW E&M-EST. PATIENT-LVL IV: ICD-10-PCS | Mod: PBBFAC,,, | Performed by: OBSTETRICS & GYNECOLOGY

## 2022-11-15 PROCEDURE — 3077F PR MOST RECENT SYSTOLIC BLOOD PRESSURE >= 140 MM HG: ICD-10-PCS | Mod: CPTII,S$GLB,, | Performed by: OBSTETRICS & GYNECOLOGY

## 2022-11-15 PROCEDURE — 99999 PR PBB SHADOW E&M-EST. PATIENT-LVL IV: CPT | Mod: PBBFAC,,, | Performed by: OBSTETRICS & GYNECOLOGY

## 2022-11-15 PROCEDURE — 1126F AMNT PAIN NOTED NONE PRSNT: CPT | Mod: CPTII,S$GLB,, | Performed by: OBSTETRICS & GYNECOLOGY

## 2022-11-15 PROCEDURE — 99214 OFFICE O/P EST MOD 30 MIN: CPT | Mod: S$GLB,,, | Performed by: OBSTETRICS & GYNECOLOGY

## 2022-11-15 PROCEDURE — 88112 CYTOPATH CELL ENHANCE TECH: CPT | Performed by: STUDENT IN AN ORGANIZED HEALTH CARE EDUCATION/TRAINING PROGRAM

## 2022-11-15 PROCEDURE — 88112 CYTOPATH CELL ENHANCE TECH: CPT | Mod: 26,,, | Performed by: STUDENT IN AN ORGANIZED HEALTH CARE EDUCATION/TRAINING PROGRAM

## 2022-11-15 PROCEDURE — 3077F SYST BP >= 140 MM HG: CPT | Mod: CPTII,S$GLB,, | Performed by: OBSTETRICS & GYNECOLOGY

## 2022-11-15 PROCEDURE — 3061F NEG MICROALBUMINURIA REV: CPT | Mod: CPTII,S$GLB,, | Performed by: OBSTETRICS & GYNECOLOGY

## 2022-11-15 PROCEDURE — 1126F PR PAIN SEVERITY QUANTIFIED, NO PAIN PRESENT: ICD-10-PCS | Mod: CPTII,S$GLB,, | Performed by: OBSTETRICS & GYNECOLOGY

## 2022-11-15 PROCEDURE — 3008F BODY MASS INDEX DOCD: CPT | Mod: CPTII,S$GLB,, | Performed by: OBSTETRICS & GYNECOLOGY

## 2022-11-15 PROCEDURE — 99214 PR OFFICE/OUTPT VISIT, EST, LEVL IV, 30-39 MIN: ICD-10-PCS | Mod: S$GLB,,, | Performed by: OBSTETRICS & GYNECOLOGY

## 2022-11-15 PROCEDURE — 3044F HG A1C LEVEL LT 7.0%: CPT | Mod: CPTII,S$GLB,, | Performed by: OBSTETRICS & GYNECOLOGY

## 2022-11-15 RX ORDER — CONJUGATED ESTROGENS 0.62 MG/G
CREAM VAGINAL
Qty: 45 G | Refills: 12 | Status: SHIPPED | OUTPATIENT
Start: 2022-11-15

## 2022-11-15 NOTE — PROGRESS NOTES
Subjective:       Patient ID: Leticia Brown is a 67 y.o. female.    Chief Complaint:  Urinary Incontinence      History of Present Illness  67 Y O F  has a past medical history of Cataract, Diabetes mellitus, H. pylori infection (2017), High cholesterol, and Hypertension. Referred by Dr. Antonio for evaluation of urinary incontinence.     [facility]    Question 11/10/2022  7:31 PM CST - Filed by Patient   General Urogynecology: Are you experiencing the following?    Dysuria (painful urination) No   Nocturia:  waking up at night to empty your bladder  Yes   If you answered yes to the previous question, how many times does this happen per night? 3-4   Enuresis (urine loss during sleep) No   Dribbling urine after you urinate Yes   Hematuria (urine appears red) No   Type of stream Weak   Urinary frequency: How often a day are you going to the bathroom per day?  Less than 10   Urinary Tract Infections: How many Urinary Tract Infections have you had in the past year? I have not had a UTI in the past year   If you have had a UTI in the past year, what treatments have you had so far?  I have not had a UTI in the past year   Urinary Incontinence (General): Are you experiencing the following?    Past consultation for incontinence: Have you ever seen someone for the evaluation of incontinence? No   If you answered yes to the previous question, please select all the therapies you have tried.  None of the above   Please note the effectiveness of the therapies. Ineffective   Need to wear protection to keep clothes dry  Yes   If you answered yes to the previous question, please bharathi the protection you use.  Poise   If you wear protection, how much wetness is typically on each pad? Moderate   If you wear protection, how often do you have to change per day, if applicable?  1   Stress Symptoms: Are you experiencing the following?    Leakage of urine with cough, laugh and/or sneeze Yes   If you answered yes to the  "previous question, what is the frequency in days, weeks and/or months? Several times a day   Leakage of urine with sex No   Leakage of urine with bending/ lifting No   Leakage of urine with briskly walking or jogging No   If you lose urine for any other reason not previously mentioned, please note it below, if applicable.  Coughing sneezing  laughing   Urge Symptoms: Are you experiencing the following?    Urgency ("got to go" feeling) Yes   Urge: How frequently do you feel an urge to urinate (feeling like you "gotta go" to the bathroom and can't wait) Daily   Do you experience a leakage of urine when you have a feeling of urgency?  Yes   Leakage of urine when unaware Yes   Past use of anticholinergics (medications used to treat overactive bladder) Yes   If you answered yes to the previous question, please bharathi the anticholinergics you have used:  Oxybutynin 20 mg when she goes out    Have you ever used Mirbetriq (aka Mirabegron)?  No   Prolapse Symptoms: Are you experiencing any of the following?     Falling out/ Bulging/ Heaviness in the vagina No   Vaginal/ Abdominal Pain/ Pressure No   Need to strain/ Push to void Yes   Need to wait on the toilet before you void Yes   Unusual position to urinate (using your hands to push back the vaginal bulge) No   Sensation of incomplete emptying Yes   Past use of pessary device No   If you answered yes to the previous question, please list the devices you have used below.     Bowel Symptoms: Are you experiencing any of the following?    Constipation No   Diarrhea  Yes   Hematochezia (bloody stool) No   Incomplete evacuation of stool No   Involuntary loss of formed stool No   Fecal smearing/urgency No   Involuntary loss of gas No   Vaginal Symptoms: Are you experiencing any of the following?     Abnormal vaginal bleeding  No   Vaginal dryness Yes   Sexually active  No   Dyspareunia (painful intercourse) No   Estrogen use  No       GYN & OB History  No LMP recorded. Patient is " postmenopausal.   Date of Last Pap: No result found    OB History    Para Term  AB Living   5 5 5         SAB IAB Ectopic Multiple Live Births                  # Outcome Date GA Lbr Deo/2nd Weight Sex Delivery Anes PTL Lv   5 Term            4 Term            3 Term            2 Term            1 Term              OB     x 5.  C/s x 0.    Largest: 10 #  oz   Forceps: no  Episiotomy:  yes  Degree: 3rd or 4th no    GYN  Menarche:  14  Menstrual cycle:   Menopause:  45's  Hysterectomy:  No  Ovaries:  present   Tubal ligation: Yes  Other abdominal surgeries: Adriana     Review of Systems  Review of Systems   Constitutional:  Negative for chills and fever.   HENT:  Negative for sore throat.    Gastrointestinal:  Positive for diarrhea. Negative for abdominal pain, constipation, nausea and vomiting.   Genitourinary:  Positive for flank pain, frequency and urgency. Negative for dysuria, hematuria, pelvic pain and vaginal discharge.   Musculoskeletal:  Positive for back pain.   Skin:  Negative for rash.   Neurological:  Negative for headaches.         Objective:     Physical Exam   Constitutional: She is oriented to person, place, and time. She appears well-developed.   HENT:   Head: Normocephalic and atraumatic.   Eyes: Conjunctivae and EOM are normal.   Cardiovascular: Normal rate, regular rhythm, S1 normal, S2 normal, normal heart sounds and intact distal pulses.   Pulmonary/Chest: Effort normal and breath sounds normal. She exhibits no tenderness.   Abdominal: Soft. Bowel sounds are normal. She exhibits no distension and no mass. There is no splenomegaly or hepatomegaly. There is no abdominal tenderness. There is no rigidity, no rebound and no guarding. No hernia.   Genitourinary: Pelvic exam was performed with patient supine. Rectum normal, vagina normal, uterus normal, cervix normal, skenes normal and bartholins normal. Right labia normal and left labia normal. Urethra exhibits hypermobility. Urethra  exhibits no urethral caruncle, no urethral diverticulum and no urethral mass. Right bartholin is not enlarged and not tender. Left bartholin is not enlarged and not tender. Rectal exam shows resting tone normal and active tone normal. Rectal exam shows no external hemorrhoid, no fissure, no tenderness, anal tone normal and no dovetailing. Guaiac negative stool. There is atrophy in the vagina. No foreign body, tenderness, bleeding, unspecified prolapse of vaginal walls, fistula, mesh exposure or lavator tenderness in the vagina. Right adnexum displays no mass and no tenderness. Left adnexum displays no mass and no tenderness. Cervix exhibits no motion tenderness and no discharge. Uterus is not tender and not experiencing uterine prolapse.   PVR: 80 ML  Empty cough stress test: Negative.  Kegel: 3/5    POP-Q  Aa: -1 Ba: -1 C: -4   GH: 3 PB: 2 TVL: 9   Ap: -1 Bp: -1 D: -6                     Musculoskeletal:         General: Normal range of motion.      Cervical back: Normal range of motion and neck supple.   Neurological: She is alert and oriented to person, place, and time. She has normal strength and normal reflexes. Cranial nerves II through XII intact. No cranial nerve deficit.   Skin: Skin is warm and dry.   Psychiatric: She has a normal mood and affect. Her speech is normal and behavior is normal. Judgment normal.          HCM  Pap's:  Normal   last Pap: 2021 High Risk HPV:  Negative  Mammo: BIRADS: 1 ; Last imaging  2021   Colonoscopy: N/a: normal   Dexa:  Normal       Assessment:        1. Urinary urgency    2. Mixed stress and urge urinary incontinence    3. Smoking addiction    4. Vaginal atrophy                Plan:     1.  Mixed urinary incontinence, urge > stress:   --Bladder diary for 3-7 days, write the number of times you go to the rest room and associated symptoms of urgency or leakage.   --Empty bladder every 3 hours.  Empty well: wait a minute, lean forward on toilet.    --Avoid dietary irritants  (see sheet).  Keep diary x 3-5 days to determine your irritants.  --KEGELS: do 10 in AM and 10 in PM, holding each x 10 seconds.  When you feel urge to go, STOP, KEGEL, and when urge has passed, then go to bathroom.  Start pelvic floor PT.     --URGE:  Continue Ditropan 20 mg daily.  SE profile reviewed.   Takes 2-4 weeks to see if will have effect.  For dry mouth: get sour, sugar free lozenge or gum.    --STRESS:  Non surgical options: Pessary vs. Impressa vs Rivive - which represent urethral and bladder support devices; Surgical options including 1.  mid urethral sling; retropubic, transobturator vs single incision 2. Fascial slings 3. Núñez procedure 4. Periurethral bulking   --SUDS    2. Vaginal atrophy (dryness):  Use 1 gram of estrogen cream in vagina nightly x 2 weeks, then twice a week thereafter.      3. STOP smoking.  This can cause bladder irritability, constipation,  and increase chances of bladder cancer.  Urine cytology to be obtained. Referred to the [facility] smoking cessation program. Smoking cessation discussion for 3-5 minutes.   [email or web address] or call: [phone]/ [phone]        Thank you for requesting consultation of your patient.  I look forward to participating in her care.    Ricardo Mora DO  Female Pelvic Medicine and Reconstructive Surgery  [facility] [place], [place]

## 2022-11-15 NOTE — PATIENT INSTRUCTIONS
Plan:    . Mixed urinary incontinence, urge > stress:   --Bladder diary for 3-7 days, write the number of times you go to the rest room and associated symptoms of urgency or leakage.   --Empty bladder every 3 hours.  Empty well: wait a minute, lean forward on toilet.    --Avoid dietary irritants (see sheet).  Keep diary x 3-5 days to determine your irritants.  --KEGELS: do 10 in AM and 10 in PM, holding each x 10 seconds.  When you feel urge to go, STOP, KEGEL, and when urge has passed, then go to bathroom.  Start pelvic floor PT.     --URGE:  Continue Ditropan 20 mg daily.  SE profile reviewed.   Takes 2-4 weeks to see if will have effect.  For dry mouth: get sour, sugar free lozenge or gum.    --STRESS:  Non surgical options: Pessary vs. Impressa vs Rivive - which represent urethral and bladder support devices; Surgical options including 1.  mid urethral sling; retropubic, transobturator vs single incision 2. Fascial slings 3. Núñez procedure 4. Periurethral bulking   --SUDS    2. Vaginal atrophy (dryness):  Use 1 gram of estrogen cream in vagina nightly x 2 weeks, then twice a week thereafter.

## 2022-11-16 LAB — BACTERIA UR CULT: NO GROWTH

## 2022-11-17 ENCOUNTER — PATIENT MESSAGE (OUTPATIENT)
Dept: UROGYNECOLOGY | Facility: CLINIC | Age: 67
End: 2022-11-17
Payer: MEDICARE

## 2022-11-21 ENCOUNTER — PATIENT MESSAGE (OUTPATIENT)
Dept: UROGYNECOLOGY | Facility: CLINIC | Age: 67
End: 2022-11-21
Payer: MEDICARE

## 2022-11-21 LAB
FINAL PATHOLOGIC DIAGNOSIS: NORMAL
Lab: NORMAL
MICROSCOPIC EXAM: NORMAL

## 2022-11-25 ENCOUNTER — PATIENT MESSAGE (OUTPATIENT)
Dept: INTERNAL MEDICINE | Facility: CLINIC | Age: 67
End: 2022-11-25
Payer: MEDICARE

## 2022-11-29 ENCOUNTER — HOSPITAL ENCOUNTER (OUTPATIENT)
Dept: RADIOLOGY | Facility: OTHER | Age: 67
Discharge: HOME | End: 2022-11-29
Attending: INTERNAL MEDICINE
Payer: MEDICARE

## 2022-11-29 DIAGNOSIS — Z12.31 ENCOUNTER FOR SCREENING MAMMOGRAM FOR MALIGNANT NEOPLASM OF BREAST: ICD-10-CM

## 2022-11-29 PROCEDURE — 77067 MAMMO DIGITAL SCREENING BILAT WITH TOMO: ICD-10-PCS | Mod: 26,,, | Performed by: RADIOLOGY

## 2022-11-29 PROCEDURE — 77067 SCR MAMMO BI INCL CAD: CPT | Mod: 26,,, | Performed by: RADIOLOGY

## 2022-11-29 PROCEDURE — 77067 SCR MAMMO BI INCL CAD: CPT | Mod: TC

## 2022-11-29 PROCEDURE — 77063 MAMMO DIGITAL SCREENING BILAT WITH TOMO: ICD-10-PCS | Mod: 26,,, | Performed by: RADIOLOGY

## 2022-11-29 PROCEDURE — 77063 BREAST TOMOSYNTHESIS BI: CPT | Mod: 26,,, | Performed by: RADIOLOGY

## 2022-11-29 PROCEDURE — 77063 BREAST TOMOSYNTHESIS BI: CPT | Mod: TC

## 2022-12-05 ENCOUNTER — PATIENT MESSAGE (OUTPATIENT)
Dept: INTERNAL MEDICINE | Facility: CLINIC | Age: 67
End: 2022-12-05
Payer: MEDICARE

## 2022-12-06 ENCOUNTER — OFFICE VISIT (OUTPATIENT)
Dept: UROGYNECOLOGY | Facility: CLINIC | Age: 67
End: 2022-12-06
Payer: MEDICARE

## 2022-12-06 VITALS
HEIGHT: 62 IN | DIASTOLIC BLOOD PRESSURE: 70 MMHG | BODY MASS INDEX: 29.25 KG/M2 | SYSTOLIC BLOOD PRESSURE: 110 MMHG | WEIGHT: 158.94 LBS

## 2022-12-06 DIAGNOSIS — Z01.419 WELL WOMAN EXAM: Primary | ICD-10-CM

## 2022-12-06 DIAGNOSIS — N95.2 VAGINAL ATROPHY: ICD-10-CM

## 2022-12-06 DIAGNOSIS — N39.46 MIXED STRESS AND URGE URINARY INCONTINENCE: ICD-10-CM

## 2022-12-06 PROCEDURE — 3061F PR NEG MICROALBUMINURIA RESULT DOCUMENTED/REVIEW: ICD-10-PCS | Mod: HCNC,CPTII,S$GLB, | Performed by: NURSE PRACTITIONER

## 2022-12-06 PROCEDURE — 3288F PR FALLS RISK ASSESSMENT DOCUMENTED: ICD-10-PCS | Mod: HCNC,CPTII,S$GLB, | Performed by: NURSE PRACTITIONER

## 2022-12-06 PROCEDURE — 3044F HG A1C LEVEL LT 7.0%: CPT | Mod: HCNC,CPTII,S$GLB, | Performed by: NURSE PRACTITIONER

## 2022-12-06 PROCEDURE — 1126F AMNT PAIN NOTED NONE PRSNT: CPT | Mod: HCNC,CPTII,S$GLB, | Performed by: NURSE PRACTITIONER

## 2022-12-06 PROCEDURE — G0101 CA SCREEN;PELVIC/BREAST EXAM: HCPCS | Mod: HCNC,S$GLB,, | Performed by: NURSE PRACTITIONER

## 2022-12-06 PROCEDURE — 3008F PR BODY MASS INDEX (BMI) DOCUMENTED: ICD-10-PCS | Mod: HCNC,CPTII,S$GLB, | Performed by: NURSE PRACTITIONER

## 2022-12-06 PROCEDURE — 99999 PR PBB SHADOW E&M-EST. PATIENT-LVL IV: CPT | Mod: PBBFAC,HCNC,, | Performed by: NURSE PRACTITIONER

## 2022-12-06 PROCEDURE — 1126F PR PAIN SEVERITY QUANTIFIED, NO PAIN PRESENT: ICD-10-PCS | Mod: HCNC,CPTII,S$GLB, | Performed by: NURSE PRACTITIONER

## 2022-12-06 PROCEDURE — 3078F PR MOST RECENT DIASTOLIC BLOOD PRESSURE < 80 MM HG: ICD-10-PCS | Mod: HCNC,CPTII,S$GLB, | Performed by: NURSE PRACTITIONER

## 2022-12-06 PROCEDURE — G0101 PR CA SCREEN;PELVIC/BREAST EXAM: ICD-10-PCS | Mod: HCNC,S$GLB,, | Performed by: NURSE PRACTITIONER

## 2022-12-06 PROCEDURE — 3288F FALL RISK ASSESSMENT DOCD: CPT | Mod: HCNC,CPTII,S$GLB, | Performed by: NURSE PRACTITIONER

## 2022-12-06 PROCEDURE — 4010F PR ACE/ARB THEARPY RXD/TAKEN: ICD-10-PCS | Mod: HCNC,CPTII,S$GLB, | Performed by: NURSE PRACTITIONER

## 2022-12-06 PROCEDURE — 3008F BODY MASS INDEX DOCD: CPT | Mod: HCNC,CPTII,S$GLB, | Performed by: NURSE PRACTITIONER

## 2022-12-06 PROCEDURE — 1101F PT FALLS ASSESS-DOCD LE1/YR: CPT | Mod: HCNC,CPTII,S$GLB, | Performed by: NURSE PRACTITIONER

## 2022-12-06 PROCEDURE — 1101F PR PT FALLS ASSESS DOC 0-1 FALLS W/OUT INJ PAST YR: ICD-10-PCS | Mod: HCNC,CPTII,S$GLB, | Performed by: NURSE PRACTITIONER

## 2022-12-06 PROCEDURE — 99999 PR PBB SHADOW E&M-EST. PATIENT-LVL IV: ICD-10-PCS | Mod: PBBFAC,HCNC,, | Performed by: NURSE PRACTITIONER

## 2022-12-06 PROCEDURE — 3044F PR MOST RECENT HEMOGLOBIN A1C LEVEL <7.0%: ICD-10-PCS | Mod: HCNC,CPTII,S$GLB, | Performed by: NURSE PRACTITIONER

## 2022-12-06 PROCEDURE — 3066F PR DOCUMENTATION OF TREATMENT FOR NEPHROPATHY: ICD-10-PCS | Mod: HCNC,CPTII,S$GLB, | Performed by: NURSE PRACTITIONER

## 2022-12-06 PROCEDURE — 3078F DIAST BP <80 MM HG: CPT | Mod: HCNC,CPTII,S$GLB, | Performed by: NURSE PRACTITIONER

## 2022-12-06 PROCEDURE — 3074F PR MOST RECENT SYSTOLIC BLOOD PRESSURE < 130 MM HG: ICD-10-PCS | Mod: HCNC,CPTII,S$GLB, | Performed by: NURSE PRACTITIONER

## 2022-12-06 PROCEDURE — 4010F ACE/ARB THERAPY RXD/TAKEN: CPT | Mod: HCNC,CPTII,S$GLB, | Performed by: NURSE PRACTITIONER

## 2022-12-06 PROCEDURE — 3074F SYST BP LT 130 MM HG: CPT | Mod: HCNC,CPTII,S$GLB, | Performed by: NURSE PRACTITIONER

## 2022-12-06 PROCEDURE — 1160F PR REVIEW ALL MEDS BY PRESCRIBER/CLIN PHARMACIST DOCUMENTED: ICD-10-PCS | Mod: HCNC,CPTII,S$GLB, | Performed by: NURSE PRACTITIONER

## 2022-12-06 PROCEDURE — 3066F NEPHROPATHY DOC TX: CPT | Mod: HCNC,CPTII,S$GLB, | Performed by: NURSE PRACTITIONER

## 2022-12-06 PROCEDURE — 1159F MED LIST DOCD IN RCRD: CPT | Mod: HCNC,CPTII,S$GLB, | Performed by: NURSE PRACTITIONER

## 2022-12-06 PROCEDURE — 3061F NEG MICROALBUMINURIA REV: CPT | Mod: HCNC,CPTII,S$GLB, | Performed by: NURSE PRACTITIONER

## 2022-12-06 PROCEDURE — 1159F PR MEDICATION LIST DOCUMENTED IN MEDICAL RECORD: ICD-10-PCS | Mod: HCNC,CPTII,S$GLB, | Performed by: NURSE PRACTITIONER

## 2022-12-06 PROCEDURE — 1160F RVW MEDS BY RX/DR IN RCRD: CPT | Mod: HCNC,CPTII,S$GLB, | Performed by: NURSE PRACTITIONER

## 2022-12-06 NOTE — PATIENT INSTRUCTIONS
1. well woman  --up to date    2. Vaginal atrophy (dryness):  Use 1 gram of estrogen cream in vagina  twice a week thereafter.     3. Mixed urinary incontinence, urge > stress:    --Empty bladder every 3 hours.  Empty well: wait a minute, lean forward on toilet.    --Avoid dietary irritants (see sheet).  Keep diary x 3-5 days to determine your irritants.  --KEGELS: do 10 in AM and 10 in PM, holding each x 10 seconds.  When you feel urge to go, STOP, KEGEL, and when urge has passed, then go to bathroom.  Consider PT in future.    --URGE: continue oxybutynin xl 10 mg twice daily For dry mouth: get sour, sugar free lozenge or gum.    --STRESS:  Pessary vs. Sling.     4. --Nocturia (nighttime urination): stop fluids 2 hours before bed.  If have leg swelling:  Elevate feet above chest x 1 hour before bed to get excess fluid off.  Can also use support hose (knee highs).      5. Loose stool  --  Start daily fiber.  Take 1 tsp of fiber powder (psyllium or other sugar-free powder).  Mix in 8 oz of water.  Take x 3-5 days.  Then, increase fiber by 1 tsp every 3-5 days until stool is easy to pass.  Stop and continue at that dose.   Do not exceed 6 tsps/day.  May also use over the counter stool softener 1-2 x/day.  AVOID laxatives.    6. RTC 1 year for follow up

## 2022-12-06 NOTE — PROGRESS NOTES
12/06/2022    SUBJECTIVE:   67 y.o. female for annual exam.    Past Medical History:   Diagnosis Date    Cataract     Diabetes mellitus     H. pylori infection 2017    [facility] EGD 2017. test of eradication neg.    High cholesterol     Hypertension        Past Surgical History:   Procedure Laterality Date    BREAST BIOPSY Right     CATARACT EXTRACTION W/  INTRAOCULAR LENS IMPLANT Right 05/12/2022    Procedure: EXTRACTION, CATARACT, WITH IOL INSERTION;  Surgeon: Destiny Carrera MD;  Location: [facility] OR;  Service: Ophthalmology;  Laterality: Right;    CATARACT EXTRACTION W/  INTRAOCULAR LENS IMPLANT Left 06/16/2022    Procedure: EXTRACTION, CATARACT, WITH IOL INSERTION;  Surgeon: Destiny Carrera MD;  Location: [facility] OR;  Service: Ophthalmology;  Laterality: Left;    CHOLECYSTECTOMY  2016    COLONOSCOPY N/A 04/11/2022    Procedure: COLONOSCOPY;  Surgeon: Todd Carrera MD;  Location: [facility];  Service: Endoscopy;  Laterality: N/A;  order created: referral  Fully vaccinated, prep instr emailed -ml    ESOPHAGOGASTRODUODENOSCOPY N/A 07/21/2020    Procedure: EGD (ESOPHAGOGASTRODUODENOSCOPY);  Surgeon: Rodrick Montes MD;  Location: [facility] (4TH FLR);  Service: Endoscopy;  Laterality: N/A;  3/30/20 - removed from 4/9/20, 3/30/20 & 3/31/20LM pt &  ph & sent email, request call back to reschedule June/July.  Pt called back, rescheduled 7/21/20 - pg  7/6/20 - 7/9/20- LM x 2 - waiting for cb to set up COVID appt. - [facility]  COVID screening on    EYE SURGERY  7/22    TUBAL LIGATION  1982       Family History   Problem Relation Age of Onset    Arthritis Mother     Cancer Mother     Heart disease Mother     No Known Problems Father     No Known Problems Sister     No Known Problems Brother     No Known Problems Daughter     No Known Problems Son     No Known Problems Maternal Aunt     No Known Problems Maternal Uncle     No Known Problems Paternal Aunt     No Known Problems Paternal Uncle     No Known Problems Maternal Grandmother      No Known Problems Maternal Grandfather     No Known Problems Paternal Grandmother     No Known Problems Paternal Grandfather     Colon cancer Neg Hx     Rectal cancer Neg Hx     Stomach cancer Neg Hx     Breast cancer Neg Hx        Social History     Socioeconomic History    Marital status:    Tobacco Use    Smoking status: Every Day     Packs/day: 0.25     Years: 40.00     Pack years: 10.00     Types: Cigarettes    Smokeless tobacco: Never   Substance and Sexual Activity    Alcohol use: Yes     Alcohol/week: 2.0 standard drinks     Types: 2 Glasses of wine per week    Drug use: No    Sexual activity: Not Currently     Partners: Male     Birth control/protection: Abstinence     Social Determinants of Health     Financial Resource Strain: Medium Risk    Difficulty of Paying Living Expenses: Somewhat hard   Food Insecurity: Food Insecurity Present    Worried About Running Out of Food in the Last Year: Sometimes true    Ran Out of Food in the Last Year: Sometimes true   Transportation Needs: No Transportation Needs    Lack of Transportation (Medical): No    Lack of Transportation (Non-Medical): No   Physical Activity: Inactive    Days of Exercise per Week: 0 days    Minutes of Exercise per Session: 0 min   Stress: Stress Concern Present    Feeling of Stress : To some extent   Social Connections: Unknown    Frequency of Communication with Friends and Family: Once a week    Frequency of Social Gatherings with Friends and Family: Once a week    Active Member of Clubs or Organizations: Yes    Attends Club or Organization Meetings: More than 4 times per year    Marital Status:    Housing Stability: High Risk    Unable to Pay for Housing in the Last Year: Yes    Number of Places Lived in the Last Year: 1    Unstable Housing in the Last Year: No       Current Outpatient Medications   Medication Sig Dispense Refill    amLODIPine (NORVASC) 5 MG tablet Take 1 tablet (5 mg total) by mouth once daily. 90 tablet 3     aspirin (ECOTRIN) 81 MG EC tablet Take 81 mg by mouth once daily.      atorvastatin (LIPITOR) 40 MG tablet Take 1 tablet (40 mg total) by mouth once daily. 90 tablet 3    blood sugar diagnostic Strp To check BG 4 times daily 100 each 11    blood-glucose meter Misc Use as instructed (Patient taking differently: Use as instructed) 1 each 0    conjugated estrogens (PREMARIN) vaginal cream 1 g with applicator or dime-sized amt with finger in vagina nightly x 2 weeks, then twice a week thereafter 45 g 12    ergocalciferol (ERGOCALCIFEROL) 50,000 unit Cap Take 1 capsule (50,000 Units total) by mouth every 7 days. 12 capsule 3    fluticasone propionate (FLONASE) 50 mcg/actuation nasal spray 1 spray (50 mcg total) by Each Nostril route once daily. 16 g 11    lancets 30 gauge Misc To check BG 4 times daily. 200 each 11    loratadine (CLARITIN) 10 mg tablet Take 10 mg by mouth.      losartan (COZAAR) 50 MG tablet TAKE 1 TABLET(50 MG) BY MOUTH TWICE DAILY 180 tablet 3    oxybutynin (DITROPAN-XL) 10 MG 24 hr tablet TAKE 1 TABLET(10 MG) BY MOUTH EVERY DAY 30 tablet 11    urea 20 % Crea Apply 1 application topically once daily. To dry skin on the feet. 75 g 10     No current facility-administered medications for this visit.       Review of patient's allergies indicates:  No Known Allergies    No LMP recorded. Patient is postmenopausal. lmp     HgbA1c 5.8     x 5.     Well Woman:  Pap:2021 normal HPV negative  Mammo:2022 normal  Colonoscopy:2019 per patient report-- normal repeat in 10 years  Dexa:10/2022 normal    OB History          5    Para   5    Term   5            AB        Living             SAB        IAB        Ectopic        Multiple        Live Births                       ROS:  Feeling well.   No dyspnea or chest pain on exertion.    No abdominal pain, change in bowel habits, black or bloody stools.  + diarrhea-- followed by   +UUI.  Using 2-3 pads/ day with moderate wetness.  +GAIL has  "larger leaks. Taking oxybutynin xl 10 mg daily. Voiding every 3 hours.  Nocturia 3/ night  GYN ROS: no breast pain or new or enlarging lumps on self exam, no vaginal bleeding.   No neurological complaints.    OBJECTIVE:   The patient appears well, alert, oriented x 3, in no distress.  /70 (BP Location: Left arm, Patient Position: Sitting, BP Method: Medium (Manual))   Ht 5' 2" (1.575 m)   Wt 72.1 kg (158 lb 15.2 oz)   BMI 29.07 kg/m²   ENT normal.  Neck supple. No adenopathy or thyromegaly. MILLICENT.   Normal respiratory effort  Pulse with regular rate and rhythm.   Abdomen soft without tenderness, guarding, mass or organomegaly.   Extremities show no edema, normal peripheral pulses.   Neurological is normal, no focal findings.    BREAST EXAM: breasts appear normal, no suspicious masses, no skin or nipple changes or axillary nodes    PELVIC EXAM:   VULVA: normal appearing vulva with no masses, tenderness or lesions,   VAGINA: normal appearing vagina with normal color and discharge, no lesions, atrophic,  CERVIX: normal appearing cervix without discharge or lesions,   UTERUS: uterus is normal size, shape, consistency and nontender,   ADNEXA: no masses,   RECTAL: normal rectal, no masses    ASSESSMENT:   1. Well woman exam        2. Mixed stress and urge urinary incontinence        3. Vaginal atrophy              PLAN:     1. well woman  --up to date    2. Vaginal atrophy (dryness):  Use 1 gram of estrogen cream in vagina  twice a week thereafter.     3. Mixed urinary incontinence, urge > stress:    --Empty bladder every 3 hours.  Empty well: wait a minute, lean forward on toilet.    --Avoid dietary irritants (see sheet).  Keep diary x 3-5 days to determine your irritants.  --KEGELS: do 10 in AM and 10 in PM, holding each x 10 seconds.  When you feel urge to go, STOP, KEGEL, and when urge has passed, then go to bathroom.  Consider PT in future.    --URGE: continue oxybutynin xl 10 mg twice daily For dry mouth: " get sour, sugar free lozenge or gum.    --STRESS:  Pessary vs. Sling.     4. --Nocturia (nighttime urination): stop fluids 2 hours before bed.  If have leg swelling:  Elevate feet above chest x 1 hour before bed to get excess fluid off.  Can also use support hose (knee highs).      5. Loose stool  --  Start daily fiber.  Take 1 tsp of fiber powder (psyllium or other sugar-free powder).  Mix in 8 oz of water.  Take x 3-5 days.  Then, increase fiber by 1 tsp every 3-5 days until stool is easy to pass.  Stop and continue at that dose.   Do not exceed 6 tsps/day.  May also use over the counter stool softener 1-2 x/day.  AVOID laxatives.    6. RTC 1 year for follow up           30 minutes were spent in face to face time with this patient  90 % of this time was spent in counseling and/or coordination of care  Emma MIGUEL [facility]  Division of Female Pelvic Medicine and Reconstructive Surgery  Department of Obstetrics & Gynecology

## 2022-12-18 ENCOUNTER — HOSPITAL ENCOUNTER (OUTPATIENT)
Facility: OTHER | Age: 67
Discharge: HOME | End: 2022-12-19
Attending: EMERGENCY MEDICINE | Admitting: EMERGENCY MEDICINE
Payer: MEDICARE

## 2022-12-18 DIAGNOSIS — R52 PAIN: ICD-10-CM

## 2022-12-18 DIAGNOSIS — R07.9 CHEST PAIN: Primary | ICD-10-CM

## 2022-12-18 LAB
ALBUMIN SERPL BCP-MCNC: 3.8 G/DL (ref 3.5–5.2)
ALP SERPL-CCNC: 91 U/L (ref 55–135)
ALT SERPL W/O P-5'-P-CCNC: 12 U/L (ref 10–44)
ANION GAP SERPL CALC-SCNC: 10 MMOL/L (ref 8–16)
AST SERPL-CCNC: 15 U/L (ref 10–40)
BASOPHILS # BLD AUTO: 0.07 K/UL (ref 0–0.2)
BASOPHILS NFR BLD: 0.6 % (ref 0–1.9)
BILIRUB SERPL-MCNC: 0.2 MG/DL (ref 0.1–1)
BILIRUB UR QL STRIP: NEGATIVE
BNP SERPL-MCNC: <10 PG/ML (ref 0–99)
BUN SERPL-MCNC: 23 MG/DL (ref 8–23)
CALCIUM SERPL-MCNC: 9.2 MG/DL (ref 8.7–10.5)
CHLORIDE SERPL-SCNC: 107 MMOL/L (ref 95–110)
CLARITY UR: CLEAR
CO2 SERPL-SCNC: 21 MMOL/L (ref 23–29)
COLOR UR: YELLOW
CREAT SERPL-MCNC: 0.9 MG/DL (ref 0.5–1.4)
DIFFERENTIAL METHOD: ABNORMAL
EOSINOPHIL # BLD AUTO: 0.3 K/UL (ref 0–0.5)
EOSINOPHIL NFR BLD: 2.3 % (ref 0–8)
ERYTHROCYTE [DISTWIDTH] IN BLOOD BY AUTOMATED COUNT: 14.6 % (ref 11.5–14.5)
EST. GFR  (NO RACE VARIABLE): >60 ML/MIN/1.73 M^2
GLUCOSE SERPL-MCNC: 101 MG/DL (ref 70–110)
GLUCOSE UR QL STRIP: NEGATIVE
HCT VFR BLD AUTO: 41.9 % (ref 37–48.5)
HCV AB SERPL QL IA: POSITIVE
HGB BLD-MCNC: 14 G/DL (ref 12–16)
HGB UR QL STRIP: ABNORMAL
HIV 1+2 AB+HIV1 P24 AG SERPL QL IA: NEGATIVE
IMM GRANULOCYTES # BLD AUTO: 0.02 K/UL (ref 0–0.04)
IMM GRANULOCYTES NFR BLD AUTO: 0.2 % (ref 0–0.5)
KETONES UR QL STRIP: NEGATIVE
LEUKOCYTE ESTERASE UR QL STRIP: NEGATIVE
LIPASE SERPL-CCNC: 60 U/L (ref 4–60)
LYMPHOCYTES # BLD AUTO: 4.9 K/UL (ref 1–4.8)
LYMPHOCYTES NFR BLD: 44.8 % (ref 18–48)
MAGNESIUM SERPL-MCNC: 2 MG/DL (ref 1.6–2.6)
MCH RBC QN AUTO: 29.9 PG (ref 27–31)
MCHC RBC AUTO-ENTMCNC: 33.4 G/DL (ref 32–36)
MCV RBC AUTO: 90 FL (ref 82–98)
MONOCYTES # BLD AUTO: 0.6 K/UL (ref 0.3–1)
MONOCYTES NFR BLD: 5.1 % (ref 4–15)
NEUTROPHILS # BLD AUTO: 5.1 K/UL (ref 1.8–7.7)
NEUTROPHILS NFR BLD: 47 % (ref 38–73)
NITRITE UR QL STRIP: NEGATIVE
NRBC BLD-RTO: 0 /100 WBC
PH UR STRIP: 6 [PH] (ref 5–8)
PLATELET # BLD AUTO: 271 K/UL (ref 150–450)
PMV BLD AUTO: 10.5 FL (ref 9.2–12.9)
POTASSIUM SERPL-SCNC: 4.2 MMOL/L (ref 3.5–5.1)
PROT SERPL-MCNC: 7.6 G/DL (ref 6–8.4)
PROT UR QL STRIP: NEGATIVE
RBC # BLD AUTO: 4.68 M/UL (ref 4–5.4)
SODIUM SERPL-SCNC: 138 MMOL/L (ref 136–145)
SP GR UR STRIP: 1.02 (ref 1–1.03)
TROPONIN I SERPL DL<=0.01 NG/ML-MCNC: 0.01 NG/ML (ref 0–0.03)
TROPONIN I SERPL DL<=0.01 NG/ML-MCNC: <0.006 NG/ML (ref 0–0.03)
TSH SERPL DL<=0.005 MIU/L-ACNC: 0.92 UIU/ML (ref 0.4–4)
URN SPEC COLLECT METH UR: ABNORMAL
UROBILINOGEN UR STRIP-ACNC: NEGATIVE EU/DL
WBC # BLD AUTO: 10.85 K/UL (ref 3.9–12.7)

## 2022-12-18 PROCEDURE — 87389 HIV-1 AG W/HIV-1&-2 AB AG IA: CPT | Mod: HCNC | Performed by: EMERGENCY MEDICINE

## 2022-12-18 PROCEDURE — 84443 ASSAY THYROID STIM HORMONE: CPT | Mod: HCNC | Performed by: EMERGENCY MEDICINE

## 2022-12-18 PROCEDURE — 25000003 PHARM REV CODE 250: Mod: HCNC | Performed by: EMERGENCY MEDICINE

## 2022-12-18 PROCEDURE — 36415 COLL VENOUS BLD VENIPUNCTURE: CPT | Mod: HCNC | Performed by: PHYSICIAN ASSISTANT

## 2022-12-18 PROCEDURE — 83880 ASSAY OF NATRIURETIC PEPTIDE: CPT | Mod: HCNC | Performed by: EMERGENCY MEDICINE

## 2022-12-18 PROCEDURE — 93010 ELECTROCARDIOGRAM REPORT: CPT | Mod: HCNC,76,, | Performed by: INTERNAL MEDICINE

## 2022-12-18 PROCEDURE — 93010 EKG 12-LEAD: ICD-10-PCS | Mod: HCNC,76,, | Performed by: INTERNAL MEDICINE

## 2022-12-18 PROCEDURE — 99220 PR INITIAL OBSERVATION CARE,LEVL III: CPT | Mod: HCNC,,, | Performed by: INTERNAL MEDICINE

## 2022-12-18 PROCEDURE — 80053 COMPREHEN METABOLIC PANEL: CPT | Mod: HCNC | Performed by: EMERGENCY MEDICINE

## 2022-12-18 PROCEDURE — 99285 EMERGENCY DEPT VISIT HI MDM: CPT | Mod: 25,HCNC

## 2022-12-18 PROCEDURE — 81003 URINALYSIS AUTO W/O SCOPE: CPT | Mod: HCNC | Performed by: EMERGENCY MEDICINE

## 2022-12-18 PROCEDURE — 86803 HEPATITIS C AB TEST: CPT | Mod: HCNC | Performed by: EMERGENCY MEDICINE

## 2022-12-18 PROCEDURE — 84484 ASSAY OF TROPONIN QUANT: CPT | Mod: 91,HCNC | Performed by: EMERGENCY MEDICINE

## 2022-12-18 PROCEDURE — 84484 ASSAY OF TROPONIN QUANT: CPT | Mod: HCNC | Performed by: PHYSICIAN ASSISTANT

## 2022-12-18 PROCEDURE — G0378 HOSPITAL OBSERVATION PER HR: HCPCS | Mod: HCNC

## 2022-12-18 PROCEDURE — 96374 THER/PROPH/DIAG INJ IV PUSH: CPT | Mod: HCNC

## 2022-12-18 PROCEDURE — 93005 ELECTROCARDIOGRAM TRACING: CPT | Mod: HCNC

## 2022-12-18 PROCEDURE — 63600175 PHARM REV CODE 636 W HCPCS: Mod: HCNC | Performed by: EMERGENCY MEDICINE

## 2022-12-18 PROCEDURE — 83690 ASSAY OF LIPASE: CPT | Mod: HCNC | Performed by: EMERGENCY MEDICINE

## 2022-12-18 PROCEDURE — 85025 COMPLETE CBC W/AUTO DIFF WBC: CPT | Mod: HCNC | Performed by: EMERGENCY MEDICINE

## 2022-12-18 PROCEDURE — 96361 HYDRATE IV INFUSION ADD-ON: CPT | Mod: HCNC

## 2022-12-18 PROCEDURE — 87522 HEPATITIS C REVRS TRNSCRPJ: CPT | Mod: HCNC | Performed by: EMERGENCY MEDICINE

## 2022-12-18 PROCEDURE — 36415 COLL VENOUS BLD VENIPUNCTURE: CPT | Mod: HCNC | Performed by: EMERGENCY MEDICINE

## 2022-12-18 PROCEDURE — 83735 ASSAY OF MAGNESIUM: CPT | Mod: HCNC | Performed by: EMERGENCY MEDICINE

## 2022-12-18 PROCEDURE — 99220 PR INITIAL OBSERVATION CARE,LEVL III: ICD-10-PCS | Mod: HCNC,,, | Performed by: INTERNAL MEDICINE

## 2022-12-18 RX ORDER — TALC
6 POWDER (GRAM) TOPICAL NIGHTLY PRN
Status: DISCONTINUED | OUTPATIENT
Start: 2022-12-18 | End: 2022-12-19 | Stop reason: HOSPADM

## 2022-12-18 RX ORDER — KETOROLAC TROMETHAMINE 30 MG/ML
10 INJECTION, SOLUTION INTRAMUSCULAR; INTRAVENOUS
Status: COMPLETED | OUTPATIENT
Start: 2022-12-18 | End: 2022-12-18

## 2022-12-18 RX ORDER — PROMETHAZINE HYDROCHLORIDE 25 MG/1
25 TABLET ORAL EVERY 6 HOURS PRN
Status: DISCONTINUED | OUTPATIENT
Start: 2022-12-18 | End: 2022-12-19 | Stop reason: HOSPADM

## 2022-12-18 RX ORDER — ATORVASTATIN CALCIUM 20 MG/1
40 TABLET, FILM COATED ORAL EVERY MORNING
Status: DISCONTINUED | OUTPATIENT
Start: 2022-12-19 | End: 2022-12-19 | Stop reason: HOSPADM

## 2022-12-18 RX ORDER — SODIUM CHLORIDE 0.9 % (FLUSH) 0.9 %
10 SYRINGE (ML) INJECTION
Status: DISCONTINUED | OUTPATIENT
Start: 2022-12-18 | End: 2022-12-19 | Stop reason: HOSPADM

## 2022-12-18 RX ORDER — FENTANYL CITRATE 50 UG/ML
50 INJECTION, SOLUTION INTRAMUSCULAR; INTRAVENOUS
Status: DISPENSED | OUTPATIENT
Start: 2022-12-18 | End: 2022-12-18

## 2022-12-18 RX ORDER — AMLODIPINE BESYLATE 5 MG/1
5 TABLET ORAL EVERY MORNING
Status: DISCONTINUED | OUTPATIENT
Start: 2022-12-19 | End: 2022-12-19 | Stop reason: HOSPADM

## 2022-12-18 RX ORDER — HYDROCODONE BITARTRATE AND ACETAMINOPHEN 5; 325 MG/1; MG/1
1 TABLET ORAL EVERY 4 HOURS PRN
Status: DISCONTINUED | OUTPATIENT
Start: 2022-12-18 | End: 2022-12-19 | Stop reason: HOSPADM

## 2022-12-18 RX ORDER — ASPIRIN 325 MG
325 TABLET ORAL
Status: COMPLETED | OUTPATIENT
Start: 2022-12-18 | End: 2022-12-18

## 2022-12-18 RX ORDER — ASPIRIN 81 MG/1
81 TABLET ORAL EVERY MORNING
Status: DISCONTINUED | OUTPATIENT
Start: 2022-12-19 | End: 2022-12-19 | Stop reason: HOSPADM

## 2022-12-18 RX ORDER — ONDANSETRON 8 MG/1
8 TABLET, ORALLY DISINTEGRATING ORAL EVERY 8 HOURS PRN
Status: DISCONTINUED | OUTPATIENT
Start: 2022-12-18 | End: 2022-12-19 | Stop reason: HOSPADM

## 2022-12-18 RX ORDER — ACETAMINOPHEN 325 MG/1
650 TABLET ORAL EVERY 8 HOURS PRN
Status: DISCONTINUED | OUTPATIENT
Start: 2022-12-18 | End: 2022-12-19 | Stop reason: HOSPADM

## 2022-12-18 RX ORDER — LOSARTAN POTASSIUM 50 MG/1
50 TABLET ORAL EVERY MORNING
Status: DISCONTINUED | OUTPATIENT
Start: 2022-12-19 | End: 2022-12-19 | Stop reason: HOSPADM

## 2022-12-18 RX ADMIN — ASPIRIN 325 MG ORAL TABLET 325 MG: 325 PILL ORAL at 08:12

## 2022-12-18 RX ADMIN — KETOROLAC TROMETHAMINE 10 MG: 30 INJECTION, SOLUTION INTRAMUSCULAR; INTRAVENOUS at 10:12

## 2022-12-18 NOTE — PROGRESS NOTES
[facility] CONSULT    Date of admission:  12/18/2022     Reason for Consult:    CP    HPI:    67-year-old female with past medical history syncope likely IVVD (EM 2020 no arrhythmia), GERD, HCV, tobacco / COPD (by CT), hypertension, hyperlipidemia, diabetes, echo left ventricular ejection fraction normal diastolic function mild aortic valve regurgitation normal RV normal PASP    Exercise stress echocardiogram 2019 no evidence of ischemia hypertensive response to exercise achieve target heart rate & went for 10 METS    Followed by Dr. Hawk in clinic    The patient states that she had 8/10 chest discomfort that awoke her from sleep last night and lasted hours  Exact same discomfort prior to above 2019 exercise stress echocardiogram    Chest x-ray no acute process  Labs initial troponin x 2 negative &  BNP normal    Medications  Current Facility-Administered Medications   Medication Dose Route Frequency Provider Last Rate Last Admin    fentaNYL 50 mcg/mL injection 50 mcg  50 mcg Intravenous ED 1 Time Jesus Russell MD        sodium chloride 0.9% flush 10 mL  10 mL Intravenous PRN Jesus Russell MD         Current Outpatient Medications   Medication Sig Dispense Refill    amLODIPine (NORVASC) 5 MG tablet Take 1 tablet (5 mg total) by mouth once daily. 90 tablet 3    aspirin (ECOTRIN) 81 MG EC tablet Take 81 mg by mouth once daily.      atorvastatin (LIPITOR) 40 MG tablet Take 1 tablet (40 mg total) by mouth once daily. 90 tablet 3    loratadine (CLARITIN) 10 mg tablet Take 10 mg by mouth.      losartan (COZAAR) 50 MG tablet TAKE 1 TABLET(50 MG) BY MOUTH TWICE DAILY 180 tablet 3    blood sugar diagnostic Strp To check BG 4 times daily 100 each 11    blood-glucose meter Misc Use as instructed (Patient taking differently: Use as instructed) 1 each 0    conjugated estrogens (PREMARIN) vaginal cream 1 g with applicator or dime-sized amt with finger in vagina nightly x 2 weeks, then twice a week  thereafter 45 g 12    ergocalciferol (ERGOCALCIFEROL) 50,000 unit Cap Take 1 capsule (50,000 Units total) by mouth every 7 days. 12 capsule 3    fluticasone propionate (FLONASE) 50 mcg/actuation nasal spray 1 spray (50 mcg total) by Each Nostril route once daily. 16 g 11    lancets 30 gauge Misc To check BG 4 times daily. 200 each 11    oxybutynin (DITROPAN-XL) 10 MG 24 hr tablet TAKE 1 TABLET(10 MG) BY MOUTH EVERY DAY 30 tablet 11    urea 20 % Crea Apply 1 application topically once daily. To dry skin on the feet. 75 g 10      Prior to Admission medications    Medication Sig Start Date End Date Taking? Authorizing Provider   amLODIPine (NORVASC) 5 MG tablet Take 1 tablet (5 mg total) by mouth once daily. 3/24/22 3/24/23 Yes Sarah Antonio MD   aspirin (ECOTRIN) 81 MG EC tablet Take 81 mg by mouth once daily.   Yes Historical Provider   atorvastatin (LIPITOR) 40 MG tablet Take 1 tablet (40 mg total) by mouth once daily. 3/24/22  Yes Sarah Antonio MD   loratadine (CLARITIN) 10 mg tablet Take 10 mg by mouth. 7/23/14  Yes Historical Provider   losartan (COZAAR) 50 MG tablet TAKE 1 TABLET(50 MG) BY MOUTH TWICE DAILY 9/23/22  Yes Sarah Antonio MD   blood sugar diagnostic Strp To check BG 4 times daily 2/17/21   Sarah Antonio MD   blood-glucose meter Misc Use as instructed  Patient taking differently: Use as instructed 11/5/20   Conchita Isaac PA-C   conjugated estrogens (PREMARIN) vaginal cream 1 g with applicator or dime-sized amt with finger in vagina nightly x 2 weeks, then twice a week thereafter 11/15/22   Ricardo Mora DO   ergocalciferol (ERGOCALCIFEROL) 50,000 unit Cap Take 1 capsule (50,000 Units total) by mouth every 7 days. 9/27/22   Sarah Antonio MD   fluticasone propionate (FLONASE) 50 mcg/actuation nasal spray 1 spray (50 mcg total) by Each Nostril route once daily. 3/24/22   Sarah Antonio MD   lancets 30 gauge Misc To check BG 4 times daily. 3/2/21   Sarah Antonio MD   oxybutynin  (DITROPAN-XL) 10 MG 24 hr tablet TAKE 1 TABLET(10 MG) BY MOUTH EVERY DAY 3/30/22   Sarah Antonio MD   urea 20 % Crea Apply 1 application topically once daily. To dry skin on the feet. 11/4/21   Mignon Trivedi DPM       History  Past Medical History:   Diagnosis Date    Cataract     Diabetes mellitus     H. pylori infection 2017    [facility] EGD 2017. test of eradication neg.    High cholesterol     Hypertension      Past Surgical History:   Procedure Laterality Date    BREAST BIOPSY Right     CATARACT EXTRACTION W/  INTRAOCULAR LENS IMPLANT Right 05/12/2022    Procedure: EXTRACTION, CATARACT, WITH IOL INSERTION;  Surgeon: Destiny Carrera MD;  Location: [facility];  Service: Ophthalmology;  Laterality: Right;    CATARACT EXTRACTION W/  INTRAOCULAR LENS IMPLANT Left 06/16/2022    Procedure: EXTRACTION, CATARACT, WITH IOL INSERTION;  Surgeon: Destiny Carrera MD;  Location: [facility] OR;  Service: Ophthalmology;  Laterality: Left;    CHOLECYSTECTOMY  2016    COLONOSCOPY N/A 04/11/2022    Procedure: COLONOSCOPY;  Surgeon: Todd Carrera MD;  Location: [facility];  Service: Endoscopy;  Laterality: N/A;  order created: referral  Fully vaccinated, prep instr emailed -ml    ESOPHAGOGASTRODUODENOSCOPY N/A 07/21/2020    Procedure: EGD (ESOPHAGOGASTRODUODENOSCOPY);  Surgeon: Rodrick Montes MD;  Location: [facility] (4TH FLR);  Service: Endoscopy;  Laterality: N/A;  3/30/20 - removed from 4/9/20, 3/30/20 & 3/31/20LM pt &  ph & sent email, request call back to reschedule June/July.  Pt called back, rescheduled 7/21/20 - pg  7/6/20 - 7/9/20- LM x 2 - waiting for cb to set up COVID appt. - ERW  COVID screening on    EYE SURGERY  7/22    TUBAL LIGATION  1982     Social History     Socioeconomic History    Marital status:    Tobacco Use    Smoking status: Every Day     Packs/day: 0.25     Years: 40.00     Pack years: 10.00     Types: Cigarettes    Smokeless tobacco: Never   Substance and Sexual Activity    Alcohol use: Yes      Alcohol/week: 2.0 standard drinks     Types: 2 Glasses of wine per week    Drug use: No    Sexual activity: Not Currently     Partners: Male     Birth control/protection: Abstinence     Social Determinants of Health     Financial Resource Strain: Medium Risk    Difficulty of Paying Living Expenses: Somewhat hard   Food Insecurity: Food Insecurity Present    Worried About Running Out of Food in the Last Year: Sometimes true    Ran Out of Food in the Last Year: Sometimes true   Transportation Needs: No Transportation Needs    Lack of Transportation (Medical): No    Lack of Transportation (Non-Medical): No   Physical Activity: Inactive    Days of Exercise per Week: 0 days    Minutes of Exercise per Session: 0 min   Stress: Stress Concern Present    Feeling of Stress : To some extent   Social Connections: Unknown    Frequency of Communication with Friends and Family: Once a week    Frequency of Social Gatherings with Friends and Family: Once a week    Active Member of Clubs or Organizations: Yes    Attends Club or Organization Meetings: More than 4 times per year    Marital Status:    Housing Stability: High Risk    Unable to Pay for Housing in the Last Year: Yes    Number of Places Lived in the Last Year: 1    Unstable Housing in the Last Year: No     Family History   Problem Relation Age of Onset    Arthritis Mother     Cancer Mother     Heart disease Mother     No Known Problems Father     No Known Problems Sister     No Known Problems Brother     No Known Problems Daughter     No Known Problems Son     No Known Problems Maternal Aunt     No Known Problems Maternal Uncle     No Known Problems Paternal Aunt     No Known Problems Paternal Uncle     No Known Problems Maternal Grandmother     No Known Problems Maternal Grandfather     No Known Problems Paternal Grandmother     No Known Problems Paternal Grandfather     Colon cancer Neg Hx     Rectal cancer Neg Hx     Stomach cancer Neg Hx     Breast cancer Neg Hx          Allergies  Review of patient's allergies indicates:  No Known Allergies    Review of Systems   Review of Systems   Constitutional: Negative for fever.   HENT:  Negative for nosebleeds.    Eyes:  Negative for visual disturbance.   Cardiovascular:  Positive for chest pain. Negative for claudication, dyspnea on exertion, palpitations and syncope.   Respiratory:  Negative for cough, hemoptysis and wheezing.    Endocrine: Negative for cold intolerance, heat intolerance, polyphagia and polyuria.   Hematologic/Lymphatic: Negative for bleeding problem.   Skin:  Negative for rash.   Musculoskeletal:  Negative for myalgias.   Gastrointestinal:  Negative for hematemesis, hematochezia, nausea and vomiting.   Genitourinary:  Negative for dysuria.   Neurological:  Negative for focal weakness and sensory change.   Psychiatric/Behavioral:  Negative for altered mental status.      Physical Exam    Temp:  [97.9 °F (36.6 °C)]   Pulse:  [52-63]   Resp:  [17-35]   BP: (139-174)/(60-74)   SpO2:  [97 %-100 %]    Wt Readings from Last 1 Encounters:   12/18/22 71.7 kg (158 lb)     Physical Exam  Constitutional:       General: She is not in acute distress.  HENT:      Head: Normocephalic and atraumatic.      Mouth/Throat:      Mouth: Mucous membranes are moist.   Eyes:      Extraocular Movements: Extraocular movements intact.      Pupils: Pupils are equal, round, and reactive to light.   Neck:      Vascular: No carotid bruit or JVD.   Cardiovascular:      Rate and Rhythm: Normal rate and regular rhythm.      Heart sounds: No murmur heard.    No friction rub. No gallop.   Pulmonary:      Effort: Pulmonary effort is normal.      Breath sounds: Normal breath sounds.   Abdominal:      Tenderness: There is no abdominal tenderness. There is no guarding or rebound.   Musculoskeletal:      Right lower leg: No edema.      Left lower leg: No edema.   Skin:     General: Skin is warm and dry.      Capillary Refill: Capillary refill takes less  than 2 seconds.   Neurological:      General: No focal deficit present.      Mental Status: She is alert.   Psychiatric:         Mood and Affect: Mood normal.       EKG  12/18/2022 sinus arrhythmia no ischemic EKG changes    Echocardiogram  Results for orders placed or performed during the hospital encounter of 11/04/20   Echo Color Flow Doppler? Yes   Result Value Ref Range    Ascending aorta 2.80 cm    STJ 2.63 cm    AV mean gradient 4 mmHg    Ao peak alirio 1.40 m/s    Ao VTI 31.91 cm    IVRT 77.07 msec    IVS 0.82 0.6 - 1.1 cm    LA size 3.38 cm    Left Atrium Major Axis 5.02 cm    Left Atrium Minor Axis 4.94 cm    LVIDd 4.44 3.5 - 6.0 cm    LVIDs 3.03 2.1 - 4.0 cm    LVOT diameter 2.01 cm    LVOT peak VTI 28.58 cm    Posterior Wall 0.59 (A) 0.6 - 1.1 cm    MV Peak A Alirio 0.82 m/s    E wave deceleration time 187.64 msec    MV Peak E Alirio 0.96 m/s    PV Peak D Alirio 0.34 m/s    PV Peak S Alirio 0.47 m/s    RA Major Axis 4.86 cm    RA Width 3.78 cm    RVDD 3.36 cm    Sinus 2.85 cm    TAPSE 1.83 cm    TR Max Alirio 2.31 m/s    TDI LATERAL 0.09 m/s    TDI SEPTAL 0.06 m/s    LA WIDTH 3.84 cm    MV stenosis pressure 1/2 time 54.42 ms    LV Diastolic Volume 89.43 mL    LV Systolic Volume 35.93 mL    RV S' 10.51 cm/s    LVOT peak alirio 1.20 m/s    LV LATERAL E/E' RATIO 10.67 m/s    LV SEPTAL E/E' RATIO 16.00 m/s    FS 32 %    LA volume 54.94 cm3    LV mass 94.34 g    Left Ventricle Relative Wall Thickness 0.27 cm    AV valve area 2.84 cm2    AV Velocity Ratio 0.86     AV index (prosthetic) 0.90     MV valve area p 1/2 method 4.04 cm2    E/A ratio 1.17     Mean e' 0.08 m/s    Pulm vein S/D ratio 1.38     LVOT area 3.2 cm2    LVOT stroke volume 90.64 cm3    AV peak gradient 8 mmHg    E/E' ratio 12.80 m/s    LV Systolic Volume Index 19.9 mL/m2    LV Diastolic Volume Index 49.64 mL/m2    LA Volume Index 30.5 mL/m2    LV Mass Index 52 g/m2    Triscuspid Valve Regurgitation Peak Gradient 21 mmHg    BSA 1.86 m2    Right Atrial Pressure (from  IVC) 3 mmHg    TV rest pulmonary artery pressure 24 mmHg    Narrative    · The left ventricle is normal in size with normal systolic function. The   estimated ejection fraction is 60%.  · Normal right ventricular size with normal right ventricular systolic   function.  · Normal left ventricular diastolic function.  · Mild tricuspid regurgitation.  · Mild aortic regurgitation.  · The estimated PA systolic pressure is 24 mmHg.  · Normal central venous pressure (3 mmHg).          Labs  Recent Results (from the past 72 hour(s))   CBC auto differential    Collection Time: 12/18/22  8:10 AM   Result Value Ref Range    WBC 10.85 3.90 - 12.70 K/uL    RBC 4.68 4.00 - 5.40 M/uL    Hemoglobin 14.0 12.0 - 16.0 g/dL    Hematocrit 41.9 37.0 - 48.5 %    MCV 90 82 - 98 fL    MCH 29.9 27.0 - 31.0 pg    MCHC 33.4 32.0 - 36.0 g/dL    RDW 14.6 (H) 11.5 - 14.5 %    Platelets 271 150 - 450 K/uL    MPV 10.5 9.2 - 12.9 fL    Immature Granulocytes 0.2 0.0 - 0.5 %    Gran # (ANC) 5.1 1.8 - 7.7 K/uL    Immature Grans (Abs) 0.02 0.00 - 0.04 K/uL    Lymph # 4.9 (H) 1.0 - 4.8 K/uL    Mono # 0.6 0.3 - 1.0 K/uL    Eos # 0.3 0.0 - 0.5 K/uL    Baso # 0.07 0.00 - 0.20 K/uL    nRBC 0 0 /100 WBC    Gran % 47.0 38.0 - 73.0 %    Lymph % 44.8 18.0 - 48.0 %    Mono % 5.1 4.0 - 15.0 %    Eosinophil % 2.3 0.0 - 8.0 %    Basophil % 0.6 0.0 - 1.9 %    Differential Method Automated    Brain natriuretic peptide    Collection Time: 12/18/22  8:10 AM   Result Value Ref Range    BNP <10 0 - 99 pg/mL   Comprehensive metabolic panel    Collection Time: 12/18/22  8:10 AM   Result Value Ref Range    Sodium 138 136 - 145 mmol/L    Potassium 4.2 3.5 - 5.1 mmol/L    Chloride 107 95 - 110 mmol/L    CO2 21 (L) 23 - 29 mmol/L    Glucose 101 70 - 110 mg/dL    BUN 23 8 - 23 mg/dL    Creatinine 0.9 0.5 - 1.4 mg/dL    Calcium 9.2 8.7 - 10.5 mg/dL    Total Protein 7.6 6.0 - 8.4 g/dL    Albumin 3.8 3.5 - 5.2 g/dL    Total Bilirubin 0.2 0.1 - 1.0 mg/dL    Alkaline Phosphatase 91 55  - 135 U/L    AST 15 10 - 40 U/L    ALT 12 10 - 44 U/L    Anion Gap 10 8 - 16 mmol/L    eGFR >60 >60 mL/min/1.73 m^2   Lipase    Collection Time: 12/18/22  8:10 AM   Result Value Ref Range    Lipase 60 4 - 60 U/L   Magnesium    Collection Time: 12/18/22  8:10 AM   Result Value Ref Range    Magnesium 2.0 1.6 - 2.6 mg/dL   Troponin I    Collection Time: 12/18/22  8:10 AM   Result Value Ref Range    Troponin I <0.006 0.000 - 0.026 ng/mL   TSH    Collection Time: 12/18/22  8:10 AM   Result Value Ref Range    TSH 0.920 0.400 - 4.000 uIU/mL   HIV 1/2 Ag/Ab (4th Gen)    Collection Time: 12/18/22  8:10 AM   Result Value Ref Range    HIV 1/2 Ag/Ab Negative Negative   Hepatitis C Antibody    Collection Time: 12/18/22  8:10 AM   Result Value Ref Range    Hepatitis C Ab Positive (A) Negative   Urinalysis, Reflex to Urine Culture Urine, Clean Catch    Collection Time: 12/18/22  9:08 AM    Specimen: Urine   Result Value Ref Range    Specimen UA Urine, Clean Catch     Color, UA Yellow Yellow, Straw, Suzanna    Appearance, UA Clear Clear    pH, UA 6.0 5.0 - 8.0    Specific Gravity, UA 1.025 1.005 - 1.030    Protein, UA Negative Negative    Glucose, UA Negative Negative    Ketones, UA Negative Negative    Bilirubin (UA) Negative Negative    Occult Blood UA Trace (A) Negative    Nitrite, UA Negative Negative    Urobilinogen, UA Negative <2.0 EU/dL    Leukocytes, UA Negative Negative   Troponin I    Collection Time: 12/18/22 11:20 AM   Result Value Ref Range    Troponin I 0.015 0.000 - 0.026 ng/mL        Imaging  X-Ray Chest AP Portable    Result Date: 12/18/2022  EXAMINATION: XR CHEST AP PORTABLE CLINICAL HISTORY: Chest pain, unspecified TECHNIQUE: Single frontal view of the chest was performed. COMPARISON: 05/01/2022. FINDINGS: The heart is not enlarged.  Superior mediastinal structures are unremarkable.  Pulmonary vasculature is within normal limits.  The lungs are free of focal consolidations.  There is no evidence for pneumothorax  or large pleural effusions.  Bony structures are grossly intact.     No acute chest disease identified. Electronically signed by: Nasir Oliver MD Date:    12/18/2022 Time:    09:40    Mammo Digital Screening Bilat w/ Antoni    Result Date: 11/29/2022  Result: Mammo Digital Screening Bilat w/ Antoni History: Patient is 67 y.o. and is seen for a screening mammogram. Films Compared: Compared to: 09/23/2021 Mammo Digital Screening Bilat w/ Antoni (No Change) Findings:  This procedure was performed using tomosynthesis. Computer-aided detection was utilized in the interpretation of this examination. The breasts are heterogeneously dense, which may obscure small masses. There is no evidence of suspicious masses, microcalcifications or architectural distortion.      No mammographic evidence of malignancy. BI-RADS Category 1: Negative Recommendation: Routine screening mammogram in 1 year is recommended. Your estimated lifetime risk of breast cancer (to age 85) based on Tyrer-Cuzick risk assessment model is 3.9 %.  According to the [employer] Society, patients with a lifetime breast cancer risk of 20% or higher might benefit from supplemental screening tests. ??       Prior coronary angiogram / intervention:  None    Assessment and Plan:  67-year-old female with past medical history syncope likely IVVD (EM 2020 no arrhythmia), GERD, HCV, tobacco / COPD (by CT), hypertension, hyperlipidemia, diabetes, JUAN 2019 negative for ischemia, echo left ventricular ejection fraction normal diastolic function mild aortic valve regurgitation normal RV normal PASP    Chest discomfort  Labs initial troponin x 2 negative   No ischemic EKG changes  On ASA 81 lipitor 40  Exercise MPI tomorrow (assuming 3rd troponin negative)    HTN  Norvasc, losartan    HLD  Lipitor 40    Thank you for allowing me to participate in the care of Leticia Brown.    Aba Pearson MD, FACC, RPVI  Interventional Cardiology, MD

## 2022-12-18 NOTE — Clinical Note
Diagnosis: Chest pain [734859]   Future Attending Provider: NEHAL PORTILLO [[id number]]   Is the patient being sent to ED Observation?: Yes   Admitting Provider:: NEHAL PORTILLO [[id number]]

## 2022-12-18 NOTE — ED NOTES
Pt reports chest pain. MD notified and orders placed. Pt is on cardiac monitoring with continuous pulse ox and BP cycling q30 min. Will continue to monitor.

## 2022-12-18 NOTE — ED TRIAGE NOTES
Pt reports to ED with left sided chest pain that started last night around 10 pm. Denies shortness of breath. The pain does not radiate anywhere. Pain described as pressure. Pt did not take any medications at home before coming to the ED. No known hx of MI. Pt is aaox4.

## 2022-12-18 NOTE — ED PROVIDER NOTES
Encounter Date: 12/18/2022    SCRIBE #1 NOTE: I, Harjit Gilmore, am scribing for, and in the presence of,  Jesus Russell MD. Other sections scribed: HPI, ROS.     History     Chief Complaint   Patient presents with    Chest Pain     C/o continuous left cp 8/10 non radiating that began last night 10 pm. Describes as ache. -SOB denies cardiac history VSS     Time seen by provider: 7:56 AM    This is a 67 y.o. female who presents with complaint of left sided chest pain that began last night. Patient reports she was feeling fine when going to bed last night, but she immediately woke up out of her sleep due to the intense pain she felt on the left side of her chest. She states earlier this year she suffered from the same symptoms, and visited a clinic without receiving any effective information about the cause of her chest pain. Patient mentions she has intermittent swelling in both of her legs. PSHx of cholecystectomy & hysterectomy.  Patient states she smokes cigarettes. No known allergies. Patient denies any drinking or drug use. Patient has no known history of stents, MI, or CHF. This is the extent of the patient's complaints at this time.       The history is provided by the patient.   Review of patient's allergies indicates:  No Known Allergies  Past Medical History:   Diagnosis Date    Cataract     Diabetes mellitus     H. pylori infection 2017    [facility] EGD 2017. test of eradication neg.    High cholesterol     Hypertension      Past Surgical History:   Procedure Laterality Date    BREAST BIOPSY Right     CATARACT EXTRACTION W/  INTRAOCULAR LENS IMPLANT Right 05/12/2022    Procedure: EXTRACTION, CATARACT, WITH IOL INSERTION;  Surgeon: Destiny Carrera MD;  Location: [facility] OR;  Service: Ophthalmology;  Laterality: Right;    CATARACT EXTRACTION W/  INTRAOCULAR LENS IMPLANT Left 06/16/2022    Procedure: EXTRACTION, CATARACT, WITH IOL INSERTION;  Surgeon: Destiny Carrera MD;  Location: [facility] OR;  Service:  Ophthalmology;  Laterality: Left;    CHOLECYSTECTOMY  2016    COLONOSCOPY N/A 04/11/2022    Procedure: COLONOSCOPY;  Surgeon: Todd Carrera MD;  Location: [facility] ENDO;  Service: Endoscopy;  Laterality: N/A;  order created: referral  Fully vaccinated, prep instr emailed -ml    ESOPHAGOGASTRODUODENOSCOPY N/A 07/21/2020    Procedure: EGD (ESOPHAGOGASTRODUODENOSCOPY);  Surgeon: Rodrick Montes MD;  Location: [facility] (4TH FLR);  Service: Endoscopy;  Laterality: N/A;  3/30/20 - removed from 4/9/20, 3/30/20 & 3/31/20LM pt &  ph & sent email, request call back to reschedule June/July.  Pt called back, rescheduled 7/21/20 - pg  7/6/20 - 7/9/20- LM x 2 - waiting for cb to set up COVID appt. - ERW  COVID screening on    EYE SURGERY  7/22    TUBAL LIGATION  1982     Family History   Problem Relation Age of Onset    Arthritis Mother     Cancer Mother     Heart disease Mother     No Known Problems Father     No Known Problems Sister     No Known Problems Brother     No Known Problems Daughter     No Known Problems Son     No Known Problems Maternal Aunt     No Known Problems Maternal Uncle     No Known Problems Paternal Aunt     No Known Problems Paternal Uncle     No Known Problems Maternal Grandmother     No Known Problems Maternal Grandfather     No Known Problems Paternal Grandmother     No Known Problems Paternal Grandfather     Colon cancer Neg Hx     Rectal cancer Neg Hx     Stomach cancer Neg Hx     Breast cancer Neg Hx      Social History     Tobacco Use    Smoking status: Every Day     Packs/day: 0.25     Years: 40.00     Pack years: 10.00     Types: Cigarettes    Smokeless tobacco: Never   Substance Use Topics    Alcohol use: Yes     Alcohol/week: 2.0 standard drinks     Types: 2 Glasses of wine per week    Drug use: No     Review of Systems  Constitutional-no fever  HEENT-no congestion  Eyes-no redness  Respiratory-no shortness of breath  Cardio- chest pain present  GI-no abdominal pain  Endocrine-no cold  intolerance  -no difficulty urinating  MSK-no myalgias  Skin-no rashes  Allergy-no environmental allergy  Neurologic-, no headache  Hematology-no swollen nodes  Behavioral-no confusion   Physical Exam     Initial Vitals [12/18/22 0748]   BP Pulse Resp Temp SpO2   (!) 148/65 63 17 97.9 °F (36.6 °C) 97 %      MAP       --         Physical Exam  Constitutional: Well appearing, no distress.  Eyes: Conjunctivae normal.  ENT       Head: Normocephalic, atraumatic.       Nose: Normal external appearance        Mouth/Throat: no strigulous respirations   Hematological/Lymphatic/Immunilogical: no visible lymphadenopathy   Cardiovascular: Normal rate,   Respiratory: Normal respiratory effort.   Gastrointestinal: non distended   Musculoskeletal: Normal range of motion in all extremities. No obvious deformities or swelling.  Neurologic: Alert, oriented. Normal speech and language. No gross focal neurologic deficits are appreciated.  Skin: Skin is warm, dry. No rash noted.  Psychiatric: Mood and affect are normal.    ED Course   Procedures  Labs Reviewed   CBC W/ AUTO DIFFERENTIAL - Abnormal; Notable for the following components:       Result Value    RDW 14.6 (*)     Lymph # 4.9 (*)     All other components within normal limits    Narrative:     Release to patient->Immediate   COMPREHENSIVE METABOLIC PANEL - Abnormal; Notable for the following components:    CO2 21 (*)     All other components within normal limits    Narrative:     Release to patient->Immediate   URINALYSIS, REFLEX TO URINE CULTURE - Abnormal; Notable for the following components:    Occult Blood UA Trace (*)     All other components within normal limits    Narrative:     Specimen Source->Urine   HEPATITIS C ANTIBODY - Abnormal; Notable for the following components:    Hepatitis C Ab Positive (*)     All other components within normal limits    Narrative:     Release to patient->Immediate   COMPREHENSIVE METABOLIC PANEL - Abnormal; Notable for the following  components:    Chloride 111 (*)     CO2 21 (*)     BUN 29 (*)     Albumin 3.3 (*)     All other components within normal limits   BASIC METABOLIC PANEL - Abnormal; Notable for the following components:    BUN 24 (*)     All other components within normal limits   B-TYPE NATRIURETIC PEPTIDE    Narrative:     Release to patient->Immediate   LIPASE    Narrative:     Release to patient->Immediate   MAGNESIUM    Narrative:     Release to patient->Immediate   TROPONIN I    Narrative:     Release to patient->Immediate   TSH    Narrative:     Release to patient->Immediate   HIV 1 / 2 ANTIBODY    Narrative:     Release to patient->Immediate   TROPONIN I   HEPATITIS C RNA, QUANTITATIVE, PCR   TROPONIN I   TROPONIN I   CBC WITHOUT DIFFERENTIAL   MAGNESIUM   PHOSPHORUS        ECG Results              EKG 12-lead (Final result)  Result time 12/19/22 14:40:32      Final result by Interface, Lab In [facility] (12/19/22 14:40:32)                   Narrative:    Test Reason : R07.9,    Vent. Rate : 058 BPM     Atrial Rate : 058 BPM     P-R Int : 150 ms          QRS Dur : 084 ms      QT Int : 432 ms       P-R-T Axes : 081 076 081 degrees     QTc Int : 424 ms    Sinus bradycardia with sinus arrhythmia  Otherwise normal ECG    Confirmed by Carine FRAGA, Delvis SAUNDERS ([id number]) on 12/19/2022 2:40:23 PM    Referred By: [email or web address]   SELF           Confirmed By:Delvis Hawk MD                      ED Interpretation by Jesus Russell MD (12/18/22 08:05:21, [facility] Emergency Dept, Emergency Medicine)    My EKG interpretation, sinus rhythm, 62 beats per minute, normal axis, no ST segment changes, when compared to previous EKG 05/02/2022 relatively unchanged                                     EKG 12-lead (Final result)  Result time 12/19/22 14:40:28      Final result by Interface, Lab In [facility] (12/19/22 14:40:28)                   Narrative:    Test Reason : R52,    Vent. Rate : 062 BPM     Atrial Rate : 062 BPM     P-R Int : 138 ms          QRS  Dur : 088 ms      QT Int : 414 ms       P-R-T Axes : 079 072 081 degrees     QTc Int : 420 ms    Sinus rhythm with Premature atrial complexes  Otherwise normal ECG    Confirmed by Carine FRAGA, Delvis SAUNDERS ([id number]) on 12/19/2022 2:40:19 PM    Referred By: [email or web address]   SELF           Confirmed By:Delvis Hawk MD                      ED Interpretation by Jesus Russell MD (12/18/22 11:30:45, [facility] Emergency Dept, Emergency Medicine)    My EKG interpretation, sinus bradycardia, 50 beats per minute, normal axis, no ST segment changes, when compared to previous EKG same date no appreciable changes                                  Imaging Results              X-Ray Chest AP Portable (Final result)  Result time 12/18/22 09:40:35      Final result by Nasir Oliver MD (12/18/22 09:40:35)                   Impression:      No acute chest disease identified.      Electronically signed by: Nasir Oliver MD  Date:    12/18/2022  Time:    09:40               Narrative:    EXAMINATION:  XR CHEST AP PORTABLE    CLINICAL HISTORY:  Chest pain, unspecified    TECHNIQUE:  Single frontal view of the chest was performed.    COMPARISON:  05/01/2022.    FINDINGS:  The heart is not enlarged.  Superior mediastinal structures are unremarkable.  Pulmonary vasculature is within normal limits.  The lungs are free of focal consolidations.  There is no evidence for pneumothorax or large pleural effusions.  Bony structures are grossly intact.                                       Medications   fentaNYL 50 mcg/mL injection 50 mcg (50 mcg Intravenous Not Given 12/18/22 1130)   aspirin tablet 325 mg (325 mg Oral Given 12/18/22 0814)   ketorolac injection 9.999 mg (9.999 mg Intravenous Given 12/18/22 1003)   sodium chloride 0.9% bolus 500 mL 500 mL (0 mLs Intravenous Stopped 12/19/22 1618)     Medical Decision Making:   History:   Old Medical Records: I decided to obtain old medical records.  Old Records Summarized: records from clinic visits and  records from previous admission(s).  Differential Diagnosis:   Atypical chest pain, MI, pneumonia, arrythmia,   Clinical Tests:   Lab Tests: Ordered and Reviewed  Radiological Study: Ordered and Reviewed  Medical Tests: Ordered and Reviewed  ED Management:  Persistent pain whic radiated to shoulder, plan for obs with 2 negative troponins, cycling of enzymes,         Scribe Attestation:   Scribe #1: I performed the above scribed service and the documentation accurately describes the services I performed. I attest to the accuracy of the note.      ED Course as of 12/20/22 1245   Sun Dec 18, 2022   0832 Recheck of monitor demonstrates sinus bradycardia rate 56 beats per minute  [TK]   0916 Recheck of monitor demonstrates sinus rhythm [TK]   1120 Recheck of monitor shows sinus rhythm [TK]      ED Course User Index  [TK] Jesus Russell MD                 Physician Attestation for Scribe: I, jesus russell, reviewed documentation as scribed in my presence, which is both accurate and complete.   Clinical Impression:   Final diagnoses:  [R07.9] Chest pain (Primary)  [R52] Pain        ED Disposition Condition    Discharge Stable          ED Prescriptions    None       Follow-up Information       Follow up With Specialties Details Why Contact Info    Sarah Antonio MD Internal Medicine Schedule an appointment as soon as possible for a visit in 3 days  [address]  [address]  [place] [facility] [address]  [phone]      [facility] Emergency Dept Emergency Medicine Go to  If symptoms worsen [address] [id number] [phone]    Delvis Hawk MD Cardiovascular Disease, Cardiology, Interventional Cardiology Schedule an appointment as soon as possible for a visit in 1 week  [address]  [address]  [place] [facility] [address]  [phone]               Jesus Russell MD  12/20/22 1247

## 2022-12-19 VITALS
DIASTOLIC BLOOD PRESSURE: 69 MMHG | OXYGEN SATURATION: 99 % | WEIGHT: 158 LBS | SYSTOLIC BLOOD PRESSURE: 154 MMHG | RESPIRATION RATE: 18 BRPM | BODY MASS INDEX: 29.08 KG/M2 | TEMPERATURE: 98 F | HEIGHT: 62 IN | HEART RATE: 69 BPM

## 2022-12-19 LAB
ALBUMIN SERPL BCP-MCNC: 3.3 G/DL (ref 3.5–5.2)
ALP SERPL-CCNC: 71 U/L (ref 55–135)
ALT SERPL W/O P-5'-P-CCNC: 12 U/L (ref 10–44)
ANION GAP SERPL CALC-SCNC: 10 MMOL/L (ref 8–16)
ANION GAP SERPL CALC-SCNC: 8 MMOL/L (ref 8–16)
AST SERPL-CCNC: 13 U/L (ref 10–40)
BILIRUB SERPL-MCNC: 0.1 MG/DL (ref 0.1–1)
BUN SERPL-MCNC: 24 MG/DL (ref 8–23)
BUN SERPL-MCNC: 29 MG/DL (ref 8–23)
CALCIUM SERPL-MCNC: 8.8 MG/DL (ref 8.7–10.5)
CALCIUM SERPL-MCNC: 8.9 MG/DL (ref 8.7–10.5)
CHLORIDE SERPL-SCNC: 109 MMOL/L (ref 95–110)
CHLORIDE SERPL-SCNC: 111 MMOL/L (ref 95–110)
CO2 SERPL-SCNC: 21 MMOL/L (ref 23–29)
CO2 SERPL-SCNC: 23 MMOL/L (ref 23–29)
CREAT SERPL-MCNC: 0.8 MG/DL (ref 0.5–1.4)
CREAT SERPL-MCNC: 0.8 MG/DL (ref 0.5–1.4)
CV STRESS BASE HR: 51 BPM
DIASTOLIC BLOOD PRESSURE: 65 MMHG
ERYTHROCYTE [DISTWIDTH] IN BLOOD BY AUTOMATED COUNT: 14.5 % (ref 11.5–14.5)
EST. GFR  (NO RACE VARIABLE): >60 ML/MIN/1.73 M^2
EST. GFR  (NO RACE VARIABLE): >60 ML/MIN/1.73 M^2
GLUCOSE SERPL-MCNC: 103 MG/DL (ref 70–110)
GLUCOSE SERPL-MCNC: 96 MG/DL (ref 70–110)
HCT VFR BLD AUTO: 40.6 % (ref 37–48.5)
HGB BLD-MCNC: 13.3 G/DL (ref 12–16)
MAGNESIUM SERPL-MCNC: 2.1 MG/DL (ref 1.6–2.6)
MCH RBC QN AUTO: 29.3 PG (ref 27–31)
MCHC RBC AUTO-ENTMCNC: 32.8 G/DL (ref 32–36)
MCV RBC AUTO: 89 FL (ref 82–98)
NUC REST EJECTION FRACTION: 60
NUC STRESS EJECTION FRACTION: 74 %
OHS CV CPX 85 PERCENT MAX PREDICTED HEART RATE MALE: 125
OHS CV CPX ESTIMATED METS: 7
OHS CV CPX MAX PREDICTED HEART RATE: 147
OHS CV CPX PATIENT IS FEMALE: 1
OHS CV CPX PATIENT IS MALE: 0
OHS CV CPX PEAK DIASTOLIC BLOOD PRESSURE: 66 MMHG
OHS CV CPX PEAK HEAR RATE: 96 BPM
OHS CV CPX PEAK RATE PRESSURE PRODUCT: NORMAL
OHS CV CPX PEAK SYSTOLIC BLOOD PRESSURE: 221 MMHG
OHS CV CPX PERCENT MAX PREDICTED HEART RATE ACHIEVED: 65
OHS CV CPX RATE PRESSURE PRODUCT PRESENTING: 6171
PHOSPHATE SERPL-MCNC: 3.3 MG/DL (ref 2.7–4.5)
PLATELET # BLD AUTO: 241 K/UL (ref 150–450)
PMV BLD AUTO: 10.2 FL (ref 9.2–12.9)
POTASSIUM SERPL-SCNC: 4.2 MMOL/L (ref 3.5–5.1)
POTASSIUM SERPL-SCNC: 4.4 MMOL/L (ref 3.5–5.1)
PROT SERPL-MCNC: 6.7 G/DL (ref 6–8.4)
RBC # BLD AUTO: 4.54 M/UL (ref 4–5.4)
SODIUM SERPL-SCNC: 140 MMOL/L (ref 136–145)
SODIUM SERPL-SCNC: 142 MMOL/L (ref 136–145)
STRESS ECHO POST EXERCISE DUR MIN: 5 MINUTES
STRESS ECHO POST EXERCISE DUR SEC: 59 SECONDS
SYSTOLIC BLOOD PRESSURE: 121 MMHG
WBC # BLD AUTO: 10.17 K/UL (ref 3.9–12.7)

## 2022-12-19 PROCEDURE — 25000003 PHARM REV CODE 250: Mod: HCNC | Performed by: NURSE PRACTITIONER

## 2022-12-19 PROCEDURE — 83735 ASSAY OF MAGNESIUM: CPT | Mod: HCNC | Performed by: PHYSICIAN ASSISTANT

## 2022-12-19 PROCEDURE — 99499 UNLISTED E&M SERVICE: CPT | Mod: HCNC,,, | Performed by: INTERNAL MEDICINE

## 2022-12-19 PROCEDURE — 99499 NO LOS: ICD-10-PCS | Mod: HCNC,,, | Performed by: INTERNAL MEDICINE

## 2022-12-19 PROCEDURE — 85027 COMPLETE CBC AUTOMATED: CPT | Mod: HCNC | Performed by: PHYSICIAN ASSISTANT

## 2022-12-19 PROCEDURE — 84100 ASSAY OF PHOSPHORUS: CPT | Mod: HCNC | Performed by: PHYSICIAN ASSISTANT

## 2022-12-19 PROCEDURE — 36415 COLL VENOUS BLD VENIPUNCTURE: CPT | Mod: HCNC | Performed by: NURSE PRACTITIONER

## 2022-12-19 PROCEDURE — 94761 N-INVAS EAR/PLS OXIMETRY MLT: CPT | Mod: HCNC

## 2022-12-19 PROCEDURE — 80053 COMPREHEN METABOLIC PANEL: CPT | Mod: HCNC | Performed by: PHYSICIAN ASSISTANT

## 2022-12-19 PROCEDURE — 36415 COLL VENOUS BLD VENIPUNCTURE: CPT | Mod: HCNC | Performed by: PHYSICIAN ASSISTANT

## 2022-12-19 PROCEDURE — 80048 BASIC METABOLIC PNL TOTAL CA: CPT | Mod: HCNC,XB | Performed by: NURSE PRACTITIONER

## 2022-12-19 PROCEDURE — G0378 HOSPITAL OBSERVATION PER HR: HCPCS | Mod: HCNC

## 2022-12-19 RX ADMIN — SODIUM CHLORIDE 500 ML: 0.9 INJECTION, SOLUTION INTRAVENOUS at 03:12

## 2022-12-19 NOTE — PROGRESS NOTES
[facility]    Admission date:  12/18/2022     Assessment    Chest pain  Has ruled out for myocardial infarction    Plan and Discussion    Await processing of stress test.  Further planning based on those results.    Subjective    No symptoms since shortly after her arrival in the emergency department    Medications  Current Facility-Administered Medications   Medication Dose Route Frequency Provider Last Rate Last Admin    acetaminophen tablet 650 mg  650 mg Oral Q8H PRN Mickie Chacko, PA-C        amLODIPine tablet 5 mg  5 mg Oral QAM Mickie Chacko, PA-C        aspirin EC tablet 81 mg  81 mg Oral QAM Mickie Chacko, PA-C        atorvastatin tablet 40 mg  40 mg Oral QAM Mickie Ricco, PA-C        HYDROcodone-acetaminophen 5-325 mg per tablet 1 tablet  1 tablet Oral Q4H PRN Mickie Chacko, PA-C        losartan tablet 50 mg  50 mg Oral QAM Mickie Chacko, PA-C        melatonin tablet 6 mg  6 mg Oral Nightly PRN Mickie Chacko, PA-C        ondansetron disintegrating tablet 8 mg  8 mg Oral Q8H PRN Mickie Chacko, PA-C        promethazine tablet 25 mg  25 mg Oral Q6H PRN Mickie Chacko, PA-C        sodium chloride 0.9% flush 10 mL  10 mL Intravenous PRN Jesus Russell MD         Current Outpatient Medications   Medication Sig Dispense Refill    amLODIPine (NORVASC) 5 MG tablet Take 1 tablet (5 mg total) by mouth once daily. 90 tablet 3    aspirin (ECOTRIN) 81 MG EC tablet Take 81 mg by mouth once daily.      atorvastatin (LIPITOR) 40 MG tablet Take 1 tablet (40 mg total) by mouth once daily. 90 tablet 3    loratadine (CLARITIN) 10 mg tablet Take 10 mg by mouth.      losartan (COZAAR) 50 MG tablet TAKE 1 TABLET(50 MG) BY MOUTH TWICE DAILY 180 tablet 3    blood sugar diagnostic Strp To check BG 4 times daily 100 each 11    blood-glucose meter Misc Use as instructed (Patient taking differently: Use as instructed) 1 each 0    conjugated estrogens (PREMARIN) vaginal cream 1 g with  applicator or dime-sized amt with finger in vagina nightly x 2 weeks, then twice a week thereafter 45 g 12    ergocalciferol (ERGOCALCIFEROL) 50,000 unit Cap Take 1 capsule (50,000 Units total) by mouth every 7 days. 12 capsule 3    fluticasone propionate (FLONASE) 50 mcg/actuation nasal spray 1 spray (50 mcg total) by Each Nostril route once daily. 16 g 11    lancets 30 gauge Misc To check BG 4 times daily. 200 each 11    oxybutynin (DITROPAN-XL) 10 MG 24 hr tablet TAKE 1 TABLET(10 MG) BY MOUTH EVERY DAY 30 tablet 11    urea 20 % Crea Apply 1 application topically once daily. To dry skin on the feet. 75 g 10        Physical Exam    Temp:  [98 °F (36.7 °C)-98.5 °F (36.9 °C)]   Pulse:  [49-75]   Resp:  [16-19]   BP: (131-182)/(61-74)   SpO2:  [96 %-99 %]    Wt Readings from Last 3 Encounters:   12/18/22 71.7 kg (158 lb)   12/06/22 72.1 kg (158 lb 15.2 oz)   11/15/22 71.2 kg (156 lb 15.5 oz)     Physical Exam  Constitutional:       General: She is not in acute distress.  Neck:      Vascular: No hepatojugular reflux or JVD.   Cardiovascular:      Rate and Rhythm: Normal rate and regular rhythm.      Heart sounds: S1 normal and S2 normal. No murmur heard.    No S3 or S4 sounds.   Pulmonary:      Effort: Pulmonary effort is normal.      Breath sounds: Normal breath sounds and air entry.   Abdominal:      General: Bowel sounds are normal.      Palpations: Abdomen is soft. There is no hepatomegaly.      Tenderness: There is no abdominal tenderness.   Musculoskeletal:      Right lower leg: No edema.      Left lower leg: No edema.   Skin:     Coloration: Skin is not pale.   Neurological:      Mental Status: She is alert.   Psychiatric:         Behavior: Behavior is cooperative.       Telemetry  Sinus rhythm    Labs  Recent Results (from the past 24 hour(s))   Troponin I    Collection Time: 12/18/22  3:06 PM   Result Value Ref Range    Troponin I 0.009 0.000 - 0.026 ng/mL   Troponin I    Collection Time: 12/18/22  6:52 PM    Result Value Ref Range    Troponin I 0.011 0.000 - 0.026 ng/mL   CBC Without Differential    Collection Time: 12/19/22  5:03 AM   Result Value Ref Range    WBC 10.17 3.90 - 12.70 K/uL    RBC 4.54 4.00 - 5.40 M/uL    Hemoglobin 13.3 12.0 - 16.0 g/dL    Hematocrit 40.6 37.0 - 48.5 %    MCV 89 82 - 98 fL    MCH 29.3 27.0 - 31.0 pg    MCHC 32.8 32.0 - 36.0 g/dL    RDW 14.5 11.5 - 14.5 %    Platelets 241 150 - 450 K/uL    MPV 10.2 9.2 - 12.9 fL   Comprehensive metabolic panel    Collection Time: 12/19/22  5:03 AM   Result Value Ref Range    Sodium 142 136 - 145 mmol/L    Potassium 4.4 3.5 - 5.1 mmol/L    Chloride 111 (H) 95 - 110 mmol/L    CO2 21 (L) 23 - 29 mmol/L    Glucose 96 70 - 110 mg/dL    BUN 29 (H) 8 - 23 mg/dL    Creatinine 0.8 0.5 - 1.4 mg/dL    Calcium 8.8 8.7 - 10.5 mg/dL    Total Protein 6.7 6.0 - 8.4 g/dL    Albumin 3.3 (L) 3.5 - 5.2 g/dL    Total Bilirubin 0.1 0.1 - 1.0 mg/dL    Alkaline Phosphatase 71 55 - 135 U/L    AST 13 10 - 40 U/L    ALT 12 10 - 44 U/L    Anion Gap 10 8 - 16 mmol/L    eGFR >60 >60 mL/min/1.73 m^2   Magnesium    Collection Time: 12/19/22  5:03 AM   Result Value Ref Range    Magnesium 2.1 1.6 - 2.6 mg/dL   Phosphorus    Collection Time: 12/19/22  5:03 AM   Result Value Ref Range    Phosphorus 3.3 2.7 - 4.5 mg/dL   Nuclear Stress - Cardiology Interpreted    Collection Time: 12/19/22 12:12 PM   Result Value Ref Range    85% Max Predicted      Max Predicted      [facility] CV CPX PATIENT IS MALE 0.0     [facility] CV CPX PATIENT IS FEMALE 1.0     Systolic blood pressure 121 mmHg    Diastolic blood pressure 65 mmHg    HR at rest 51 bpm    RPP 6,171     Nuc Rest EF 60     Nuc Stress EF 74 %             Delvis D. Carine, MD

## 2022-12-19 NOTE — DISCHARGE SUMMARY
[facility] ED Observation Unit  Discharge Summary      History of Present Illness:    This is a 67 y.o. female who presents with complaint of left sided chest pain that began last night. Patient reports she was feeling fine when going to bed last night, but she immediately woke up out of her sleep due to the intense pain she felt on the left side of her chest. She states earlier this year she suffered from the same symptoms, and visited a clinic without receiving any effective information about the cause of her chest pain. Patient mentions she has intermittent swelling in both of her legs. PSHx of cholecystectomy & hysterectomy.  Patient states she smokes cigarettes. No known allergies. Patient denies any drinking or drug use. Patient has no known history of stents, MI, or CHF. This is the extent of the patient's complaints at this time.      Interval History   Initial trop was negative in the ED.  Plan was for cards consult and trend trop.  Cards recommended nuclear stress in the AM.  She has had no further CP since prior to arrival to the ED.       Observation Course:    Negative trop x 3, stress test no evidence of ischemia.  Cleared by cards.     Consultants:    Cardiology - Nuclear Stress, trend troponins    ED/OBS Workup:  Vitals:    12/19/22 0805 12/19/22 1000 12/19/22 1210 12/19/22 1400   BP: 134/63  (!) 154/69    Pulse:  (!) 49 (!) 56 62   Resp: 19  18    Temp: 98 °F (36.7 °C)  98.2 °F (36.8 °C)    TempSrc: Oral  Oral    SpO2: 98%  99%    Weight:       Height:        12/19/22 1600   BP:    Pulse: 69   Resp:    Temp:    TempSrc:    SpO2:    Weight:    Height:        Labs Reviewed   CBC W/ AUTO DIFFERENTIAL - Abnormal; Notable for the following components:       Result Value    RDW 14.6 (*)     Lymph # 4.9 (*)     All other components within normal limits    Narrative:     Release to patient->Immediate   COMPREHENSIVE METABOLIC PANEL - Abnormal; Notable for the following components:    CO2 21 (*)     All  other components within normal limits    Narrative:     Release to patient->Immediate   URINALYSIS, REFLEX TO URINE CULTURE - Abnormal; Notable for the following components:    Occult Blood UA Trace (*)     All other components within normal limits    Narrative:     Specimen Source->Urine   HEPATITIS C ANTIBODY - Abnormal; Notable for the following components:    Hepatitis C Ab Positive (*)     All other components within normal limits    Narrative:     Release to patient->Immediate   COMPREHENSIVE METABOLIC PANEL - Abnormal; Notable for the following components:    Chloride 111 (*)     CO2 21 (*)     BUN 29 (*)     Albumin 3.3 (*)     All other components within normal limits   BASIC METABOLIC PANEL - Abnormal; Notable for the following components:    BUN 24 (*)     All other components within normal limits   B-TYPE NATRIURETIC PEPTIDE    Narrative:     Release to patient->Immediate   LIPASE    Narrative:     Release to patient->Immediate   MAGNESIUM    Narrative:     Release to patient->Immediate   TROPONIN I    Narrative:     Release to patient->Immediate   TSH    Narrative:     Release to patient->Immediate   HIV 1 / 2 ANTIBODY    Narrative:     Release to patient->Immediate   TROPONIN I   HEPATITIS C RNA, QUANTITATIVE, PCR   TROPONIN I   TROPONIN I   CBC WITHOUT DIFFERENTIAL   MAGNESIUM   PHOSPHORUS       X-Ray Chest AP Portable   Final Result      No acute chest disease identified.         Electronically signed by: Nasir Oliver MD   Date:    12/18/2022   Time:    09:40          Final Diagnosis:  1. Chest pain    2. Pain        Plan:  DC home with close f/u with Dr Hawk her cardiologist.  If symptoms return, please come back to the ED for reevaluation, she stated understanding,      Discharge Condition: Good    Disposition: Home or Self Care     Time spent on the discharge of the patient including review of hospital course with the patient. reviewing discharge medications and arranging follow-up care 35  minutes.  Patient was seen and examined on the date of discharge and determined to be suitable for discharge.    Follow Up:   ED Disposition Condition    Discharge Stable            ED Prescriptions    None       Follow-up Information       Follow up With Specialties Details Why Contact Info    Sarah Antonio MD Internal Medicine Schedule an appointment as soon as possible for a visit in 3 days  [address]  [place] [facility] [address]  [phone]      [facility] - Emergency Dept Emergency Medicine Go to  If symptoms worsen [address] [facility] [address]  [phone]    Delvis Hawk MD Cardiovascular Disease, Cardiology, Interventional Cardiology Schedule an appointment as soon as possible for a visit in 1 week  [address]  [place] [facility] [address]  [phone]              Future Appointments   Date Time Provider Department Center   1/11/2023 12:45 PM Sarah Antonio MD [facility] IM [facility]   1/12/2023  8:00 AM SUDS [facility] UROGYN [facility]   3/27/2023  7:15 AM LAB [facility] LABDRAW [facility]

## 2022-12-19 NOTE — ED NOTES
Report received from Janice RAE at bedside. Patient lying in bed sleeping, respirations E/UL, arousal to verbal stimuli. Denies pain, or any needs at this time. Side rails up x 2, call light and personal belongings within reach, denies any needs at this time. Updated on plan of care.

## 2022-12-19 NOTE — ED NOTES
Assumed care of pt resting quietly on hospital bed with lights off and watching television; Alert and oriented x 3. Vital signs stable, no acute distress noted. Pt is able to reposition self on bed. Bed locked in lowest position; side rails up and locked x 2; call light, bedside table, and personal belongings within reach. Room assessed for safety measures and cleanliness; no action needed at this time. Updated on plan of care.  Pt instructed to alert nurse for any needs; verbalizes understanding. Pt denies needs or complaints at this time; will continue to monitor.

## 2022-12-19 NOTE — PROGRESS NOTES
[facility] ED Observation Unit  Progress Note      HPI   This is a 67 y.o. female who presents with complaint of left sided chest pain that began last night. Patient reports she was feeling fine when going to bed last night, but she immediately woke up out of her sleep due to the intense pain she felt on the left side of her chest. She states earlier this year she suffered from the same symptoms, and visited a clinic without receiving any effective information about the cause of her chest pain. Patient mentions she has intermittent swelling in both of her legs. PSHx of cholecystectomy & hysterectomy.  Patient states she smokes cigarettes. No known allergies. Patient denies any drinking or drug use. Patient has no known history of stents, MI, or CHF. This is the extent of the patient's complaints at this time.     Interval History   Initial trop was negative in the ED.  Plan was for cards consult and trend trop.  Cards recommended nuclear stress in the AM.  She has had no further CP since prior to arrival to the ED.      PMHx   Past Medical History:   Diagnosis Date    Cataract     Diabetes mellitus     H. pylori infection 2017    [facility] EGD 2017. test of eradication neg.    High cholesterol     Hypertension       Past Surgical History:   Procedure Laterality Date    BREAST BIOPSY Right     CATARACT EXTRACTION W/  INTRAOCULAR LENS IMPLANT Right 05/12/2022    Procedure: EXTRACTION, CATARACT, WITH IOL INSERTION;  Surgeon: Destiny Carrera MD;  Location: [facility] OR;  Service: Ophthalmology;  Laterality: Right;    CATARACT EXTRACTION W/  INTRAOCULAR LENS IMPLANT Left 06/16/2022    Procedure: EXTRACTION, CATARACT, WITH IOL INSERTION;  Surgeon: Destiny Carrera MD;  Location: [facility] OR;  Service: Ophthalmology;  Laterality: Left;    CHOLECYSTECTOMY  2016    COLONOSCOPY N/A 04/11/2022    Procedure: COLONOSCOPY;  Surgeon: Todd Carrera MD;  Location: [facility] ENDO;  Service: Endoscopy;  Laterality: N/A;  order created: referral  Fully  vaccinated, prep instr emailed -ml    ESOPHAGOGASTRODUODENOSCOPY N/A 07/21/2020    Procedure: EGD (ESOPHAGOGASTRODUODENOSCOPY);  Surgeon: Rodrick Montes MD;  Location: [facility] ([address] [place] [address]);  Service: Endoscopy;  Laterality: N/A;  3/30/20 - removed from 4/9/20, 3/30/20 & 3/31/20LM pt &  ph & sent email, request call back to reschedule June/July.  Pt called back, rescheduled 7/21/20 - pg  7/6/20 - 7/9/20- LM x 2 - waiting for cb to set up COVID appt. - ERW  COVID screening on    EYE SURGERY  7/22    TUBAL LIGATION  1982        Family Hx   Family History   Problem Relation Age of Onset    Arthritis Mother     Cancer Mother     Heart disease Mother     No Known Problems Father     No Known Problems Sister     No Known Problems Brother     No Known Problems Daughter     No Known Problems Son     No Known Problems Maternal Aunt     No Known Problems Maternal Uncle     No Known Problems Paternal Aunt     No Known Problems Paternal Uncle     No Known Problems Maternal Grandmother     No Known Problems Maternal Grandfather     No Known Problems Paternal Grandmother     No Known Problems Paternal Grandfather     Colon cancer Neg Hx     Rectal cancer Neg Hx     Stomach cancer Neg Hx     Breast cancer Neg Hx         Social Hx   Social History     Socioeconomic History    Marital status:    Tobacco Use    Smoking status: Every Day     Packs/day: 0.25     Years: 40.00     Pack years: 10.00     Types: Cigarettes    Smokeless tobacco: Never   Substance and Sexual Activity    Alcohol use: Yes     Alcohol/week: 2.0 standard drinks     Types: 2 Glasses of wine per week    Drug use: No    Sexual activity: Not Currently     Partners: Male     Birth control/protection: Abstinence     Social Determinants of Health     Financial Resource Strain: Medium Risk    Difficulty of Paying Living Expenses: Somewhat hard   Food Insecurity: Food Insecurity Present    Worried About Running Out of Food in the Last Year: Sometimes true     Ran Out of Food in the Last Year: Sometimes true   Transportation Needs: No Transportation Needs    Lack of Transportation (Medical): No    Lack of Transportation (Non-Medical): No   Physical Activity: Inactive    Days of Exercise per Week: 0 days    Minutes of Exercise per Session: 0 min   Stress: Stress Concern Present    Feeling of Stress : To some extent   Social Connections: Unknown    Frequency of Communication with Friends and Family: Once a week    Frequency of Social Gatherings with Friends and Family: Once a week    Active Member of Clubs or Organizations: Yes    Attends Club or Organization Meetings: More than 4 times per year    Marital Status:    Housing Stability: High Risk    Unable to Pay for Housing in the Last Year: Yes    Number of Places Lived in the Last Year: 1    Unstable Housing in the Last Year: No        Vital Signs   Vitals:    12/19/22 1000 12/19/22 1210 12/19/22 1400 12/19/22 1600   BP:  (!) 154/69     BP Location:  Right arm     Patient Position:  Sitting     Pulse: (!) 49 (!) 56 62 69   Resp:  18     Temp:  98.2 °F (36.8 °C)     TempSrc:  Oral     SpO2:  99%     Weight:       Height:            Review of Systems  Review of Systems   Constitutional:  Negative for chills and fever.   Respiratory:  Negative for cough, shortness of breath and wheezing.    Cardiovascular:  Negative for chest pain and leg swelling.   Gastrointestinal:  Negative for abdominal pain and nausea.   Musculoskeletal:  Negative for myalgias.   Skin:  Negative for rash.   All other systems reviewed and are negative.    Brief Physical Exam/Reassessment   Physical Exam    Nursing note and vitals reviewed.  Constitutional: She appears well-developed and well-nourished. She does not appear ill. No distress.   HENT:   Head: Normocephalic and atraumatic.   Mouth/Throat: Mucous membranes are normal.   Eyes: Conjunctivae are normal.   Neck: Neck supple.   Cardiovascular:  Normal rate, regular rhythm, S1 normal, S2  normal and normal heart sounds.     Exam reveals no gallop.       No murmur heard.  Pulses:       Radial pulses are 2+ on the right side and 2+ on the left side.   Pulmonary/Chest: Effort normal and breath sounds normal. No tachypnea. She has no decreased breath sounds. She has no wheezes.   Abdominal: Abdomen is soft and flat. There is no abdominal tenderness.   No right CVA tenderness.  No left CVA tenderness. There is no rebound and no guarding.   Musculoskeletal:      Cervical back: Neck supple.      Right lower leg: No swelling. No edema.      Left lower leg: No swelling. No edema.     Neurological: She is alert and oriented to person, place, and time. GCS eye subscore is 4. GCS verbal subscore is 5. GCS motor subscore is 6.   Skin: Skin is warm, dry and intact.   Psychiatric: She has a normal mood and affect. Her behavior is normal.       Labs/Imaging   Labs Reviewed   CBC W/ AUTO DIFFERENTIAL - Abnormal; Notable for the following components:       Result Value    RDW 14.6 (*)     Lymph # 4.9 (*)     All other components within normal limits    Narrative:     Release to patient->Immediate   COMPREHENSIVE METABOLIC PANEL - Abnormal; Notable for the following components:    CO2 21 (*)     All other components within normal limits    Narrative:     Release to patient->Immediate   URINALYSIS, REFLEX TO URINE CULTURE - Abnormal; Notable for the following components:    Occult Blood UA Trace (*)     All other components within normal limits    Narrative:     Specimen Source->Urine   HEPATITIS C ANTIBODY - Abnormal; Notable for the following components:    Hepatitis C Ab Positive (*)     All other components within normal limits    Narrative:     Release to patient->Immediate   COMPREHENSIVE METABOLIC PANEL - Abnormal; Notable for the following components:    Chloride 111 (*)     CO2 21 (*)     BUN 29 (*)     Albumin 3.3 (*)     All other components within normal limits   BASIC METABOLIC PANEL - Abnormal; Notable for  the following components:    BUN 24 (*)     All other components within normal limits   B-TYPE NATRIURETIC PEPTIDE    Narrative:     Release to patient->Immediate   LIPASE    Narrative:     Release to patient->Immediate   MAGNESIUM    Narrative:     Release to patient->Immediate   TROPONIN I    Narrative:     Release to patient->Immediate   TSH    Narrative:     Release to patient->Immediate   HIV 1 / 2 ANTIBODY    Narrative:     Release to patient->Immediate   TROPONIN I   HEPATITIS C RNA, QUANTITATIVE, PCR   TROPONIN I   TROPONIN I   CBC WITHOUT DIFFERENTIAL   MAGNESIUM   PHOSPHORUS      Imaging Results              X-Ray Chest AP Portable (Final result)  Result time 12/18/22 09:40:35      Final result by Nasir Oliver MD (12/18/22 09:40:35)                   Impression:      No acute chest disease identified.      Electronically signed by: Nasir Oliver MD  Date:    12/18/2022  Time:    09:40               Narrative:    EXAMINATION:  XR CHEST AP PORTABLE    CLINICAL HISTORY:  Chest pain, unspecified    TECHNIQUE:  Single frontal view of the chest was performed.    COMPARISON:  05/01/2022.    FINDINGS:  The heart is not enlarged.  Superior mediastinal structures are unremarkable.  Pulmonary vasculature is within normal limits.  The lungs are free of focal consolidations.  There is no evidence for pneumothorax or large pleural effusions.  Bony structures are grossly intact.                                       I reviewed all labs, imaging, EKGs.     Plan   1. Chest pain    2. Pain        I have discussed this case with ALFONSO Church.

## 2022-12-19 NOTE — H&P
ED Observation Unit  History and Physical      I assumed care of this patient from the Main ED at onset of observation time, 1242 on 12/18/2022.       History of Present Illness:       This is a 67 y.o. female who presents with complaint of left sided chest pain that began last night. Patient reports she was feeling fine when going to bed last night, but she immediately woke up out of her sleep due to the intense pain she felt on the left side of her chest. She states earlier this year she suffered from the same symptoms, and visited a clinic without receiving any effective information about the cause of her chest pain. Patient mentions she has intermittent swelling in both of her legs. PSHx of cholecystectomy & hysterectomy.  Patient states she smokes cigarettes. No known allergies. Patient denies any drinking or drug use. Patient has no known history of stents, MI, or CHF. This is the extent of the patient's complaints at this time.       I reviewed the ED Provider Note dated 12/18/22 prior to my evaluation of this patient.  I reviewed all labs and imaging performed in the Main ED, prior to patient being placed in Observation. Patient was placed in the ED Observation Unit for Chest pain.    PMHx   Past Medical History:   Diagnosis Date    Cataract     Diabetes mellitus     H. pylori infection 2017    [facility] EGD 2017. test of eradication neg.    High cholesterol     Hypertension       Past Surgical History:   Procedure Laterality Date    BREAST BIOPSY Right     CATARACT EXTRACTION W/  INTRAOCULAR LENS IMPLANT Right 05/12/2022    Procedure: EXTRACTION, CATARACT, WITH IOL INSERTION;  Surgeon: Destiny Carrera MD;  Location: [facility] OR;  Service: Ophthalmology;  Laterality: Right;    CATARACT EXTRACTION W/  INTRAOCULAR LENS IMPLANT Left 06/16/2022    Procedure: EXTRACTION, CATARACT, WITH IOL INSERTION;  Surgeon: Destiny Carrera MD;  Location: [facility] OR;  Service: Ophthalmology;  Laterality: Left;    CHOLECYSTECTOMY  2016     COLONOSCOPY N/A 04/11/2022    Procedure: COLONOSCOPY;  Surgeon: Todd Carrera MD;  Location: [facility] ENDO;  Service: Endoscopy;  Laterality: N/A;  order created: referral  Fully vaccinated, prep instr emailed -ml    ESOPHAGOGASTRODUODENOSCOPY N/A 07/21/2020    Procedure: EGD (ESOPHAGOGASTRODUODENOSCOPY);  Surgeon: Rodrick Montes MD;  Location: [facility] ENDO (4TH FLR);  Service: Endoscopy;  Laterality: N/A;  3/30/20 - removed from 4/9/20, 3/30/20 & 3/31/20LM pt &  ph & sent email, request call back to reschedule June/July.  Pt called back, rescheduled 7/21/20 - pg  7/6/20 - 7/9/20- LM x 2 - waiting for cb to set up COVID appt. - ERW  COVID screening on    EYE SURGERY  7/22    TUBAL LIGATION  1982        Family Hx   Family History   Problem Relation Age of Onset    Arthritis Mother     Cancer Mother     Heart disease Mother     No Known Problems Father     No Known Problems Sister     No Known Problems Brother     No Known Problems Daughter     No Known Problems Son     No Known Problems Maternal Aunt     No Known Problems Maternal Uncle     No Known Problems Paternal Aunt     No Known Problems Paternal Uncle     No Known Problems Maternal Grandmother     No Known Problems Maternal Grandfather     No Known Problems Paternal Grandmother     No Known Problems Paternal Grandfather     Colon cancer Neg Hx     Rectal cancer Neg Hx     Stomach cancer Neg Hx     Breast cancer Neg Hx         Social Hx   Social History     Socioeconomic History    Marital status:    Tobacco Use    Smoking status: Every Day     Packs/day: 0.25     Years: 40.00     Pack years: 10.00     Types: Cigarettes    Smokeless tobacco: Never   Substance and Sexual Activity    Alcohol use: Yes     Alcohol/week: 2.0 standard drinks     Types: 2 Glasses of wine per week    Drug use: No    Sexual activity: Not Currently     Partners: Male     Birth control/protection: Abstinence     Social Determinants of Health     Financial Resource Strain: Medium  Risk    Difficulty of Paying Living Expenses: Somewhat hard   Food Insecurity: Food Insecurity Present    Worried About Running Out of Food in the Last Year: Sometimes true    Ran Out of Food in the Last Year: Sometimes true   Transportation Needs: No Transportation Needs    Lack of Transportation (Medical): No    Lack of Transportation (Non-Medical): No   Physical Activity: Inactive    Days of Exercise per Week: 0 days    Minutes of Exercise per Session: 0 min   Stress: Stress Concern Present    Feeling of Stress : To some extent   Social Connections: Unknown    Frequency of Communication with Friends and Family: Once a week    Frequency of Social Gatherings with Friends and Family: Once a week    Active Member of Clubs or Organizations: Yes    Attends Club or Organization Meetings: More than 4 times per year    Marital Status:    Housing Stability: High Risk    Unable to Pay for Housing in the Last Year: Yes    Number of Places Lived in the Last Year: 1    Unstable Housing in the Last Year: No        Vital Signs   Vitals:    12/18/22 1336 12/18/22 1600 12/18/22 1735 12/18/22 1800   BP:   (!) 140/62    BP Location:   Right arm    Patient Position:   Sitting    Pulse: 62 70 (!) 56 (!) 54   Resp: 20  19    Temp:   98.3 °F (36.8 °C)    TempSrc:   Oral    SpO2: 100%  97%    Weight:       Height:            Review of Systems  Review of Systems   Constitutional:  Negative for chills.   HENT:  Negative for congestion.    Eyes:  Negative for photophobia.   Respiratory:  Negative for cough, hemoptysis and shortness of breath.    Cardiovascular:  Positive for chest pain. Negative for palpitations and leg swelling.   Gastrointestinal:  Negative for abdominal pain, diarrhea, nausea and vomiting.   Genitourinary:  Negative for flank pain.   Musculoskeletal:  Negative for myalgias.   Skin:  Negative for rash.   Neurological:  Negative for loss of consciousness and headaches.   Psychiatric/Behavioral:  The patient is not  nervous/anxious.      Physical Exam  Physical Exam  Vitals reviewed.   Constitutional:       Appearance: Normal appearance.   HENT:      Head: Normocephalic and atraumatic.      Nose: Nose normal.   Eyes:      Extraocular Movements: Extraocular movements intact.      Conjunctiva/sclera: Conjunctivae normal.   Cardiovascular:      Rate and Rhythm: Normal rate and regular rhythm.      Heart sounds: Normal heart sounds.   Pulmonary:      Effort: Pulmonary effort is normal.      Breath sounds: Normal breath sounds.   Abdominal:      Palpations: Abdomen is soft.      Tenderness: There is no abdominal tenderness.   Musculoskeletal:         General: Normal range of motion.      Cervical back: Neck supple.   Skin:     General: Skin is warm.   Neurological:      General: No focal deficit present.      Mental Status: She is alert and oriented to person, place, and time. Mental status is at baseline.       Medications:   Scheduled Meds:   [START ON 12/19/2022] amLODIPine  5 mg Oral QAM    [START ON 12/19/2022] aspirin  81 mg Oral QAM    [START ON 12/19/2022] atorvastatin  40 mg Oral QAM    fentaNYL  50 mcg Intravenous ED 1 Time    [START ON 12/19/2022] losartan  50 mg Oral QAM     Continuous Infusions:  PRN Meds:.acetaminophen, HYDROcodone-acetaminophen, melatonin, ondansetron, promethazine, sodium chloride 0.9%      Assessment/Plan:  Chest pain:    ACS rule out  - Cardiac monitor  - asa 81 mg daily  - Serial troponin- wnl   - Continue home medications  - Cardiology consult: recommended Nuclear stress test tomorrow am, pending result.  - Pending final disposition as per cardiology.        Case was discussed with the Dr. Jesus Russell

## 2022-12-21 LAB — HCV RNA SERPL NAA+PROBE-ACNC: NORMAL IU/ML

## 2023-01-05 ENCOUNTER — OFFICE VISIT (OUTPATIENT)
Dept: CARDIOLOGY | Facility: CLINIC | Age: 68
End: 2023-01-05
Payer: MEDICARE

## 2023-01-05 VITALS
BODY MASS INDEX: 28.13 KG/M2 | SYSTOLIC BLOOD PRESSURE: 136 MMHG | DIASTOLIC BLOOD PRESSURE: 74 MMHG | WEIGHT: 153.81 LBS | HEART RATE: 56 BPM | OXYGEN SATURATION: 99 %

## 2023-01-05 DIAGNOSIS — R07.2 PRECORDIAL PAIN: Primary | ICD-10-CM

## 2023-01-05 DIAGNOSIS — I10 PRIMARY HYPERTENSION: ICD-10-CM

## 2023-01-05 DIAGNOSIS — E78.2 MIXED HYPERLIPIDEMIA: ICD-10-CM

## 2023-01-05 DIAGNOSIS — I70.0 AORTIC ATHEROSCLEROSIS: ICD-10-CM

## 2023-01-05 PROCEDURE — 1159F PR MEDICATION LIST DOCUMENTED IN MEDICAL RECORD: ICD-10-PCS | Mod: HCNC,CPTII,S$GLB, | Performed by: INTERNAL MEDICINE

## 2023-01-05 PROCEDURE — 99999 PR PBB SHADOW E&M-EST. PATIENT-LVL III: CPT | Mod: PBBFAC,HCNC,, | Performed by: INTERNAL MEDICINE

## 2023-01-05 PROCEDURE — 3008F BODY MASS INDEX DOCD: CPT | Mod: HCNC,CPTII,S$GLB, | Performed by: INTERNAL MEDICINE

## 2023-01-05 PROCEDURE — 1101F PR PT FALLS ASSESS DOC 0-1 FALLS W/OUT INJ PAST YR: ICD-10-PCS | Mod: HCNC,CPTII,S$GLB, | Performed by: INTERNAL MEDICINE

## 2023-01-05 PROCEDURE — 3075F PR MOST RECENT SYSTOLIC BLOOD PRESS GE 130-139MM HG: ICD-10-PCS | Mod: HCNC,CPTII,S$GLB, | Performed by: INTERNAL MEDICINE

## 2023-01-05 PROCEDURE — 99215 OFFICE O/P EST HI 40 MIN: CPT | Mod: HCNC,S$GLB,, | Performed by: INTERNAL MEDICINE

## 2023-01-05 PROCEDURE — 3072F PR LOW RISK FOR RETINOPATHY: ICD-10-PCS | Mod: HCNC,CPTII,S$GLB, | Performed by: INTERNAL MEDICINE

## 2023-01-05 PROCEDURE — 99215 PR OFFICE/OUTPT VISIT, EST, LEVL V, 40-54 MIN: ICD-10-PCS | Mod: HCNC,S$GLB,, | Performed by: INTERNAL MEDICINE

## 2023-01-05 PROCEDURE — 1126F PR PAIN SEVERITY QUANTIFIED, NO PAIN PRESENT: ICD-10-PCS | Mod: HCNC,CPTII,S$GLB, | Performed by: INTERNAL MEDICINE

## 2023-01-05 PROCEDURE — 3075F SYST BP GE 130 - 139MM HG: CPT | Mod: HCNC,CPTII,S$GLB, | Performed by: INTERNAL MEDICINE

## 2023-01-05 PROCEDURE — 3078F DIAST BP <80 MM HG: CPT | Mod: HCNC,CPTII,S$GLB, | Performed by: INTERNAL MEDICINE

## 2023-01-05 PROCEDURE — 3072F LOW RISK FOR RETINOPATHY: CPT | Mod: HCNC,CPTII,S$GLB, | Performed by: INTERNAL MEDICINE

## 2023-01-05 PROCEDURE — 1101F PT FALLS ASSESS-DOCD LE1/YR: CPT | Mod: HCNC,CPTII,S$GLB, | Performed by: INTERNAL MEDICINE

## 2023-01-05 PROCEDURE — 3288F PR FALLS RISK ASSESSMENT DOCUMENTED: ICD-10-PCS | Mod: HCNC,CPTII,S$GLB, | Performed by: INTERNAL MEDICINE

## 2023-01-05 PROCEDURE — 3008F PR BODY MASS INDEX (BMI) DOCUMENTED: ICD-10-PCS | Mod: HCNC,CPTII,S$GLB, | Performed by: INTERNAL MEDICINE

## 2023-01-05 PROCEDURE — 3288F FALL RISK ASSESSMENT DOCD: CPT | Mod: HCNC,CPTII,S$GLB, | Performed by: INTERNAL MEDICINE

## 2023-01-05 PROCEDURE — 99999 PR PBB SHADOW E&M-EST. PATIENT-LVL III: ICD-10-PCS | Mod: PBBFAC,HCNC,, | Performed by: INTERNAL MEDICINE

## 2023-01-05 PROCEDURE — 1126F AMNT PAIN NOTED NONE PRSNT: CPT | Mod: HCNC,CPTII,S$GLB, | Performed by: INTERNAL MEDICINE

## 2023-01-05 PROCEDURE — 1159F MED LIST DOCD IN RCRD: CPT | Mod: HCNC,CPTII,S$GLB, | Performed by: INTERNAL MEDICINE

## 2023-01-05 PROCEDURE — 3078F PR MOST RECENT DIASTOLIC BLOOD PRESSURE < 80 MM HG: ICD-10-PCS | Mod: HCNC,CPTII,S$GLB, | Performed by: INTERNAL MEDICINE

## 2023-01-05 NOTE — PROGRESS NOTES
[facility]    Chief Complaint  Chief Complaint   Patient presents with    Chest Pain       HPI:    Presents for follow-up after her hospitalization last month with chest pain.  Chest pain was atypical for a cardiac etiology.  Cardiac enzymes and electrocardiogram were normal.  She had a stress test which was negative for ischemia but at a submaximal heart rate.  No symptoms replicated during the stress test.  Since discharge, she has done well.  No recurrent chest pain.  Active without exertional dyspnea or chest discomfort.  Stopped smoking about 5 days ago.    Medications  Current Outpatient Medications   Medication Sig Dispense Refill    amLODIPine (NORVASC) 5 MG tablet Take 1 tablet (5 mg total) by mouth once daily. 90 tablet 3    aspirin (ECOTRIN) 81 MG EC tablet Take 81 mg by mouth once daily.      atorvastatin (LIPITOR) 40 MG tablet Take 1 tablet (40 mg total) by mouth once daily. 90 tablet 3    blood sugar diagnostic Strp To check BG 4 times daily 100 each 11    blood-glucose meter Misc Use as instructed (Patient taking differently: Use as instructed) 1 each 0    conjugated estrogens (PREMARIN) vaginal cream 1 g with applicator or dime-sized amt with finger in vagina nightly x 2 weeks, then twice a week thereafter 45 g 12    ergocalciferol (ERGOCALCIFEROL) 50,000 unit Cap Take 1 capsule (50,000 Units total) by mouth every 7 days. 12 capsule 3    fluticasone propionate (FLONASE) 50 mcg/actuation nasal spray 1 spray (50 mcg total) by Each Nostril route once daily. 16 g 11    lancets 30 gauge Misc To check BG 4 times daily. 200 each 11    loratadine (CLARITIN) 10 mg tablet Take 10 mg by mouth.      losartan (COZAAR) 50 MG tablet TAKE 1 TABLET(50 MG) BY MOUTH TWICE DAILY 180 tablet 3    oxybutynin (DITROPAN-XL) 10 MG 24 hr tablet TAKE 1 TABLET(10 MG) BY MOUTH EVERY DAY 30 tablet 11    urea 20 % Crea Apply 1 application topically once daily. To dry skin on the feet. 75 g 10     No current  facility-administered medications for this visit.        History  Past Medical History:   Diagnosis Date    Cataract     Diabetes mellitus     H. pylori infection 2017    [facility] EGD 2017. test of eradication neg.    High cholesterol     Hypertension      Past Surgical History:   Procedure Laterality Date    BREAST BIOPSY Right     CATARACT EXTRACTION W/  INTRAOCULAR LENS IMPLANT Right 05/12/2022    Procedure: EXTRACTION, CATARACT, WITH IOL INSERTION;  Surgeon: Destiny Carrera MD;  [facility];  Service: Ophthalmology;  Laterality: Right;    CATARACT EXTRACTION W/  INTRAOCULAR LENS IMPLANT Left 06/16/2022    Procedure: EXTRACTION, CATARACT, WITH IOL INSERTION;  Surgeon: Destiny Carrera MD;  Location: [facility];  Service: Ophthalmology;  Laterality: Left;    CHOLECYSTECTOMY  2016    COLONOSCOPY N/A 04/11/2022    Procedure: COLONOSCOPY;  Surgeon: Todd Carrera MD;  Location: [facility];  Service: Endoscopy;  Laterality: N/A;  order created: referral  Fully vaccinated, prep instr emailed -ml    ESOPHAGOGASTRODUODENOSCOPY N/A 07/21/2020    Procedure: EGD (ESOPHAGOGASTRODUODENOSCOPY);  Surgeon: Rodrick Montes MD;  Location: [facility] (4TH FLR);  Service: Endoscopy;  Laterality: N/A;  3/30/20 - removed from 4/9/20, 3/30/20 & 3/31/20LM pt &  ph & sent email, request call back to reschedule June/July.  Pt called back, rescheduled 7/21/20 - pg  7/6/20 - 7/9/20- LM x 2 - waiting for cb to set up COVID appt. - ERW  COVID screening on    EYE SURGERY  7/22    TUBAL LIGATION  1982     Social History     Socioeconomic History    Marital status:    Tobacco Use    Smoking status: Every Day     Packs/day: 0.25     Years: 40.00     Pack years: 10.00     Types: Cigarettes    Smokeless tobacco: Never   Substance and Sexual Activity    Alcohol use: Yes     Alcohol/week: 2.0 standard drinks     Types: 2 Glasses of wine per week    Drug use: No    Sexual activity: Not Currently     Partners: Male     Birth control/protection:  Abstinence     Social Determinants of Health     Financial Resource Strain: Medium Risk    Difficulty of Paying Living Expenses: Somewhat hard   Food Insecurity: Food Insecurity Present    Worried About Running Out of Food in the Last Year: Sometimes true    Ran Out of Food in the Last Year: Sometimes true   Transportation Needs: No Transportation Needs    Lack of Transportation (Medical): No    Lack of Transportation (Non-Medical): No   Physical Activity: Inactive    Days of Exercise per Week: 1 day    Minutes of Exercise per Session: 0 min   Stress: No Stress Concern Present    Feeling of Stress : Not at all   Social Connections: Unknown    Frequency of Communication with Friends and Family: Twice a week    Frequency of Social Gatherings with Friends and Family: Twice a week    Active Member of Clubs or Organizations: Yes    Attends Club or Organization Meetings: More than 4 times per year    Marital Status:    Housing Stability: High Risk    Unable to Pay for Housing in the Last Year: Yes    Number of Places Lived in the Last Year: 1    Unstable Housing in the Last Year: No     Family History   Problem Relation Age of Onset    Arthritis Mother     Cancer Mother     Heart disease Mother     No Known Problems Father     No Known Problems Sister     No Known Problems Brother     No Known Problems Daughter     No Known Problems Son     No Known Problems Maternal Aunt     No Known Problems Maternal Uncle     No Known Problems Paternal Aunt     No Known Problems Paternal Uncle     No Known Problems Maternal Grandmother     No Known Problems Maternal Grandfather     No Known Problems Paternal Grandmother     No Known Problems Paternal Grandfather     Colon cancer Neg Hx     Rectal cancer Neg Hx     Stomach cancer Neg Hx     Breast cancer Neg Hx         Allergies  Review of patient's allergies indicates:  No Known Allergies    Review of Systems   Review of Systems   Constitutional: Negative for malaise/fatigue,  weight gain and weight loss.   Eyes:  Negative for visual disturbance.   Cardiovascular:  Negative for chest pain, claudication, cyanosis, dyspnea on exertion, irregular heartbeat, leg swelling, near-syncope, orthopnea, palpitations, paroxysmal nocturnal dyspnea and syncope.   Respiratory:  Negative for cough, hemoptysis, shortness of breath, sleep disturbances due to breathing and wheezing.    Hematologic/Lymphatic: Negative for bleeding problem. Does not bruise/bleed easily.   Skin:  Negative for poor wound healing.   Musculoskeletal:  Negative for muscle cramps and myalgias.   Gastrointestinal:  Negative for abdominal pain, anorexia, diarrhea, heartburn, hematemesis, hematochezia, melena, nausea and vomiting.   Genitourinary:  Negative for hematuria and nocturia.   Neurological:  Negative for excessive daytime sleepiness, dizziness, focal weakness, light-headedness and weakness.     Physical Exam  Vitals:    01/05/23 0916   BP: 136/74   Pulse: (!) 56     Wt Readings from Last 1 Encounters:   01/05/23 69.8 kg (153 lb 12.8 oz)     Physical Exam  Constitutional:       General: She is not in acute distress.     Appearance: She is not toxic-appearing or diaphoretic.   HENT:      Head: Normocephalic and atraumatic.   Eyes:      General: No scleral icterus.     Conjunctiva/sclera: Conjunctivae normal.   Neck:      Thyroid: No thyromegaly.      Vascular: No carotid bruit, hepatojugular reflux or JVD.      Trachea: No tracheal deviation.   Cardiovascular:      Rate and Rhythm: Normal rate and regular rhythm. No extrasystoles are present.     Chest Wall: PMI is not displaced.      Pulses:           Carotid pulses are 2+ on the right side and 2+ on the left side.       Radial pulses are 2+ on the right side and 2+ on the left side.        Dorsalis pedis pulses are 2+ on the right side and 2+ on the left side.        Posterior tibial pulses are 2+ on the right side and 2+ on the left side.      Heart sounds: S1 normal and S2  normal. No murmur heard.    No S3 or S4 sounds.   Pulmonary:      Effort: No accessory muscle usage or respiratory distress.      Breath sounds: No decreased breath sounds, wheezing, rhonchi or rales.   Abdominal:      General: Bowel sounds are normal. There is no abdominal bruit.      Palpations: Abdomen is soft. There is no hepatomegaly, splenomegaly or pulsatile mass.      Tenderness: There is no abdominal tenderness.   Musculoskeletal:         General: No tenderness or deformity.      Cervical back: Neck supple.   Skin:     General: Skin is warm and dry.      Coloration: Skin is not pale.      Nails: There is no clubbing.   Neurological:      Mental Status: She is alert and oriented to person, place, and time.      Cranial Nerves: No cranial nerve deficit.      Sensory: No sensory deficit.   Psychiatric:         Speech: Speech normal.         Behavior: Behavior normal. Behavior is cooperative.       Labs  Admission on 12/18/2022, Discharged on 12/19/2022   Component Date Value Ref Range Status    WBC 12/18/2022 10.85  3.90 - 12.70 K/uL Final    RBC 12/18/2022 4.68  4.00 - 5.40 M/uL Final    Hemoglobin 12/18/2022 14.0  12.0 - 16.0 g/dL Final    Hematocrit 12/18/2022 41.9  37.0 - 48.5 % Final    MCV 12/18/2022 90  82 - 98 fL Final    MCH 12/18/2022 29.9  27.0 - 31.0 pg Final    MCHC 12/18/2022 33.4  32.0 - 36.0 g/dL Final    RDW 12/18/2022 14.6 (H)  11.5 - 14.5 % Final    Platelets 12/18/2022 271  150 - 450 K/uL Final    MPV 12/18/2022 10.5  9.2 - 12.9 fL Final    Immature Granulocytes 12/18/2022 0.2  0.0 - 0.5 % Final    Gran # (ANC) 12/18/2022 5.1  1.8 - 7.7 K/uL Final    Immature Grans (Abs) 12/18/2022 0.02  0.00 - 0.04 K/uL Final    Comment: Mild elevation in immature granulocytes is non specific and   can be seen in a variety of conditions including stress response,   acute inflammation, trauma and pregnancy. Correlation with other   laboratory and clinical findings is essential.      Lymph # 12/18/2022 4.9  (H)  1.0 - 4.8 K/uL Final    Mono # 12/18/2022 0.6  0.3 - 1.0 K/uL Final    Eos # 12/18/2022 0.3  0.0 - 0.5 K/uL Final    Baso # 12/18/2022 0.07  0.00 - 0.20 K/uL Final    nRBC 12/18/2022 0  0 /100 WBC Final    Gran % 12/18/2022 47.0  38.0 - 73.0 % Final    Lymph % 12/18/2022 44.8  18.0 - 48.0 % Final    Mono % 12/18/2022 5.1  4.0 - 15.0 % Final    Eosinophil % 12/18/2022 2.3  0.0 - 8.0 % Final    Basophil % 12/18/2022 0.6  0.0 - 1.9 % Final    Differential Method 12/18/2022 Automated   Final    BNP 12/18/2022 <10  0 - 99 pg/mL Final    Values of less than 100 pg/ml are consistent with non-CHF populations.    Sodium 12/18/2022 138  136 - 145 mmol/L Final    Potassium 12/18/2022 4.2  3.5 - 5.1 mmol/L Final    Chloride 12/18/2022 107  95 - 110 mmol/L Final    CO2 12/18/2022 21 (L)  23 - 29 mmol/L Final    Glucose 12/18/2022 101  70 - 110 mg/dL Final    BUN 12/18/2022 23  8 - 23 mg/dL Final    Creatinine 12/18/2022 0.9  0.5 - 1.4 mg/dL Final    Calcium 12/18/2022 9.2  8.7 - 10.5 mg/dL Final    Total Protein 12/18/2022 7.6  6.0 - 8.4 g/dL Final    Albumin 12/18/2022 3.8  3.5 - 5.2 g/dL Final    Total Bilirubin 12/18/2022 0.2  0.1 - 1.0 mg/dL Final    Comment: For infants and newborns, interpretation of results should be based  on gestational age, weight and in agreement with clinical  observations.    Premature Infant recommended reference ranges:  Up to 24 hours.............<8.0 mg/dL  Up to 48 hours............<12.0 mg/dL  3-5 days..................<15.0 mg/dL  6-29 days.................<15.0 mg/dL      Alkaline Phosphatase 12/18/2022 91  55 - 135 U/L Final    AST 12/18/2022 15  10 - 40 U/L Final    ALT 12/18/2022 12  10 - 44 U/L Final    Anion Gap 12/18/2022 10  8 - 16 mmol/L Final    eGFR 12/18/2022 >60  >60 mL/min/1.73 m^2 Final    Lipase 12/18/2022 60  4 - 60 U/L Final    Magnesium 12/18/2022 2.0  1.6 - 2.6 mg/dL Final    Troponin I 12/18/2022 <0.006  0.000 - 0.026 ng/mL Final    Comment: The reference interval  for Troponin I represents the 99th percentile   cutoff   for our facility and is consistent with 3rd generation assay   performance.      TSH 12/18/2022 0.920  0.400 - 4.000 uIU/mL Final    Specimen UA 12/18/2022 Urine, Clean Catch   Final    Color, UA 12/18/2022 Yellow  Yellow, Straw, Suzanna Final    Appearance, UA 12/18/2022 Clear  Clear Final    pH, UA 12/18/2022 6.0  5.0 - 8.0 Final    Specific Gravity, UA 12/18/2022 1.025  1.005 - 1.030 Final    Protein, UA 12/18/2022 Negative  Negative Final    Comment: Recommend a 24 hour urine protein or a urine   protein/creatinine ratio if globulin induced proteinuria is  clinically suspected.      Glucose, UA 12/18/2022 Negative  Negative Final    Ketones, UA 12/18/2022 Negative  Negative Final    Bilirubin (UA) 12/18/2022 Negative  Negative Final    Occult Blood UA 12/18/2022 Trace (A)  Negative Final    Nitrite, UA 12/18/2022 Negative  Negative Final    Urobilinogen, UA 12/18/2022 Negative  <2.0 EU/dL Final    Leukocytes, UA 12/18/2022 Negative  Negative Final    HIV 1/2 Ag/Ab 12/18/2022 Negative  Negative Final    Hepatitis C Ab 12/18/2022 Positive (A)  Negative Final    Troponin I 12/18/2022 0.015  0.000 - 0.026 ng/mL Final    Comment: The reference interval for Troponin I represents the 99th percentile   cutoff   for our facility and is consistent with 3rd generation assay   performance.      Troponin I 12/18/2022 0.009  0.000 - 0.026 ng/mL Final    Comment: The reference interval for Troponin I represents the 99th percentile   cutoff   for our facility and is consistent with 3rd generation assay   performance.      85% Max Predicted HR 12/19/2022 125   Final    Max Predicted HR 12/19/2022 147   Final    [facility] CV CPX PATIENT IS MALE 12/19/2022 0.0   Final    [facility] CV CPX PATIENT IS FEMALE 12/19/2022 1.0   Final    Systolic blood pressure 12/19/2022 121  mmHg Final    Diastolic blood pressure 12/19/2022 65  mmHg Final    HR at rest 12/19/2022 51  bpm Final    RPP  12/19/2022 6,171   Final    Nuc Rest EF 12/19/2022 60   Final    Nuc Stress EF 12/19/2022 74  % Final    Exercise duration (min) 12/19/2022 5  minutes Final    Exercise duration (sec) 12/19/2022 59  seconds Final    Peak Systolic BP 12/19/2022 221  mmHg Final    Peak Diatolic BP 12/19/2022 66  mmHg Final    Peak HR 12/19/2022 96  bpm Final    Estimated METs 12/19/2022 7   Final    % Max HR Achieved 12/19/2022 65   Final    Peak RPP 12/19/2022 21,216   Final    Troponin I 12/18/2022 0.011  0.000 - 0.026 ng/mL Final    Comment: The reference interval for Troponin I represents the 99th percentile   cutoff   for our facility and is consistent with 3rd generation assay   performance.      WBC 12/19/2022 10.17  3.90 - 12.70 K/uL Final    RBC 12/19/2022 4.54  4.00 - 5.40 M/uL Final    Hemoglobin 12/19/2022 13.3  12.0 - 16.0 g/dL Final    Hematocrit 12/19/2022 40.6  37.0 - 48.5 % Final    MCV 12/19/2022 89  82 - 98 fL Final    MCH 12/19/2022 29.3  27.0 - 31.0 pg Final    MCHC 12/19/2022 32.8  32.0 - 36.0 g/dL Final    RDW 12/19/2022 14.5  11.5 - 14.5 % Final    Platelets 12/19/2022 241  150 - 450 K/uL Final    MPV 12/19/2022 10.2  9.2 - 12.9 fL Final    Sodium 12/19/2022 142  136 - 145 mmol/L Final    Potassium 12/19/2022 4.4  3.5 - 5.1 mmol/L Final    Chloride 12/19/2022 111 (H)  95 - 110 mmol/L Final    CO2 12/19/2022 21 (L)  23 - 29 mmol/L Final    Glucose 12/19/2022 96  70 - 110 mg/dL Final    BUN 12/19/2022 29 (H)  8 - 23 mg/dL Final    Creatinine 12/19/2022 0.8  0.5 - 1.4 mg/dL Final    Calcium 12/19/2022 8.8  8.7 - 10.5 mg/dL Final    Total Protein 12/19/2022 6.7  6.0 - 8.4 g/dL Final    Albumin 12/19/2022 3.3 (L)  3.5 - 5.2 g/dL Final    Total Bilirubin 12/19/2022 0.1  0.1 - 1.0 mg/dL Final    Comment: For infants and newborns, interpretation of results should be based  on gestational age, weight and in agreement with clinical  observations.    Premature Infant recommended reference ranges:  Up to 24  hours.............<8.0 mg/dL  Up to 48 hours............<12.0 mg/dL  3-5 days..................<15.0 mg/dL  6-29 days.................<15.0 mg/dL      Alkaline Phosphatase 12/19/2022 71  55 - 135 U/L Final    AST 12/19/2022 13  10 - 40 U/L Final    ALT 12/19/2022 12  10 - 44 U/L Final    Anion Gap 12/19/2022 10  8 - 16 mmol/L Final    eGFR 12/19/2022 >60  >60 mL/min/1.73 m^2 Final    Magnesium 12/19/2022 2.1  1.6 - 2.6 mg/dL Final    Phosphorus 12/19/2022 3.3  2.7 - 4.5 mg/dL Final    Sodium 12/19/2022 140  136 - 145 mmol/L Final    Potassium 12/19/2022 4.2  3.5 - 5.1 mmol/L Final    Chloride 12/19/2022 109  95 - 110 mmol/L Final    CO2 12/19/2022 23  23 - 29 mmol/L Final    Glucose 12/19/2022 103  70 - 110 mg/dL Final    BUN 12/19/2022 24 (H)  8 - 23 mg/dL Final    Creatinine 12/19/2022 0.8  0.5 - 1.4 mg/dL Final    Calcium 12/19/2022 8.9  8.7 - 10.5 mg/dL Final    Anion Gap 12/19/2022 8  8 - 16 mmol/L Final    eGFR 12/19/2022 >60  >60 mL/min/1.73 m^2 Final    Hepatitis C Virus (HCV) RNA Detect* 12/18/2022 Undetected  Undetected IU/mL Final    Comment: Result in log IU/mL is Undetected.    -------------------ADDITIONAL INFORMATION-------------------  The quantification range of this assay is 15 to 100,000,000   IU/mL (1.18 log to 8.00 log IU/mL). Testing was performed   using the shilo HCV test ([employer]   with the shilo [id number] System.    Test Performed by:  [place] [address]  : Nitin Tubbs M.D. Ph.D.; CLIA# [id number]     Office Visit on 11/15/2022   Component Date Value Ref Range Status    Urine Culture, Routine 11/15/2022 No growth   Final    Final Pathologic Diagnosis 11/15/2022    Final                    Value:Urine, voided, cytologic evaluation:  Negative for high grade urothelial carcinoma.      Interp By Jossue Pedersen MD, PhD, Signed on 11/21/2022 at 11:00    Microscopic Exam 11/15/2022  Microscopic examination performed.   Final    Disclaimer 11/15/2022    Final                    Value:Screening was performed at [facility], [address] [place] [address].     Lab Visit on 09/20/2022   Component Date Value Ref Range Status    Microalbumin, Urine 09/20/2022 6.0  ug/mL Final    Creatinine, Urine 09/20/2022 84.6  15.0 - 325.0 mg/dL Final    Microalb/Creat Ratio 09/20/2022 7.1  0.0 - 30.0 ug/mg Final   Lab Visit on 09/20/2022   Component Date Value Ref Range Status    TSH 09/20/2022 1.445  0.400 - 4.000 uIU/mL Final    Hemoglobin A1C 09/20/2022 5.8 (H)  4.0 - 5.6 % Final    Comment: ADA Screening Guidelines:  5.7-6.4%  Consistent with prediabetes  >or=6.5%  Consistent with diabetes    High levels of fetal hemoglobin interfere with the HbA1C  assay. Heterozygous hemoglobin variants (HbS, HgC, etc)do  not significantly interfere with this assay.   However, presence of multiple variants may affect accuracy.      Estimated Avg Glucose 09/20/2022 120  68 - 131 mg/dL Final    Vit D, 25-Hydroxy 09/20/2022 21 (L)  30 - 96 ng/mL Final    Comment: Vitamin D deficiency.........<10 ng/mL                              Vitamin D insufficiency......10-29 ng/mL       Vitamin D sufficiency........> or equal to 30 ng/mL  Vitamin D toxicity............>100 ng/mL      Cholesterol 09/20/2022 191  120 - 199 mg/dL Final    Comment: The National Cholesterol Education Program (NCEP) has set the  following guidelines (reference ranges) for Cholesterol:  Optimal.....................<200 mg/dL  Borderline High.............200-239 mg/dL  High........................> or = 240 mg/dL      Triglycerides 09/20/2022 107  30 - 150 mg/dL Final    Comment: The National Cholesterol Education Program (NCEP) has set the  following guidelines (reference values) for triglycerides:  Normal......................<150 mg/dL  Borderline High.............150-199  mg/dL  High........................200-499 mg/dL      HDL 09/20/2022 41  40 - 75 mg/dL Final    Comment: The National Cholesterol Education Program (NCEP) has set the  following guidelines (reference values) for HDL Cholesterol:  Low...............<40 mg/dL  Optimal...........>60 mg/dL      LDL Cholesterol 09/20/2022 128.6  63.0 - 159.0 mg/dL Final    Comment: The National Cholesterol Education Program (NCEP) has set the  following guidelines (reference values) for LDL Cholesterol:  Optimal.......................<130 mg/dL  Borderline High...............130-159 mg/dL  High..........................160-189 mg/dL  Very High.....................>190 mg/dL      HDL/Cholesterol Ratio 09/20/2022 21.5  20.0 - 50.0 % Final    Total Cholesterol/HDL Ratio 09/20/2022 4.7  2.0 - 5.0 Final    Non-HDL Cholesterol 09/20/2022 150  mg/dL Final    Comment: Risk category and Non-HDL cholesterol goals:  Coronary heart disease (CHD)or equivalent (10-year risk of CHD >20%):  Non-HDL cholesterol goal     <130 mg/dL  Two or more CHD risk factors and 10-year risk of CHD <= 20%:  Non-HDL cholesterol goal     <160 mg/dL  0 to 1 CHD risk factor:  Non-HDL cholesterol goal     <190 mg/dL         Imaging  X-Ray Chest AP Portable    Result Date: 12/18/2022  EXAMINATION: XR CHEST AP PORTABLE CLINICAL HISTORY: Chest pain, unspecified TECHNIQUE: Single frontal view of the chest was performed. COMPARISON: 05/01/2022. FINDINGS: The heart is not enlarged.  Superior mediastinal structures are unremarkable.  Pulmonary vasculature is within normal limits.  The lungs are free of focal consolidations.  There is no evidence for pneumothorax or large pleural effusions.  Bony structures are grossly intact.     No acute chest disease identified. Electronically signed by: Nasir Oliver MD Date:    12/18/2022 Time:    09:40    Nuclear Stress - Cardiology Interpreted    Result Date: 12/19/2022    Normal myocardial perfusion scan. There is no evidence of myocardial  ischemia or infarction.   The gated perfusion images showed an ejection fraction of 60% at rest. The gated perfusion images showed an ejection fraction of 74% post stress.   The ECG portion of the study is negative for ischemia. Sensitivity is reduced secondary to the target heart rate not being achieved.   The exercise capacity was average.       Assessment  1. Precordial pain  Resolved.  Noncardiac by history and testing.  Would not pursue further workup.    2. Aortic atherosclerosis  Continue risk factor modification efforts.  Noted on prior CT scan    3. Primary hypertension  Controlled      Plan and Discussion    Encouraged to continue with her smoking avoidance.  With resolution of her symptoms, no further workup indicated at this time.    The 10-year ASCVD risk score (Akhil DK, et al., 2019) is: 44.2%    Values used to calculate the score:      Age: 67 years      Sex: Female      Is Non- : Yes      Diabetic: Yes      Tobacco smoker: Yes      Systolic Blood Pressure: 136 mmHg      Is BP treated: Yes      HDL Cholesterol: 41 mg/dL      Total Cholesterol: 191 mg/dL     Follow Up  Follow up if symptoms worsen or fail to improve.      Delvis Hawk MD

## 2023-01-10 ENCOUNTER — OFFICE VISIT (OUTPATIENT)
Dept: INTERNAL MEDICINE | Facility: CLINIC | Age: 68
End: 2023-01-10
Payer: MEDICARE

## 2023-01-10 ENCOUNTER — LAB VISIT (OUTPATIENT)
Dept: LAB | Facility: OTHER | Age: 68
End: 2023-01-10
Attending: INTERNAL MEDICINE
Payer: MEDICARE

## 2023-01-10 VITALS
WEIGHT: 151.88 LBS | HEIGHT: 62 IN | SYSTOLIC BLOOD PRESSURE: 118 MMHG | DIASTOLIC BLOOD PRESSURE: 72 MMHG | HEART RATE: 69 BPM | BODY MASS INDEX: 27.95 KG/M2 | OXYGEN SATURATION: 98 %

## 2023-01-10 DIAGNOSIS — M54.2 NECK PAIN ON RIGHT SIDE: ICD-10-CM

## 2023-01-10 DIAGNOSIS — R63.0 DECREASED APPETITE: ICD-10-CM

## 2023-01-10 DIAGNOSIS — E78.49 OTHER HYPERLIPIDEMIA: ICD-10-CM

## 2023-01-10 DIAGNOSIS — E11.42 TYPE 2 DIABETES MELLITUS WITH DIABETIC POLYNEUROPATHY, WITHOUT LONG-TERM CURRENT USE OF INSULIN: ICD-10-CM

## 2023-01-10 DIAGNOSIS — E55.9 VITAMIN D DEFICIENCY: ICD-10-CM

## 2023-01-10 DIAGNOSIS — I10 ESSENTIAL HYPERTENSION: ICD-10-CM

## 2023-01-10 DIAGNOSIS — I70.0 AORTIC ATHEROSCLEROSIS: ICD-10-CM

## 2023-01-10 DIAGNOSIS — Z00.00 ANNUAL PHYSICAL EXAM: ICD-10-CM

## 2023-01-10 DIAGNOSIS — J43.2 CENTRILOBULAR EMPHYSEMA: ICD-10-CM

## 2023-01-10 DIAGNOSIS — F33.0 MILD EPISODE OF RECURRENT MAJOR DEPRESSIVE DISORDER: Primary | ICD-10-CM

## 2023-01-10 LAB
25(OH)D3+25(OH)D2 SERPL-MCNC: 28 NG/ML (ref 30–96)
ALBUMIN SERPL BCP-MCNC: 3.8 G/DL (ref 3.5–5.2)
ALP SERPL-CCNC: 88 U/L (ref 55–135)
ALT SERPL W/O P-5'-P-CCNC: 10 U/L (ref 10–44)
ANION GAP SERPL CALC-SCNC: 10 MMOL/L (ref 8–16)
AST SERPL-CCNC: 16 U/L (ref 10–40)
BASOPHILS # BLD AUTO: 0.06 K/UL (ref 0–0.2)
BASOPHILS NFR BLD: 0.6 % (ref 0–1.9)
BILIRUB SERPL-MCNC: 0.4 MG/DL (ref 0.1–1)
BUN SERPL-MCNC: 16 MG/DL (ref 8–23)
CALCIUM SERPL-MCNC: 10.1 MG/DL (ref 8.7–10.5)
CHLORIDE SERPL-SCNC: 106 MMOL/L (ref 95–110)
CHOLEST SERPL-MCNC: 183 MG/DL (ref 120–199)
CHOLEST/HDLC SERPL: 4 {RATIO} (ref 2–5)
CO2 SERPL-SCNC: 24 MMOL/L (ref 23–29)
CREAT SERPL-MCNC: 0.8 MG/DL (ref 0.5–1.4)
DIFFERENTIAL METHOD: NORMAL
EOSINOPHIL # BLD AUTO: 0.1 K/UL (ref 0–0.5)
EOSINOPHIL NFR BLD: 1 % (ref 0–8)
ERYTHROCYTE [DISTWIDTH] IN BLOOD BY AUTOMATED COUNT: 14.2 % (ref 11.5–14.5)
EST. GFR  (NO RACE VARIABLE): >60 ML/MIN/1.73 M^2
ESTIMATED AVG GLUCOSE: 114 MG/DL (ref 68–131)
GLUCOSE SERPL-MCNC: 95 MG/DL (ref 70–110)
HBA1C MFR BLD: 5.6 % (ref 4–5.6)
HCT VFR BLD AUTO: 42.3 % (ref 37–48.5)
HDLC SERPL-MCNC: 46 MG/DL (ref 40–75)
HDLC SERPL: 25.1 % (ref 20–50)
HGB BLD-MCNC: 14.1 G/DL (ref 12–16)
IMM GRANULOCYTES # BLD AUTO: 0.02 K/UL (ref 0–0.04)
IMM GRANULOCYTES NFR BLD AUTO: 0.2 % (ref 0–0.5)
LDLC SERPL CALC-MCNC: 124 MG/DL (ref 63–159)
LYMPHOCYTES # BLD AUTO: 4.5 K/UL (ref 1–4.8)
LYMPHOCYTES NFR BLD: 43.7 % (ref 18–48)
MCH RBC QN AUTO: 30 PG (ref 27–31)
MCHC RBC AUTO-ENTMCNC: 33.3 G/DL (ref 32–36)
MCV RBC AUTO: 90 FL (ref 82–98)
MONOCYTES # BLD AUTO: 0.4 K/UL (ref 0.3–1)
MONOCYTES NFR BLD: 4.3 % (ref 4–15)
NEUTROPHILS # BLD AUTO: 5.1 K/UL (ref 1.8–7.7)
NEUTROPHILS NFR BLD: 50.2 % (ref 38–73)
NONHDLC SERPL-MCNC: 137 MG/DL
NRBC BLD-RTO: 0 /100 WBC
PLATELET # BLD AUTO: 278 K/UL (ref 150–450)
PMV BLD AUTO: 10.3 FL (ref 9.2–12.9)
POTASSIUM SERPL-SCNC: 4.4 MMOL/L (ref 3.5–5.1)
PROT SERPL-MCNC: 7.8 G/DL (ref 6–8.4)
RBC # BLD AUTO: 4.7 M/UL (ref 4–5.4)
SODIUM SERPL-SCNC: 140 MMOL/L (ref 136–145)
TRIGL SERPL-MCNC: 65 MG/DL (ref 30–150)
WBC # BLD AUTO: 10.18 K/UL (ref 3.9–12.7)

## 2023-01-10 PROCEDURE — 3074F PR MOST RECENT SYSTOLIC BLOOD PRESSURE < 130 MM HG: ICD-10-PCS | Mod: HCNC,CPTII,S$GLB, | Performed by: INTERNAL MEDICINE

## 2023-01-10 PROCEDURE — 3072F LOW RISK FOR RETINOPATHY: CPT | Mod: HCNC,CPTII,S$GLB, | Performed by: INTERNAL MEDICINE

## 2023-01-10 PROCEDURE — 99215 OFFICE O/P EST HI 40 MIN: CPT | Mod: HCNC,S$GLB,, | Performed by: INTERNAL MEDICINE

## 2023-01-10 PROCEDURE — 1160F PR REVIEW ALL MEDS BY PRESCRIBER/CLIN PHARMACIST DOCUMENTED: ICD-10-PCS | Mod: HCNC,CPTII,S$GLB, | Performed by: INTERNAL MEDICINE

## 2023-01-10 PROCEDURE — 99999 PR PBB SHADOW E&M-EST. PATIENT-LVL IV: CPT | Mod: PBBFAC,HCNC,, | Performed by: INTERNAL MEDICINE

## 2023-01-10 PROCEDURE — 3288F PR FALLS RISK ASSESSMENT DOCUMENTED: ICD-10-PCS | Mod: HCNC,CPTII,S$GLB, | Performed by: INTERNAL MEDICINE

## 2023-01-10 PROCEDURE — 1126F AMNT PAIN NOTED NONE PRSNT: CPT | Mod: HCNC,CPTII,S$GLB, | Performed by: INTERNAL MEDICINE

## 2023-01-10 PROCEDURE — 3288F FALL RISK ASSESSMENT DOCD: CPT | Mod: HCNC,CPTII,S$GLB, | Performed by: INTERNAL MEDICINE

## 2023-01-10 PROCEDURE — 3074F SYST BP LT 130 MM HG: CPT | Mod: HCNC,CPTII,S$GLB, | Performed by: INTERNAL MEDICINE

## 2023-01-10 PROCEDURE — 3078F PR MOST RECENT DIASTOLIC BLOOD PRESSURE < 80 MM HG: ICD-10-PCS | Mod: HCNC,CPTII,S$GLB, | Performed by: INTERNAL MEDICINE

## 2023-01-10 PROCEDURE — 1101F PT FALLS ASSESS-DOCD LE1/YR: CPT | Mod: HCNC,CPTII,S$GLB, | Performed by: INTERNAL MEDICINE

## 2023-01-10 PROCEDURE — 1101F PR PT FALLS ASSESS DOC 0-1 FALLS W/OUT INJ PAST YR: ICD-10-PCS | Mod: HCNC,CPTII,S$GLB, | Performed by: INTERNAL MEDICINE

## 2023-01-10 PROCEDURE — 80053 COMPREHEN METABOLIC PANEL: CPT | Mod: HCNC | Performed by: INTERNAL MEDICINE

## 2023-01-10 PROCEDURE — 99215 PR OFFICE/OUTPT VISIT, EST, LEVL V, 40-54 MIN: ICD-10-PCS | Mod: HCNC,S$GLB,, | Performed by: INTERNAL MEDICINE

## 2023-01-10 PROCEDURE — 99999 PR PBB SHADOW E&M-EST. PATIENT-LVL IV: ICD-10-PCS | Mod: PBBFAC,HCNC,, | Performed by: INTERNAL MEDICINE

## 2023-01-10 PROCEDURE — 1160F RVW MEDS BY RX/DR IN RCRD: CPT | Mod: HCNC,CPTII,S$GLB, | Performed by: INTERNAL MEDICINE

## 2023-01-10 PROCEDURE — 1159F MED LIST DOCD IN RCRD: CPT | Mod: HCNC,CPTII,S$GLB, | Performed by: INTERNAL MEDICINE

## 2023-01-10 PROCEDURE — 80061 LIPID PANEL: CPT | Mod: HCNC | Performed by: INTERNAL MEDICINE

## 2023-01-10 PROCEDURE — 3078F DIAST BP <80 MM HG: CPT | Mod: HCNC,CPTII,S$GLB, | Performed by: INTERNAL MEDICINE

## 2023-01-10 PROCEDURE — 82306 VITAMIN D 25 HYDROXY: CPT | Mod: HCNC | Performed by: INTERNAL MEDICINE

## 2023-01-10 PROCEDURE — 1159F PR MEDICATION LIST DOCUMENTED IN MEDICAL RECORD: ICD-10-PCS | Mod: HCNC,CPTII,S$GLB, | Performed by: INTERNAL MEDICINE

## 2023-01-10 PROCEDURE — 3008F PR BODY MASS INDEX (BMI) DOCUMENTED: ICD-10-PCS | Mod: HCNC,CPTII,S$GLB, | Performed by: INTERNAL MEDICINE

## 2023-01-10 PROCEDURE — 3008F BODY MASS INDEX DOCD: CPT | Mod: HCNC,CPTII,S$GLB, | Performed by: INTERNAL MEDICINE

## 2023-01-10 PROCEDURE — 1126F PR PAIN SEVERITY QUANTIFIED, NO PAIN PRESENT: ICD-10-PCS | Mod: HCNC,CPTII,S$GLB, | Performed by: INTERNAL MEDICINE

## 2023-01-10 PROCEDURE — 36415 COLL VENOUS BLD VENIPUNCTURE: CPT | Mod: HCNC | Performed by: INTERNAL MEDICINE

## 2023-01-10 PROCEDURE — 85025 COMPLETE CBC W/AUTO DIFF WBC: CPT | Mod: HCNC | Performed by: INTERNAL MEDICINE

## 2023-01-10 PROCEDURE — 3072F PR LOW RISK FOR RETINOPATHY: ICD-10-PCS | Mod: HCNC,CPTII,S$GLB, | Performed by: INTERNAL MEDICINE

## 2023-01-10 PROCEDURE — 83036 HEMOGLOBIN GLYCOSYLATED A1C: CPT | Mod: HCNC | Performed by: INTERNAL MEDICINE

## 2023-01-10 RX ORDER — MIRTAZAPINE 15 MG/1
15 TABLET, FILM COATED ORAL NIGHTLY
Qty: 30 TABLET | Refills: 11 | Status: SHIPPED | OUTPATIENT
Start: 2023-01-10 | End: 2023-08-14 | Stop reason: SDUPTHER

## 2023-01-10 RX ORDER — ATORVASTATIN CALCIUM 40 MG/1
40 TABLET, FILM COATED ORAL DAILY
Qty: 90 TABLET | Refills: 3 | Status: SHIPPED | OUTPATIENT
Start: 2023-01-10 | End: 2023-08-14 | Stop reason: SDUPTHER

## 2023-01-10 RX ORDER — CYCLOBENZAPRINE HCL 5 MG
5 TABLET ORAL 3 TIMES DAILY PRN
Qty: 30 TABLET | Refills: 1 | Status: SHIPPED | OUTPATIENT
Start: 2023-01-10 | End: 2023-01-20

## 2023-01-10 RX ORDER — AMLODIPINE BESYLATE 5 MG/1
5 TABLET ORAL DAILY
Qty: 90 TABLET | Refills: 3 | Status: SHIPPED | OUTPATIENT
Start: 2023-01-10 | End: 2023-08-14 | Stop reason: SDUPTHER

## 2023-01-10 NOTE — PROGRESS NOTES
Subjective:       Patient ID: Leticia Brown is a 67 y.o. female who  has a past medical history of Cataract, Diabetes mellitus, H. pylori infection (2017), High cholesterol, and Hypertension.    Chief Complaint: Depression, Neck Pain, and Anorexia     History was obtained from the patient and supplemented through chart review  The patient was seen in the ED 12/2022 for CP.    Is a  at [employer].      HPI     Depression, Insomnia:     Doesn't feel like herself. Decreased appetite, insomnia.    Mood is usually good.  Sleep:  Difficulty falling, staying asleep. Racing thoughts.  Anhedonia:  yes  Guilt: yes to herself.  Energy: varies  Concentration: varies  Appetite: decreased  Psychomotor agitation: no issues  SI: passive thoughts     Denies feeling anxious/nervous.  Some difficulty being able to control worrying.  Is not easily annoyed.  Doesn't feel that something bad might happen.      Used to be on Wellbutrin. Rx'd Remeron in the past for mood, sleep, appetite.  She doesn't remember if it caused any SE; likely only took it briefly.    Decreased appetite:  No nausea. Only eating coffee, toast for breakfast and dinner. Staying hydrated. Sometimes has with dysphagia, chronic.  No coughing, odynophagia. No fatigue, night sweats, LAD.    UTD on Pap smear, C scope, mammogram. CT chest as below.  BMI Readings from Last 3 Encounters:   01/10/23 27.78 kg/m²   01/05/23 28.13 kg/m²   12/18/22 28.90 kg/m²     R neck pain, h/o Cervical DDD:  X 2 weeks. No inciting event.  R neck to her shoulder. Burning sensation. Mainly occurs at night.  No radiation.  Some trouble opening jars.  No computer work.    RF, DANIELLE WNL .  Saw Rheumatology and had low suspicion for inflammatory arthritis.  MRI cscope 3/2019 was cervical spondylosis, DDD without cord compression.  EMG with cervical spinal stenosis, degenerative disc disease.    Unable to titrate gabapentin 300 due to sedation.    HTN:    Stress test  12/2022 negative for ischemia.    No snoring, apnea.    BP is controlled.  On losartan split to 50 BID d/t hypotension and Norvasc 5.  Compliant with meds.  Tolerating meds well.  D/C'd from [facility] d/t lack of participation.    DM2 is controlled.  Unable to tolerate metformin 500 XR d/t loose stools.    Diet: not much starches. Doing well. Loves veggies. Eats fish, salmon. Not much red meat.  Drinks: water, no juices. unsweet tea. Rarely has a soft drink.  ETOH:  none    Exercise: on her feet at work, but none outside of work. Used to walk 3 miles, jump rope.      Normal microalbumin creatinine ratio.  On an ACE/ARB.   Retinal exams:  .  Foot exams:  11/2021  Lab Results   Component Value Date/Time    HGBA1C 5.8 (H) 09/20/2022 07:03 AM    HGBA1C 5.6 03/17/2022 08:50 AM    HGBA1C 5.7 (H) 08/12/2021 07:14 AM     HLD, Aortic Atherosclerosis:    Is currently taking Lipitor 40 and ASA 81 daily.  Lab Results   Component Value Date    LDLCALC 128.6 09/20/2022     The 10-year ASCVD risk score (Akhil DK, et al., 2019) is: 34.6%    Values used to calculate the score:      Age: 67 years      Sex: Female      Is Non- : Yes      Diabetic: Yes      Tobacco smoker: Yes      Systolic Blood Pressure: 118 mmHg      Is BP treated: Yes      HDL Cholesterol: 41 mg/dL      Total Cholesterol: 191 mg/dL    Vitamin D deficiency:   On ergocalciferol.  DXA 10/25/2022 with normal BMD.  Lab Results   Component Value Date    KPKLNWQE75YA 21 (L) 09/20/2022    XPJJPSRW88VI 23 (L) 08/12/2021    AVWMQKVE12FF 39 10/19/2020     H/o Tobacco use, abnormal CT:  Smoked close to 1 ppd for 30 years. Quit in 2018 cold turkey.  Was on Wellbutrin in the past for depression.   Quit tobacco  given CP.  Spiral CT 04/05/2022 for lung cancer screening with small nodules.  Atherosclerosis, moderate emphysematous changes.              Not addressed today.  CP:  Was seen in the ED .  Awoke with intense left-sided  "CP.  EKG s ST changes. CXR without acute abnormality.  Stress test  Negative for ischemia.  Saw Cardiology.  No recurrent CP, SANTANA. Quit tobacco since 1/2023.  No further workup needed.    H/o H pylori, dysphagia to solids:  EGD 2017 normal other than positive biopsies.  Test of eradication neg.  Started Prilosec.  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  Erythema mucosa in the stomach.  Biopsy was negative. Still with mild dysphagia.  Persistent. Provided # for [facility] to reschedule. Consider repeat EGD.    HCV:    Antibody positive, but viral level negative. +HBsAB 10/2019.  HIV NR at [facility]   Viral level negative.  No treatment needed.    H/o Colon polyp:  C scope  was normal.  C scope in 10 years.    Urinary incontinence:   Stress, urge UIC.  H/o vaginal delivery x 5.  No constipation.   On oxybutynin, but feels that it doesn't work as well.  Follows with Urogynecology.  Rx estrogen cream for vaginal atrophy.  Cont current med. F/u c Urogyn. Also discussed timed voiding, Kegal exercises.    Right rotator cuff tendinitis:  Likely a CJ DJD or C4 radiculopathy.  Completed PT at [facility].  Neurosurgery at [facility] recommended home exercises.   Mild intermittent pain. She declined ortho referral.    Review of Systems   Constitutional:  Positive for appetite change. Negative for activity change.   Respiratory:  Negative for wheezing.    Cardiovascular:  Negative for leg swelling.   Musculoskeletal:  Positive for neck pain. Negative for gait problem.   Skin:  Negative for rash and wound.   Psychiatric/Behavioral:  Positive for sleep disturbance. Negative for confusion.        I personally reviewed Past Medical History, Past Surgical History, Social History, and Family History.    Objective:      Vitals:    01/10/23 1054   BP: 118/72   Pulse: 69   SpO2: 98%   Weight: 68.9 kg (151 lb 14.4 oz)   Height: 5' 2" (1.575 m)       Physical Exam  Constitutional:       General: She is not in acute " distress.     Appearance: She is not diaphoretic.   HENT:      Head: Normocephalic and atraumatic.   Eyes:      General: No scleral icterus.        Right eye: No discharge.         Left eye: No discharge.   Cardiovascular:      Rate and Rhythm: Normal rate and regular rhythm.      Heart sounds: Normal heart sounds. No murmur heard.  Pulmonary:      Effort: Pulmonary effort is normal. No respiratory distress.      Breath sounds: Normal breath sounds. No wheezing.   Abdominal:      General: Bowel sounds are normal. There is no distension.      Palpations: Abdomen is soft.      Tenderness: There is no abdominal tenderness.   Musculoskeletal:         General: No deformity.      Cervical back: No bony tenderness. No pain with movement. Normal range of motion.      Right lower leg: No edema.      Left lower leg: No edema.   Skin:     General: Skin is warm and dry.      Findings: No erythema.   Neurological:      Mental Status: She is alert.      Gait: Gait normal.   Psychiatric:         Behavior: Behavior normal.         Lab Results   Component Value Date    WBC 10.17 12/19/2022    HGB 13.3 12/19/2022    HCT 40.6 12/19/2022     12/19/2022    CHOL 191 09/20/2022    TRIG 107 09/20/2022    HDL 41 09/20/2022    ALT 12 12/19/2022    AST 13 12/19/2022     12/19/2022    K 4.2 12/19/2022     12/19/2022    CREATININE 0.8 12/19/2022    BUN 24 (H) 12/19/2022    CO2 23 12/19/2022    TSH 0.920 12/18/2022    HGBA1C 5.8 (H) 09/20/2022       The 10-year ASCVD risk score (Akhil DK, et al., 2019) is: 34.6%    Values used to calculate the score:      Age: 67 years      Sex: Female      Is Non- : Yes      Diabetic: Yes      Tobacco smoker: Yes      Systolic Blood Pressure: 118 mmHg      Is BP treated: Yes      HDL Cholesterol: 41 mg/dL      Total Cholesterol: 191 mg/dL    (Imaging have been independently reviewed)  CXR without acute abnormality.    Assessment:       1. Mild episode of recurrent  major depressive disorder    2. Decreased appetite    3. Neck pain on right side    4. Essential hypertension    5. Type 2 diabetes mellitus with diabetic polyneuropathy, without long-term current use of insulin    6. Aortic atherosclerosis    7. Vitamin D deficiency    8. Centrilobular emphysema              Plan:       Leticia was seen today for depression, neck pain and anorexia.    Diagnoses and all orders for this visit:    Mild episode of recurrent major depressive disorder  Comments:  Persistent. Rx Remeron for mood, sleep, appetite. Discussed SE. RTC 1 mo.  Orders:  -     mirtazapine (REMERON) 15 MG tablet; Take 1 tablet (15 mg total) by mouth every evening.    Decreased appetite  Comments:  Labs, TSH wnl. Previously referred to GI for dysphagia. Start Remeron for mood, sleep, appetite.  Orders:  -     mirtazapine (REMERON) 15 MG tablet; Take 1 tablet (15 mg total) by mouth every evening.    Neck pain on right side  Comments:  Provided home exercises. Low dose Flexeril PRN. Discussed SE. Could consider Gabaneptin 100. If persistent, consider PT and/or back/spine clinic.  Orders:  -     cyclobenzaprine (FLEXERIL) 5 MG tablet; Take 1 tablet (5 mg total) by mouth 3 (three) times daily as needed for Muscle spasms.    Essential hypertension  Comments:  Controlled. Cont losartan 50 BID, Norvasc 5. Reschedule CMP.  Orders:  -     amLODIPine (NORVASC) 5 MG tablet; Take 1 tablet (5 mg total) by mouth once daily.    Type 2 diabetes mellitus with diabetic polyneuropathy, without long-term current use of insulin  Comments:  A1C controlled without meds. Unable to tolerate metformin XR d/t GI SE. A1c today, plan on q6 mo. Schedule foot exam with podiatry.    Aortic atherosclerosis  Comments:  FLP increased, but still controlled. Continue Lipitor 40, ASA 81. Monitor FLP annually.  Orders:  -     atorvastatin (LIPITOR) 40 MG tablet; Take 1 tablet (40 mg total) by mouth once daily.    Vitamin D  deficiency  Comments:  Persistently low. Restarted ergocalciferol. Resched vit D level.  DEXA normal.    Centrilobular emphysema  Comments:  No acute issues. 30 pack year hx.  Monitor CT chest annually.              Side effects of medication(s) were discussed in detail and patient voiced understanding.  Patient will call back for any issues or complications.     I have spent a total of 40 minutes with the patient as well as reviewing the chart/medical record and placing orders on the day of the visit. Discussed mental health, neck pain.     RTC in 1 month(s) or sooner PRN for mental health.

## 2023-01-12 ENCOUNTER — PROCEDURE VISIT (OUTPATIENT)
Dept: UROGYNECOLOGY | Facility: CLINIC | Age: 68
End: 2023-01-12
Payer: MEDICARE

## 2023-01-12 VITALS
HEIGHT: 62 IN | SYSTOLIC BLOOD PRESSURE: 134 MMHG | DIASTOLIC BLOOD PRESSURE: 60 MMHG | BODY MASS INDEX: 27.99 KG/M2 | WEIGHT: 152.13 LBS

## 2023-01-12 DIAGNOSIS — N39.46 MIXED STRESS AND URGE URINARY INCONTINENCE: ICD-10-CM

## 2023-01-12 LAB
BILIRUB SERPL-MCNC: NORMAL MG/DL
BLOOD URINE, POC: NORMAL
CLARITY, POC UA: CLEAR
COLOR, POC UA: NORMAL
GLUCOSE UR QL STRIP: NORMAL
KETONES UR QL STRIP: NORMAL
LEUKOCYTE ESTERASE URINE, POC: NORMAL
NITRITE, POC UA: NORMAL
PH, POC UA: 5
PROTEIN, POC: NORMAL
SPECIFIC GRAVITY, POC UA: 1.02
UROBILINOGEN, POC UA: NORMAL

## 2023-01-12 PROCEDURE — 81002 URINALYSIS NONAUTO W/O SCOPE: CPT | Mod: S$GLB,,, | Performed by: OBSTETRICS & GYNECOLOGY

## 2023-01-12 RX ORDER — LIDOCAINE HYDROCHLORIDE 20 MG/ML
JELLY TOPICAL ONCE
Status: COMPLETED | OUTPATIENT
Start: 2023-01-12 | End: 2023-01-12

## 2023-01-12 RX ORDER — OXYBUTYNIN CHLORIDE 10 MG/1
10 TABLET, EXTENDED RELEASE ORAL DAILY
Qty: 30 TABLET | Refills: 11 | Status: SHIPPED | OUTPATIENT
Start: 2023-01-12 | End: 2023-01-12 | Stop reason: SDUPTHER

## 2023-01-12 RX ORDER — SULFAMETHOXAZOLE AND TRIMETHOPRIM 800; 160 MG/1; MG/1
1 TABLET ORAL
Status: COMPLETED | OUTPATIENT
Start: 2023-01-12 | End: 2023-01-12

## 2023-01-12 RX ORDER — OXYBUTYNIN CHLORIDE 10 MG/1
10 TABLET, EXTENDED RELEASE ORAL 2 TIMES DAILY
Qty: 60 TABLET | Refills: 11 | Status: SHIPPED | OUTPATIENT
Start: 2023-01-12 | End: 2023-04-18

## 2023-01-12 RX ORDER — OXYBUTYNIN CHLORIDE 10 MG/1
10 TABLET, EXTENDED RELEASE ORAL DAILY
Qty: 60 TABLET | Refills: 11 | Status: SHIPPED | OUTPATIENT
Start: 2023-01-12 | End: 2023-01-12 | Stop reason: CLARIF

## 2023-01-12 RX ADMIN — LIDOCAINE HYDROCHLORIDE: 20 JELLY TOPICAL at 09:01

## 2023-01-12 RX ADMIN — SULFAMETHOXAZOLE AND TRIMETHOPRIM 1 TABLET: 800; 160 TABLET ORAL at 09:01

## 2023-01-12 NOTE — PROCEDURES
Procedures    TITLE OF OPERATION:  Complex uroflowmetry  Complex cystometry  Electromyography with surface electrodes  Pressure voiding flow study  Urethral pressure profile       INDICATIONS:  67 y.o. Y O F  has a past medical history of Cataract, Diabetes mellitus, H. pylori infection (2017), High cholesterol, and Hypertension.Referred by Dr. Antonio for evaluation of urinary incontinence.     PREOPERATIVE DIAGNOSIS:  Mixed urinary incontinence   Urinary incontinence      POSTOPERATIVE DIAGNOSIS:    Mixed urinary incontinence   Urinary incontinence      ANESTHESIA:  None.    SPECIMEN (BACTERIOLOGICAL, PATHOLOGICAL OR OTHER):  None.    PROSTHETIC DEVICE/IMPLANT:  None.    SURGEONS NARRATIVE:  A time out was performed in which the patient identity and procedure were confirmed.  Urodynamic evaluation was performed using a computerized system ([employer]  Uroflowmetry was performed on the patient in the sitting position without catheters in place.  Subsequent urodynamic testing was performed with the patient in the lithotomy position at 45 degrees. Air charged catheters were used with sterile water as the infusion medium. Vesical and abdominal (rectal) pressures were measured, and detrusor pressure was calculated. EMG activity was recorded with surface electrodes. During filling, room temperature sterile water was infused at a rate of 30 cubic centimeters per minute. The patient was asked cough after instillation of each 150 mL volume. Two Valsalva leak point pressures and two cough leak point pressures were performed with the catheters in place at 150, 300 cubic centimeters and again at maximum capacity. Valsalva leak point pressure was defined as the difference between vesical pressure at which leakage was noted (visualized at the external urethral meatus) and the baseline vesical pressure. Following urodynamic testing, a pressure flow study was performed with the patient in the sitting position.  Vesical and abdominal pressures were monitored and detrusor pressures were calculated. After the pressure flow study, the catheters were then removed. The patient tolerated the procedure well.     Urine dipstick: normal        ANESTHESIA:  None.     SPECIMEN (BACTERIOLOGICAL, PATHOLOGICAL OR OTHER):  None.     PROSTHETIC DEVICE/IMPLANT:  None.     SURGEONS NARRATIVE:  A time out was performed in which the patient identity and procedure were confirmed.  Urodynamic evaluation was performed using a computerized system ([employer]  Uroflowmetry was performed on the patient in the sitting position without catheters in place.  Subsequent urodynamic testing was performed with the patient in the lithotomy position at 45 degrees. Air charged catheters were used with sterile water as the infusion medium. Vesical and abdominal (rectal) pressures were measured, and detrusor pressure was calculated. EMG activity was recorded with surface electrodes. During filling, room temperature sterile water was infused at a rate of 30 cubic centimeters per minute. The patient was asked cough after instillation of each 150 mL volume. Two Valsalva leak point pressures and two cough leak point pressures were performed with the catheters in place at 150, 300 cubic centimeters and again at maximum capacity. Valsalva leak point pressure was defined as the difference between vesical pressure at which leakage was noted (visualized at the external urethral meatus) and the baseline vesical pressure. Following urodynamic testing, a pressure flow study was performed with the patient in the sitting position. Vesical and abdominal pressures were monitored and detrusor pressures were calculated. After the pressure flow study, the catheters were then removed. The patient tolerated the procedure well.      Urine dipstick: normal      Complex Urinary Flow Study     For uroflow, the patient voided 234 mL with a maximum flow of  20 mL/Sec and  an average flow of 10 and with a post void residual of  20 mL.  The overall configuration of this uroflow study was : normal bell shaped.        Cystometry:   Using calibrated electronic equipment, cystometry was done with a medium fill rate of 30 mL per minute and simultaneous measurement of intraabdominal pressure using a rectal catheter and bladder pressure using a urethral catheter. The first sensation occurred at 134 mL at a detrusor pressure of 7 cm H20 and first desire at 224 mL at a detrusor pressure of 8 cm H20 a strong desire to void occurred at 393 mL with a detrusor pressure of 1  cm H20. The patient was filled to a maximum capacity of 507 mL with a detrusor pressure of 0 cm H20.   Detrusor contractions was not observed.      Voiding detrusor pressure:   The patient was instructed to void and the maximum detrusor pressure was measured. The patient voided   377 mL. Study was completed and performed utilizing both bladder and rectal catheters for detrusor, vesical, and abdominal pressure measurement.  The pressure flow, according to the differential between the abdominal and bladder pressure, showed a maximum detrusor pressure of 63  cm of water.  The average  flow rate was  4  mL per second.   She voided with an intermittent pressure. The remaining volume of 129 mL  was measured presenting the post void residual.      Urethral Pressure Profile:       Detrusor instability ( UI)   was not noted  under the following condition of the test.        A stress test was performed at the following bladder bladder volumes:     160 mL with a peak pressure of  162 cm H20 and the patient did not leak   160 mL with a peak pressure of  123 cm H20 and the patient did not leak     302 mL with a peak pressure of  100 cm H20 and the patient did not leak  302 mL with a peak pressure of  151 cm H20 and the patient did not leak     507 mL with a calculated LLP of 9 and a Pves pressure of 96 cm H2O  507 mL with a calculated LLP  of 21 and a Pves pressure of 60 cm H2O        Electromyography: Provocative maneuvers produced appropriate changes in waveforms. The EMG shows increased EMG activity with increased intraabdominal pressure. There was a decrease in the EMG activity during voiding  consistent with normal function of the pelvic floor.        These findings are consistent with Positive urodynamic stress incontinence .     Assessment: Compliance  ( decreased )   Max capacity .  DO (--).  GAIL (+).              Plan:    Reviewed treatment options for stress incontinence including  autologous fascial sling, mid urethral sling: Trans obturator/ retropubic/single incision, periurethral bulking. She has only a small amount of leakage at capacity. Urge incontinence is more of an issue. Once urge better controlled she would be a good candidate for periureteral bulking. I am inclined to avoid mesh given the extent of her smoking.   Recommend increasing the dose of Ditropan 10 mg from daily to twice a day.   SE profile reviewed.    For dry mouth: get sour, sugar free lozenge or gum.        Title of Operation:   Cystourethroscopy.     INDICATIONS:  67 y.o. Y O F  has a past medical history of Cataract, Diabetes mellitus, H. pylori infection (2017), High cholesterol, and Hypertension.    PREOPERATIVE DIAGNOSIS  Mixed urinary incontinence   Urinary incontinence      POSTOPERATIVE DIAGNOSIS:   Mixed urinary incontinence   Urinary incontinence      Anesthesia:   2% Xylocaine gel.    Specimen (Bacteriological, Pathological or other):   None.     Prosthetic Device/Implant:   None.     Surgeons Narrative:     After informed consent was obtained, the patient was placed in the lithotomy position. The urethral meatus was prepped with Betadine and 10 cubic centimeters of 2% Xylocaine gel were introduced into the urethra. A flexible cystourethroscope was introduced into the bladder. The bladder was distended with approximately 300 cubic centimeters of sterile  water. A systematic survey was performed in which the bladder was surveyed using multiple sequential passes in a clockwise fashion from the bladder dome to the bladder base to the urethrovesical junction trabeculations and hypervascularity. No lesions, stones or masses. The trigone and ureteral orifices were observed. The scope was then flipped back on itself, and the urethrovesical junction was viewed. A vaginal examining finger was then placed with pressure suburethrally at the urethrovesical junction as the telescope was withdrawn in order to perform positive pressure urethroscopy.  Standard maneuvers of cough, squeeze and Valsalva were performed. The telescope was then completely withdrawn.     Findings: Urethroscopy:  Normal, with decreased coaptation.  Cystoscopy:  Normal bladder mucosa with trabeculations and hypervascularity. Bilateral ureteral flow was noted.      Assessment: Essentially normal cystourethroscopy.      Plan: The patient will follow up with Dr Mora as scheduled.  See urodynamics note for further plan details.

## 2023-01-19 ENCOUNTER — TELEPHONE (OUTPATIENT)
Dept: INTERNAL MEDICINE | Facility: CLINIC | Age: 68
End: 2023-01-19

## 2023-01-19 ENCOUNTER — OFFICE VISIT (OUTPATIENT)
Dept: INTERNAL MEDICINE | Facility: CLINIC | Age: 68
End: 2023-01-19
Attending: INTERNAL MEDICINE
Payer: MEDICARE

## 2023-01-19 ENCOUNTER — PATIENT MESSAGE (OUTPATIENT)
Dept: UROGYNECOLOGY | Facility: CLINIC | Age: 68
End: 2023-01-19
Payer: MEDICARE

## 2023-01-19 VITALS
OXYGEN SATURATION: 99 % | SYSTOLIC BLOOD PRESSURE: 130 MMHG | WEIGHT: 150.56 LBS | HEIGHT: 62 IN | DIASTOLIC BLOOD PRESSURE: 60 MMHG | HEART RATE: 90 BPM | BODY MASS INDEX: 27.7 KG/M2

## 2023-01-19 DIAGNOSIS — R05.9 COUGH: ICD-10-CM

## 2023-01-19 DIAGNOSIS — J06.9 UPPER RESPIRATORY TRACT INFECTION, UNSPECIFIED TYPE: Primary | ICD-10-CM

## 2023-01-19 DIAGNOSIS — E11.42 TYPE 2 DIABETES MELLITUS WITH DIABETIC POLYNEUROPATHY, WITHOUT LONG-TERM CURRENT USE OF INSULIN: ICD-10-CM

## 2023-01-19 DIAGNOSIS — I10 ESSENTIAL HYPERTENSION: ICD-10-CM

## 2023-01-19 LAB
CTP QC/QA: YES
POC MOLECULAR INFLUENZA A AGN: NEGATIVE
POC MOLECULAR INFLUENZA B AGN: NEGATIVE
SARS-COV-2 RNA RESP QL NAA+PROBE: NOT DETECTED

## 2023-01-19 PROCEDURE — 99999 PR PBB SHADOW E&M-EST. PATIENT-LVL IV: CPT | Mod: PBBFAC,HCNC,, | Performed by: INTERNAL MEDICINE

## 2023-01-19 PROCEDURE — 3075F PR MOST RECENT SYSTOLIC BLOOD PRESS GE 130-139MM HG: ICD-10-PCS | Mod: HCNC,CPTII,S$GLB, | Performed by: INTERNAL MEDICINE

## 2023-01-19 PROCEDURE — 1160F PR REVIEW ALL MEDS BY PRESCRIBER/CLIN PHARMACIST DOCUMENTED: ICD-10-PCS | Mod: HCNC,CPTII,S$GLB, | Performed by: INTERNAL MEDICINE

## 2023-01-19 PROCEDURE — 3078F DIAST BP <80 MM HG: CPT | Mod: HCNC,CPTII,S$GLB, | Performed by: INTERNAL MEDICINE

## 2023-01-19 PROCEDURE — 1126F AMNT PAIN NOTED NONE PRSNT: CPT | Mod: HCNC,CPTII,S$GLB, | Performed by: INTERNAL MEDICINE

## 2023-01-19 PROCEDURE — 3044F PR MOST RECENT HEMOGLOBIN A1C LEVEL <7.0%: ICD-10-PCS | Mod: HCNC,CPTII,S$GLB, | Performed by: INTERNAL MEDICINE

## 2023-01-19 PROCEDURE — 1126F PR PAIN SEVERITY QUANTIFIED, NO PAIN PRESENT: ICD-10-PCS | Mod: HCNC,CPTII,S$GLB, | Performed by: INTERNAL MEDICINE

## 2023-01-19 PROCEDURE — 1101F PT FALLS ASSESS-DOCD LE1/YR: CPT | Mod: HCNC,CPTII,S$GLB, | Performed by: INTERNAL MEDICINE

## 2023-01-19 PROCEDURE — U0005 INFEC AGEN DETEC AMPLI PROBE: HCPCS | Performed by: INTERNAL MEDICINE

## 2023-01-19 PROCEDURE — 3072F PR LOW RISK FOR RETINOPATHY: ICD-10-PCS | Mod: HCNC,CPTII,S$GLB, | Performed by: INTERNAL MEDICINE

## 2023-01-19 PROCEDURE — 1101F PR PT FALLS ASSESS DOC 0-1 FALLS W/OUT INJ PAST YR: ICD-10-PCS | Mod: HCNC,CPTII,S$GLB, | Performed by: INTERNAL MEDICINE

## 2023-01-19 PROCEDURE — 99214 OFFICE O/P EST MOD 30 MIN: CPT | Mod: HCNC,S$GLB,, | Performed by: INTERNAL MEDICINE

## 2023-01-19 PROCEDURE — 99214 PR OFFICE/OUTPT VISIT, EST, LEVL IV, 30-39 MIN: ICD-10-PCS | Mod: HCNC,S$GLB,, | Performed by: INTERNAL MEDICINE

## 2023-01-19 PROCEDURE — 99499 RISK ADDL DX/OHS AUDIT: ICD-10-PCS | Mod: S$GLB,,, | Performed by: INTERNAL MEDICINE

## 2023-01-19 PROCEDURE — 87502 POCT INFLUENZA A/B MOLECULAR: ICD-10-PCS | Mod: QW,HCNC,S$GLB, | Performed by: INTERNAL MEDICINE

## 2023-01-19 PROCEDURE — 3072F LOW RISK FOR RETINOPATHY: CPT | Mod: HCNC,CPTII,S$GLB, | Performed by: INTERNAL MEDICINE

## 2023-01-19 PROCEDURE — 87502 INFLUENZA DNA AMP PROBE: CPT | Mod: QW,HCNC,S$GLB, | Performed by: INTERNAL MEDICINE

## 2023-01-19 PROCEDURE — 3288F FALL RISK ASSESSMENT DOCD: CPT | Mod: HCNC,CPTII,S$GLB, | Performed by: INTERNAL MEDICINE

## 2023-01-19 PROCEDURE — 3008F BODY MASS INDEX DOCD: CPT | Mod: HCNC,CPTII,S$GLB, | Performed by: INTERNAL MEDICINE

## 2023-01-19 PROCEDURE — 3288F PR FALLS RISK ASSESSMENT DOCUMENTED: ICD-10-PCS | Mod: HCNC,CPTII,S$GLB, | Performed by: INTERNAL MEDICINE

## 2023-01-19 PROCEDURE — 3008F PR BODY MASS INDEX (BMI) DOCUMENTED: ICD-10-PCS | Mod: HCNC,CPTII,S$GLB, | Performed by: INTERNAL MEDICINE

## 2023-01-19 PROCEDURE — 99999 PR PBB SHADOW E&M-EST. PATIENT-LVL IV: ICD-10-PCS | Mod: PBBFAC,HCNC,, | Performed by: INTERNAL MEDICINE

## 2023-01-19 PROCEDURE — 1160F RVW MEDS BY RX/DR IN RCRD: CPT | Mod: HCNC,CPTII,S$GLB, | Performed by: INTERNAL MEDICINE

## 2023-01-19 PROCEDURE — 99499 UNLISTED E&M SERVICE: CPT | Mod: S$GLB,,, | Performed by: INTERNAL MEDICINE

## 2023-01-19 PROCEDURE — 3075F SYST BP GE 130 - 139MM HG: CPT | Mod: HCNC,CPTII,S$GLB, | Performed by: INTERNAL MEDICINE

## 2023-01-19 PROCEDURE — 3044F HG A1C LEVEL LT 7.0%: CPT | Mod: HCNC,CPTII,S$GLB, | Performed by: INTERNAL MEDICINE

## 2023-01-19 PROCEDURE — 1159F MED LIST DOCD IN RCRD: CPT | Mod: HCNC,CPTII,S$GLB, | Performed by: INTERNAL MEDICINE

## 2023-01-19 PROCEDURE — 3078F PR MOST RECENT DIASTOLIC BLOOD PRESSURE < 80 MM HG: ICD-10-PCS | Mod: HCNC,CPTII,S$GLB, | Performed by: INTERNAL MEDICINE

## 2023-01-19 PROCEDURE — 1159F PR MEDICATION LIST DOCUMENTED IN MEDICAL RECORD: ICD-10-PCS | Mod: HCNC,CPTII,S$GLB, | Performed by: INTERNAL MEDICINE

## 2023-01-19 NOTE — TELEPHONE ENCOUNTER
Patient is have fecal incontinence since urodynamics.  She is not using fiber supplement.  She will start today. Will follow up in one week.  KAYODE Orta-BC

## 2023-01-19 NOTE — PROGRESS NOTES
Subjective:       Patient ID: Leticia Brown is a 67 y.o. female.    Chief Complaint: Cough and Chest Pain    Pt normally cared for by my colleague Dr. Antonio and patient is new to me. I have reviewed patient's past medical, surgical, and social history in addition to MAR and allergies.   Patient Active Problem List:     Essential hypertension     Mixed hyperlipidemia     Anxiety     Bilateral shoulder pain     Hepatitis C antibody test positive     H/O allergic rhinitis     GERD (gastroesophageal reflux disease)     Mild episode of recurrent major depressive disorder     DDD (degenerative disc disease), cervical     Right wrist pain     Type 2 diabetes mellitus with diabetic polyneuropathy, without long-term current use of insulin     Persistent cough     Incontinence of urine     History of tobacco abuse     Vitamin D deficiency     Colon polyp     Right rotator cuff tendinitis     Insomnia     Light cigarette smoker (1-9 cigs/day)     Dysphagia     Abnormal chest CT     Syncope     Aortic atherosclerosis     Nuclear sclerotic cataract of left eye     Centrilobular emphysema    66 yo with above problem list presents for urgent visit for 48 hour Hx of cough with an associated bilateral, lateral chest wall pain. Pain only occurs with cough. Also has sore throat, mild sinus pressure.  sick with similar symptoms. Patient denies F/C, SOB, chest tightness, eye pain, severe headache, inability to maintain adequate PO intake, significant loss of taste or smell, abdominal pain, nausea/vomiting, or diarrhea.                Review of Systems   Constitutional:  Negative for chills, fatigue, fever and unexpected weight change.   HENT:  Positive for congestion. Negative for ear pain, hearing loss, postnasal drip, tinnitus, trouble swallowing and voice change.    Respiratory:  Positive for cough. Negative for chest tightness, shortness of breath and wheezing.    Cardiovascular:  Negative for chest pain,  "palpitations and leg swelling.   Gastrointestinal:  Negative for abdominal pain, blood in stool, diarrhea, nausea and vomiting.   Endocrine: Negative for polydipsia, polyphagia and polyuria.   Genitourinary:  Negative for difficulty urinating, dysuria, hematuria and vaginal bleeding.   Skin:  Negative for rash.   Allergic/Immunologic: Negative for food allergies.   Neurological:  Negative for dizziness, numbness and headaches.   Hematological:  Does not bruise/bleed easily.   Psychiatric/Behavioral:  The patient is not nervous/anxious.      Objective:      Vitals:    01/19/23 1544   BP: 130/60   Pulse: 90   SpO2: 99%   Weight: 68.3 kg (150 lb 9.2 oz)   Height: 5' 2" (1.575 m)      Physical Exam  Constitutional:       Appearance: She is well-developed.   HENT:      Head: Normocephalic and atraumatic.      Jaw: No swelling.      Right Ear: Tympanic membrane, ear canal and external ear normal.      Left Ear: Tympanic membrane, ear canal and external ear normal.      Nose: No mucosal edema or rhinorrhea.      Mouth/Throat:      Palate: No mass.      Pharynx: Posterior oropharyngeal erythema present. No pharyngeal swelling, oropharyngeal exudate or uvula swelling.      Tonsils: No tonsillar exudate or tonsillar abscesses.   Eyes:      Conjunctiva/sclera: Conjunctivae normal.   Pulmonary:      Effort: Pulmonary effort is normal. No respiratory distress.      Breath sounds: Normal breath sounds. No wheezing.   Abdominal:      General: There is no distension.   Lymphadenopathy:      Cervical: No cervical adenopathy.   Skin:     General: Skin is warm and dry.      Findings: No rash.   Neurological:      Mental Status: She is alert and oriented to person, place, and time.       Assessment:       1. Upper respiratory tract infection, unspecified type    2. Cough          Plan:       Leticia was seen today for cough and chest pain.    Diagnoses and all orders for this visit:    Upper respiratory tract infection, unspecified " type   Presumed viral. OTC meds discussed.  Prompts to call office discussed.  -     POCT Influenza A/B Molecular    Cough  -     COVID-19 Routine Screening    HTN  Controlled and asymptomatic.  Continue current Rx regimen.    DM  Avoiding steroids at this time as exam and vitals cami Ríos MD  Internal Medicine-[facility]        Side effects of medication(s) were discussed in detail and patient voiced understanding.  Patient will call back for any issues or complications.

## 2023-02-07 DIAGNOSIS — Z00.00 ENCOUNTER FOR MEDICARE ANNUAL WELLNESS EXAM: ICD-10-CM

## 2023-02-09 DIAGNOSIS — Z00.00 ENCOUNTER FOR MEDICARE ANNUAL WELLNESS EXAM: ICD-10-CM

## 2023-03-05 ENCOUNTER — PATIENT MESSAGE (OUTPATIENT)
Dept: CARDIOLOGY | Facility: CLINIC | Age: 68
End: 2023-03-05
Payer: MEDICARE

## 2023-03-08 ENCOUNTER — PATIENT MESSAGE (OUTPATIENT)
Dept: INTERNAL MEDICINE | Facility: CLINIC | Age: 68
End: 2023-03-08
Payer: MEDICARE

## 2023-03-10 NOTE — TELEPHONE ENCOUNTER
Spoke to pt, pt stated that she's having chest tightness and it only happens at night. Let pt know that if her symtoms worsen to go to urgent care/ER. Pt is okay with seeing dr lozano on Wednesday.

## 2023-03-15 ENCOUNTER — OFFICE VISIT (OUTPATIENT)
Dept: INTERNAL MEDICINE | Facility: CLINIC | Age: 68
End: 2023-03-15
Attending: INTERNAL MEDICINE
Payer: MEDICARE

## 2023-03-15 VITALS
BODY MASS INDEX: 28.03 KG/M2 | OXYGEN SATURATION: 99 % | WEIGHT: 152.31 LBS | HEIGHT: 62 IN | SYSTOLIC BLOOD PRESSURE: 134 MMHG | DIASTOLIC BLOOD PRESSURE: 64 MMHG | HEART RATE: 73 BPM

## 2023-03-15 DIAGNOSIS — R10.13 DYSPEPSIA: Primary | ICD-10-CM

## 2023-03-15 DIAGNOSIS — E55.9 VITAMIN D DEFICIENCY: ICD-10-CM

## 2023-03-15 DIAGNOSIS — R19.8 ALTERNATING CONSTIPATION AND DIARRHEA: ICD-10-CM

## 2023-03-15 PROCEDURE — 3288F FALL RISK ASSESSMENT DOCD: CPT | Mod: HCNC,CPTII,S$GLB, | Performed by: INTERNAL MEDICINE

## 2023-03-15 PROCEDURE — 1159F PR MEDICATION LIST DOCUMENTED IN MEDICAL RECORD: ICD-10-PCS | Mod: HCNC,CPTII,S$GLB, | Performed by: INTERNAL MEDICINE

## 2023-03-15 PROCEDURE — 3044F PR MOST RECENT HEMOGLOBIN A1C LEVEL <7.0%: ICD-10-PCS | Mod: HCNC,CPTII,S$GLB, | Performed by: INTERNAL MEDICINE

## 2023-03-15 PROCEDURE — 1101F PR PT FALLS ASSESS DOC 0-1 FALLS W/OUT INJ PAST YR: ICD-10-PCS | Mod: HCNC,CPTII,S$GLB, | Performed by: INTERNAL MEDICINE

## 2023-03-15 PROCEDURE — 1101F PT FALLS ASSESS-DOCD LE1/YR: CPT | Mod: HCNC,CPTII,S$GLB, | Performed by: INTERNAL MEDICINE

## 2023-03-15 PROCEDURE — 3075F SYST BP GE 130 - 139MM HG: CPT | Mod: HCNC,CPTII,S$GLB, | Performed by: INTERNAL MEDICINE

## 2023-03-15 PROCEDURE — 3078F DIAST BP <80 MM HG: CPT | Mod: HCNC,CPTII,S$GLB, | Performed by: INTERNAL MEDICINE

## 2023-03-15 PROCEDURE — 3075F PR MOST RECENT SYSTOLIC BLOOD PRESS GE 130-139MM HG: ICD-10-PCS | Mod: HCNC,CPTII,S$GLB, | Performed by: INTERNAL MEDICINE

## 2023-03-15 PROCEDURE — 1126F AMNT PAIN NOTED NONE PRSNT: CPT | Mod: HCNC,CPTII,S$GLB, | Performed by: INTERNAL MEDICINE

## 2023-03-15 PROCEDURE — 1126F PR PAIN SEVERITY QUANTIFIED, NO PAIN PRESENT: ICD-10-PCS | Mod: HCNC,CPTII,S$GLB, | Performed by: INTERNAL MEDICINE

## 2023-03-15 PROCEDURE — 99999 PR PBB SHADOW E&M-EST. PATIENT-LVL IV: ICD-10-PCS | Mod: PBBFAC,HCNC,, | Performed by: INTERNAL MEDICINE

## 2023-03-15 PROCEDURE — 99214 PR OFFICE/OUTPT VISIT, EST, LEVL IV, 30-39 MIN: ICD-10-PCS | Mod: HCNC,S$GLB,, | Performed by: INTERNAL MEDICINE

## 2023-03-15 PROCEDURE — 3008F BODY MASS INDEX DOCD: CPT | Mod: HCNC,CPTII,S$GLB, | Performed by: INTERNAL MEDICINE

## 2023-03-15 PROCEDURE — 1160F PR REVIEW ALL MEDS BY PRESCRIBER/CLIN PHARMACIST DOCUMENTED: ICD-10-PCS | Mod: HCNC,CPTII,S$GLB, | Performed by: INTERNAL MEDICINE

## 2023-03-15 PROCEDURE — 99999 PR PBB SHADOW E&M-EST. PATIENT-LVL IV: CPT | Mod: PBBFAC,HCNC,, | Performed by: INTERNAL MEDICINE

## 2023-03-15 PROCEDURE — 3072F PR LOW RISK FOR RETINOPATHY: ICD-10-PCS | Mod: HCNC,CPTII,S$GLB, | Performed by: INTERNAL MEDICINE

## 2023-03-15 PROCEDURE — 1159F MED LIST DOCD IN RCRD: CPT | Mod: HCNC,CPTII,S$GLB, | Performed by: INTERNAL MEDICINE

## 2023-03-15 PROCEDURE — 3044F HG A1C LEVEL LT 7.0%: CPT | Mod: HCNC,CPTII,S$GLB, | Performed by: INTERNAL MEDICINE

## 2023-03-15 PROCEDURE — 3072F LOW RISK FOR RETINOPATHY: CPT | Mod: HCNC,CPTII,S$GLB, | Performed by: INTERNAL MEDICINE

## 2023-03-15 PROCEDURE — 3288F PR FALLS RISK ASSESSMENT DOCUMENTED: ICD-10-PCS | Mod: HCNC,CPTII,S$GLB, | Performed by: INTERNAL MEDICINE

## 2023-03-15 PROCEDURE — 1160F RVW MEDS BY RX/DR IN RCRD: CPT | Mod: HCNC,CPTII,S$GLB, | Performed by: INTERNAL MEDICINE

## 2023-03-15 PROCEDURE — 99214 OFFICE O/P EST MOD 30 MIN: CPT | Mod: HCNC,S$GLB,, | Performed by: INTERNAL MEDICINE

## 2023-03-15 PROCEDURE — 3008F PR BODY MASS INDEX (BMI) DOCUMENTED: ICD-10-PCS | Mod: HCNC,CPTII,S$GLB, | Performed by: INTERNAL MEDICINE

## 2023-03-15 PROCEDURE — 3078F PR MOST RECENT DIASTOLIC BLOOD PRESSURE < 80 MM HG: ICD-10-PCS | Mod: HCNC,CPTII,S$GLB, | Performed by: INTERNAL MEDICINE

## 2023-03-15 RX ORDER — PANTOPRAZOLE SODIUM 40 MG/1
40 TABLET, DELAYED RELEASE ORAL
Qty: 90 TABLET | Refills: 0 | Status: SHIPPED | OUTPATIENT
Start: 2023-03-15 | End: 2023-06-12

## 2023-03-15 RX ORDER — ERGOCALCIFEROL 1.25 MG/1
50000 CAPSULE ORAL
Qty: 12 CAPSULE | Refills: 3 | Status: SHIPPED | OUTPATIENT
Start: 2023-03-15 | End: 2023-08-14 | Stop reason: SDUPTHER

## 2023-03-15 NOTE — PROGRESS NOTES
Subjective:       Patient ID: Leticia Brown is a 67 y.o. female.    Chief Complaint: Anorexia, Weight Loss, and Chest Pain (Pt wets and defecates on herself/)    Pt normally cared for by my colleague Dr. Antonio. I have reviewed patient's past medical, surgical, and social history in addition to MAR and allergies.   Patient Active Problem List:     Essential hypertension     Mixed hyperlipidemia     Anxiety     Bilateral shoulder pain     Hepatitis C antibody test positive     H/O allergic rhinitis     GERD (gastroesophageal reflux disease)     Mild episode of recurrent major depressive disorder     DDD (degenerative disc disease), cervical     Right wrist pain     Type 2 diabetes mellitus with diabetic polyneuropathy, without long-term current use of insulin     Persistent cough     Incontinence of urine     History of tobacco abuse     Vitamin D deficiency     Colon polyp     Right rotator cuff tendinitis     Insomnia     Light cigarette smoker (1-9 cigs/day)     Dysphagia     Abnormal chest CT     Syncope     Aortic atherosclerosis     Nuclear sclerotic cataract of left eye     Centrilobular emphysema    68 yo with above problem list presents for urgent visit for anorexia and weight loss. Chart review shows she has gained 2lbs in past 2 months. She reports a 2 week hx of epigastric discomfort that is constant in nature and worsened by PO intake. Hx of reflux and fecal urgency and incontinence. She reports this has also worsened as of late. Denies dysphagia or sensation of things sticking in throat, BRBPR, melena, or night sweats. She suffers from urinary urgency and nocturia 3 times nightly and this is unchanged. She denies any vaginal discharge, dysuria,  vulvar swelling or pain, rash.                  Review of Systems   Constitutional:  Negative for chills, fatigue, fever and unexpected weight change.   HENT:  Negative for ear pain, hearing loss, postnasal drip, tinnitus, trouble swallowing and voice  "change.    Respiratory:  Negative for cough, chest tightness, shortness of breath and wheezing.    Cardiovascular:  Negative for chest pain, palpitations and leg swelling.   Gastrointestinal:  Negative for abdominal pain, blood in stool, diarrhea, nausea and vomiting.   Endocrine: Negative for polydipsia, polyphagia and polyuria.   Genitourinary:  Negative for difficulty urinating, dysuria, hematuria and vaginal bleeding.   Skin:  Negative for rash.   Allergic/Immunologic: Negative for food allergies.   Neurological:  Negative for dizziness, numbness and headaches.   Hematological:  Does not bruise/bleed easily.   Psychiatric/Behavioral:  The patient is not nervous/anxious.      Objective:      Vitals:    03/15/23 0942   BP: 134/64   Pulse: 73   SpO2: 99%   Weight: 69.1 kg (152 lb 5.4 oz)   Height: 5' 2" (1.575 m)      Physical Exam  Vitals and nursing note reviewed.   Constitutional:       General: She is not in acute distress.     Appearance: Normal appearance. She is well-developed.   HENT:      Head: Normocephalic and atraumatic.      Mouth/Throat:      Pharynx: No oropharyngeal exudate.   Eyes:      General: No scleral icterus.     Conjunctiva/sclera: Conjunctivae normal.      Pupils: Pupils are equal, round, and reactive to light.   Neck:      Thyroid: No thyromegaly.   Cardiovascular:      Rate and Rhythm: Normal rate and regular rhythm.      Heart sounds: Normal heart sounds. No murmur heard.  Pulmonary:      Effort: Pulmonary effort is normal.      Breath sounds: Normal breath sounds. No wheezing or rales.   Abdominal:      General: There is no distension.      Tenderness: There is abdominal tenderness (very mild with deep palpation) in the epigastric area. There is no right CVA tenderness, left CVA tenderness, guarding or rebound. Negative signs include Rossi's sign.   Musculoskeletal:         General: No tenderness.   Lymphadenopathy:      Cervical: No cervical adenopathy.   Skin:     General: Skin is " warm and dry.   Neurological:      Mental Status: She is alert and oriented to person, place, and time.   Psychiatric:         Behavior: Behavior normal.       Assessment:       1. Dyspepsia    2. Vitamin D deficiency    3. Alternating constipation and diarrhea        Plan:       Leticia was seen today for anorexia, weight loss and chest pain.    Diagnoses and all orders for this visit:    Dyspepsia   Acute vs chronic. Tx for 2-3 months with antacid and dietary changes. Office and Emergency Department prompts discussed.  -     pantoprazole (PROTONIX) 40 MG tablet; Take 1 tablet (40 mg total) by mouth before breakfast.  Vitamin D deficiency  Comments:  Persistently low. Restart ergocalciferol. DEXA normal; due for repeat.  Orders:  -     ergocalciferol (ERGOCALCIFEROL) 50,000 unit Cap; Take 1 capsule (50,000 Units total) by mouth every 7 days.    Alternating constipation and diarrhea   Start with tx of constipation to see if this breaks the cycle. Next is identifying IBS trigger.               Сергей Ríos MD  Internal Medicine-[facility]        Side effects of medication(s) were discussed in detail and patient voiced understanding.  Patient will call back for any issues or complications.

## 2023-04-05 ENCOUNTER — TELEPHONE (OUTPATIENT)
Dept: SMOKING CESSATION | Facility: CLINIC | Age: 68
End: 2023-04-05
Payer: MEDICARE

## 2023-04-06 ENCOUNTER — PATIENT MESSAGE (OUTPATIENT)
Dept: INTERNAL MEDICINE | Facility: CLINIC | Age: 68
End: 2023-04-06
Payer: MEDICARE

## 2023-04-06 DIAGNOSIS — Z87.891 HISTORY OF TOBACCO ABUSE: ICD-10-CM

## 2023-04-06 DIAGNOSIS — R93.89 ABNORMAL CHEST CT: ICD-10-CM

## 2023-04-06 DIAGNOSIS — F17.210 LIGHT CIGARETTE SMOKER (1-9 CIGS/DAY): Primary | ICD-10-CM

## 2023-04-13 ENCOUNTER — OFFICE VISIT (OUTPATIENT)
Dept: UROGYNECOLOGY | Facility: CLINIC | Age: 68
End: 2023-04-13
Payer: MEDICARE

## 2023-04-13 VITALS
HEIGHT: 62 IN | WEIGHT: 147.94 LBS | BODY MASS INDEX: 27.22 KG/M2 | DIASTOLIC BLOOD PRESSURE: 70 MMHG | SYSTOLIC BLOOD PRESSURE: 144 MMHG

## 2023-04-13 DIAGNOSIS — N32.81 OVERACTIVE BLADDER: ICD-10-CM

## 2023-04-13 DIAGNOSIS — R19.8 IRREGULAR BOWEL HABITS: ICD-10-CM

## 2023-04-13 DIAGNOSIS — N39.46 MIXED STRESS AND URGE URINARY INCONTINENCE: Primary | ICD-10-CM

## 2023-04-13 DIAGNOSIS — N95.2 VAGINAL ATROPHY: ICD-10-CM

## 2023-04-13 PROCEDURE — 3044F HG A1C LEVEL LT 7.0%: CPT | Mod: HCNC,CPTII,S$GLB, | Performed by: NURSE PRACTITIONER

## 2023-04-13 PROCEDURE — 3288F PR FALLS RISK ASSESSMENT DOCUMENTED: ICD-10-PCS | Mod: HCNC,CPTII,S$GLB, | Performed by: NURSE PRACTITIONER

## 2023-04-13 PROCEDURE — 3072F PR LOW RISK FOR RETINOPATHY: ICD-10-PCS | Mod: HCNC,CPTII,S$GLB, | Performed by: NURSE PRACTITIONER

## 2023-04-13 PROCEDURE — 3077F SYST BP >= 140 MM HG: CPT | Mod: HCNC,CPTII,S$GLB, | Performed by: NURSE PRACTITIONER

## 2023-04-13 PROCEDURE — 99213 OFFICE O/P EST LOW 20 MIN: CPT | Mod: HCNC,S$GLB,, | Performed by: NURSE PRACTITIONER

## 2023-04-13 PROCEDURE — 3078F DIAST BP <80 MM HG: CPT | Mod: HCNC,CPTII,S$GLB, | Performed by: NURSE PRACTITIONER

## 2023-04-13 PROCEDURE — 3044F PR MOST RECENT HEMOGLOBIN A1C LEVEL <7.0%: ICD-10-PCS | Mod: HCNC,CPTII,S$GLB, | Performed by: NURSE PRACTITIONER

## 2023-04-13 PROCEDURE — 99213 PR OFFICE/OUTPT VISIT, EST, LEVL III, 20-29 MIN: ICD-10-PCS | Mod: HCNC,S$GLB,, | Performed by: NURSE PRACTITIONER

## 2023-04-13 PROCEDURE — 99999 PR PBB SHADOW E&M-EST. PATIENT-LVL IV: ICD-10-PCS | Mod: PBBFAC,HCNC,, | Performed by: NURSE PRACTITIONER

## 2023-04-13 PROCEDURE — 3077F PR MOST RECENT SYSTOLIC BLOOD PRESSURE >= 140 MM HG: ICD-10-PCS | Mod: HCNC,CPTII,S$GLB, | Performed by: NURSE PRACTITIONER

## 2023-04-13 PROCEDURE — 1160F RVW MEDS BY RX/DR IN RCRD: CPT | Mod: HCNC,CPTII,S$GLB, | Performed by: NURSE PRACTITIONER

## 2023-04-13 PROCEDURE — 1160F PR REVIEW ALL MEDS BY PRESCRIBER/CLIN PHARMACIST DOCUMENTED: ICD-10-PCS | Mod: HCNC,CPTII,S$GLB, | Performed by: NURSE PRACTITIONER

## 2023-04-13 PROCEDURE — 1101F PT FALLS ASSESS-DOCD LE1/YR: CPT | Mod: HCNC,CPTII,S$GLB, | Performed by: NURSE PRACTITIONER

## 2023-04-13 PROCEDURE — 1101F PR PT FALLS ASSESS DOC 0-1 FALLS W/OUT INJ PAST YR: ICD-10-PCS | Mod: HCNC,CPTII,S$GLB, | Performed by: NURSE PRACTITIONER

## 2023-04-13 PROCEDURE — 3288F FALL RISK ASSESSMENT DOCD: CPT | Mod: HCNC,CPTII,S$GLB, | Performed by: NURSE PRACTITIONER

## 2023-04-13 PROCEDURE — 1159F PR MEDICATION LIST DOCUMENTED IN MEDICAL RECORD: ICD-10-PCS | Mod: HCNC,CPTII,S$GLB, | Performed by: NURSE PRACTITIONER

## 2023-04-13 PROCEDURE — 1126F AMNT PAIN NOTED NONE PRSNT: CPT | Mod: HCNC,CPTII,S$GLB, | Performed by: NURSE PRACTITIONER

## 2023-04-13 PROCEDURE — 3008F BODY MASS INDEX DOCD: CPT | Mod: HCNC,CPTII,S$GLB, | Performed by: NURSE PRACTITIONER

## 2023-04-13 PROCEDURE — 3072F LOW RISK FOR RETINOPATHY: CPT | Mod: HCNC,CPTII,S$GLB, | Performed by: NURSE PRACTITIONER

## 2023-04-13 PROCEDURE — 1159F MED LIST DOCD IN RCRD: CPT | Mod: HCNC,CPTII,S$GLB, | Performed by: NURSE PRACTITIONER

## 2023-04-13 PROCEDURE — 99999 PR PBB SHADOW E&M-EST. PATIENT-LVL IV: CPT | Mod: PBBFAC,HCNC,, | Performed by: NURSE PRACTITIONER

## 2023-04-13 PROCEDURE — 3078F PR MOST RECENT DIASTOLIC BLOOD PRESSURE < 80 MM HG: ICD-10-PCS | Mod: HCNC,CPTII,S$GLB, | Performed by: NURSE PRACTITIONER

## 2023-04-13 PROCEDURE — 3008F PR BODY MASS INDEX (BMI) DOCUMENTED: ICD-10-PCS | Mod: HCNC,CPTII,S$GLB, | Performed by: NURSE PRACTITIONER

## 2023-04-13 PROCEDURE — 1126F PR PAIN SEVERITY QUANTIFIED, NO PAIN PRESENT: ICD-10-PCS | Mod: HCNC,CPTII,S$GLB, | Performed by: NURSE PRACTITIONER

## 2023-04-13 NOTE — PROGRESS NOTES
Urogyn follow up  04/13/2023  .  [facility]  [address] [id number]    Leticia Brown  [id number]  1955      Leticia Brown is a 67 y.o. here for a urogyn follow up for overactive bladder.    Last HPI from 11/15/2022  History of Present Illness  67 Y O F  has a past medical history of Cataract, Diabetes mellitus, H. pylori infection (2017), High cholesterol, and Hypertension. Referred by Dr. Antonio for evaluation of urinary incontinence.      [facility]     Question 11/10/2022  7:31 PM CST - Filed by Patient   General Urogynecology: Are you experiencing the following?     Dysuria (painful urination) No   Nocturia:  waking up at night to empty your bladder  Yes   If you answered yes to the previous question, how many times does this happen per night? 3-4   Enuresis (urine loss during sleep) No   Dribbling urine after you urinate Yes   Hematuria (urine appears red) No   Type of stream Weak   Urinary frequency: How often a day are you going to the bathroom per day?  Less than 10   Urinary Tract Infections: How many Urinary Tract Infections have you had in the past year? I have not had a UTI in the past year   If you have had a UTI in the past year, what treatments have you had so far?  I have not had a UTI in the past year   Urinary Incontinence (General): Are you experiencing the following?     Past consultation for incontinence: Have you ever seen someone for the evaluation of incontinence? No   If you answered yes to the previous question, please select all the therapies you have tried.  None of the above   Please note the effectiveness of the therapies. Ineffective   Need to wear protection to keep clothes dry  Yes   If you answered yes to the previous question, please bharathi the protection you use.  Poise   If you wear protection, how much wetness is typically on each pad? Moderate   If you wear protection, how often do you have to change per  "day, if applicable?  1   Stress Symptoms: Are you experiencing the following?     Leakage of urine with cough, laugh and/or sneeze Yes   If you answered yes to the previous question, what is the frequency in days, weeks and/or months? Several times a day   Leakage of urine with sex No   Leakage of urine with bending/ lifting No   Leakage of urine with briskly walking or jogging No   If you lose urine for any other reason not previously mentioned, please note it below, if applicable.  Coughing sneezing  laughing   Urge Symptoms: Are you experiencing the following?     Urgency ("got to go" feeling) Yes   Urge: How frequently do you feel an urge to urinate (feeling like you "gotta go" to the bathroom and can't wait) Daily   Do you experience a leakage of urine when you have a feeling of urgency?  Yes   Leakage of urine when unaware Yes   Past use of anticholinergics (medications used to treat overactive bladder) Yes   If you answered yes to the previous question, please bharathi the anticholinergics you have used:  Oxybutynin 20 mg when she goes out    Have you ever used Mirbetriq (aka Mirabegron)?  No   Prolapse Symptoms: Are you experiencing any of the following?      Falling out/ Bulging/ Heaviness in the vagina No   Vaginal/ Abdominal Pain/ Pressure No   Need to strain/ Push to void Yes   Need to wait on the toilet before you void Yes   Unusual position to urinate (using your hands to push back the vaginal bulge) No   Sensation of incomplete emptying Yes   Past use of pessary device No   If you answered yes to the previous question, please list the devices you have used below.      Bowel Symptoms: Are you experiencing any of the following?     Constipation No   Diarrhea  Yes   Hematochezia (bloody stool) No   Incomplete evacuation of stool No   Involuntary loss of formed stool No   Fecal smearing/urgency No   Involuntary loss of gas No   Vaginal Symptoms: Are you experiencing any of the following?      Abnormal vaginal " bleeding  No   Vaginal dryness Yes   Sexually active  No   Dyspareunia (painful intercourse) No   Estrogen use  No          Changes from last visit:   1.  Mixed urinary incontinence, urge > stress:   --denies UI  --taking oxybutynin xl 10 mg twice daily  --voiding every 3 hours during the day and once/ night     2. Vaginal atrophy (dryness):    --using vaginal estrogen cream twice weekly     3.Irregular bowels  --denies constipation or diarrhea  --taking metamucil occasionally    Past Medical History:   Diagnosis Date    Cataract     Diabetes mellitus     H. pylori infection 2017    [facility] EGD 2017. test of eradication neg.    High cholesterol     Hypertension        Past Surgical History:   Procedure Laterality Date    BREAST BIOPSY Right     CATARACT EXTRACTION W/  INTRAOCULAR LENS IMPLANT Right 05/12/2022    Procedure: EXTRACTION, CATARACT, WITH IOL INSERTION;  Surgeon: Destiny Carrera MD;  Location: [facility];  Service: Ophthalmology;  Laterality: Right;    CATARACT EXTRACTION W/  INTRAOCULAR LENS IMPLANT Left 06/16/2022    Procedure: EXTRACTION, CATARACT, WITH IOL INSERTION;  Surgeon: Destiny Carrera MD;  Location: [facility];  Service: Ophthalmology;  Laterality: Left;    CHOLECYSTECTOMY  2016    COLONOSCOPY N/A 04/11/2022    Procedure: COLONOSCOPY;  Surgeon: Todd Carrera MD;  Location: [facility];  Service: Endoscopy;  Laterality: N/A;  order created: referral  Fully vaccinated, prep instr emailed -ml    ESOPHAGOGASTRODUODENOSCOPY N/A 07/21/2020    Procedure: EGD (ESOPHAGOGASTRODUODENOSCOPY);  Surgeon: Rodrick Montes MD;  [facility]  Service: Endoscopy;  Laterality: N/A;  3/30/20 - removed from 4/9/20, 3/30/20 & 3/31/20LM pt &  ph & sent email, request call back to reschedule June/July.  Pt called back, rescheduled 7/21/20 - pg  7/6/20 - 7/9/20- LM x 2 - waiting for cb to set up COVID appt. - [facility]  COVID screening on    EYE SURGERY  7/22    TUBAL LIGATION  1982       Family History    Problem Relation Age of Onset    Arthritis Mother     Cancer Mother     Heart disease Mother     No Known Problems Father     No Known Problems Sister     No Known Problems Brother     No Known Problems Daughter     No Known Problems Son     No Known Problems Maternal Aunt     No Known Problems Maternal Uncle     No Known Problems Paternal Aunt     No Known Problems Paternal Uncle     No Known Problems Maternal Grandmother     No Known Problems Maternal Grandfather     No Known Problems Paternal Grandmother     No Known Problems Paternal Grandfather     Colon cancer Neg Hx     Rectal cancer Neg Hx     Stomach cancer Neg Hx     Breast cancer Neg Hx        Social History     Socioeconomic History    Marital status:    Tobacco Use    Smoking status: Every Day     Packs/day: 0.25     Years: 40.00     Pack years: 10.00     Types: Cigarettes    Smokeless tobacco: Never   Substance and Sexual Activity    Alcohol use: Yes     Alcohol/week: 2.0 standard drinks     Types: 2 Glasses of wine per week    Drug use: No    Sexual activity: Not Currently     Partners: Male     Birth control/protection: Abstinence     Social Determinants of Health     Financial Resource Strain: Medium Risk    Difficulty of Paying Living Expenses: Somewhat hard   Food Insecurity: No Food Insecurity    Worried About Running Out of Food in the Last Year: Never true    Ran Out of Food in the Last Year: Never true   Transportation Needs: No Transportation Needs    Lack of Transportation (Medical): No    Lack of Transportation (Non-Medical): No   Physical Activity: Inactive    Days of Exercise per Week: 0 days    Minutes of Exercise per Session: 20 min   Stress: Stress Concern Present    Feeling of Stress : Very much   Social Connections: Unknown    Frequency of Communication with Friends and Family: Once a week    Frequency of Social Gatherings with Friends and Family: Never    Active Member of Clubs or Organizations: Yes    Attends Club or  Organization Meetings: More than 4 times per year    Marital Status:    Housing Stability: High Risk    Unable to Pay for Housing in the Last Year: Yes    Number of Places Lived in the Last Year: 1    Unstable Housing in the Last Year: No       Current Outpatient Medications   Medication Sig Dispense Refill    amLODIPine (NORVASC) 5 MG tablet Take 1 tablet (5 mg total) by mouth once daily. 90 tablet 3    aspirin (ECOTRIN) 81 MG EC tablet Take 81 mg by mouth once daily.      atorvastatin (LIPITOR) 40 MG tablet Take 1 tablet (40 mg total) by mouth once daily. 90 tablet 3    blood sugar diagnostic Strp To check BG 4 times daily 100 each 11    blood-glucose meter Misc Use as instructed (Patient taking differently: Use as instructed) 1 each 0    conjugated estrogens (PREMARIN) vaginal cream 1 g with applicator or dime-sized amt with finger in vagina nightly x 2 weeks, then twice a week thereafter 45 g 12    ergocalciferol (ERGOCALCIFEROL) 50,000 unit Cap Take 1 capsule (50,000 Units total) by mouth every 7 days. 12 capsule 3    fluticasone propionate (FLONASE) 50 mcg/actuation nasal spray 1 spray (50 mcg total) by Each Nostril route once daily. 16 g 11    lancets 30 gauge Misc To check BG 4 times daily. 200 each 11    loratadine (CLARITIN) 10 mg tablet Take 10 mg by mouth.      losartan (COZAAR) 50 MG tablet TAKE 1 TABLET(50 MG) BY MOUTH TWICE DAILY 180 tablet 3    mirtazapine (REMERON) 15 MG tablet Take 1 tablet (15 mg total) by mouth every evening. 30 tablet 11    oxybutynin (DITROPAN-XL) 10 MG 24 hr tablet Take 1 tablet (10 mg total) by mouth 2 (two) times a day. 60 tablet 11    pantoprazole (PROTONIX) 40 MG tablet Take 1 tablet (40 mg total) by mouth before breakfast. 90 tablet 0    urea 20 % Crea Apply 1 application topically once daily. To dry skin on the feet. 75 g 10     No current facility-administered medications for this visit.       Review of patient's allergies indicates:  No Known Allergies    Well  "woman:  Pap:03/2021 normal HPV negative  Mammo:11/2022 normal  Colonoscopy:2019 per patient report-- normal repeat in 10 years  Dexa:10/2022 normal    ROS:  As per HPI.      Exam  BP (!) 144/70 (BP Location: Right arm, Patient Position: Sitting, BP Method: Medium (Manual))   Ht 5' 2" (1.575 m)   Wt 67.1 kg (147 lb 14.9 oz)   BMI 27.06 kg/m²   General: alert and oriented, no acute distress  Respiratory: normal respiratory effort  Abd: soft, non-tender, non-distended    Pelvic--deferred    Impression  1. Mixed stress and urge urinary incontinence        2. Vaginal atrophy        3. Irregular bowel habits        4. Overactive bladder          We reviewed the above issues and discussed options for short-term versus long-term management of her problems.   Plan:     1)  Mixed urinary incontinence, urge > stress:    --Empty bladder every 3 hours.  Empty well: wait a minute, lean forward on toilet.    --Avoid dietary irritants (see sheet).  Keep diary x 3-5 days to determine your irritants.  --KEGELS: do 10 in AM and 10 in PM, holding each x 10 seconds.  When you feel urge to go, STOP, KEGEL, and when urge has passed, then go to bathroom.    --URGE: continue oxybutynin xl 10 mg twice daily  For dry mouth: get sour, sugar free lozenge or gum.    --STRESS:  Pessary vs. Sling.     2)Vaginal atrophy (dryness):  Use 1 gram of estrogen cream in vagina twice a week.    3)irregular bowel movements:  --take daily fiber.  Take 1 tsp of fiber powder (psyllium or other sugar-free powder).  Mix in 8 oz of water.  Take x 3-5 days.  Then, increase fiber by 1 tsp every 3-5 days until stool is easy to pass.  Stop and continue at that dose.   Do not exceed 6 tsps/day.  May also use over the counter stool softener 1-2 x/day.  AVOID laxatives.    4)RTC 6 months    I spent a total of 20 minutes on the day of the visit.  This includes face to face time and non-face to face time preparing to see the patient (eg, review of tests), obtaining " and/or reviewing separately obtained history, documenting clinical information in the electronic or other health record, independently interpreting results and communicating results to the patient/family/caregiver, or care coordinator.     Emma Zhang, FNP-BC [facility]  Division of Female Pelvic Medicine and Reconstructive Surgery  Department of Obstetrics & Gynecology

## 2023-04-13 NOTE — PATIENT INSTRUCTIONS
1)  Mixed urinary incontinence, urge > stress:    --Empty bladder every 3 hours.  Empty well: wait a minute, lean forward on toilet.    --Avoid dietary irritants (see sheet).  Keep diary x 3-5 days to determine your irritants.  --KEGELS: do 10 in AM and 10 in PM, holding each x 10 seconds.  When you feel urge to go, STOP, KEGEL, and when urge has passed, then go to bathroom.    --URGE: continue oxybutynin xl 10 mg twice daily  For dry mouth: get sour, sugar free lozenge or gum.    --STRESS:  Pessary vs. Sling.     2)Vaginal atrophy (dryness):  Use 1 gram of estrogen cream in vagina twice a week.    3)irregular bowel movements:  --take daily fiber.  Take 1 tsp of fiber powder (psyllium or other sugar-free powder).  Mix in 8 oz of water.  Take x 3-5 days.  Then, increase fiber by 1 tsp every 3-5 days until stool is easy to pass.  Stop and continue at that dose.   Do not exceed 6 tsps/day.  May also use over the counter stool softener 1-2 x/day.  AVOID laxatives.    4)RTC 6 months

## 2023-04-20 ENCOUNTER — HOSPITAL ENCOUNTER (OUTPATIENT)
Dept: RADIOLOGY | Facility: OTHER | Age: 68
Discharge: HOME | End: 2023-04-20
Attending: INTERNAL MEDICINE
Payer: MEDICARE

## 2023-04-20 DIAGNOSIS — Z87.891 HISTORY OF TOBACCO ABUSE: ICD-10-CM

## 2023-04-20 DIAGNOSIS — F17.210 LIGHT CIGARETTE SMOKER (1-9 CIGS/DAY): ICD-10-CM

## 2023-04-20 DIAGNOSIS — R93.89 ABNORMAL CHEST CT: ICD-10-CM

## 2023-04-20 PROCEDURE — 71271 CT THORAX LUNG CANCER SCR C-: CPT | Mod: TC,HCNC

## 2023-04-20 PROCEDURE — 71271 CT THORAX LUNG CANCER SCR C-: CPT | Mod: 26,HCNC,, | Performed by: RADIOLOGY

## 2023-04-20 PROCEDURE — 71271 CT CHEST LUNG SCREENING LOW DOSE: ICD-10-PCS | Mod: 26,HCNC,, | Performed by: RADIOLOGY

## 2023-04-20 NOTE — PROGRESS NOTES
There are no signs of cancer on CT scan of chest and we will repeat this in one year. If still smoking, please STOP!    Respectfully,  Сергей íRos

## 2023-04-25 ENCOUNTER — PATIENT MESSAGE (OUTPATIENT)
Dept: INTERNAL MEDICINE | Facility: CLINIC | Age: 68
End: 2023-04-25
Payer: MEDICARE

## 2023-04-26 NOTE — TELEPHONE ENCOUNTER
Called pt to schedule an appt; MD Joan Kulkarni Staff 2 hours ago (12:54 PM)       Please schedule appt c/ me early next week      Rerouting for 2nd attempt

## 2023-04-27 NOTE — TELEPHONE ENCOUNTER
2ND ATTEMPT  Called pt to schedule an appt; MD Joan Kulkarni Staff 2 hours ago (12:54 PM)       Please schedule appt c/ me early next week      Sent [employer] message

## 2023-05-10 ENCOUNTER — OFFICE VISIT (OUTPATIENT)
Dept: INTERNAL MEDICINE | Facility: CLINIC | Age: 68
End: 2023-05-10
Payer: MEDICARE

## 2023-05-10 ENCOUNTER — LAB VISIT (OUTPATIENT)
Dept: LAB | Facility: OTHER | Age: 68
End: 2023-05-10
Attending: INTERNAL MEDICINE
Payer: MEDICARE

## 2023-05-10 VITALS
HEIGHT: 62 IN | SYSTOLIC BLOOD PRESSURE: 124 MMHG | OXYGEN SATURATION: 98 % | WEIGHT: 148.38 LBS | BODY MASS INDEX: 27.3 KG/M2 | DIASTOLIC BLOOD PRESSURE: 74 MMHG | HEART RATE: 79 BPM

## 2023-05-10 DIAGNOSIS — I10 ESSENTIAL HYPERTENSION: ICD-10-CM

## 2023-05-10 DIAGNOSIS — E11.42 TYPE 2 DIABETES MELLITUS WITH DIABETIC POLYNEUROPATHY, WITHOUT LONG-TERM CURRENT USE OF INSULIN: ICD-10-CM

## 2023-05-10 DIAGNOSIS — Z00.00 ANNUAL PHYSICAL EXAM: ICD-10-CM

## 2023-05-10 DIAGNOSIS — E55.9 VITAMIN D DEFICIENCY: ICD-10-CM

## 2023-05-10 DIAGNOSIS — F33.0 MILD EPISODE OF RECURRENT MAJOR DEPRESSIVE DISORDER: Primary | ICD-10-CM

## 2023-05-10 DIAGNOSIS — E78.2 MIXED HYPERLIPIDEMIA: ICD-10-CM

## 2023-05-10 DIAGNOSIS — R63.0 DECREASED APPETITE: ICD-10-CM

## 2023-05-10 DIAGNOSIS — K21.9 GASTROESOPHAGEAL REFLUX DISEASE, UNSPECIFIED WHETHER ESOPHAGITIS PRESENT: ICD-10-CM

## 2023-05-10 LAB
25(OH)D3+25(OH)D2 SERPL-MCNC: 38 NG/ML (ref 30–96)
ESTIMATED AVG GLUCOSE: 114 MG/DL (ref 68–131)
HBA1C MFR BLD: 5.6 % (ref 4–5.6)

## 2023-05-10 PROCEDURE — 3072F PR LOW RISK FOR RETINOPATHY: ICD-10-PCS | Mod: CPTII,S$GLB,, | Performed by: INTERNAL MEDICINE

## 2023-05-10 PROCEDURE — 1160F RVW MEDS BY RX/DR IN RCRD: CPT | Mod: CPTII,S$GLB,, | Performed by: INTERNAL MEDICINE

## 2023-05-10 PROCEDURE — 4010F ACE/ARB THERAPY RXD/TAKEN: CPT | Mod: CPTII,S$GLB,, | Performed by: INTERNAL MEDICINE

## 2023-05-10 PROCEDURE — 3074F PR MOST RECENT SYSTOLIC BLOOD PRESSURE < 130 MM HG: ICD-10-PCS | Mod: CPTII,S$GLB,, | Performed by: INTERNAL MEDICINE

## 2023-05-10 PROCEDURE — 3008F PR BODY MASS INDEX (BMI) DOCUMENTED: ICD-10-PCS | Mod: CPTII,S$GLB,, | Performed by: INTERNAL MEDICINE

## 2023-05-10 PROCEDURE — 4010F PR ACE/ARB THEARPY RXD/TAKEN: ICD-10-PCS | Mod: CPTII,S$GLB,, | Performed by: INTERNAL MEDICINE

## 2023-05-10 PROCEDURE — 3288F PR FALLS RISK ASSESSMENT DOCUMENTED: ICD-10-PCS | Mod: CPTII,S$GLB,, | Performed by: INTERNAL MEDICINE

## 2023-05-10 PROCEDURE — 3078F PR MOST RECENT DIASTOLIC BLOOD PRESSURE < 80 MM HG: ICD-10-PCS | Mod: CPTII,S$GLB,, | Performed by: INTERNAL MEDICINE

## 2023-05-10 PROCEDURE — 3008F BODY MASS INDEX DOCD: CPT | Mod: CPTII,S$GLB,, | Performed by: INTERNAL MEDICINE

## 2023-05-10 PROCEDURE — 36415 COLL VENOUS BLD VENIPUNCTURE: CPT | Performed by: INTERNAL MEDICINE

## 2023-05-10 PROCEDURE — 3288F FALL RISK ASSESSMENT DOCD: CPT | Mod: CPTII,S$GLB,, | Performed by: INTERNAL MEDICINE

## 2023-05-10 PROCEDURE — 1159F PR MEDICATION LIST DOCUMENTED IN MEDICAL RECORD: ICD-10-PCS | Mod: CPTII,S$GLB,, | Performed by: INTERNAL MEDICINE

## 2023-05-10 PROCEDURE — 99999 PR PBB SHADOW E&M-EST. PATIENT-LVL V: CPT | Mod: PBBFAC,,, | Performed by: INTERNAL MEDICINE

## 2023-05-10 PROCEDURE — 1159F MED LIST DOCD IN RCRD: CPT | Mod: CPTII,S$GLB,, | Performed by: INTERNAL MEDICINE

## 2023-05-10 PROCEDURE — 99215 OFFICE O/P EST HI 40 MIN: CPT | Mod: S$GLB,,, | Performed by: INTERNAL MEDICINE

## 2023-05-10 PROCEDURE — 1126F AMNT PAIN NOTED NONE PRSNT: CPT | Mod: CPTII,S$GLB,, | Performed by: INTERNAL MEDICINE

## 2023-05-10 PROCEDURE — 3072F LOW RISK FOR RETINOPATHY: CPT | Mod: CPTII,S$GLB,, | Performed by: INTERNAL MEDICINE

## 2023-05-10 PROCEDURE — 3044F PR MOST RECENT HEMOGLOBIN A1C LEVEL <7.0%: ICD-10-PCS | Mod: CPTII,S$GLB,, | Performed by: INTERNAL MEDICINE

## 2023-05-10 PROCEDURE — 1101F PR PT FALLS ASSESS DOC 0-1 FALLS W/OUT INJ PAST YR: ICD-10-PCS | Mod: CPTII,S$GLB,, | Performed by: INTERNAL MEDICINE

## 2023-05-10 PROCEDURE — 1126F PR PAIN SEVERITY QUANTIFIED, NO PAIN PRESENT: ICD-10-PCS | Mod: CPTII,S$GLB,, | Performed by: INTERNAL MEDICINE

## 2023-05-10 PROCEDURE — 1101F PT FALLS ASSESS-DOCD LE1/YR: CPT | Mod: CPTII,S$GLB,, | Performed by: INTERNAL MEDICINE

## 2023-05-10 PROCEDURE — 1160F PR REVIEW ALL MEDS BY PRESCRIBER/CLIN PHARMACIST DOCUMENTED: ICD-10-PCS | Mod: CPTII,S$GLB,, | Performed by: INTERNAL MEDICINE

## 2023-05-10 PROCEDURE — 99999 PR PBB SHADOW E&M-EST. PATIENT-LVL V: ICD-10-PCS | Mod: PBBFAC,,, | Performed by: INTERNAL MEDICINE

## 2023-05-10 PROCEDURE — 82306 VITAMIN D 25 HYDROXY: CPT | Performed by: INTERNAL MEDICINE

## 2023-05-10 PROCEDURE — 83036 HEMOGLOBIN GLYCOSYLATED A1C: CPT | Performed by: INTERNAL MEDICINE

## 2023-05-10 PROCEDURE — 99215 PR OFFICE/OUTPT VISIT, EST, LEVL V, 40-54 MIN: ICD-10-PCS | Mod: S$GLB,,, | Performed by: INTERNAL MEDICINE

## 2023-05-10 PROCEDURE — 3044F HG A1C LEVEL LT 7.0%: CPT | Mod: CPTII,S$GLB,, | Performed by: INTERNAL MEDICINE

## 2023-05-10 PROCEDURE — 3078F DIAST BP <80 MM HG: CPT | Mod: CPTII,S$GLB,, | Performed by: INTERNAL MEDICINE

## 2023-05-10 PROCEDURE — 3074F SYST BP LT 130 MM HG: CPT | Mod: CPTII,S$GLB,, | Performed by: INTERNAL MEDICINE

## 2023-05-10 RX ORDER — MIRTAZAPINE 15 MG/1
15 TABLET, FILM COATED ORAL NIGHTLY
Qty: 30 TABLET | Refills: 11 | Status: CANCELLED | OUTPATIENT
Start: 2023-05-10 | End: 2024-05-09

## 2023-05-10 NOTE — PROGRESS NOTES
Subjective:       Patient ID: Leticia Brown is a 68 y.o. female who  has a past medical history of Cataract, Diabetes mellitus, H. pylori infection (2017), High cholesterol, and Hypertension.    Chief Complaint: Annual Exam, Depression, and Anorexia     History was obtained from the patient and supplemented through chart review  Saw Urogynecology for [facility].    Is a  at [employer].      HPI     Depression, Insomnia:     Doesn't feel like herself. Decreased appetite (as below), insomnia.  Feels anxious/nervous.  Anxious about her health, cancer screening.     Sleep:  some trouble difficulty falling, staying asleep.   Anhedonia:  yes. Doesn't do much other going to [employer], work.  Guilt: no  Energy: ok  Concentration: ok  Appetite: decreased  Psychomotor agitation: no issues  SI: No     Used to be on Wellbutrin.   Taking Remeron consistently now for mood, sleep, appetite. Has improved sleep. No major effect on mood. Is somewhat harder to get out of bed in the AM.    Went to therapy years ago when her mother passed.    Decreased appetite:  Persistent decreased appetite, weight loss, although weight has been stable.  Also c early satiety. No N/V.   Dysphagia as below.  Mental health as above. On Remeron.   Alternating constipation and diarrhea. Added fiber.  Resolved.    UTD on Pap smear, C scope, mammogram, CT chest..  BMI Readings from Last 3 Encounters:   05/10/23 27.14 kg/m²   04/13/23 27.06 kg/m²   03/15/23 27.86 kg/m²     H/o H pylori, dysphagia to solids:  EGD 2017 normal other than positive biopsies.  Test of eradication neg.  Started Prilosec.  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  Erythema mucosa in the stomach.  Biopsy was negative.     Sometimes has with dysphagia, chronic.  With solid food, meat. No coughing, odynophagia.     Reported epigastric discomfort worsened by p.o. intake, worsened GERD symptoms.  Rx pantoprazole daily with improvement.    NSAIDs:  seldom  ETOH: maybe glass of wine 1/mo    HTN:    Stress test 12/2022 negative for ischemia.    No snoring, apnea.    BP is *controlled.  On losartan split to 50 BID d/t hypotension and Norvasc 5.  Compliant with meds.  Tolerating meds well.  D/C'd from [facility] d/t lack of participation.    DM2 is controlled.  Unable to tolerate metformin 500 XR d/t loose stools. Currently no BM issues.    Diet: not much starches. Doing well. Loves veggies. Eats fish, salmon. Not much red meat.  Drinks: water, no juices. unsweet tea. Rarely has a soft drink.  ETOH:  none    Exercise: on her feet at work, but none outside of work. Used to walk 3 miles, jump rope.      Normal microalbumin creatinine ratio.  On an ACE/ARB.   Retinal exams:  .  Foot exams:  11/2021  Lab Results   Component Value Date/Time    HGBA1C 5.6 01/10/2023 11:38 AM    HGBA1C 5.8 (H) 09/20/2022 07:03 AM    HGBA1C 5.6 03/17/2022 08:50 AM     HLD, Aortic Atherosclerosis:    Is taking Lipitor 40 and ASA 81 daily.  Lab Results   Component Value Date    LDLCALC 124.0 01/10/2023     The 10-year ASCVD risk score (Akhil AHMADI, et al., 2019) is: 37.6%    Values used to calculate the score:      Age: 68 years      Sex: Female      Is Non- : Yes      Diabetic: Yes      Tobacco smoker: Yes      Systolic Blood Pressure: 124 mmHg      Is BP treated: Yes      HDL Cholesterol: 46 mg/dL      Total Cholesterol: 183 mg/dL    Vitamin D deficiency:   Increased Ergocalciferol to 2/week x 1 mo, then 1/week. Taking consistently.  DXA 10/25/2022 with normal BMD.  Lab Results   Component Value Date    QSVIVJTM10HN 28 (L) 01/10/2023    ZYXJMPDC62XP 21 (L) 09/20/2022    MTTSLGLH96AM 23 (L) 08/12/2021               Not addressed today.  CP:  Was seen in the ED .  Awoke with intense left-sided CP.  EKG s ST changes. CXR without acute abnormality.  Stress test  Negative for ischemia.  Saw Cardiology.  No recurrent CP, SANTANA. Quit tobacco since  1/2023.  No further workup needed.    H/o Tobacco use, abnormal CT:  Smoked close to 1 ppd for 30 years. Quit in 2018 cold turkey.  Was on Wellbutrin in the past for depression.   Quit tobacco  given CP.  Spiral CT 04/20/2023 for lung cancer screening with 0.3 cm opacity, stable.  Emphysema.  Atherosclerosis.  30 pack year hx.  Quit in 1/2023.  Monitor CT chest annually.     HCV:    Antibody positive, but viral level negative. +HBsAB 10/2019.  HIV NR at [facility]   Viral level negative.  No treatment needed.    H/o Colon polyp:  C scope  was normal.  C scope in 10 years.    Urinary incontinence:   Stress, urge [facility].  H/o vaginal delivery x 5.  No constipation.   On oxybutynin, but feels that it doesn't work as well.  Follows with Urogynecology.  Rx estrogen cream for vaginal atrophy.  Cont current med. F/u c Urogyn. Also discussed timed voiding, Kegal exercises.    Right rotator cuff tendinitis:  Likely a CJ DJD or C4 radiculopathy.  Completed PT at [facility].  Neurosurgery at [facility] recommended home exercises.   Mild intermittent pain. She declined ortho referral.    R neck pain, h/o Cervical DDD:  RF, DANIELLE WNL .  Saw Rheumatology and had low suspicion for inflammatory arthritis.  MRI cscope 3/2019 was cervical spondylosis, DDD without cord compression.  EMG with cervical spinal stenosis, degenerative disc disease.    Unable to titrate gabapentin 300 due to sedation.  Provided home exercises. Low dose Flexeril PRN. Discussed SE. Could consider Gabaneptin 100. If persistent, consider PT and/or back/spine clinic.    Review of Systems   Constitutional:  Positive for appetite change. Negative for activity change.   HENT:  Positive for trouble swallowing.    Respiratory:  Negative for cough, choking and wheezing.    Cardiovascular:  Negative for leg swelling.   Gastrointestinal:  Negative for abdominal pain, constipation, diarrhea, nausea and vomiting.   Musculoskeletal:  Negative for gait problem.   Skin:   "Negative for rash and wound.   Hematological:  Negative for adenopathy.   Psychiatric/Behavioral:  Negative for confusion and sleep disturbance. The patient is nervous/anxious.        I personally reviewed Past Medical History, Past Surgical History, Social History, and Family History.    Objective:      Vitals:    05/10/23 0825   BP: 124/74   Pulse: 79   SpO2: 98%   Weight: 67.3 kg (148 lb 5.9 oz)   Height: 5' 2" (1.575 m)       Physical Exam  Constitutional:       General: She is not in acute distress.     Appearance: She is well-developed. She is not diaphoretic.   HENT:      Head: Normocephalic and atraumatic.      Nose: Nose normal.      Mouth/Throat:      Pharynx: No oropharyngeal exudate.   Eyes:      General: No scleral icterus.        Right eye: No discharge.         Left eye: No discharge.   Neck:      Thyroid: No thyromegaly.      Trachea: No tracheal deviation.   Cardiovascular:      Rate and Rhythm: Normal rate and regular rhythm.      Heart sounds: Normal heart sounds. No murmur heard.  Pulmonary:      Effort: Pulmonary effort is normal. No respiratory distress.      Breath sounds: Normal breath sounds. No wheezing.   Abdominal:      General: Bowel sounds are normal. There is no distension.      Palpations: Abdomen is soft.      Tenderness: There is no abdominal tenderness.   Musculoskeletal:         General: No deformity.      Cervical back: Neck supple.      Right lower leg: No edema.      Left lower leg: No edema.   Lymphadenopathy:      Cervical: No cervical adenopathy.   Skin:     General: Skin is warm and dry.      Findings: No erythema.   Neurological:      Mental Status: She is alert.      Gait: Gait normal.   Psychiatric:         Behavior: Behavior normal.         Lab Results   Component Value Date    WBC 10.18 01/10/2023    HGB 14.1 01/10/2023    HCT 42.3 01/10/2023     01/10/2023    CHOL 183 01/10/2023    TRIG 65 01/10/2023    HDL 46 01/10/2023    ALT 10 01/10/2023    AST 16 " 01/10/2023     01/10/2023    K 4.4 01/10/2023     01/10/2023    CREATININE 0.8 01/10/2023    BUN 16 01/10/2023    CO2 24 01/10/2023    TSH 0.920 12/18/2022    HGBA1C 5.6 01/10/2023       The 10-year ASCVD risk score (Akhil AHMADI, et al., 2019) is: 37.6%    Values used to calculate the score:      Age: 68 years      Sex: Female      Is Non- : Yes      Diabetic: Yes      Tobacco smoker: Yes      Systolic Blood Pressure: 124 mmHg      Is BP treated: Yes      HDL Cholesterol: 46 mg/dL      Total Cholesterol: 183 mg/dL    (Imaging have been independently reviewed)  Spiral CT 04/20/2023 for lung cancer screening with 0.3 cm opacity, stable.      Assessment:       1. Mild episode of recurrent major depressive disorder    2. Decreased appetite    3. Gastroesophageal reflux disease, unspecified whether esophagitis present    4. Essential hypertension    5. Type 2 diabetes mellitus with diabetic polyneuropathy, without long-term current use of insulin    6. Mixed hyperlipidemia    7. Vitamin D deficiency    8. Annual physical exam            Plan:       Leticia was seen today for annual exam, depression and anorexia.    Diagnoses and all orders for this visit:    Mild episode of recurrent major depressive disorder  Comments:  Persistent. Cont Remeron for mood, sleep, appetite. Refer to [facility] program.  Orders:  -     Ambulatory referral/consult to Primary Care Behavioral Health (Non-Opioids); Future    Decreased appetite  Comments:  Labs, TSH wnl. Refer to GI; consider EGD to eval for gastritis/H Pylori, stricture. Cont Remeron for mood, sleep, appetite. UTD age appropriate ca screening.  Orders:  -     Ambulatory referral/consult to Gastroenterology; Future  -     Ambulatory referral/consult to Primary Care Behavioral Health (Non-Opioids); Future    Gastroesophageal reflux disease, unspecified whether esophagitis present  Comments:  Improved. Cont PPI for now. Refer to [facility] to consider  repeat EGD.    Essential hypertension  Comments:  Controlled. Cont losartan 50 BID, Norvasc 5. CMP wnl.    Type 2 diabetes mellitus with diabetic polyneuropathy, without long-term current use of insulin  Comments:  A1C controlled without meds. Unable to tolerate metformin XR d/t GI SE.  Monitor A1c q6mo. Reschedule foot exam with podiatry.    Mixed hyperlipidemia  Comments:  FLP controlled. Continue Lipitor 40, ASA 81. Monitor FLP annually.    Vitamin D deficiency  Comments:  Borderline low, improved. Increased Ergocalciferol to 2/week x 1 mo, then 1/week. Monitor.  DEXA normal.    Annual physical exam  -     Hemoglobin A1C; Future  -     Vitamin D; Future    Other orders  The following orders have not been finalized:  -     Cancel: mirtazapine (REMERON) 15 MG tablet           Side effects of medication(s) were discussed in detail and patient voiced understanding.  Patient will call back for any issues or complications.     I have spent a total of 40 minutes with the patient as well as reviewing the chart/medical record and placing orders on the day of the visit. Discussed decreased appetite, weight loss, mental health, GERD, sleep.     RTC in 3 month(s) or sooner PRN for decreased appetite, weight loss, mental health. Establish care with a new PCP. May alternate visits virtually and in person.

## 2023-05-25 DIAGNOSIS — E55.9 VITAMIN D DEFICIENCY: ICD-10-CM

## 2023-05-25 RX ORDER — ERGOCALCIFEROL 1.25 MG/1
50000 CAPSULE ORAL
Qty: 12 CAPSULE | Refills: 3 | Status: CANCELLED | OUTPATIENT
Start: 2023-05-25

## 2023-05-25 RX ORDER — OXYBUTYNIN CHLORIDE 10 MG/1
10 TABLET, EXTENDED RELEASE ORAL DAILY
Qty: 90 TABLET | Refills: 3 | Status: SHIPPED | OUTPATIENT
Start: 2023-05-25 | End: 2023-08-14 | Stop reason: SDUPTHER

## 2023-05-25 NOTE — TELEPHONE ENCOUNTER
Refill Decision Note   Leticia Brown  is requesting a refill authorization.  Brief Assessment and Rationale for Refill:  Approve     Medication Therapy Plan:  PCP previously prescribed rx.      Comments:     No Care Gaps recommended.     Note composed:2:58 PM 05/25/2023

## 2023-05-25 NOTE — TELEPHONE ENCOUNTER
No care due was identified.  Health [facility] Embedded Care Due Messages. Reference number: [id number].   5/25/2023 10:41:17 AM CDT

## 2023-05-30 ENCOUNTER — PATIENT MESSAGE (OUTPATIENT)
Dept: BEHAVIORAL HEALTH | Facility: CLINIC | Age: 68
End: 2023-05-30
Payer: MEDICARE

## 2023-06-07 ENCOUNTER — PATIENT MESSAGE (OUTPATIENT)
Dept: BEHAVIORAL HEALTH | Facility: CLINIC | Age: 68
End: 2023-06-07
Payer: MEDICARE

## 2023-06-10 NOTE — TELEPHONE ENCOUNTER
No care due was identified.  Health [facility] Embedded Care Due Messages. Reference number: [id number].   6/10/2023 5:24:04 AM CDT

## 2023-06-10 NOTE — TELEPHONE ENCOUNTER
Refill Routing Note   Medication(s) are not appropriate for processing by [facility] for the following reason(s):      New or recently adjusted medication    [facility] action(s):  Defer None identified     Medication Therapy Plan: New start (3/15/23); Defer      Appointments  past 12m or future 3m with PCP    Date Provider   Last Visit   5/10/2023 Sarah Antonio MD   Next Visit   Visit date not found Sarah Antonio MD   ED visits in past 90 days: 0        Note composed:9:32 AM 06/10/2023

## 2023-06-12 RX ORDER — PANTOPRAZOLE SODIUM 40 MG/1
TABLET, DELAYED RELEASE ORAL
Qty: 90 TABLET | Refills: 0 | Status: SHIPPED | OUTPATIENT
Start: 2023-06-12 | End: 2023-07-05 | Stop reason: SDUPTHER

## 2023-06-14 ENCOUNTER — PES CALL (OUTPATIENT)
Dept: ADMINISTRATIVE | Facility: CLINIC | Age: 68
End: 2023-06-14
Payer: MEDICARE

## 2023-06-21 ENCOUNTER — PES CALL (OUTPATIENT)
Dept: ADMINISTRATIVE | Facility: CLINIC | Age: 68
End: 2023-06-21
Payer: MEDICARE

## 2023-07-05 DIAGNOSIS — K21.9 GASTROESOPHAGEAL REFLUX DISEASE, UNSPECIFIED WHETHER ESOPHAGITIS PRESENT: Primary | ICD-10-CM

## 2023-07-05 RX ORDER — PANTOPRAZOLE SODIUM 40 MG/1
40 TABLET, DELAYED RELEASE ORAL
Qty: 90 TABLET | Refills: 0 | Status: SHIPPED | OUTPATIENT
Start: 2023-07-05 | End: 2023-08-14 | Stop reason: SDUPTHER

## 2023-07-05 NOTE — TELEPHONE ENCOUNTER
Refill Encounter    PCP Visits: Recent Visits  Date Type Provider Dept   05/10/23 Office Visit Sarah Antonio MD [facility]   01/10/23 Office Visit Sarah Antonio MD [facility]   09/27/22 Office Visit Sarah Antonio MD [facility]   Showing recent visits within past 360 days and meeting all other requirements  Future Appointments  No visits were found meeting these conditions.  Showing future appointments within next 720 days and meeting all other requirements     Last 3 Blood Pressure:   BP Readings from Last 3 Encounters:   05/10/23 124/74   04/13/23 (!) 144/70   03/15/23 134/64     Preferred Pharmacy:   [id number] - [place], [place] - [address] AT [facility] [address]  [address]  [facility] [address]  Phone: [phone] Fax: [phone]    [facility] [place] [place] - [address]  [address], [address] [address]  Phone: [phone] Fax: [phone]    Requested RX:  Requested Prescriptions     Pending Prescriptions Disp Refills    pantoprazole (PROTONIX) 40 MG tablet 90 tablet 0     Sig: Take 1 tablet (40 mg total) by mouth before breakfast.      RX Route: Normal

## 2023-07-05 NOTE — TELEPHONE ENCOUNTER
No care due was identified.  Health [facility] Embedded Care Due Messages. Reference number: [id number].   7/05/2023 9:17:41 AM CDT

## 2023-07-31 ENCOUNTER — PATIENT MESSAGE (OUTPATIENT)
Dept: ADMINISTRATIVE | Facility: HOSPITAL | Age: 68
End: 2023-07-31
Payer: MEDICARE

## 2023-07-31 ENCOUNTER — PATIENT OUTREACH (OUTPATIENT)
Dept: ADMINISTRATIVE | Facility: HOSPITAL | Age: 68
End: 2023-07-31
Payer: MEDICARE

## 2023-08-14 ENCOUNTER — OFFICE VISIT (OUTPATIENT)
Dept: INTERNAL MEDICINE | Facility: CLINIC | Age: 68
End: 2023-08-14
Payer: MEDICARE

## 2023-08-14 ENCOUNTER — PATIENT MESSAGE (OUTPATIENT)
Dept: INTERNAL MEDICINE | Facility: CLINIC | Age: 68
End: 2023-08-14
Payer: MEDICARE

## 2023-08-14 ENCOUNTER — IMMUNIZATION (OUTPATIENT)
Dept: PHARMACY | Facility: CLINIC | Age: 68
End: 2023-08-14
Payer: MEDICARE

## 2023-08-14 VITALS
DIASTOLIC BLOOD PRESSURE: 70 MMHG | SYSTOLIC BLOOD PRESSURE: 177 MMHG | OXYGEN SATURATION: 98 % | BODY MASS INDEX: 26.37 KG/M2 | HEIGHT: 62 IN | WEIGHT: 143.31 LBS | HEART RATE: 64 BPM

## 2023-08-14 DIAGNOSIS — I10 ESSENTIAL HYPERTENSION: ICD-10-CM

## 2023-08-14 DIAGNOSIS — E11.42 TYPE 2 DIABETES MELLITUS WITH DIABETIC POLYNEUROPATHY, WITHOUT LONG-TERM CURRENT USE OF INSULIN: ICD-10-CM

## 2023-08-14 DIAGNOSIS — F33.0 MILD EPISODE OF RECURRENT MAJOR DEPRESSIVE DISORDER: ICD-10-CM

## 2023-08-14 DIAGNOSIS — R63.0 DECREASED APPETITE: ICD-10-CM

## 2023-08-14 DIAGNOSIS — E55.9 VITAMIN D DEFICIENCY: ICD-10-CM

## 2023-08-14 DIAGNOSIS — I70.0 AORTIC ATHEROSCLEROSIS: ICD-10-CM

## 2023-08-14 DIAGNOSIS — K21.9 GASTROESOPHAGEAL REFLUX DISEASE, UNSPECIFIED WHETHER ESOPHAGITIS PRESENT: ICD-10-CM

## 2023-08-14 PROCEDURE — 3078F DIAST BP <80 MM HG: CPT | Mod: HCNC,CPTII,S$GLB, | Performed by: FAMILY MEDICINE

## 2023-08-14 PROCEDURE — 1159F MED LIST DOCD IN RCRD: CPT | Mod: HCNC,CPTII,S$GLB, | Performed by: FAMILY MEDICINE

## 2023-08-14 PROCEDURE — 3008F BODY MASS INDEX DOCD: CPT | Mod: HCNC,CPTII,S$GLB, | Performed by: FAMILY MEDICINE

## 2023-08-14 PROCEDURE — 1126F PR PAIN SEVERITY QUANTIFIED, NO PAIN PRESENT: ICD-10-PCS | Mod: HCNC,CPTII,S$GLB, | Performed by: FAMILY MEDICINE

## 2023-08-14 PROCEDURE — 4010F ACE/ARB THERAPY RXD/TAKEN: CPT | Mod: HCNC,CPTII,S$GLB, | Performed by: FAMILY MEDICINE

## 2023-08-14 PROCEDURE — 99214 OFFICE O/P EST MOD 30 MIN: CPT | Mod: HCNC,S$GLB,, | Performed by: FAMILY MEDICINE

## 2023-08-14 PROCEDURE — 1101F PR PT FALLS ASSESS DOC 0-1 FALLS W/OUT INJ PAST YR: ICD-10-PCS | Mod: HCNC,CPTII,S$GLB, | Performed by: FAMILY MEDICINE

## 2023-08-14 PROCEDURE — 4010F PR ACE/ARB THEARPY RXD/TAKEN: ICD-10-PCS | Mod: HCNC,CPTII,S$GLB, | Performed by: FAMILY MEDICINE

## 2023-08-14 PROCEDURE — 1159F PR MEDICATION LIST DOCUMENTED IN MEDICAL RECORD: ICD-10-PCS | Mod: HCNC,CPTII,S$GLB, | Performed by: FAMILY MEDICINE

## 2023-08-14 PROCEDURE — 3288F PR FALLS RISK ASSESSMENT DOCUMENTED: ICD-10-PCS | Mod: HCNC,CPTII,S$GLB, | Performed by: FAMILY MEDICINE

## 2023-08-14 PROCEDURE — 99999 PR PBB SHADOW E&M-EST. PATIENT-LVL III: ICD-10-PCS | Mod: PBBFAC,HCNC,, | Performed by: FAMILY MEDICINE

## 2023-08-14 PROCEDURE — 3077F SYST BP >= 140 MM HG: CPT | Mod: HCNC,CPTII,S$GLB, | Performed by: FAMILY MEDICINE

## 2023-08-14 PROCEDURE — 3078F PR MOST RECENT DIASTOLIC BLOOD PRESSURE < 80 MM HG: ICD-10-PCS | Mod: HCNC,CPTII,S$GLB, | Performed by: FAMILY MEDICINE

## 2023-08-14 PROCEDURE — 3008F PR BODY MASS INDEX (BMI) DOCUMENTED: ICD-10-PCS | Mod: HCNC,CPTII,S$GLB, | Performed by: FAMILY MEDICINE

## 2023-08-14 PROCEDURE — 3044F PR MOST RECENT HEMOGLOBIN A1C LEVEL <7.0%: ICD-10-PCS | Mod: HCNC,CPTII,S$GLB, | Performed by: FAMILY MEDICINE

## 2023-08-14 PROCEDURE — 99999 PR PBB SHADOW E&M-EST. PATIENT-LVL III: CPT | Mod: PBBFAC,HCNC,, | Performed by: FAMILY MEDICINE

## 2023-08-14 PROCEDURE — 99214 PR OFFICE/OUTPT VISIT, EST, LEVL IV, 30-39 MIN: ICD-10-PCS | Mod: HCNC,S$GLB,, | Performed by: FAMILY MEDICINE

## 2023-08-14 PROCEDURE — 3077F PR MOST RECENT SYSTOLIC BLOOD PRESSURE >= 140 MM HG: ICD-10-PCS | Mod: HCNC,CPTII,S$GLB, | Performed by: FAMILY MEDICINE

## 2023-08-14 PROCEDURE — 1126F AMNT PAIN NOTED NONE PRSNT: CPT | Mod: HCNC,CPTII,S$GLB, | Performed by: FAMILY MEDICINE

## 2023-08-14 PROCEDURE — 1101F PT FALLS ASSESS-DOCD LE1/YR: CPT | Mod: HCNC,CPTII,S$GLB, | Performed by: FAMILY MEDICINE

## 2023-08-14 PROCEDURE — 3044F HG A1C LEVEL LT 7.0%: CPT | Mod: HCNC,CPTII,S$GLB, | Performed by: FAMILY MEDICINE

## 2023-08-14 PROCEDURE — 3288F FALL RISK ASSESSMENT DOCD: CPT | Mod: HCNC,CPTII,S$GLB, | Performed by: FAMILY MEDICINE

## 2023-08-14 RX ORDER — BLOOD-GLUCOSE CONTROL, NORMAL
EACH MISCELLANEOUS
Qty: 200 EACH | Refills: 11 | Status: SHIPPED | OUTPATIENT
Start: 2023-08-14 | End: 2023-08-18

## 2023-08-14 RX ORDER — LOSARTAN POTASSIUM 50 MG/1
50 TABLET ORAL 2 TIMES DAILY
Qty: 180 TABLET | Refills: 3 | Status: SHIPPED | OUTPATIENT
Start: 2023-08-14

## 2023-08-14 RX ORDER — AMLODIPINE BESYLATE 5 MG/1
5 TABLET ORAL DAILY
Qty: 90 TABLET | Refills: 3 | Status: SHIPPED | OUTPATIENT
Start: 2023-08-14 | End: 2024-03-28 | Stop reason: SDUPTHER

## 2023-08-14 RX ORDER — ERGOCALCIFEROL 1.25 MG/1
50000 CAPSULE ORAL
Qty: 12 CAPSULE | Refills: 3 | Status: SHIPPED | OUTPATIENT
Start: 2023-08-14

## 2023-08-14 RX ORDER — ATORVASTATIN CALCIUM 40 MG/1
40 TABLET, FILM COATED ORAL DAILY
Qty: 90 TABLET | Refills: 3 | Status: SHIPPED | OUTPATIENT
Start: 2023-08-14

## 2023-08-14 RX ORDER — DICLOFENAC SODIUM 10 MG/G
2 GEL TOPICAL DAILY
Qty: 100 G | Refills: 1 | Status: SHIPPED | OUTPATIENT
Start: 2023-08-14

## 2023-08-14 RX ORDER — OXYBUTYNIN CHLORIDE 10 MG/1
10 TABLET, EXTENDED RELEASE ORAL DAILY
Qty: 90 TABLET | Refills: 3 | Status: SHIPPED | OUTPATIENT
Start: 2023-08-14

## 2023-08-14 RX ORDER — PANTOPRAZOLE SODIUM 40 MG/1
40 TABLET, DELAYED RELEASE ORAL
Qty: 90 TABLET | Refills: 0 | Status: SHIPPED | OUTPATIENT
Start: 2023-08-14

## 2023-08-14 RX ORDER — MIRTAZAPINE 15 MG/1
15 TABLET, FILM COATED ORAL NIGHTLY
Qty: 30 TABLET | Refills: 11 | Status: SHIPPED | OUTPATIENT
Start: 2023-08-14 | End: 2024-08-13

## 2023-08-14 NOTE — PROGRESS NOTES
"Subjective:       Patient ID: Leticia Brown is a 68 y.o. female.    Chief Complaint: Establish Care    HPI    Sleep/depression and appetite issues. On remeron. Would like to continue trying this before changing.     Right shoulder/trap pain for a while. No h/o trauma. Comes and goes over last couple of months. Tried hitting it but no topical or oral meds. Described as tingling/burning sensation.  No radiation down arm. No neck pain.    Continues to smoke.     Diabetes Management Status    Statin: Taking  ACE/ARB: Taking    Screening or Prevention Patient's value Goal Complete/Controlled?   HgA1C Testing and Control   Lab Results   Component Value Date    HGBA1C 5.6 05/10/2023      Annually/Less than 8% Yes   Lipid profile : 01/10/2023 Annually Yes   LDL control Lab Results   Component Value Date    LDLCALC 124.0 01/10/2023    Annually/Less than 100 mg/dl  No   Nephropathy screening Lab Results   Component Value Date    LABMICR 6.0 09/20/2022     Lab Results   Component Value Date    PROTEINUA Negative 12/18/2022     No results found for: "UTPCR"   Annually Yes   Blood pressure BP Readings from Last 1 Encounters:   08/14/23 (!) 177/70    Less than 140/90 No   Dilated retinal exam : 08/11/2022 Annually No   Foot exam   : 11/04/2021 Annually No         Tests to Keep You Healthy    Mammogram: Met on 11/29/2022  Eye Exam: SCHEDULED  Colon Cancer Screening: Met on 4/11/2022  Last Blood Pressure <= 139/89 (8/14/2023): NO  Last HbA1c < 8 (05/10/2023): Yes  Tobacco Cessation: NO      Social History     Social History Narrative    Not on file       Family History   Problem Relation Age of Onset    Arthritis Mother     Cancer Mother     Heart disease Mother     No Known Problems Father     No Known Problems Sister     No Known Problems Brother     No Known Problems Daughter     No Known Problems Son     No Known Problems Maternal Aunt     No Known Problems Maternal Uncle     No Known Problems Paternal Aunt     No " Known Problems Paternal Uncle     No Known Problems Maternal Grandmother     No Known Problems Maternal Grandfather     No Known Problems Paternal Grandmother     No Known Problems Paternal Grandfather     Colon cancer Neg Hx     Rectal cancer Neg Hx     Stomach cancer Neg Hx     Breast cancer Neg Hx        Current Outpatient Medications:     aspirin (ECOTRIN) 81 MG EC tablet, Take 81 mg by mouth once daily., Disp: , Rfl:     conjugated estrogens (PREMARIN) vaginal cream, 1 g with applicator or dime-sized amt with finger in vagina nightly x 2 weeks, then twice a week thereafter, Disp: 45 g, Rfl: 12    fluticasone propionate (FLONASE) 50 mcg/actuation nasal spray, 1 spray (50 mcg total) by Each Nostril route once daily., Disp: 16 g, Rfl: 11    loratadine (CLARITIN) 10 mg tablet, Take 10 mg by mouth., Disp: , Rfl:     urea 20 % Crea, Apply 1 application topically once daily. To dry skin on the feet., Disp: 75 g, Rfl: 10    amLODIPine (NORVASC) 5 MG tablet, Take 1 tablet (5 mg total) by mouth once daily., Disp: 90 tablet, Rfl: 3    atorvastatin (LIPITOR) 40 MG tablet, Take 1 tablet (40 mg total) by mouth once daily., Disp: 90 tablet, Rfl: 3    blood sugar diagnostic Strp, To check BG 4 times daily, Disp: 100 each, Rfl: 11    diclofenac sodium (VOLTAREN) 1 % Gel, Apply 2 g topically once daily., Disp: 100 g, Rfl: 1    ergocalciferol (ERGOCALCIFEROL) 50,000 unit Cap, Take 1 capsule (50,000 Units total) by mouth every 7 days., Disp: 12 capsule, Rfl: 3    lancets 30 gauge Misc, To check BG 4 times daily., Disp: 200 each, Rfl: 11    losartan (COZAAR) 50 MG tablet, Take 1 tablet (50 mg total) by mouth 2 (two) times daily., Disp: 180 tablet, Rfl: 3    mirtazapine (REMERON) 15 MG tablet, Take 1 tablet (15 mg total) by mouth every evening., Disp: 30 tablet, Rfl: 11    oxybutynin (DITROPAN-XL) 10 MG 24 hr tablet, Take 1 tablet (10 mg total) by mouth once daily., Disp: 90 tablet, Rfl: 3    pantoprazole (PROTONIX) 40 MG tablet,  "Take 1 tablet (40 mg total) by mouth before breakfast., Disp: 90 tablet, Rfl: 0    pneumoc 20-arnold conj-dip cr,PF, (PREVNAR 20, PF,) 0.5 mL Syrg injection, Inject into the muscle., Disp: 0.5 mL, Rfl: 0    Review of Systems   Constitutional:  Positive for appetite change. Negative for chills and fever.   Respiratory:  Negative for cough and shortness of breath.    Cardiovascular:  Negative for chest pain.   Gastrointestinal:  Negative for abdominal pain.   Musculoskeletal:  Positive for arthralgias and neck pain (on right side/burning).   Skin:  Negative for rash.   Neurological:  Negative for dizziness.   Psychiatric/Behavioral:  Positive for dysphoric mood.        Objective:   BP (!) 177/70   Pulse 64   Ht 5' 2" (1.575 m)   Wt 65 kg (143 lb 4.8 oz)   SpO2 98%   BMI 26.21 kg/m²      Physical Exam  Constitutional:       General: She is not in acute distress.     Appearance: She is well-developed. She is not diaphoretic.   HENT:      Head: Normocephalic and atraumatic.      Nose: Nose normal.   Eyes:      General:         Right eye: No discharge.         Left eye: No discharge.      Conjunctiva/sclera: Conjunctivae normal.      Pupils: Pupils are equal, round, and reactive to light.   Neck:      Thyroid: No thyromegaly.   Cardiovascular:      Rate and Rhythm: Normal rate and regular rhythm.      Heart sounds: No murmur heard.  Pulmonary:      Effort: Pulmonary effort is normal. No respiratory distress.      Breath sounds: Normal breath sounds.   Abdominal:      General: There is no distension.      Palpations: Abdomen is soft.   Musculoskeletal:      Comments: Limited range of motion of side bending toward the left with reproducible discomfort of her right trapezius muscle.  No spasm appreciated but did have increase in muscle tone.  No range of motion issues with her right shoulder nor any crepitus appreciated.   Skin:     Findings: No rash.   Neurological:      Mental Status: She is alert and oriented to person, " place, and time.   Psychiatric:         Behavior: Behavior normal.         Assessment & Plan     Problem List Items Addressed This Visit          Psychiatric    Mild episode of recurrent major depressive disorder    Relevant Medications    mirtazapine (REMERON) 15 MG tablet       Cardiac/Vascular    Essential hypertension    Relevant Medications    amLODIPine (NORVASC) 5 MG tablet    losartan (COZAAR) 50 MG tablet    Aortic atherosclerosis    Relevant Medications    atorvastatin (LIPITOR) 40 MG tablet       Endocrine    Type 2 diabetes mellitus with diabetic polyneuropathy, without long-term current use of insulin    Relevant Medications    blood sugar diagnostic Strp    lancets 30 gauge Misc    Other Relevant Orders    Ambulatory referral/consult to Optometry    Hemoglobin A1C    Comprehensive Metabolic Panel    Microalbumin/Creatinine Ratio, Urine    CBC Auto Differential    Vitamin D deficiency    Relevant Medications    ergocalciferol (ERGOCALCIFEROL) 50,000 unit Cap       GI    GERD (gastroesophageal reflux disease)    Relevant Medications    pantoprazole (PROTONIX) 40 MG tablet     Other Visit Diagnoses       Decreased appetite        Labs, TSH wnl. Previously referred to GI for dysphagia. Start Remeron for mood, sleep, appetite.    Relevant Medications    mirtazapine (REMERON) 15 MG tablet              Immunizations Administered on Date of Encounter - 8/14/2023       Name Date Dose VIS Date Route Exp Date    Pneumococcal Conjugate - 20 Valent  Incomplete 0.5 mL 5/12/2023 Intramuscular --    : [employer]     Pneumococcal Conjugate - 20 Valent 8/14/2023 11:07 AM 0.5 mL 5/12/2023 Intramuscular 9/30/2024    Site: Left deltoid     Given By: Tari Boggs, PharmD     : [employer]     Lot: [id number]              No follow-ups on file.      Disclaimer:  This note may have been prepared using voice recognition software, it may have not been extensively proofed, as such there could be errors within  the text such as sound alike errors.

## 2023-08-17 ENCOUNTER — PATIENT MESSAGE (OUTPATIENT)
Dept: ADMINISTRATIVE | Facility: OTHER | Age: 68
End: 2023-08-17
Payer: MEDICARE

## 2023-08-17 ENCOUNTER — PATIENT MESSAGE (OUTPATIENT)
Dept: INTERNAL MEDICINE | Facility: CLINIC | Age: 68
End: 2023-08-17
Payer: MEDICARE

## 2023-08-17 DIAGNOSIS — E11.42 TYPE 2 DIABETES MELLITUS WITH DIABETIC POLYNEUROPATHY, WITHOUT LONG-TERM CURRENT USE OF INSULIN: Primary | ICD-10-CM

## 2023-08-18 RX ORDER — LANCETS
1 EACH MISCELLANEOUS 4 TIMES DAILY
Qty: 400 EACH | Refills: 2 | Status: SHIPPED | OUTPATIENT
Start: 2023-08-18

## 2023-08-18 NOTE — TELEPHONE ENCOUNTER
No care due was identified.  [facility] Embedded Care Due Messages. Reference number: [id number].   8/18/2023 7:59:55 AM CDT

## 2023-09-11 ENCOUNTER — PATIENT MESSAGE (OUTPATIENT)
Dept: INTERNAL MEDICINE | Facility: CLINIC | Age: 68
End: 2023-09-11
Payer: MEDICARE

## 2023-11-06 ENCOUNTER — PATIENT MESSAGE (OUTPATIENT)
Dept: ADMINISTRATIVE | Facility: HOSPITAL | Age: 68
End: 2023-11-06
Payer: MEDICARE

## 2023-11-28 ENCOUNTER — TELEPHONE (OUTPATIENT)
Dept: ORTHOPEDICS | Facility: CLINIC | Age: 68
End: 2023-11-28
Payer: MEDICARE

## 2023-11-28 ENCOUNTER — OFFICE VISIT (OUTPATIENT)
Dept: INTERNAL MEDICINE | Facility: CLINIC | Age: 68
End: 2023-11-28
Payer: MEDICARE

## 2023-11-28 ENCOUNTER — LAB VISIT (OUTPATIENT)
Dept: LAB | Facility: OTHER | Age: 68
End: 2023-11-28
Attending: FAMILY MEDICINE
Payer: MEDICARE

## 2023-11-28 VITALS
HEART RATE: 61 BPM | SYSTOLIC BLOOD PRESSURE: 151 MMHG | WEIGHT: 149.06 LBS | HEIGHT: 62 IN | BODY MASS INDEX: 27.43 KG/M2 | DIASTOLIC BLOOD PRESSURE: 65 MMHG | OXYGEN SATURATION: 96 %

## 2023-11-28 DIAGNOSIS — I77.1 TORTUOUS AORTA: ICD-10-CM

## 2023-11-28 DIAGNOSIS — H91.90 HEARING LOSS, UNSPECIFIED HEARING LOSS TYPE, UNSPECIFIED LATERALITY: ICD-10-CM

## 2023-11-28 DIAGNOSIS — R32 URINARY INCONTINENCE, UNSPECIFIED TYPE: ICD-10-CM

## 2023-11-28 DIAGNOSIS — R05.3 PERSISTENT COUGH: ICD-10-CM

## 2023-11-28 DIAGNOSIS — E78.2 MIXED HYPERLIPIDEMIA: ICD-10-CM

## 2023-11-28 DIAGNOSIS — R13.10 DYSPHAGIA, UNSPECIFIED TYPE: ICD-10-CM

## 2023-11-28 DIAGNOSIS — M50.30 DDD (DEGENERATIVE DISC DISEASE), CERVICAL: ICD-10-CM

## 2023-11-28 DIAGNOSIS — E55.9 VITAMIN D DEFICIENCY: ICD-10-CM

## 2023-11-28 DIAGNOSIS — Z87.09 H/O ALLERGIC RHINITIS: ICD-10-CM

## 2023-11-28 DIAGNOSIS — R55 SYNCOPE, UNSPECIFIED SYNCOPE TYPE: ICD-10-CM

## 2023-11-28 DIAGNOSIS — I10 ESSENTIAL HYPERTENSION: ICD-10-CM

## 2023-11-28 DIAGNOSIS — Z00.00 ENCOUNTER FOR PREVENTIVE HEALTH EXAMINATION: Primary | ICD-10-CM

## 2023-11-28 DIAGNOSIS — E11.42 TYPE 2 DIABETES MELLITUS WITH DIABETIC POLYNEUROPATHY, WITHOUT LONG-TERM CURRENT USE OF INSULIN: ICD-10-CM

## 2023-11-28 DIAGNOSIS — Z00.00 ENCOUNTER FOR MEDICARE ANNUAL WELLNESS EXAM: ICD-10-CM

## 2023-11-28 DIAGNOSIS — F17.210 LIGHT CIGARETTE SMOKER (1-9 CIGS/DAY): ICD-10-CM

## 2023-11-28 DIAGNOSIS — K21.9 GASTROESOPHAGEAL REFLUX DISEASE, UNSPECIFIED WHETHER ESOPHAGITIS PRESENT: ICD-10-CM

## 2023-11-28 DIAGNOSIS — F33.0 MILD EPISODE OF RECURRENT MAJOR DEPRESSIVE DISORDER: ICD-10-CM

## 2023-11-28 DIAGNOSIS — M25.512 BILATERAL SHOULDER PAIN, UNSPECIFIED CHRONICITY: ICD-10-CM

## 2023-11-28 DIAGNOSIS — H25.12 NUCLEAR SCLEROTIC CATARACT OF LEFT EYE: ICD-10-CM

## 2023-11-28 DIAGNOSIS — M25.511 BILATERAL SHOULDER PAIN, UNSPECIFIED CHRONICITY: ICD-10-CM

## 2023-11-28 DIAGNOSIS — M75.81 RIGHT ROTATOR CUFF TENDINITIS: ICD-10-CM

## 2023-11-28 DIAGNOSIS — I70.0 AORTIC ATHEROSCLEROSIS: ICD-10-CM

## 2023-11-28 DIAGNOSIS — M25.531 RIGHT WRIST PAIN: ICD-10-CM

## 2023-11-28 DIAGNOSIS — G47.00 INSOMNIA, UNSPECIFIED TYPE: ICD-10-CM

## 2023-11-28 DIAGNOSIS — Z87.891 HISTORY OF TOBACCO ABUSE: ICD-10-CM

## 2023-11-28 DIAGNOSIS — R76.8 HEPATITIS C ANTIBODY TEST POSITIVE: ICD-10-CM

## 2023-11-28 DIAGNOSIS — F41.9 ANXIETY: ICD-10-CM

## 2023-11-28 DIAGNOSIS — R93.89 ABNORMAL CHEST CT: ICD-10-CM

## 2023-11-28 DIAGNOSIS — J43.2 CENTRILOBULAR EMPHYSEMA: ICD-10-CM

## 2023-11-28 DIAGNOSIS — K63.5 POLYP OF COLON, UNSPECIFIED PART OF COLON, UNSPECIFIED TYPE: ICD-10-CM

## 2023-11-28 LAB
ALBUMIN SERPL BCP-MCNC: 3.6 G/DL (ref 3.5–5.2)
ALP SERPL-CCNC: 91 U/L (ref 55–135)
ALT SERPL W/O P-5'-P-CCNC: 11 U/L (ref 10–44)
ANION GAP SERPL CALC-SCNC: 8 MMOL/L (ref 8–16)
AST SERPL-CCNC: 16 U/L (ref 10–40)
BASOPHILS # BLD AUTO: 0.05 K/UL (ref 0–0.2)
BASOPHILS NFR BLD: 0.5 % (ref 0–1.9)
BILIRUB SERPL-MCNC: 0.3 MG/DL (ref 0.1–1)
BUN SERPL-MCNC: 16 MG/DL (ref 8–23)
CALCIUM SERPL-MCNC: 9.1 MG/DL (ref 8.7–10.5)
CHLORIDE SERPL-SCNC: 108 MMOL/L (ref 95–110)
CO2 SERPL-SCNC: 24 MMOL/L (ref 23–29)
CREAT SERPL-MCNC: 1 MG/DL (ref 0.5–1.4)
DIFFERENTIAL METHOD: ABNORMAL
EOSINOPHIL # BLD AUTO: 0.2 K/UL (ref 0–0.5)
EOSINOPHIL NFR BLD: 2 % (ref 0–8)
ERYTHROCYTE [DISTWIDTH] IN BLOOD BY AUTOMATED COUNT: 13.7 % (ref 11.5–14.5)
EST. GFR  (NO RACE VARIABLE): >60 ML/MIN/1.73 M^2
ESTIMATED AVG GLUCOSE: 114 MG/DL (ref 68–131)
GLUCOSE SERPL-MCNC: 90 MG/DL (ref 70–110)
HBA1C MFR BLD: 5.6 % (ref 4–5.6)
HCT VFR BLD AUTO: 41.1 % (ref 37–48.5)
HGB BLD-MCNC: 12.9 G/DL (ref 12–16)
IMM GRANULOCYTES # BLD AUTO: 0.02 K/UL (ref 0–0.04)
IMM GRANULOCYTES NFR BLD AUTO: 0.2 % (ref 0–0.5)
LYMPHOCYTES # BLD AUTO: 3.9 K/UL (ref 1–4.8)
LYMPHOCYTES NFR BLD: 39.6 % (ref 18–48)
MCH RBC QN AUTO: 28.9 PG (ref 27–31)
MCHC RBC AUTO-ENTMCNC: 31.4 G/DL (ref 32–36)
MCV RBC AUTO: 92 FL (ref 82–98)
MONOCYTES # BLD AUTO: 0.6 K/UL (ref 0.3–1)
MONOCYTES NFR BLD: 5.9 % (ref 4–15)
NEUTROPHILS # BLD AUTO: 5.1 K/UL (ref 1.8–7.7)
NEUTROPHILS NFR BLD: 51.8 % (ref 38–73)
NRBC BLD-RTO: 0 /100 WBC
PLATELET # BLD AUTO: 247 K/UL (ref 150–450)
PMV BLD AUTO: 9.7 FL (ref 9.2–12.9)
POTASSIUM SERPL-SCNC: 4.5 MMOL/L (ref 3.5–5.1)
PROT SERPL-MCNC: 7.1 G/DL (ref 6–8.4)
RBC # BLD AUTO: 4.46 M/UL (ref 4–5.4)
SODIUM SERPL-SCNC: 140 MMOL/L (ref 136–145)
WBC # BLD AUTO: 9.73 K/UL (ref 3.9–12.7)

## 2023-11-28 PROCEDURE — 1160F RVW MEDS BY RX/DR IN RCRD: CPT | Mod: HCNC,CPTII,S$GLB, | Performed by: NURSE PRACTITIONER

## 2023-11-28 PROCEDURE — 3077F PR MOST RECENT SYSTOLIC BLOOD PRESSURE >= 140 MM HG: ICD-10-PCS | Mod: HCNC,CPTII,S$GLB, | Performed by: NURSE PRACTITIONER

## 2023-11-28 PROCEDURE — 3044F HG A1C LEVEL LT 7.0%: CPT | Mod: HCNC,CPTII,S$GLB, | Performed by: NURSE PRACTITIONER

## 2023-11-28 PROCEDURE — 1159F MED LIST DOCD IN RCRD: CPT | Mod: HCNC,CPTII,S$GLB, | Performed by: NURSE PRACTITIONER

## 2023-11-28 PROCEDURE — 4010F ACE/ARB THERAPY RXD/TAKEN: CPT | Mod: HCNC,CPTII,S$GLB, | Performed by: NURSE PRACTITIONER

## 2023-11-28 PROCEDURE — 1126F AMNT PAIN NOTED NONE PRSNT: CPT | Mod: HCNC,CPTII,S$GLB, | Performed by: NURSE PRACTITIONER

## 2023-11-28 PROCEDURE — 83036 HEMOGLOBIN GLYCOSYLATED A1C: CPT | Mod: HCNC | Performed by: FAMILY MEDICINE

## 2023-11-28 PROCEDURE — 85025 COMPLETE CBC W/AUTO DIFF WBC: CPT | Mod: HCNC | Performed by: FAMILY MEDICINE

## 2023-11-28 PROCEDURE — 3078F PR MOST RECENT DIASTOLIC BLOOD PRESSURE < 80 MM HG: ICD-10-PCS | Mod: HCNC,CPTII,S$GLB, | Performed by: NURSE PRACTITIONER

## 2023-11-28 PROCEDURE — 3077F SYST BP >= 140 MM HG: CPT | Mod: HCNC,CPTII,S$GLB, | Performed by: NURSE PRACTITIONER

## 2023-11-28 PROCEDURE — 99999 PR PBB SHADOW E&M-EST. PATIENT-LVL V: ICD-10-PCS | Mod: PBBFAC,HCNC,, | Performed by: NURSE PRACTITIONER

## 2023-11-28 PROCEDURE — 3288F PR FALLS RISK ASSESSMENT DOCUMENTED: ICD-10-PCS | Mod: HCNC,CPTII,S$GLB, | Performed by: NURSE PRACTITIONER

## 2023-11-28 PROCEDURE — 3288F FALL RISK ASSESSMENT DOCD: CPT | Mod: HCNC,CPTII,S$GLB, | Performed by: NURSE PRACTITIONER

## 2023-11-28 PROCEDURE — 80053 COMPREHEN METABOLIC PANEL: CPT | Mod: HCNC | Performed by: FAMILY MEDICINE

## 2023-11-28 PROCEDURE — 1101F PT FALLS ASSESS-DOCD LE1/YR: CPT | Mod: HCNC,CPTII,S$GLB, | Performed by: NURSE PRACTITIONER

## 2023-11-28 PROCEDURE — 1101F PR PT FALLS ASSESS DOC 0-1 FALLS W/OUT INJ PAST YR: ICD-10-PCS | Mod: HCNC,CPTII,S$GLB, | Performed by: NURSE PRACTITIONER

## 2023-11-28 PROCEDURE — 36415 COLL VENOUS BLD VENIPUNCTURE: CPT | Mod: HCNC | Performed by: FAMILY MEDICINE

## 2023-11-28 PROCEDURE — 1170F FXNL STATUS ASSESSED: CPT | Mod: HCNC,CPTII,S$GLB, | Performed by: NURSE PRACTITIONER

## 2023-11-28 PROCEDURE — 1126F PR PAIN SEVERITY QUANTIFIED, NO PAIN PRESENT: ICD-10-PCS | Mod: HCNC,CPTII,S$GLB, | Performed by: NURSE PRACTITIONER

## 2023-11-28 PROCEDURE — 4010F PR ACE/ARB THEARPY RXD/TAKEN: ICD-10-PCS | Mod: HCNC,CPTII,S$GLB, | Performed by: NURSE PRACTITIONER

## 2023-11-28 PROCEDURE — 1170F PR FUNCTIONAL STATUS ASSESSED: ICD-10-PCS | Mod: HCNC,CPTII,S$GLB, | Performed by: NURSE PRACTITIONER

## 2023-11-28 PROCEDURE — 3044F PR MOST RECENT HEMOGLOBIN A1C LEVEL <7.0%: ICD-10-PCS | Mod: HCNC,CPTII,S$GLB, | Performed by: NURSE PRACTITIONER

## 2023-11-28 PROCEDURE — G0439 PR MEDICARE ANNUAL WELLNESS SUBSEQUENT VISIT: ICD-10-PCS | Mod: HCNC,S$GLB,, | Performed by: NURSE PRACTITIONER

## 2023-11-28 PROCEDURE — 3078F DIAST BP <80 MM HG: CPT | Mod: HCNC,CPTII,S$GLB, | Performed by: NURSE PRACTITIONER

## 2023-11-28 PROCEDURE — G0439 PPPS, SUBSEQ VISIT: HCPCS | Mod: HCNC,S$GLB,, | Performed by: NURSE PRACTITIONER

## 2023-11-28 PROCEDURE — 99999 PR PBB SHADOW E&M-EST. PATIENT-LVL V: CPT | Mod: PBBFAC,HCNC,, | Performed by: NURSE PRACTITIONER

## 2023-11-28 PROCEDURE — 1159F PR MEDICATION LIST DOCUMENTED IN MEDICAL RECORD: ICD-10-PCS | Mod: HCNC,CPTII,S$GLB, | Performed by: NURSE PRACTITIONER

## 2023-11-28 PROCEDURE — 1160F PR REVIEW ALL MEDS BY PRESCRIBER/CLIN PHARMACIST DOCUMENTED: ICD-10-PCS | Mod: HCNC,CPTII,S$GLB, | Performed by: NURSE PRACTITIONER

## 2023-11-28 NOTE — PATIENT INSTRUCTIONS
Counseling and Referral of Other Preventative  (Italic type indicates deductible and co-insurance are waived)    Patient Name: Leticia Brown  Today's Date: 11/28/2023    Health Maintenance       Date Due Completion Date    Shingles Vaccine (1 of 2) Never done ---    RSV Vaccine (Age 60+ and Pregnant patients) (1 - 1-dose 60+ series) Never done ---    Foot Exam 11/04/2022 11/4/2021    Eye Exam 08/11/2023 8/11/2022 (Done)    Override on 8/11/2022: Done    Override on 10/1/2020: Done    Influenza Vaccine (1) 09/01/2023 9/20/2022    COVID-19 Vaccine (5 - 2023-24 season) 09/01/2023 5/18/2022    Diabetes Urine Screening 09/20/2023 9/20/2022    Hemoglobin A1c 11/10/2023 5/10/2023    Mammogram 11/29/2023 11/29/2022    Lipid Panel 01/10/2024 1/10/2023    LDCT Lung Screen 04/20/2024 4/20/2023    High Dose Statin 08/14/2024 8/14/2023    DEXA Scan 10/25/2025 10/25/2022    TETANUS VACCINE 08/30/2029 8/30/2019    Colorectal Cancer Screening 04/11/2032 4/11/2022    Override on 3/8/2017: Done (repeat in 10 years)        No orders of the defined types were placed in this encounter.    The following information is provided to all patients.  This information is to help you find resources for any of the problems found today that may be affecting your health:                Living healthy guide: www.[facility].[place].gov      Understanding Diabetes: www.diabetes.org      Eating healthy: www.cdc.gov/healthyweight      CDC home safety checklist: www.cdc.gov/steadi/patient.html      Agency on Aging: www.goea.[place].gov      Alcoholics anonymous (AA): www.aa.org      Physical Activity: www.jean.nih.gov/jo1ukvb      Tobacco use: www.quitwithusla.org

## 2023-11-28 NOTE — PROGRESS NOTES
"  Leticia Brown presented for a  Medicare AWV and comprehensive Health Risk Assessment today. The following components were reviewed and updated:    Medical history  Family History  Social history  Allergies and Current Medications  Health Risk Assessment  Health Maintenance  Care Team         ** See Completed Assessments for Annual Wellness Visit within the encounter summary.**         The following assessments were completed:  Living Situation  CAGE  Depression Screening  Timed Get Up and Go  Whisper Test  Cognitive Function Screening  Nutrition Screening  ADL Screening  PAQ Screening        Vitals:    11/28/23 0822 11/28/23 0827 11/28/23 0831   BP: 124/68 (!) 113/56 (!) 151/65   BP Location: Right arm Right arm Left arm   Patient Position: Sitting  Sitting   BP Method: Large (Manual) Medium (Automatic) Medium (Automatic)   Pulse: 63 67 61   SpO2: 96%     Weight: 67.6 kg (149 lb 0.5 oz)     Height: 5' 2" (1.575 m)       Body mass index is 27.26 kg/m².    Physical Exam  Vitals reviewed.   Constitutional:       Appearance: She is well-developed.   HENT:      Head: Normocephalic and atraumatic. Not macrocephalic and not microcephalic. No raccoon eyes, Cornell's sign, abrasion, contusion, right periorbital erythema, left periorbital erythema or laceration. Hair is normal.      Right Ear: No decreased hearing noted. No laceration, drainage, swelling or tenderness. No middle ear effusion. No foreign body. No mastoid tenderness. No hemotympanum. Tympanic membrane is not injected, scarred, perforated, erythematous, retracted or bulging. Tympanic membrane has normal mobility.      Left Ear: No decreased hearing noted. No laceration, drainage, swelling or tenderness.  No middle ear effusion. No foreign body. No mastoid tenderness. No hemotympanum. Tympanic membrane is not injected, scarred, perforated, erythematous, retracted or bulging. Tympanic membrane has normal mobility.      Nose: Nose normal. No nasal deformity, " laceration or mucosal edema.      Mouth/Throat:      Pharynx: Uvula midline.   Eyes:      General: Lids are normal.      Conjunctiva/sclera: Conjunctivae normal.   Neck:      Thyroid: No thyroid mass or thyromegaly.      Trachea: Trachea normal.   Cardiovascular:      Rate and Rhythm: Normal rate and regular rhythm.   Pulmonary:      Effort: Pulmonary effort is normal.      Breath sounds: Normal breath sounds.   Abdominal:      Palpations: Abdomen is soft.   Musculoskeletal:         General: Normal range of motion.      Cervical back: Neck supple. No edema or erythema. No spinous process tenderness or muscular tenderness. Normal range of motion.   Lymphadenopathy:      Head:      Right side of head: No submental, submandibular, tonsillar, preauricular or posterior auricular adenopathy.      Left side of head: No submental, submandibular, tonsillar, preauricular, posterior auricular or occipital adenopathy.   Skin:     General: Skin is warm and dry.   Neurological:      Mental Status: She is alert and oriented to person, place, and time.      Cranial Nerves: No cranial nerve deficit.      Sensory: No sensory deficit.   Psychiatric:         Behavior: Behavior normal.         Thought Content: Thought content normal.         Judgment: Judgment normal.               Diagnoses and health risks identified today and associated recommendations/orders:    1. Encounter for preventive health examination  Annual Health Risk Assessment (HRA) visit today.  Counseling and referral of health maintenance and preventative health measures performed.  Patient given annual wellness paperwork to take home.  Encouraged to return in 1 year for subsequent HRA visit.     2. Encounter for Medicare annual wellness exam  Annual Health Risk Assessment (HRA) visit today.  Counseling and referral of health maintenance and preventative health measures performed.  Patient given annual wellness paperwork to take home.  Encouraged to return in 1 year  for subsequent HRA visit.   - Ambulatory Referral/Consult to Enhanced Annual Wellness Visit (eAWV)    3. Tortuous aorta  Chronic. Stable. Noted on CXR from 09/17/2017. Continue current treatment plan as previously prescribed by PCP.    4. Mixed hyperlipidemia  Chronic. Stable. Continue current treatment plan as previously prescribed by PCP.    5. Essential hypertension  Chronic. Stable. Controlled. Encouraged to increase exercise as tolerated (moderate-intensity aerobic activity and muscle-strengthening activities) improve diet to heart healthy, low sodium diet.  Continue current treatment plan as previously prescribed by PCP.    6. Aortic atherosclerosis  Chronic. Stable. Continue current treatment plan as previously prescribed by PCP.    7. Persistent cough  Chronic. Stable. Continue current treatment plan as previously prescribed by PCP.    8. Centrilobular emphysema  Chronic. Stable. Continue current treatment plan as previously prescribed by PCP.    9. H/O allergic rhinitis  Chronic. Stable. Continue current treatment plan as previously prescribed by PCP.    10. Nuclear sclerotic cataract of left eye  Chronic. Stable. Continue current treatment plan as previously prescribed by PCP.    11. Mild episode of recurrent major depressive disorder  Chronic. Stable. Continue current treatment plan as previously prescribed by PCP.    12. Anxiety  Chronic. Stable. Continue current treatment plan as previously prescribed by PCP.    13. DDD (degenerative disc disease), cervical  Chronic. Stable. Continue current treatment plan as previously prescribed by PCP.    14. Urinary incontinence, unspecified type  Chronic. Stable. Continue current treatment plan as previously prescribed by PCP.    15. Type 2 diabetes mellitus with diabetic polyneuropathy, without long-term current use of insulin  Chronic. Stable. Controlled. Last Hgb A1c=5.6 from 1/10/23. Continue current treatment plan as previously prescribed by PCP.    16. Polyp  of colon, unspecified part of colon, unspecified type  Chronic. Stable. Continue current treatment plan as previously prescribed by PCP.    17. Vitamin D deficiency  Chronic. Stable. Continue current treatment plan as previously prescribed by PCP.    18. Dysphagia, unspecified type  Chronic. Stable. Continue current treatment plan as previously prescribed by PCP.    19. Gastroesophageal reflux disease, unspecified whether esophagitis present  Chronic. Stable. Continue current treatment plan as previously prescribed by PCP.    20. Hepatitis C antibody test positive  Chronic. Stable. Continue current treatment plan as previously prescribed by PCP.    21. Right rotator cuff tendinitis  Chronic. Stable. Continue current treatment plan as previously prescribed by PCP.    22. Bilateral shoulder pain, unspecified chronicity  Chronic. Stable. Continue current treatment plan as previously prescribed by PCP.    23. Right wrist pain  Chronic. Stable. Continue current treatment plan as previously prescribed by PCP.    24. Abnormal chest CT  Chronic. Stable. Continue current treatment plan as previously prescribed by PCP.    25. History of tobacco abuse  Chronic. Stable. Continue current treatment plan as previously prescribed by PCP.    26. Insomnia, unspecified type  Chronic. Stable. Continue current treatment plan as previously prescribed by PCP.    27. Light cigarette smoker (1-9 cigs/day)  Chronic. Stable. Continue current treatment plan as previously prescribed by PCP.    28. Syncope, unspecified syncope type  Chronic. Stable. Continue current treatment plan as previously prescribed by PCP.      Provided Leticia with a 5-10 year written screening schedule and personal prevention plan. Recommendations were developed using the USPSTF age appropriate recommendations. Education, counseling, and referrals were provided as needed. After Visit Summary printed and given to patient which includes a list of additional  screenings\tests needed.      I offered to discuss end of life issues, including information on how to make advance directives that the patient could use to name someone who would make medical decisions on their behalf if they became too ill to make themselves.    ___Patient declined  _X_Patient is interested, I provided paper work and offered to discuss.    No follow-ups on file.    Frank Salomon NPI offered to discuss advanced care planning, including how to pick a person who would make decisions for you if you were unable to make them for yourself, called a health care power of , and what kind of decisions you might make such as use of life sustaining treatments such as ventilators and tube feeding when faced with a life limiting illness recorded on a living will that they will need to know. (How you want to be cared for as you near the end of your natural life)     X Patient is interested in learning more about how to make advanced directives.  I provided them paperwork and offered to discuss this with them.

## 2023-12-24 ENCOUNTER — PATIENT MESSAGE (OUTPATIENT)
Dept: ADMINISTRATIVE | Facility: OTHER | Age: 68
End: 2023-12-24
Payer: MEDICARE

## 2023-12-26 ENCOUNTER — OFFICE VISIT (OUTPATIENT)
Dept: URGENT CARE | Facility: CLINIC | Age: 68
End: 2023-12-26
Payer: MEDICARE

## 2023-12-26 ENCOUNTER — PATIENT MESSAGE (OUTPATIENT)
Dept: INTERNAL MEDICINE | Facility: CLINIC | Age: 68
End: 2023-12-26
Payer: MEDICARE

## 2023-12-26 ENCOUNTER — PATIENT MESSAGE (OUTPATIENT)
Dept: PODIATRY | Facility: CLINIC | Age: 68
End: 2023-12-26
Payer: MEDICARE

## 2023-12-26 VITALS
SYSTOLIC BLOOD PRESSURE: 176 MMHG | TEMPERATURE: 99 F | RESPIRATION RATE: 18 BRPM | BODY MASS INDEX: 26.31 KG/M2 | OXYGEN SATURATION: 97 % | HEIGHT: 62 IN | DIASTOLIC BLOOD PRESSURE: 79 MMHG | WEIGHT: 143 LBS | HEART RATE: 66 BPM

## 2023-12-26 DIAGNOSIS — R09.89 ABNORMAL LUNG SOUNDS: ICD-10-CM

## 2023-12-26 DIAGNOSIS — F17.200 CURRENT EVERY DAY SMOKER: ICD-10-CM

## 2023-12-26 DIAGNOSIS — J20.8 ACUTE BACTERIAL BRONCHITIS: Primary | ICD-10-CM

## 2023-12-26 DIAGNOSIS — R09.81 NASAL CONGESTION: ICD-10-CM

## 2023-12-26 DIAGNOSIS — R09.89 CHEST CONGESTION: ICD-10-CM

## 2023-12-26 DIAGNOSIS — R05.1 ACUTE COUGH: ICD-10-CM

## 2023-12-26 DIAGNOSIS — B96.89 ACUTE BACTERIAL BRONCHITIS: Primary | ICD-10-CM

## 2023-12-26 LAB
CTP QC/QA: YES
SARS-COV-2 AG RESP QL IA.RAPID: NEGATIVE

## 2023-12-26 PROCEDURE — 96372 PR INJECTION,THERAP/PROPH/DIAG2ST, IM OR SUBCUT: ICD-10-PCS | Mod: S$GLB,,, | Performed by: NURSE PRACTITIONER

## 2023-12-26 PROCEDURE — 71046 XR CHEST PA AND LATERAL: ICD-10-PCS | Mod: S$GLB,,, | Performed by: RADIOLOGY

## 2023-12-26 PROCEDURE — 96372 THER/PROPH/DIAG INJ SC/IM: CPT | Mod: S$GLB,,, | Performed by: NURSE PRACTITIONER

## 2023-12-26 PROCEDURE — 87811 SARS-COV-2 COVID19 W/OPTIC: CPT | Mod: QW,S$GLB,, | Performed by: NURSE PRACTITIONER

## 2023-12-26 PROCEDURE — 71046 X-RAY EXAM CHEST 2 VIEWS: CPT | Mod: S$GLB,,, | Performed by: RADIOLOGY

## 2023-12-26 PROCEDURE — 94640 PR INHAL RX, AIRWAY OBST/DX SPUTUM INDUCT: ICD-10-PCS | Mod: S$GLB,,, | Performed by: NURSE PRACTITIONER

## 2023-12-26 PROCEDURE — 87811 SARS CORONAVIRUS 2 ANTIGEN POCT, MANUAL READ: ICD-10-PCS | Mod: QW,S$GLB,, | Performed by: NURSE PRACTITIONER

## 2023-12-26 PROCEDURE — 99214 OFFICE O/P EST MOD 30 MIN: CPT | Mod: 25,S$GLB,, | Performed by: NURSE PRACTITIONER

## 2023-12-26 PROCEDURE — 94640 AIRWAY INHALATION TREATMENT: CPT | Mod: S$GLB,,, | Performed by: NURSE PRACTITIONER

## 2023-12-26 PROCEDURE — 99214 PR OFFICE/OUTPT VISIT, EST, LEVL IV, 30-39 MIN: ICD-10-PCS | Mod: 25,S$GLB,, | Performed by: NURSE PRACTITIONER

## 2023-12-26 RX ORDER — PREDNISONE 20 MG/1
20 TABLET ORAL DAILY
Qty: 3 TABLET | Refills: 0 | Status: SHIPPED | OUTPATIENT
Start: 2023-12-27 | End: 2023-12-30

## 2023-12-26 RX ORDER — AZITHROMYCIN 250 MG/1
TABLET, FILM COATED ORAL
Qty: 6 TABLET | Refills: 0 | Status: SHIPPED | OUTPATIENT
Start: 2023-12-26 | End: 2023-12-31

## 2023-12-26 RX ORDER — ALBUTEROL SULFATE 90 UG/1
2 AEROSOL, METERED RESPIRATORY (INHALATION) EVERY 6 HOURS PRN
Qty: 18 G | Refills: 0 | Status: SHIPPED | OUTPATIENT
Start: 2023-12-26

## 2023-12-26 RX ORDER — BENZONATATE 100 MG/1
100 CAPSULE ORAL 3 TIMES DAILY PRN
Qty: 30 CAPSULE | Refills: 0 | Status: SHIPPED | OUTPATIENT
Start: 2023-12-26 | End: 2024-01-05

## 2023-12-26 RX ORDER — IPRATROPIUM BROMIDE 0.5 MG/2.5ML
0.5 SOLUTION RESPIRATORY (INHALATION)
Status: COMPLETED | OUTPATIENT
Start: 2023-12-26 | End: 2023-12-26

## 2023-12-26 RX ORDER — DEXAMETHASONE SODIUM PHOSPHATE 100 MG/10ML
10 INJECTION INTRAMUSCULAR; INTRAVENOUS
Status: COMPLETED | OUTPATIENT
Start: 2023-12-26 | End: 2023-12-26

## 2023-12-26 RX ADMIN — IPRATROPIUM BROMIDE 0.5 MG: 0.5 SOLUTION RESPIRATORY (INHALATION) at 01:12

## 2023-12-26 RX ADMIN — DEXAMETHASONE SODIUM PHOSPHATE 10 MG: 100 INJECTION INTRAMUSCULAR; INTRAVENOUS at 01:12

## 2023-12-26 NOTE — PATIENT INSTRUCTIONS
Please drink plenty of fluids.  Please get plenty of rest.  Please return here or go to the Emergency Department for any concerns or worsening of condition.  You were prescribed antibiotics, please take them to completion.  If you were given a steroid shot in the clinic and have also been given a prescription for a steroid such as Prednisone or a Medrol Dose Pack, please begin taking them tomorrow.  It is ok to take over the counter plain Allegra or Claritin or Zyrtec.   If you do have Hypertension or palpitations, it is safe to take Coricidin HBP for relief of sinus symptoms.  We recommend you take Flonase (Fluticasone) or another nasally inhaled steroid unless you are already taking one.  Nasal irrigation with a saline spray or Netti Pot like device per their directions is also recommended.  If not allergic, please take over the counter Tylenol (Acetaminophen) and/or Motrin (Ibuprofen) as directed for control of pain and/or fever.  Please follow up with your primary care doctor or specialist as needed.    If you  smoke, please stop smoking.

## 2023-12-26 NOTE — PROGRESS NOTES
"ecaSubjective:      Patient ID: Leticia Brown is a 68 y.o. female.    Vitals:  height is 5' 2" (1.575 m) and weight is 64.9 kg (143 lb). Her oral temperature is 98.9 °F (37.2 °C). Her blood pressure is 176/79 (abnormal) and her pulse is 66. Her respiration is 18 and oxygen saturation is 97%.     Chief Complaint: Cough    Patient symptoms started 11 days ago with a cough.  Patient took tylenol, mucinex DM, and night time cough medication without relief.    68 year old female presents to clinic with complaints of cough, chest congestion, nasal congestion, chills, treating with flonase. History for daily tobacco use x 40+ years. 1pack every 3 days    Cough  This is a new problem. The current episode started 1 to 4 weeks ago. The problem has been gradually worsening. The cough is Productive of sputum. Associated symptoms include chills, headaches, nasal congestion and a sore throat. Pertinent negatives include no ear congestion, ear pain, fever, postnasal drip or sweats. Her past medical history is significant for environmental allergies. There is no history of bronchitis or pneumonia.       Constitution: Positive for chills. Negative for fever.   HENT:  Positive for sore throat. Negative for ear pain and postnasal drip.    Respiratory:  Positive for cough.    Allergic/Immunologic: Positive for environmental allergies.   Neurological:  Positive for headaches.      Objective:     Physical Exam   Constitutional: She is oriented to person, place, and time. She appears well-developed. She is cooperative.  Non-toxic appearance. She does not appear ill. No distress.   HENT:   Head: Normocephalic and atraumatic.   Ears:   Right Ear: Hearing, tympanic membrane, external ear and ear canal normal.   Left Ear: Hearing, tympanic membrane, external ear and ear canal normal.   Nose: Mucosal edema and rhinorrhea present. No nasal deformity. No epistaxis. Right sinus exhibits no maxillary sinus tenderness and no frontal " sinus tenderness. Left sinus exhibits no maxillary sinus tenderness and no frontal sinus tenderness.   Mouth/Throat: Uvula is midline, oropharynx is clear and moist and mucous membranes are normal. No trismus in the jaw. Normal dentition. No uvula swelling. No oropharyngeal exudate, posterior oropharyngeal edema or posterior oropharyngeal erythema.   Eyes: Conjunctivae and lids are normal. No scleral icterus.   Neck: Trachea normal and phonation normal. Neck supple. No edema present. No erythema present. No neck rigidity present.   Cardiovascular: Normal rate, regular rhythm, normal heart sounds and normal pulses.   Pulmonary/Chest: Effort normal. No respiratory distress. She has no decreased breath sounds. She has wheezes. She has rhonchi.   Abdominal: Normal appearance.   Musculoskeletal: Normal range of motion.         General: No deformity. Normal range of motion.   Neurological: She is alert and oriented to person, place, and time. She exhibits normal muscle tone. Coordination normal.   Skin: Skin is warm, dry, intact, not diaphoretic and not pale.   Psychiatric: Her speech is normal and behavior is normal. Judgment and thought content normal.   Nursing note and vitals reviewed.      Assessment:     1. Acute bacterial bronchitis    2. Acute cough    3. Abnormal lung sounds    4. Chest congestion    5. Nasal congestion    6. Current every day smoker      Results for orders placed or performed in visit on 12/26/23   SARS Coronavirus 2 Antigen, POCT Manual Read   Result Value Ref Range    SARS Coronavirus 2 Antigen Negative Negative     Acceptable Yes       Plan:       Acute bacterial bronchitis  -     dexAMETHasone injection 10 mg  -     benzonatate (TESSALON) 100 MG capsule; Take 1 capsule (100 mg total) by mouth 3 (three) times daily as needed for Cough.  Dispense: 30 capsule; Refill: 0  -     predniSONE (DELTASONE) 20 MG tablet; Take 1 tablet (20 mg total) by mouth once daily. for 3 days   Dispense: 3 tablet; Refill: 0  -     azithromycin (Z-ABRAHAM) 250 MG tablet; Take 2 tablets by mouth on day 1; Take 1 tablet by mouth on days 2-5  Dispense: 6 tablet; Refill: 0    Acute cough  -     SARS Coronavirus 2 Antigen, POCT Manual Read  -     XR CHEST PA AND LATERAL; Future; Expected date: 12/26/2023  -     ipratropium 0.02 % nebulizer solution 0.5 mg  -     benzonatate (TESSALON) 100 MG capsule; Take 1 capsule (100 mg total) by mouth 3 (three) times daily as needed for Cough.  Dispense: 30 capsule; Refill: 0  -     albuterol (VENTOLIN HFA) 90 mcg/actuation inhaler; Inhale 2 puffs into the lungs every 6 (six) hours as needed for Wheezing. Rescue  Dispense: 18 g; Refill: 0    Abnormal lung sounds  -     ipratropium 0.02 % nebulizer solution 0.5 mg  -     albuterol (VENTOLIN HFA) 90 mcg/actuation inhaler; Inhale 2 puffs into the lungs every 6 (six) hours as needed for Wheezing. Rescue  Dispense: 18 g; Refill: 0    Chest congestion  -     ipratropium 0.02 % nebulizer solution 0.5 mg  -     albuterol (VENTOLIN HFA) 90 mcg/actuation inhaler; Inhale 2 puffs into the lungs every 6 (six) hours as needed for Wheezing. Rescue  Dispense: 18 g; Refill: 0    Nasal congestion    Current every day smoker  -     XR CHEST PA AND LATERAL; Future; Expected date: 12/26/2023      XR CHEST PA AND LATERAL    Result Date: 12/26/2023  EXAMINATION: XR CHEST PA AND LATERAL CLINICAL HISTORY: Acute cough TECHNIQUE: PA and lateral views of the chest were performed. COMPARISON: No 12/18/2022 ne FINDINGS: Heart size normal.  The lungs are clear.  No pleural effusion     See above Electronically signed by: Ander Franks MD Date:    12/26/2023 Time:    13:45            Reviewed xray with patient who acknowledged results. Discussed  Diagnosis and treatment plan with patient who verbalized understanding and agrees with plan of care.  Advised on the need to call and schedule a follow-up appointment with pcp. Patient given educational handouts  supporting this diagnosis as well.  Patient denies any further questions or concerns at this time.  Patient exits exam room in no acute distress.     Patient Instructions   Please drink plenty of fluids.  Please get plenty of rest.  Please return here or go to the Emergency Department for any concerns or worsening of condition.  You were prescribed antibiotics, please take them to completion.  If you were given a steroid shot in the clinic and have also been given a prescription for a steroid such as Prednisone or a Medrol Dose Pack, please begin taking them tomorrow.  It is ok to take over the counter plain Allegra or Claritin or Zyrtec.   If you do have Hypertension or palpitations, it is safe to take Coricidin HBP for relief of sinus symptoms.  We recommend you take Flonase (Fluticasone) or another nasally inhaled steroid unless you are already taking one.  Nasal irrigation with a saline spray or Netti Pot like device per their directions is also recommended.  If not allergic, please take over the counter Tylenol (Acetaminophen) and/or Motrin (Ibuprofen) as directed for control of pain and/or fever.  Please follow up with your primary care doctor or specialist as needed.    If you  smoke, please stop smoking.

## 2023-12-28 ENCOUNTER — PATIENT MESSAGE (OUTPATIENT)
Dept: ADMINISTRATIVE | Facility: OTHER | Age: 68
End: 2023-12-28
Payer: MEDICARE

## 2024-01-09 ENCOUNTER — OFFICE VISIT (OUTPATIENT)
Dept: INTERNAL MEDICINE | Facility: CLINIC | Age: 69
End: 2024-01-09
Payer: MEDICARE

## 2024-01-09 ENCOUNTER — PATIENT MESSAGE (OUTPATIENT)
Dept: INTERNAL MEDICINE | Facility: CLINIC | Age: 69
End: 2024-01-09

## 2024-01-09 ENCOUNTER — HOSPITAL ENCOUNTER (OUTPATIENT)
Dept: RADIOLOGY | Facility: OTHER | Age: 69
Discharge: HOME | End: 2024-01-09
Attending: FAMILY MEDICINE
Payer: MEDICARE

## 2024-01-09 ENCOUNTER — OFFICE VISIT (OUTPATIENT)
Dept: OPTOMETRY | Facility: CLINIC | Age: 69
End: 2024-01-09
Payer: MEDICARE

## 2024-01-09 VITALS
DIASTOLIC BLOOD PRESSURE: 89 MMHG | WEIGHT: 136.69 LBS | HEIGHT: 62 IN | OXYGEN SATURATION: 99 % | BODY MASS INDEX: 25.15 KG/M2 | SYSTOLIC BLOOD PRESSURE: 143 MMHG | HEART RATE: 65 BPM

## 2024-01-09 DIAGNOSIS — E11.42 TYPE 2 DIABETES MELLITUS WITH DIABETIC POLYNEUROPATHY, WITHOUT LONG-TERM CURRENT USE OF INSULIN: ICD-10-CM

## 2024-01-09 DIAGNOSIS — H52.223 REGULAR ASTIGMATISM OF BOTH EYES: ICD-10-CM

## 2024-01-09 DIAGNOSIS — F33.0 MILD EPISODE OF RECURRENT MAJOR DEPRESSIVE DISORDER: ICD-10-CM

## 2024-01-09 DIAGNOSIS — Z12.31 ENCOUNTER FOR SCREENING MAMMOGRAM FOR BREAST CANCER: ICD-10-CM

## 2024-01-09 DIAGNOSIS — Z01.00 EYE EXAM, ROUTINE: Primary | ICD-10-CM

## 2024-01-09 DIAGNOSIS — J43.2 CENTRILOBULAR EMPHYSEMA: ICD-10-CM

## 2024-01-09 DIAGNOSIS — I70.0 AORTIC ATHEROSCLEROSIS: ICD-10-CM

## 2024-01-09 DIAGNOSIS — R53.1 WEAKNESS: Primary | ICD-10-CM

## 2024-01-09 PROCEDURE — 1126F AMNT PAIN NOTED NONE PRSNT: CPT | Mod: HCNC,CPTII,S$GLB, | Performed by: FAMILY MEDICINE

## 2024-01-09 PROCEDURE — 77067 SCR MAMMO BI INCL CAD: CPT | Mod: 26,HCNC,, | Performed by: RADIOLOGY

## 2024-01-09 PROCEDURE — 3077F SYST BP >= 140 MM HG: CPT | Mod: HCNC,CPTII,S$GLB, | Performed by: FAMILY MEDICINE

## 2024-01-09 PROCEDURE — 77063 BREAST TOMOSYNTHESIS BI: CPT | Mod: 26,HCNC,, | Performed by: RADIOLOGY

## 2024-01-09 PROCEDURE — 1159F MED LIST DOCD IN RCRD: CPT | Mod: HCNC,CPTII,S$GLB, | Performed by: FAMILY MEDICINE

## 2024-01-09 PROCEDURE — 3288F FALL RISK ASSESSMENT DOCD: CPT | Mod: HCNC,CPTII,S$GLB, | Performed by: FAMILY MEDICINE

## 2024-01-09 PROCEDURE — 77067 SCR MAMMO BI INCL CAD: CPT | Mod: TC,HCNC

## 2024-01-09 PROCEDURE — 92014 COMPRE OPH EXAM EST PT 1/>: CPT | Mod: HCNC,S$GLB,, | Performed by: OPTOMETRIST

## 2024-01-09 PROCEDURE — 99999 PR PBB SHADOW E&M-EST. PATIENT-LVL III: CPT | Mod: PBBFAC,HCNC,, | Performed by: OPTOMETRIST

## 2024-01-09 PROCEDURE — 99999 PR PBB SHADOW E&M-EST. PATIENT-LVL IV: CPT | Mod: PBBFAC,HCNC,, | Performed by: FAMILY MEDICINE

## 2024-01-09 PROCEDURE — 92015 DETERMINE REFRACTIVE STATE: CPT | Mod: HCNC,S$GLB,, | Performed by: OPTOMETRIST

## 2024-01-09 PROCEDURE — 3008F BODY MASS INDEX DOCD: CPT | Mod: HCNC,CPTII,S$GLB, | Performed by: FAMILY MEDICINE

## 2024-01-09 PROCEDURE — 3079F DIAST BP 80-89 MM HG: CPT | Mod: HCNC,CPTII,S$GLB, | Performed by: FAMILY MEDICINE

## 2024-01-09 PROCEDURE — 99214 OFFICE O/P EST MOD 30 MIN: CPT | Mod: HCNC,S$GLB,, | Performed by: FAMILY MEDICINE

## 2024-01-09 PROCEDURE — 1101F PT FALLS ASSESS-DOCD LE1/YR: CPT | Mod: HCNC,CPTII,S$GLB, | Performed by: FAMILY MEDICINE

## 2024-01-09 RX ORDER — METHYLPREDNISOLONE 4 MG/1
TABLET ORAL
Qty: 1 EACH | Refills: 0 | Status: SHIPPED | OUTPATIENT
Start: 2024-01-09 | End: 2024-06-12

## 2024-01-09 RX ORDER — CETIRIZINE HYDROCHLORIDE 10 MG/1
10 TABLET ORAL DAILY
COMMUNITY
Start: 2024-01-09

## 2024-01-09 NOTE — PROGRESS NOTES
Subjective:       Patient ID: Leticia Brown is a 68 y.o. female.    Chief Complaint: Fatigue (Since dec 24)    Fatigue  Associated symptoms include fatigue, neck pain and weakness. Pertinent negatives include no arthralgias, chest pain, headaches, joint swelling or vomiting.       Feeling weak and lightheaded with cough/chest tightness since 12/24. Went to  on 12/26 and XR normal. Was given 3 days of prednisone as well as azithro and albuter.     Was having trouble swallowing and runny nose.     Since then the cough is better but still feeling lightheaded with some blurry vision especially when she stands up.  Eating and drinking normal.   No dyspnea.    Tests to Keep You Healthy    Mammogram: Met on 1/9/2024  Eye Exam: Met on 1/9/2024  Colon Cancer Screening: Met on 4/11/2022  Last Blood Pressure <= 139/89 (1/9/2024): NO  Last HbA1c < 8 (11/28/2023): Yes  Tobacco Cessation: NO      Social History     Social History Narrative    Not on file       Family History   Problem Relation Age of Onset    Arthritis Mother     Cancer Mother         lung    Heart disease Mother     No Known Problems Father     No Known Problems Sister     No Known Problems Brother     No Known Problems Daughter     No Known Problems Daughter     Anxiety disorder Daughter     No Known Problems Son     No Known Problems Maternal Aunt     No Known Problems Maternal Uncle     No Known Problems Paternal Aunt     No Known Problems Paternal Uncle     No Known Problems Maternal Grandmother     No Known Problems Maternal Grandfather     No Known Problems Paternal Grandmother     No Known Problems Paternal Grandfather     Colon cancer Neg Hx     Rectal cancer Neg Hx     Stomach cancer Neg Hx     Breast cancer Neg Hx        Current Outpatient Medications:     albuterol (VENTOLIN HFA) 90 mcg/actuation inhaler, Inhale 2 puffs into the lungs every 6 (six) hours as needed for Wheezing. Rescue, Disp: 18 g, Rfl: 0    amLODIPine (NORVASC) 5 MG  tablet, Take 1 tablet (5 mg total) by mouth once daily., Disp: 90 tablet, Rfl: 3    aspirin (ECOTRIN) 81 MG EC tablet, Take 81 mg by mouth once daily., Disp: , Rfl:     atorvastatin (LIPITOR) 40 MG tablet, Take 1 tablet (40 mg total) by mouth once daily., Disp: 90 tablet, Rfl: 3    blood sugar diagnostic Strp, 1 each by Misc.(Non-Drug; Combo Route) route 4 (four) times daily., Disp: 400 each, Rfl: 2    conjugated estrogens (PREMARIN) vaginal cream, 1 g with applicator or dime-sized amt with finger in vagina nightly x 2 weeks, then twice a week thereafter, Disp: 45 g, Rfl: 12    diclofenac sodium (VOLTAREN) 1 % Gel, Apply 2 g topically once daily., Disp: 100 g, Rfl: 1    ergocalciferol (ERGOCALCIFEROL) 50,000 unit Cap, Take 1 capsule (50,000 Units total) by mouth every 7 days., Disp: 12 capsule, Rfl: 3    fluticasone propionate (FLONASE) 50 mcg/actuation nasal spray, 1 spray (50 mcg total) by Each Nostril route once daily., Disp: 16 g, Rfl: 11    lancets Misc, 1 each by Misc.(Non-Drug; Combo Route) route 4 (four) times daily., Disp: 400 each, Rfl: 2    losartan (COZAAR) 50 MG tablet, Take 1 tablet (50 mg total) by mouth 2 (two) times daily., Disp: 180 tablet, Rfl: 3    mirtazapine (REMERON) 15 MG tablet, Take 1 tablet (15 mg total) by mouth every evening., Disp: 30 tablet, Rfl: 11    oxybutynin (DITROPAN-XL) 10 MG 24 hr tablet, Take 1 tablet (10 mg total) by mouth once daily., Disp: 90 tablet, Rfl: 3    pantoprazole (PROTONIX) 40 MG tablet, Take 1 tablet (40 mg total) by mouth before breakfast., Disp: 90 tablet, Rfl: 0    pneumoc 20-arnold conj-dip cr,PF, (PREVNAR 20, PF,) 0.5 mL Syrg injection, Inject into the muscle., Disp: 0.5 mL, Rfl: 0    urea 20 % Crea, Apply 1 application topically once daily. To dry skin on the feet., Disp: 75 g, Rfl: 10    cetirizine (ZYRTEC) 10 MG tablet, Take 1 tablet (10 mg total) by mouth once daily., Disp: , Rfl:     methylPREDNISolone (MEDROL DOSEPACK) 4 mg tablet, use as directed, Disp:  "1 each, Rfl: 0    Review of Systems   Constitutional:  Positive for activity change and fatigue. Negative for unexpected weight change.   HENT:  Positive for rhinorrhea and trouble swallowing. Negative for hearing loss.    Eyes:  Positive for visual disturbance. Negative for discharge.   Respiratory:  Positive for chest tightness and wheezing.    Cardiovascular:  Negative for chest pain and palpitations.   Gastrointestinal:  Negative for blood in stool, constipation, diarrhea and vomiting.   Endocrine: Negative for polydipsia and polyuria.   Genitourinary:  Negative for difficulty urinating, dysuria, hematuria and menstrual problem.   Musculoskeletal:  Positive for neck pain. Negative for arthralgias and joint swelling.   Neurological:  Positive for weakness and light-headedness. Negative for headaches.   Psychiatric/Behavioral:  Positive for dysphoric mood. Negative for confusion.        Objective:   BP (!) 143/89 (BP Location: Left arm, Patient Position: Sitting)   Pulse 65   Ht 5' 2" (1.575 m)   Wt 62 kg (136 lb 11 oz)   SpO2 99%   BMI 25.00 kg/m²      Physical Exam  Constitutional:       General: She is not in acute distress.     Appearance: She is well-developed. She is not diaphoretic.   HENT:      Head: Normocephalic and atraumatic.      Nose: Nose normal.   Eyes:      General:         Right eye: No discharge.         Left eye: No discharge.      Conjunctiva/sclera: Conjunctivae normal.      Pupils: Pupils are equal, round, and reactive to light.   Neck:      Thyroid: No thyromegaly.   Cardiovascular:      Rate and Rhythm: Normal rate and regular rhythm.      Heart sounds: No murmur heard.  Pulmonary:      Effort: Pulmonary effort is normal. No respiratory distress.      Breath sounds: Normal breath sounds.   Abdominal:      General: There is no distension.      Palpations: Abdomen is soft.   Skin:     Findings: No rash.   Neurological:      Mental Status: She is alert and oriented to person, place, and " time.   Psychiatric:         Behavior: Behavior normal.         Assessment & Plan     Problem List Items Addressed This Visit          Psychiatric    Mild episode of recurrent major depressive disorder       Pulmonary    Centrilobular emphysema    Current Assessment & Plan     Appears compensated at this time            Cardiac/Vascular    Aortic atherosclerosis    Current Assessment & Plan     Stable on statin            Endocrine    Type 2 diabetes mellitus with diabetic polyneuropathy, without long-term current use of insulin    Current Assessment & Plan     Well controlled.             Other    Weakness - Primary    Current Assessment & Plan     Labs done and were reviewed.  No concerning findings on the results.  I think her symptoms are from a constellation of postviral syndrome.         Relevant Orders    CBC Auto Differential (Completed)    Comprehensive Metabolic Panel (Completed)         Immunizations Administered on Date of Encounter - 1/9/2024       No immunizations on file.             No follow-ups on file.      Disclaimer:  This note may have been prepared using voice recognition software, it may have not been extensively proofed, as such there could be errors within the text such as sound alike errors.

## 2024-01-09 NOTE — LETTER
January 9, 2024      [facility]  [address]  [place] [address]  Phone: [phone]  Fax: [phone]       Patient: Leticia Brown   YOB: 1955  Date of Visit: 01/09/2024    To Whom It May Concern:    Nisa Brown  was at [facility] on 01/09/2024. The patient may return to work on 1/16/24 with no restrictions. If you have any questions or concerns, or if I can be of further assistance, please do not hesitate to contact me.    Sincerely,    Joao Diehl

## 2024-01-09 NOTE — PROGRESS NOTES
[facility]    [facility] Exam    DLS: 8/11/22 05/12/2022 IMPLANT: [id number] 22.5 OD   06/16/2022 IMPLANT: [id number] 22.5 OS     AT's PRN OU     Pt here for check of ocular health.  states she has been having blurry VA   OU since this morning.  Pt states she feels a little light headed today   but is ok with dilating the pupils.  Pt states she has been sick with an   upper respiratory infection.  Pt denies flashes, floaters, headaches or   eye pain OU.  Pt denies itching, tearing or burning OU.     Hemoglobin A1C       Date                     Value               Ref Range             Status                11/28/2023               5.6                 4.0 - 5.6 %           Final                 05/10/2023               5.6                 4.0 - 5.6 %           Final                 01/10/2023               5.6                 4.0 - 5.6 %           Final            Last edited by Kaushal Haines, OD on 1/9/2024  9:00 AM.            Assessment /Plan     For exam results, see Encounter Report.    Eye exam, routine  -[facility]    Type 2 diabetes mellitus with diabetic polyneuropathy, without long-term current use of insulin  Comments:  Controlled. Unable to tolerate metformin XR d/t GI SE. A1c today, q6 mo. Consider d/c Metformin, switch to Glipizide. Try to reduce sweets, exercise regularly.  Orders:  -     Ambulatory referral/consult to Optometry  -No retinopathy noted today.  Continued control with primary care physician and annual comprehensive eye exam.    Regular astigmatism of both eyes  -Ok OTC readers      RTC 1 yr

## 2024-01-11 NOTE — PROGRESS NOTES
Your mammogram is normal and you need to repeat in 1 year. Let us know if any questions.     Umang Silva and Dr. Diehl

## 2024-01-22 PROBLEM — R53.1 WEAKNESS: Status: ACTIVE | Noted: 2024-01-22

## 2024-01-22 NOTE — ASSESSMENT & PLAN NOTE
Labs done and were reviewed.  No concerning findings on the results.  I think her symptoms are from a constellation of postviral syndrome.

## 2024-02-20 ENCOUNTER — TELEPHONE (OUTPATIENT)
Dept: OPTOMETRY | Facility: CLINIC | Age: 69
End: 2024-02-20
Payer: MEDICARE

## 2024-02-20 NOTE — TELEPHONE ENCOUNTER
DIANELYS for pt informed we canceled her appt as she was just seen and had 3 CEE scheduled for different days

## 2024-02-21 ENCOUNTER — CLINICAL SUPPORT (OUTPATIENT)
Dept: AUDIOLOGY | Facility: CLINIC | Age: 69
End: 2024-02-21
Payer: MEDICARE

## 2024-02-21 DIAGNOSIS — H90.3 SENSORINEURAL HEARING LOSS (SNHL) OF BOTH EARS: Primary | ICD-10-CM

## 2024-02-21 DIAGNOSIS — H91.90 HEARING LOSS, UNSPECIFIED HEARING LOSS TYPE, UNSPECIFIED LATERALITY: ICD-10-CM

## 2024-02-21 PROCEDURE — 99999 PR PBB SHADOW E&M-EST. PATIENT-LVL I: CPT | Mod: PBBFAC,HCNC,, | Performed by: AUDIOLOGIST

## 2024-02-21 PROCEDURE — 92567 TYMPANOMETRY: CPT | Mod: HCNC,S$GLB,, | Performed by: AUDIOLOGIST

## 2024-02-21 PROCEDURE — 92557 COMPREHENSIVE HEARING TEST: CPT | Mod: HCNC,S$GLB,, | Performed by: AUDIOLOGIST

## 2024-02-22 NOTE — PROGRESS NOTES
Leticia Brown, a 68 y.o. female, was seen today in the clinic for an audiologic evaluation.  Patient's reported she was referred by primary care for a hearing test. Patient denied hearing loss, tinnitus, otalgia, and vertigo.     Tympanometry revealed Type A in the right ear and Type A in the left ear. Audiogram results revealed normal sloping to mild sensorineural hearing loss bilaterally.  Speech reception thresholds were noted at 30 dB in the right ear and 30 dB in the left ear.  Speech discrimination scores were 100% in the right ear and 100% in the left ear.    Patient was counseled on today's results. Annual hearing tests were recommended to monitor the progression of hearing loss.     Recommendations:  Otologic evaluation  Annual audiogram  Hearing protection when in noise

## 2024-02-28 ENCOUNTER — PATIENT MESSAGE (OUTPATIENT)
Dept: INTERNAL MEDICINE | Facility: CLINIC | Age: 69
End: 2024-02-28
Payer: MEDICARE

## 2024-03-04 ENCOUNTER — PATIENT MESSAGE (OUTPATIENT)
Dept: ADMINISTRATIVE | Facility: HOSPITAL | Age: 69
End: 2024-03-04
Payer: MEDICARE

## 2024-03-22 NOTE — PROGRESS NOTES
[facility]    Chief Complaint  Chief Complaint   Patient presents with    Loss of Consciousness       HPI:    This 65-year-old female is referred after hospitalization with syncope.  She was standing at work.  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  She motioned to a co-worker who came over to help her to the ground.  She never lost consciousness.  She had no associated nausea, vomiting, diaphoresis, chest discomfort, or dyspnea.  EMS was summoned and she was hemodynamically stable.  She was transported the emergency department.  In the emergency department twice they tried to perform orthostatic vital signs.  Both times they stood her up she got dizzy and had to be helped back onto the stretcher.  There is no documentation of any arrhythmias associated with these episodes which were witnessed in the emergency department.  She was admitted for observation.  She was given intravenous fluids.  Since going home she has felt weak but she has not had any similar episodes.  She is not yet back to work.  The day of the event, she had woke up feeling well.  She has had no problems with nausea, vomiting, or diarrhea.  No problems with oral intake.  She wears compression stockings when she is standing at work.  Less than 2 weeks prior to this event, her losartan was increased from 50 to100 mg once per day.  She reports that her blood pressure is still labile.  Is high at times and low at others.  When her blood pressure is high she feels poorly.  Notes no symptoms when she records her blood pressures being normal.    Medications  Current Outpatient Medications   Medication Sig Dispense Refill    amLODIPine (NORVASC) 5 MG tablet Take 1 tablet (5 mg total) by mouth once daily. 30 tablet 3    aspirin (ECOTRIN) 81 MG EC tablet Take 81 mg by mouth once daily.      atorvastatin (LIPITOR) 40 MG tablet Take 1 tablet (40 mg total) by mouth once daily. 90 tablet 3     blood sugar diagnostic Strp To check BG 4 times daily 200 each 0    blood-glucose meter Misc Use as instructed 1 each 0    ergocalciferol (ERGOCALCIFEROL) 50,000 unit Cap Take 1 capsule (50,000 Units total) by mouth every 7 days. 12 capsule 1    lancets 30 gauge Misc To check BG 4 times daily. 200 each 0    loratadine (CLARITIN) 10 mg tablet Take 10 mg by mouth.      metFORMIN (GLUCOPHAGE-XR) 500 MG ER 24hr tablet Take 1 tablet (500 mg total) by mouth daily with breakfast. 30 tablet 11    oxybutynin (DITROPAN-XL) 10 MG 24 hr tablet TAKE ONE TABLET BY MOUTH ONE TIME DAILY  30 tablet 11    gabapentin (NEURONTIN) 300 MG capsule Take 300 mg by mouth.      losartan (COZAAR) 50 MG tablet Take 1 tablet (50 mg total) by mouth 2 (two) times a day. 180 tablet 3    omeprazole (PRILOSEC) 20 MG capsule Take 1 capsule (20 mg total) by mouth once daily. 90 capsule 1     No current facility-administered medications for this visit.         History  Past Medical History:   Diagnosis Date    Diabetes mellitus     H. pylori infection 2017    [facility] EGD 2017. test of eradication neg.    High cholesterol     Hypertension      Past Surgical History:   Procedure Laterality Date    BREAST BIOPSY Right     CHOLECYSTECTOMY  2016    ESOPHAGOGASTRODUODENOSCOPY N/A 7/21/2020    Procedure: EGD (ESOPHAGOGASTRODUODENOSCOPY);  Surgeon: Rodrick Montes MD;  Location: [address];  Service: Endoscopy;  Laterality: N/A;  3/30/20 - removed from 4/9/20, 3/30/20 & 3/31/20 pt &  ph & sent email, request call back to reschedule June/July.  Pt called back, rescheduled 7/21/20 - pg  7/6/20 - 7/9/20- LM x 2 - waiting for cb to set up COVID appt. - ERW  COVID screening on     Social History     Socioeconomic History    Marital status:      Spouse name: Not on file    Number of children: Not on file    Years of education: Not on file    Highest education level: Not on file   Occupational History    Not on file    Social Needs    Financial resource strain: Somewhat hard    Food insecurity     Worry: Sometimes true     Inability: Sometimes true    Transportation needs     Medical: No     Non-medical: No   Tobacco Use    Smoking status: Current Every Day Smoker     Packs/day: 0.50     Years: 30.00     Pack years: 15.00     Types: Cigarettes    Smokeless tobacco: Never Used   Substance and Sexual Activity    Alcohol use: No     Frequency: Monthly or less     Drinks per session: 1 or 2     Binge frequency: Never    Drug use: No    Sexual activity: Not Currently   Lifestyle    Physical activity     Days per week: 3 days     Minutes per session: 30 min    Stress: To some extent   Relationships    Social connections     Talks on phone: Twice a week     Gets together: Patient refused     Attends [employer] service: Not on file     Active member of club or organization: Yes     Attends meetings of clubs or organizations: 1 to 4 times per year     Relationship status:    Other Topics Concern    Not on file   Social History Narrative    Not on file     Family History   Problem Relation Age of Onset    No Known Problems Mother     No Known Problems Father     No Known Problems Sister     No Known Problems Brother     No Known Problems Daughter     No Known Problems Son     No Known Problems Maternal Aunt     No Known Problems Maternal Uncle     No Known Problems Paternal Aunt     No Known Problems Paternal Uncle     No Known Problems Maternal Grandmother     No Known Problems Maternal Grandfather     No Known Problems Paternal Grandmother     No Known Problems Paternal Grandfather     Colon cancer Neg Hx     Rectal cancer Neg Hx     Stomach cancer Neg Hx         Allergies  Review of patient's allergies indicates:  No Known Allergies    Review of Systems   Review of Systems   Constitution: Negative for malaise/fatigue, weight gain and weight loss.   Eyes: Negative for visual disturbance.    Cardiovascular: Positive for near-syncope. Negative for chest pain, claudication, cyanosis, dyspnea on exertion, irregular heartbeat, leg swelling, orthopnea, palpitations, paroxysmal nocturnal dyspnea and syncope.   Respiratory: Negative for cough, hemoptysis, shortness of breath, sleep disturbances due to breathing and wheezing.    Hematologic/Lymphatic: Negative for bleeding problem. Does not bruise/bleed easily.   Skin: Negative for poor wound healing.   Musculoskeletal: Negative for muscle cramps and myalgias.   Gastrointestinal: Negative for abdominal pain, anorexia, diarrhea, heartburn, hematemesis, hematochezia, melena, nausea and vomiting.   Genitourinary: Negative for hematuria and nocturia.   Neurological: Negative for excessive daytime sleepiness, dizziness, focal weakness, light-headedness and weakness.       Physical Exam  Vitals:    11/18/20 1452   BP: 132/68   Pulse: 77     Wt Readings from Last 1 Encounters:   11/18/20 75.8 kg (167 lb 1.7 oz)     Physical Exam   Constitutional: She is oriented to person, place, and time. She is cooperative.  Non-toxic appearance. No distress.   HENT:   Head: Normocephalic and atraumatic.   Eyes: Conjunctivae are normal. No scleral icterus.   Neck: Neck supple. No hepatojugular reflux and no JVD present. Carotid bruit is not present. No tracheal deviation present. No thyromegaly present.   Cardiovascular: Normal rate, regular rhythm, S1 normal and S2 normal.  No extrasystoles are present. PMI is not displaced. Exam reveals no S3 and no S4.   No murmur heard.  Pulses:       Carotid pulses are 2+ on the right side and 2+ on the left side.       Radial pulses are 2+ on the right side and 2+ on the left side.        Dorsalis pedis pulses are 2+ on the right side and 2+ on the left side.        Posterior tibial pulses are 2+ on the right side and 2+ on the left side.   Pulmonary/Chest: No accessory muscle usage. No respiratory distress. She has no decreased breath  No sounds. She has no wheezes. She has no rhonchi. She has no rales.   Abdominal: Soft. Bowel sounds are normal. She exhibits no pulsatile liver, no abdominal bruit and no pulsatile midline mass. There is no splenomegaly or hepatomegaly. There is no abdominal tenderness.   Musculoskeletal:         General: No tenderness, deformity or edema.   Neurological: She is alert and oriented to person, place, and time. She has normal strength. No cranial nerve deficit or sensory deficit.   Skin: Skin is warm, dry and intact. She is not diaphoretic. No cyanosis. No pallor. Nails show no clubbing.   Psychiatric: She has a normal mood and affect. Her speech is normal and behavior is normal.       Labs  Admission on 11/04/2020, Discharged on 11/05/2020   Component Date Value Ref Range Status    WBC 11/04/2020 11.86  3.90 - 12.70 K/uL Final    RBC 11/04/2020 4.51  4.00 - 5.40 M/uL Final    Hemoglobin 11/04/2020 12.8  12.0 - 16.0 g/dL Final    Hematocrit 11/04/2020 41.1  37.0 - 48.5 % Final    MCV 11/04/2020 91  82 - 98 fL Final    MCH 11/04/2020 28.4  27.0 - 31.0 pg Final    MCHC 11/04/2020 31.1* 32.0 - 36.0 g/dL Final    RDW 11/04/2020 14.2  11.5 - 14.5 % Final    Platelets 11/04/2020 261  150 - 350 K/uL Final    MPV 11/04/2020 10.7  9.2 - 12.9 fL Final    Immature Granulocytes 11/04/2020 0.3  0.0 - 0.5 % Final    Gran # (ANC) 11/04/2020 6.0  1.8 - 7.7 K/uL Final    Immature Grans (Abs) 11/04/2020 0.03  0.00 - 0.04 K/uL Final    Comment: Mild elevation in immature granulocytes is non specific and   can be seen in a variety of conditions including stress response,   acute inflammation, trauma and pregnancy. Correlation with other   laboratory and clinical findings is essential.      Lymph # 11/04/2020 4.9* 1.0 - 4.8 K/uL Final    Mono # 11/04/2020 0.7  0.3 - 1.0 K/uL Final    Eos # 11/04/2020 0.2  0.0 - 0.5 K/uL Final    Baso # 11/04/2020 0.06  0.00 - 0.20 K/uL Final    nRBC 11/04/2020 0  0 /100 WBC Final    Gran %  11/04/2020 50.2  38.0 - 73.0 % Final    Lymph % 11/04/2020 41.5  18.0 - 48.0 % Final    Mono % 11/04/2020 5.6  4.0 - 15.0 % Final    Eosinophil % 11/04/2020 1.9  0.0 - 8.0 % Final    Basophil % 11/04/2020 0.5  0.0 - 1.9 % Final    Differential Method 11/04/2020 Automated   Final    Sodium 11/04/2020 139  136 - 145 mmol/L Final    Potassium 11/04/2020 3.8  3.5 - 5.1 mmol/L Final    Chloride 11/04/2020 106  95 - 110 mmol/L Final    CO2 11/04/2020 22* 23 - 29 mmol/L Final    Glucose 11/04/2020 87  70 - 110 mg/dL Final    BUN 11/04/2020 19  8 - 23 mg/dL Final    Creatinine 11/04/2020 0.8  0.5 - 1.4 mg/dL Final    Calcium 11/04/2020 9.7  8.7 - 10.5 mg/dL Final    Total Protein 11/04/2020 7.6  6.0 - 8.4 g/dL Final    Albumin 11/04/2020 4.0  3.5 - 5.2 g/dL Final    Total Bilirubin 11/04/2020 0.4  0.1 - 1.0 mg/dL Final    Comment: For infants and newborns, interpretation of results should be based  on gestational age, weight and in agreement with clinical  observations.  Premature Infant recommended reference ranges:  Up to 24 hours.............<8.0 mg/dL  Up to 48 hours............<12.0 mg/dL  3-5 days..................<15.0 mg/dL  6-29 days.................<15.0 mg/dL      Alkaline Phosphatase 11/04/2020 98  55 - 135 U/L Final    AST 11/04/2020 19  10 - 40 U/L Final    ALT 11/04/2020 15  10 - 44 U/L Final    Anion Gap 11/04/2020 11  8 - 16 mmol/L Final    eGFR if African American 11/04/2020 >60.0  >60 mL/min/1.73 m^2 Final    eGFR if non African American 11/04/2020 >60.0  >60 mL/min/1.73 m^2 Final    Comment: Calculation used to obtain the estimated glomerular filtration  rate (eGFR) is the CKD-EPI equation.       Troponin I 11/04/2020 <0.006  0.000 - 0.026 ng/mL Final    Comment: The reference interval for Troponin I represents the 99th percentile   cutoff   for our facility and is consistent with 3rd generation assay   performance.      POC Rapid COVID 11/04/2020 Negative  Negative Final      Acceptable 11/04/2020 Yes   Final    POCT Glucose 11/04/2020 103  70 - 110 mg/dL Final    POCT Glucose 11/04/2020 90  70 - 110 mg/dL Final    Specimen UA 11/05/2020 Urine, Clean Catch   Final    Color, UA 11/05/2020 Yellow  Yellow, Straw, Suzanna Final    Appearance, UA 11/05/2020 Clear  Clear Final    pH, UA 11/05/2020 5.0  5.0 - 8.0 Final    Specific Lawrence, UA 11/05/2020 1.025  1.005 - 1.030 Final    Protein, UA 11/05/2020 Negative  Negative Final    Comment: Recommend a 24 hour urine protein or a urine   protein/creatinine ratio if globulin induced proteinuria is  clinically suspected.      Glucose, UA 11/05/2020 Negative  Negative Final    Ketones, UA 11/05/2020 Negative  Negative Final    Bilirubin (UA) 11/05/2020 Negative  Negative Final    Occult Blood UA 11/05/2020 Negative  Negative Final    Nitrite, UA 11/05/2020 Negative  Negative Final    Leukocytes, UA 11/05/2020 Negative  Negative Final    Magnesium 11/05/2020 1.8  1.6 - 2.6 mg/dL Final    Sodium 11/05/2020 139  136 - 145 mmol/L Final    Potassium 11/05/2020 4.0  3.5 - 5.1 mmol/L Final    Chloride 11/05/2020 108  95 - 110 mmol/L Final    CO2 11/05/2020 21* 23 - 29 mmol/L Final    Glucose 11/05/2020 101  70 - 110 mg/dL Final    BUN 11/05/2020 17  8 - 23 mg/dL Final    Creatinine 11/05/2020 0.8  0.5 - 1.4 mg/dL Final    Calcium 11/05/2020 9.0  8.7 - 10.5 mg/dL Final    Anion Gap 11/05/2020 10  8 - 16 mmol/L Final    eGFR if African American 11/05/2020 >60.0  >60 mL/min/1.73 m^2 Final    eGFR if non African American 11/05/2020 >60.0  >60 mL/min/1.73 m^2 Final    Comment: Calculation used to obtain the estimated glomerular filtration  rate (eGFR) is the CKD-EPI equation.       WBC 11/05/2020 11.16  3.90 - 12.70 K/uL Final    RBC 11/05/2020 4.15  4.00 - 5.40 M/uL Final    Hemoglobin 11/05/2020 11.7* 12.0 - 16.0 g/dL Final    Hematocrit 11/05/2020 37.9  37.0 - 48.5 % Final    MCV 11/05/2020 91  82 - 98 fL Final     MCH 11/05/2020 28.2  27.0 - 31.0 pg Final    MCHC 11/05/2020 30.9* 32.0 - 36.0 g/dL Final    RDW 11/05/2020 14.4  11.5 - 14.5 % Final    Platelets 11/05/2020 226  150 - 350 K/uL Final    MPV 11/05/2020 10.9  9.2 - 12.9 fL Final    Immature Granulocytes 11/05/2020 0.2  0.0 - 0.5 % Final    Gran # (ANC) 11/05/2020 4.1  1.8 - 7.7 K/uL Final    Immature Grans (Abs) 11/05/2020 0.02  0.00 - 0.04 K/uL Final    Comment: Mild elevation in immature granulocytes is non specific and   can be seen in a variety of conditions including stress response,   acute inflammation, trauma and pregnancy. Correlation with other   laboratory and clinical findings is essential.      Lymph # 11/05/2020 6.1* 1.0 - 4.8 K/uL Final    Mono # 11/05/2020 0.6  0.3 - 1.0 K/uL Final    Eos # 11/05/2020 0.3  0.0 - 0.5 K/uL Final    Baso # 11/05/2020 0.08  0.00 - 0.20 K/uL Final    nRBC 11/05/2020 0  0 /100 WBC Final    Gran % 11/05/2020 36.5* 38.0 - 73.0 % Final    Lymph % 11/05/2020 54.5* 18.0 - 48.0 % Final    Mono % 11/05/2020 5.6  4.0 - 15.0 % Final    Eosinophil % 11/05/2020 2.5  0.0 - 8.0 % Final    Basophil % 11/05/2020 0.7  0.0 - 1.9 % Final    Differential Method 11/05/2020 Automated   Final    Ascending aorta 11/05/2020 2.80  cm Final    STJ 11/05/2020 2.63  cm Final    AV mean gradient 11/05/2020 4  mmHg Final    Ao peak alirio 11/05/2020 1.40  m/s Final    Ao VTI 11/05/2020 31.91  cm Final    IVRT 11/05/2020 77.07  msec Final    IVS 11/05/2020 0.82  0.6 - 1.1 cm Final    LA size 11/05/2020 3.38  cm Final    Left Atrium Major Axis 11/05/2020 5.02  cm Final    Left Atrium Minor Axis 11/05/2020 4.94  cm Final    LVIDd 11/05/2020 4.44  3.5 - 6.0 cm Final    LVIDs 11/05/2020 3.03  2.1 - 4.0 cm Final    LVOT diameter 11/05/2020 2.01  cm Final    LVOT peak VTI 11/05/2020 28.58  cm Final    Posterior Wall 11/05/2020 0.59* 0.6 - 1.1 cm Final    MV Peak A Alirio 11/05/2020 0.82  m/s Final    E wave decelartion time  11/05/2020 187.64  msec Final    MV Peak E Alirio 11/05/2020 0.96  m/s Final    PV Peak D Alirio 11/05/2020 0.34  m/s Final    PV Peak S Alirio 11/05/2020 0.47  m/s Final    RA Major Axis 11/05/2020 4.86  cm Final    RA Width 11/05/2020 3.78  cm Final    RVDD 11/05/2020 3.36  cm Final    Sinus 11/05/2020 2.85  cm Final    TAPSE 11/05/2020 1.83  cm Final    TR Max Alirio 11/05/2020 2.31  m/s Final    TDI LATERAL 11/05/2020 0.09  m/s Final    TDI SEPTAL 11/05/2020 0.06  m/s Final    LA WIDTH 11/05/2020 3.84  cm Final    MV stenosis pressure 1/2 time 11/05/2020 54.42  ms Final    LV Diastolic Volume 11/05/2020 89.43  mL Final    LV Systolic Volume 11/05/2020 35.93  mL Final    RV S' 11/05/2020 10.51  cm/s Final    LVOT peak alirio 11/05/2020 1.20  m/s Final    LV LATERAL E/E' RATIO 11/05/2020 10.67  m/s Final    LV SEPTAL E/E' RATIO 11/05/2020 16.00  m/s Final    FS 11/05/2020 32  % Final    LA volume 11/05/2020 54.94  cm3 Final    LV mass 11/05/2020 94.34  g Final    Left Ventricle Relative Wall Thick* 11/05/2020 0.27  cm Final    AV valve area 11/05/2020 2.84  cm2 Final    AV Velocity Ratio 11/05/2020 0.86   Final    AV index (prosthetic) 11/05/2020 0.90   Final    MV valve area p 1/2 method 11/05/2020 4.04  cm2 Final    E/A ratio 11/05/2020 1.17   Final    Mean e' 11/05/2020 0.08  m/s Final    Pulm vein S/D ratio 11/05/2020 1.38   Final    LVOT area 11/05/2020 3.2  cm2 Final    LVOT stroke volume 11/05/2020 90.64  cm3 Final    AV peak gradient 11/05/2020 8  mmHg Final    E/E' ratio 11/05/2020 12.80  m/s Final    LV Systolic Volume Index 11/05/2020 19.9  mL/m2 Final    LV Diastolic Volume Index 11/05/2020 49.64  mL/m2 Final    LA Volume Index 11/05/2020 30.5  mL/m2 Final    LV Mass Index 11/05/2020 52  g/m2 Final    Triscuspid Valve Regurgitation Pea* 11/05/2020 21  mmHg Final    BSA 11/05/2020 1.86  m2 Final    Right Atrial Pressure (from IVC) 11/05/2020 3  mmHg Final    TV rest pulmonary  artery pressure 11/05/2020 24  mmHg Final    POCT Glucose 11/05/2020 98  70 - 110 mg/dL Final    POCT Glucose 11/05/2020 93  70 - 110 mg/dL Final    POCT Glucose 11/05/2020 105  70 - 110 mg/dL Final   Lab Visit on 10/19/2020   Component Date Value Ref Range Status    WBC 10/19/2020 10.08  3.90 - 12.70 K/uL Final    RBC 10/19/2020 4.34  4.00 - 5.40 M/uL Final    Hemoglobin 10/19/2020 12.1  12.0 - 16.0 g/dL Final    Hematocrit 10/19/2020 38.9  37.0 - 48.5 % Final    MCV 10/19/2020 90  82 - 98 fL Final    MCH 10/19/2020 27.9  27.0 - 31.0 pg Final    MCHC 10/19/2020 31.1* 32.0 - 36.0 g/dL Final    RDW 10/19/2020 14.2  11.5 - 14.5 % Final    Platelets 10/19/2020 272  150 - 350 K/uL Final    MPV 10/19/2020 10.8  9.2 - 12.9 fL Final    Immature Granulocytes 10/19/2020 0.2  0.0 - 0.5 % Final    Gran # (ANC) 10/19/2020 4.4  1.8 - 7.7 K/uL Final    Immature Grans (Abs) 10/19/2020 0.02  0.00 - 0.04 K/uL Final    Comment: Mild elevation in immature granulocytes is non specific and   can be seen in a variety of conditions including stress response,   acute inflammation, trauma and pregnancy. Correlation with other   laboratory and clinical findings is essential.      Lymph # 10/19/2020 4.8  1.0 - 4.8 K/uL Final    Mono # 10/19/2020 0.5  0.3 - 1.0 K/uL Final    Eos # 10/19/2020 0.3  0.0 - 0.5 K/uL Final    Baso # 10/19/2020 0.07  0.00 - 0.20 K/uL Final    nRBC 10/19/2020 0  0 /100 WBC Final    Gran % 10/19/2020 43.4  38.0 - 73.0 % Final    Lymph % 10/19/2020 47.9  18.0 - 48.0 % Final    Mono % 10/19/2020 5.3  4.0 - 15.0 % Final    Eosinophil % 10/19/2020 2.5  0.0 - 8.0 % Final    Basophil % 10/19/2020 0.7  0.0 - 1.9 % Final    Differential Method 10/19/2020 Automated   Final    Sodium 10/19/2020 141  136 - 145 mmol/L Final    Potassium 10/19/2020 4.0  3.5 - 5.1 mmol/L Final    Chloride 10/19/2020 109  95 - 110 mmol/L Final    CO2 10/19/2020 24  23 - 29 mmol/L Final    Glucose 10/19/2020 92  70 -  110 mg/dL Final    BUN 10/19/2020 19  8 - 23 mg/dL Final    Creatinine 10/19/2020 0.8  0.5 - 1.4 mg/dL Final    Calcium 10/19/2020 9.1  8.7 - 10.5 mg/dL Final    Total Protein 10/19/2020 7.0  6.0 - 8.4 g/dL Final    Albumin 10/19/2020 3.8  3.5 - 5.2 g/dL Final    Total Bilirubin 10/19/2020 0.3  0.1 - 1.0 mg/dL Final    Comment: For infants and newborns, interpretation of results should be based  on gestational age, weight and in agreement with clinical  observations.  Premature Infant recommended reference ranges:  Up to 24 hours.............<8.0 mg/dL  Up to 48 hours............<12.0 mg/dL  3-5 days..................<15.0 mg/dL  6-29 days.................<15.0 mg/dL      Alkaline Phosphatase 10/19/2020 105  55 - 135 U/L Final    AST 10/19/2020 19  10 - 40 U/L Final    ALT 10/19/2020 12  10 - 44 U/L Final    Anion Gap 10/19/2020 8  8 - 16 mmol/L Final    eGFR if African American 10/19/2020 >60  >60 mL/min/1.73 m^2 Final    eGFR if non African American 10/19/2020 >60  >60 mL/min/1.73 m^2 Final    Comment: Calculation used to obtain the estimated glomerular filtration  rate (eGFR) is the CKD-EPI equation.       Cholesterol 10/19/2020 94* 120 - 199 mg/dL Final    Comment: The National Cholesterol Education Program (NCEP) has set the  following guidelines (reference ranges) for Cholesterol:  Optimal.....................<200 mg/dL  Borderline High.............200-239 mg/dL  High........................> or = 240 mg/dL      Triglycerides 10/19/2020 47  30 - 150 mg/dL Final    Comment: The National Cholesterol Education Program (NCEP) has set the  following guidelines (reference values) for triglycerides:  Normal......................<150 mg/dL  Borderline High.............150-199 mg/dL  High........................200-499 mg/dL      HDL 10/19/2020 35* 40 - 75 mg/dL Final    Comment: The National Cholesterol Education Program (NCEP) has set the  following guidelines (reference values) for HDL  Cholesterol:  Low...............<40 mg/dL  Optimal...........>60 mg/dL      LDL Cholesterol 10/19/2020 49.6* 63.0 - 159.0 mg/dL Final    Comment: The National Cholesterol Education Program (NCEP) has set the  following guidelines (reference values) for LDL Cholesterol:  Optimal.......................<130 mg/dL  Borderline High...............130-159 mg/dL  High..........................160-189 mg/dL  Very High.....................>190 mg/dL      HDL/Cholesterol Ratio 10/19/2020 37.2  20.0 - 50.0 % Final    Total Cholesterol/HDL Ratio 10/19/2020 2.7  2.0 - 5.0 Final    Non-HDL Cholesterol 10/19/2020 59  mg/dL Final    Comment: Risk category and Non-HDL cholesterol goals:  Coronary heart disease (CHD)or equivalent (10-year risk of CHD >20%):  Non-HDL cholesterol goal     <130 mg/dL  Two or more CHD risk factors and 10-year risk of CHD <= 20%:  Non-HDL cholesterol goal     <160 mg/dL  0 to 1 CHD risk factor:  Non-HDL cholesterol goal     <190 mg/dL      HIV 1/2 Ag/Ab 10/19/2020 Negative  Negative Final    Vit D, 25-Hydroxy 10/19/2020 39  30 - 96 ng/mL Final    Comment: Vitamin D deficiency.........<10 ng/mL                              Vitamin D insufficiency......10-29 ng/mL       Vitamin D sufficiency........> or equal to 30 ng/mL  Vitamin D toxicity............>100 ng/mL      Hemoglobin A1C 10/19/2020 5.9* 4.0 - 5.6 % Final    Comment: ADA Screening Guidelines:  5.7-6.4%  Consistent with prediabetes  >or=6.5%  Consistent with diabetes  High levels of fetal hemoglobin interfere with the HbA1C  assay. Heterozygous hemoglobin variants (HbS, HgC, etc)do  not significantly interfere with this assay.   However, presence of multiple variants may affect accuracy.      Estimated Avg Glucose 10/19/2020 123  68 - 131 mg/dL Final   Lab Visit on 10/19/2020   Component Date Value Ref Range Status    Microalbumin, Urine 10/19/2020 8.0  ug/mL Final    Creatinine, Urine 10/19/2020 129.8  15.0 - 325.0 mg/dL Final    Comment:  "The random urine reference ranges provided were established   for 24 hour urine collections.  No reference ranges exist for  random urine specimens.  Correlate clinically.     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  An immunostain for H pylori is negative for organisms.  Negative for intestinal metaplasia, dysplasia or malignancy.      Interpreted by: Aj Escamilla M.D., Signed on 07/24/2020 at 14:02    Gross 07/21/2020    Final                    Value:Patient ID/ Pathology ID:  [id number]  Received in formalin, labeled "stomach," is a 5 x 4 x 3 mm aggregate of  tan-pink tissue fragments. The specimen is submitted entirely in cassette  [id number].  Grossed by: Iesha Ayoub     Lab Visit on 07/18/2020   Component Date Value Ref Range Status    SARS-CoV2 (COVID-19) Qualitative P* 07/18/2020 Not Detected  Not Detected Final    Comment: This test utilizes a real-time reverse transcription  polymerase chain reaction procedure to amplify and   detect the SARS-CoV-2 RdRp and N genes.    The analytical sensitivity (limit of detection) of   this assay is 100 copies/mL.   A Detected result is considered positive for COVID-19.  This patient is considered infected with the   SARS-CoV-2 virus and is presumed to be contagious.    A Not Detected result means that SARS-CoV-2 RNA is not  present above the limit of detection. It does not rule  out the possibility of COVID-19 and should not be the  sole basis for treatment decisions.  If COVID-19 is   strongly suspected based on clinical and exposure   history,re-testing should be considered.    This test is only for use under Food " and Drug   Administration s Emergency Use Authorization (EUA).   Commercial reagents are provided by [employer].  Performance characteristics of the EUA have been   independently verified by [facility]   Department of Pathology and L                           aboratory Medicine.           Imaging  Ct Chest With Contrast    Result Date: 10/26/2020  EXAMINATION: CT CHEST WITH CONTRAST CLINICAL HISTORY: Tobacco use. Screening spiral CT with difficulty distinguishing nodule vs vascular; Abnormal findings on diagnostic imaging of other specified body structures TECHNIQUE: Low dose axial images, sagittal and coronal reformations were obtained from the thoracic inlet to the lung bases following the IV administration of 75 mL of Omnipaque 350. COMPARISON: Noncontrast low-dose CT 10/19/2020 FINDINGS Heart and mediastinum: Aortic and coronary atherosclerosis is present.  There are multiple noncalcified borderline caliber to mildly enlarged bilateral hilar lymph nodes measuring up to 13 mm in short axis at the right hilum and the area in question in on the prior study corresponds to a 9 mm short axis right infrahilar lymph node.  There is a 1 cm right paratracheal lymph node. Lungs/Pleura: Emphysematous changes are present.  A a a no intraparenchymal nodules. Upper Abdomen: No abnormality of the partially imaged upper abdomen. Bones: Unremarkable.     Borderline caliber mediastinal and hilar lymphadenopathy, which could be reactive or neoplastic in nature.  Given the patient's history of smoking, short-term follow-up study with repeat contrast enhanced chest CT in 3 months is suggested. Emphysema and atherosclerosis Electronically signed by: Fredy Stone Jr Date:    10/26/2020 Time:    16:18      Assessment  1. Syncope, unspecified syncope type  Sounds orthostatic.  May be related to the recent adjustment in Cozaar.    2. Essential hypertension  Controlled today.  Sounds labile  - losartan (COZAAR)  50 MG tablet; Take 1 tablet (50 mg total) by mouth 2 (two) times a day.  Dispense: 180 tablet; Refill: 3    3. Type 2 diabetes mellitus with diabetic polyneuropathy, without long-term current use of insulin  May have some degree of autonomic insufficiency related to her diabetes      Plan and Discussion    Discussed importance of maintaining hydration.  Continue wear compression stockings when she is up and about.  Will split the dosing on losartan to 50 mg twice a day to see if it helps with the labile nature of her blood pressure.  She is currently on event monitor.  I think any arrhythmia is much less likely as an etiology since she was on telemetry in the emergency department when she had 2 of these episodes.      Follow Up  Follow up in about 6 weeks (around 12/30/2020).      Delvis Hawk MD

## 2024-04-09 ENCOUNTER — OFFICE VISIT (OUTPATIENT)
Dept: INTERNAL MEDICINE | Facility: CLINIC | Age: 69
End: 2024-04-09
Payer: MEDICARE

## 2024-04-09 VITALS
HEART RATE: 61 BPM | DIASTOLIC BLOOD PRESSURE: 71 MMHG | BODY MASS INDEX: 27.42 KG/M2 | OXYGEN SATURATION: 98 % | WEIGHT: 149 LBS | HEIGHT: 62 IN | SYSTOLIC BLOOD PRESSURE: 160 MMHG

## 2024-04-09 DIAGNOSIS — S46.819A STRAIN OF TRAPEZIUS MUSCLE, UNSPECIFIED LATERALITY, INITIAL ENCOUNTER: Primary | ICD-10-CM

## 2024-04-09 DIAGNOSIS — I10 ESSENTIAL HYPERTENSION: ICD-10-CM

## 2024-04-09 PROCEDURE — 99999 PR PBB SHADOW E&M-EST. PATIENT-LVL V: CPT | Mod: PBBFAC,,, | Performed by: FAMILY MEDICINE

## 2024-04-09 PROCEDURE — 99213 OFFICE O/P EST LOW 20 MIN: CPT | Mod: S$GLB,,, | Performed by: FAMILY MEDICINE

## 2024-04-09 PROCEDURE — 1125F AMNT PAIN NOTED PAIN PRSNT: CPT | Mod: CPTII,S$GLB,, | Performed by: FAMILY MEDICINE

## 2024-04-09 PROCEDURE — 3077F SYST BP >= 140 MM HG: CPT | Mod: CPTII,S$GLB,, | Performed by: FAMILY MEDICINE

## 2024-04-09 PROCEDURE — 3008F BODY MASS INDEX DOCD: CPT | Mod: CPTII,S$GLB,, | Performed by: FAMILY MEDICINE

## 2024-04-09 PROCEDURE — 1101F PT FALLS ASSESS-DOCD LE1/YR: CPT | Mod: CPTII,S$GLB,, | Performed by: FAMILY MEDICINE

## 2024-04-09 PROCEDURE — 3078F DIAST BP <80 MM HG: CPT | Mod: CPTII,S$GLB,, | Performed by: FAMILY MEDICINE

## 2024-04-09 PROCEDURE — 1159F MED LIST DOCD IN RCRD: CPT | Mod: CPTII,S$GLB,, | Performed by: FAMILY MEDICINE

## 2024-04-09 PROCEDURE — 4010F ACE/ARB THERAPY RXD/TAKEN: CPT | Mod: CPTII,S$GLB,, | Performed by: FAMILY MEDICINE

## 2024-04-09 PROCEDURE — 3288F FALL RISK ASSESSMENT DOCD: CPT | Mod: CPTII,S$GLB,, | Performed by: FAMILY MEDICINE

## 2024-04-09 RX ORDER — HYDROCHLOROTHIAZIDE 12.5 MG/1
12.5 TABLET ORAL DAILY
Qty: 90 TABLET | Refills: 3 | Status: SHIPPED | OUTPATIENT
Start: 2024-04-09 | End: 2025-04-09

## 2024-04-09 RX ORDER — MELOXICAM 15 MG/1
15 TABLET ORAL DAILY
Qty: 14 TABLET | Refills: 0 | Status: SHIPPED | OUTPATIENT
Start: 2024-04-09

## 2024-04-09 RX ORDER — METHOCARBAMOL 500 MG/1
500 TABLET, FILM COATED ORAL 4 TIMES DAILY
Qty: 40 TABLET | Refills: 0 | Status: SHIPPED | OUTPATIENT
Start: 2024-04-09 | End: 2024-04-19

## 2024-04-09 NOTE — PROGRESS NOTES
Subjective:       Patient ID: Leticia Brown is a 68 y.o. female.    Chief Complaint: Shoulder Pain (both)    Shoulder Pain   The pain is present in the neck, right shoulder and left shoulder. This is a chronic problem. The current episode started more than 1 month ago. There has been no history of extremity trauma. The problem occurs daily. The problem has been waxing and waning. The quality of the pain is described as aching. Pertinent negatives include no fever. She has tried movement for the symptoms. The treatment provided no relief.     History of Present Illness        Tests to Keep You Healthy    Mammogram: Met on 1/9/2024  Eye Exam: Met on 1/9/2024  Colon Cancer Screening: Met on 4/11/2022  Last Blood Pressure <= 139/89 (4/9/2024): NO  Last HbA1c < 8 (11/28/2023): Yes  Tobacco Cessation: NO      Social History     Social History Narrative    Not on file       Family History   Problem Relation Name Age of Onset    Arthritis Mother      Cancer Mother          lung    Heart disease Mother      No Known Problems Father      No Known Problems Sister      No Known Problems Brother      No Known Problems Daughter Tashi     No Known Problems Daughter Maria G     Anxiety disorder Daughter Juju     No Known Problems Son Segundo     No Known Problems Maternal Aunt      No Known Problems Maternal Uncle      No Known Problems Paternal Aunt      No Known Problems Paternal Uncle      No Known Problems Maternal Grandmother      No Known Problems Maternal Grandfather      No Known Problems Paternal Grandmother      No Known Problems Paternal Grandfather      Colon cancer Neg Hx      Rectal cancer Neg Hx      Stomach cancer Neg Hx      Breast cancer Neg Hx         Current Outpatient Medications:     albuterol (VENTOLIN HFA) 90 mcg/actuation inhaler, Inhale 2 puffs into the lungs every 6 (six) hours as needed for Wheezing. Rescue, Disp: 18 g, Rfl: 0    amLODIPine (NORVASC) 5 MG tablet, Take 1 tablet (5 mg total) by  mouth 2 (two) times daily., Disp: 180 tablet, Rfl: 0    aspirin (ECOTRIN) 81 MG EC tablet, Take 81 mg by mouth once daily., Disp: , Rfl:     atorvastatin (LIPITOR) 40 MG tablet, Take 1 tablet (40 mg total) by mouth once daily., Disp: 90 tablet, Rfl: 3    blood sugar diagnostic Strp, 1 each by Misc.(Non-Drug; Combo Route) route 4 (four) times daily., Disp: 400 each, Rfl: 2    cetirizine (ZYRTEC) 10 MG tablet, Take 1 tablet (10 mg total) by mouth once daily., Disp: , Rfl:     conjugated estrogens (PREMARIN) vaginal cream, 1 g with applicator or dime-sized amt with finger in vagina nightly x 2 weeks, then twice a week thereafter, Disp: 45 g, Rfl: 12    diclofenac sodium (VOLTAREN) 1 % Gel, Apply 2 g topically once daily., Disp: 100 g, Rfl: 1    ergocalciferol (ERGOCALCIFEROL) 50,000 unit Cap, Take 1 capsule (50,000 Units total) by mouth every 7 days., Disp: 12 capsule, Rfl: 3    fluticasone propionate (FLONASE) 50 mcg/actuation nasal spray, 1 spray (50 mcg total) by Each Nostril route once daily., Disp: 16 g, Rfl: 11    lancets Misc, 1 each by Misc.(Non-Drug; Combo Route) route 4 (four) times daily., Disp: 400 each, Rfl: 2    losartan (COZAAR) 50 MG tablet, Take 1 tablet (50 mg total) by mouth 2 (two) times daily., Disp: 180 tablet, Rfl: 3    methylPREDNISolone (MEDROL DOSEPACK) 4 mg tablet, use as directed, Disp: 1 each, Rfl: 0    mirtazapine (REMERON) 15 MG tablet, Take 1 tablet (15 mg total) by mouth every evening., Disp: 30 tablet, Rfl: 11    pantoprazole (PROTONIX) 40 MG tablet, Take 1 tablet (40 mg total) by mouth before breakfast., Disp: 90 tablet, Rfl: 0    pneumoc 20-arnold conj-dip cr,PF, (PREVNAR 20, PF,) 0.5 mL Syrg injection, Inject into the muscle., Disp: 0.5 mL, Rfl: 0    urea 20 % Crea, Apply 1 application topically once daily. To dry skin on the feet., Disp: 75 g, Rfl: 10    hydroCHLOROthiazide (HYDRODIURIL) 12.5 MG Tab, Take 1 tablet (12.5 mg total) by mouth once daily. For blood pressure, Disp: 90  "tablet, Rfl: 3    meloxicam (MOBIC) 15 MG tablet, Take 1 tablet (15 mg total) by mouth once daily., Disp: 14 tablet, Rfl: 0    oxybutynin (DITROPAN-XL) 10 MG 24 hr tablet, Take 1 tablet (10 mg total) by mouth once daily., Disp: 90 tablet, Rfl: 3    Review of Systems   Constitutional:  Negative for chills and fever.   Respiratory:  Negative for cough and shortness of breath.    Cardiovascular:  Negative for chest pain.   Gastrointestinal:  Negative for abdominal pain.   Musculoskeletal:  Positive for arthralgias.   Skin:  Negative for rash.   Neurological:  Negative for dizziness.       Objective:   BP (!) 160/71 (BP Location: Left arm, Patient Position: Sitting)   Pulse 61   Ht 5' 2" (1.575 m)   Wt 67.6 kg (149 lb)   SpO2 98%   BMI 27.25 kg/m²      Physical Exam  Vitals reviewed.   Constitutional:       Appearance: She is well-developed.   HENT:      Head: Normocephalic and atraumatic.   Eyes:      Conjunctiva/sclera: Conjunctivae normal.   Cardiovascular:      Rate and Rhythm: Normal rate.   Pulmonary:      Effort: Pulmonary effort is normal. No respiratory distress.   Musculoskeletal:      Comments: Increased muscle tone of the trapezius muscles as well as limited range of motion of cervical spine with side bending and rotation   Skin:     General: Skin is warm and dry.      Findings: No rash.   Neurological:      Mental Status: She is alert and oriented to person, place, and time.      Coordination: Coordination normal.   Psychiatric:         Behavior: Behavior normal.         Physical Exam      @resultssec@  Assessment & Plan   Assessment & Plan      Problem List Items Addressed This Visit          Cardiac/Vascular    Essential hypertension    Current Assessment & Plan     Elevated. No change in medication today. However bring bp log and f/u in 4 weeks          Other Visit Diagnoses       Strain of trapezius muscle, unspecified laterality, initial encounter    -  Primary    Relevant Orders    Ambulatory " referral/consult to Physical/Occupational Therapy              Immunizations Administered on Date of Encounter - 4/9/2024       No immunizations on file.             Follow up in about 4 weeks (around 5/7/2024) for follow up with Curry Porras.          Disclaimer:  This note may have been prepared using voice recognition software, it may have not been extensively proofed, as such there could be errors within the text such as sound alike errors.

## 2024-04-09 NOTE — PROGRESS NOTES
Subjective:       Patient ID: Leticia Brown is a 68 y.o. female.    Chief Complaint: Shoulder Pain (both)    Shoulder Pain   Pertinent negatives include no fever or headaches.   Ms. Brown presents to clinic for complaints of bilateral shoulder pain x 6 weeks. She denies recent injury or trauma. She has tried heating packs and tylenol but reports minimal relief. The pain is worse with movement, but also occurs when laying down and without activity. Upon assessment, pain is more localized to her trapezus muscle. Full ROM to right and left shoulder.     BP is elevated today. Compliant with medications.     Tests to Keep You Healthy    Mammogram: Met on 1/9/2024  Eye Exam: Met on 1/9/2024  Colon Cancer Screening: Met on 4/11/2022  Last Blood Pressure <= 139/89 (4/9/2024): NO  Last HbA1c < 8 (11/28/2023): Yes  Tobacco Cessation: NO      Social History     Social History Narrative    Not on file       Family History   Problem Relation Age of Onset    Arthritis Mother     Cancer Mother         lung    Heart disease Mother     No Known Problems Father     No Known Problems Sister     No Known Problems Brother     No Known Problems Daughter     No Known Problems Daughter     Anxiety disorder Daughter     No Known Problems Son     No Known Problems Maternal Aunt     No Known Problems Maternal Uncle     No Known Problems Paternal Aunt     No Known Problems Paternal Uncle     No Known Problems Maternal Grandmother     No Known Problems Maternal Grandfather     No Known Problems Paternal Grandmother     No Known Problems Paternal Grandfather     Colon cancer Neg Hx     Rectal cancer Neg Hx     Stomach cancer Neg Hx     Breast cancer Neg Hx        Current Outpatient Medications:     albuterol (VENTOLIN HFA) 90 mcg/actuation inhaler, Inhale 2 puffs into the lungs every 6 (six) hours as needed for Wheezing. Rescue, Disp: 18 g, Rfl: 0    amLODIPine (NORVASC) 5 MG tablet, Take 1 tablet (5 mg total)  by mouth 2 (two) times daily., Disp: 180 tablet, Rfl: 0    aspirin (ECOTRIN) 81 MG EC tablet, Take 81 mg by mouth once daily., Disp: , Rfl:     atorvastatin (LIPITOR) 40 MG tablet, Take 1 tablet (40 mg total) by mouth once daily., Disp: 90 tablet, Rfl: 3    blood sugar diagnostic Strp, 1 each by Misc.(Non-Drug; Combo Route) route 4 (four) times daily., Disp: 400 each, Rfl: 2    cetirizine (ZYRTEC) 10 MG tablet, Take 1 tablet (10 mg total) by mouth once daily., Disp: , Rfl:     conjugated estrogens (PREMARIN) vaginal cream, 1 g with applicator or dime-sized amt with finger in vagina nightly x 2 weeks, then twice a week thereafter, Disp: 45 g, Rfl: 12    diclofenac sodium (VOLTAREN) 1 % Gel, Apply 2 g topically once daily., Disp: 100 g, Rfl: 1    ergocalciferol (ERGOCALCIFEROL) 50,000 unit Cap, Take 1 capsule (50,000 Units total) by mouth every 7 days., Disp: 12 capsule, Rfl: 3    fluticasone propionate (FLONASE) 50 mcg/actuation nasal spray, 1 spray (50 mcg total) by Each Nostril route once daily., Disp: 16 g, Rfl: 11    lancets Misc, 1 each by Misc.(Non-Drug; Combo Route) route 4 (four) times daily., Disp: 400 each, Rfl: 2    losartan (COZAAR) 50 MG tablet, Take 1 tablet (50 mg total) by mouth 2 (two) times daily., Disp: 180 tablet, Rfl: 3    methylPREDNISolone (MEDROL DOSEPACK) 4 mg tablet, use as directed, Disp: 1 each, Rfl: 0    mirtazapine (REMERON) 15 MG tablet, Take 1 tablet (15 mg total) by mouth every evening., Disp: 30 tablet, Rfl: 11    oxybutynin (DITROPAN-XL) 10 MG 24 hr tablet, Take 1 tablet (10 mg total) by mouth once daily., Disp: 90 tablet, Rfl: 3    pantoprazole (PROTONIX) 40 MG tablet, Take 1 tablet (40 mg total) by mouth before breakfast., Disp: 90 tablet, Rfl: 0    pneumoc 20-arnold conj-dip cr,PF, (PREVNAR 20, PF,) 0.5 mL Syrg injection, Inject into the muscle., Disp: 0.5 mL, Rfl: 0    urea 20 % Crea, Apply 1 application topically once daily. To dry skin on the feet., Disp: 75 g,  "Rfl: 10    Review of Systems   Constitutional:  Negative for appetite change, fatigue and fever.   HENT:  Negative for congestion, rhinorrhea, sinus pressure and sore throat.    Eyes:  Negative for pain, redness and itching.   Respiratory:  Negative for chest tightness, shortness of breath, wheezing and stridor.    Cardiovascular:  Negative for chest pain, palpitations and leg swelling.   Gastrointestinal:  Negative for abdominal distention, constipation, diarrhea, nausea and vomiting.   Endocrine: Negative for cold intolerance and heat intolerance.   Genitourinary:  Negative for difficulty urinating and dysuria.   Musculoskeletal:  Positive for myalgias and neck pain. Negative for back pain and joint swelling.   Neurological:  Negative for dizziness, seizures, light-headedness and headaches.     Objective:   BP (!) 160/71 (BP Location: Left arm, Patient Position: Sitting)   Pulse 61   Ht 5' 2" (1.575 m)   SpO2 98%   BMI 25.00 kg/m²      Physical Exam  Constitutional:       Appearance: Normal appearance. She is normal weight.   HENT:      Head: Normocephalic.      Nose: Nose normal.      Mouth/Throat:      Mouth: Mucous membranes are moist.   Eyes:      Pupils: Pupils are equal, round, and reactive to light.   Cardiovascular:      Rate and Rhythm: Normal rate and regular rhythm.   Pulmonary:      Effort: Pulmonary effort is normal. No respiratory distress.      Breath sounds: No wheezing.   Abdominal:      General: Abdomen is flat. There is no distension.      Palpations: Abdomen is soft.      Tenderness: There is no abdominal tenderness. There is no guarding.   Musculoskeletal:      Right shoulder: No swelling, deformity or crepitus. Normal range of motion.      Left shoulder: No swelling, deformity or crepitus. Normal range of motion.        Arms:       Cervical back: Tenderness present.      Comments: Tenderness to palpation in areas shown above. No redness, swelling or deformity noted.    Neurological:      " Mental Status: She is alert.           @resultssec@  Assessment & Plan       Problem List Items Addressed This Visit    None        Immunizations Administered on Date of Encounter - 4/9/2024       No immunizations on file.             No follow-ups on file.    This note was generated with the assistance of ambient listening technology. Verbal consent was obtained by the patient and accompanying visitor(s) for the recording of patient appointment to facilitate this note. I attest to having reviewed and edited the generated note for accuracy, though some syntax or spelling errors may persist. Please contact the author of this note for any clarification.      Disclaimer:  This note may have been prepared using voice recognition software, it may have not been extensively proofed, as such there could be errors within the text such as sound alike errors.   Quality 111:Pneumonia Vaccination Status For Older Adults: Pneumococcal Vaccination Previously Received Detail Level: Detailed Quality 130: Documentation Of Current Medications In The Medical Record: Current Medications Documented

## 2024-04-18 DIAGNOSIS — F17.200 CURRENT EVERY DAY SMOKER: Primary | ICD-10-CM

## 2024-04-18 RX ORDER — OXYBUTYNIN CHLORIDE 10 MG/1
10 TABLET, EXTENDED RELEASE ORAL DAILY
Qty: 90 TABLET | Refills: 3 | Status: SHIPPED | OUTPATIENT
Start: 2024-04-18

## 2024-04-18 NOTE — TELEPHONE ENCOUNTER
Leticia Brown  is requesting a refill authorization.  Brief Assessment and Rationale for Refill:  Approve     Medication Therapy Plan:         Comments:     Note composed:1:00 PM 04/18/2024

## 2024-04-18 NOTE — TELEPHONE ENCOUNTER
----- Message from Gio Sargent sent at 4/18/2024  9:06 AM CDT -----  Type: General Call Back     Name of Caller:pt   Reason pt is calling in regards to receiving  an letter to get an ct lung scan please give the pt an call to advise   Would the patient rather a call back or a response via [facility]? call  Best Call Back Number: [phone]  Additional Information:

## 2024-04-18 NOTE — TELEPHONE ENCOUNTER
Epic won't let me sign LDCT order.    FW: Appointment Request   Alla Fisher MA   Sent:  12:43 PM   To: CAROLYN MIGUEL Staff      Leticia JO Kevin   MRN: [id number] : 1955   Pt Home: [phone]     Entered: [phone]        Message       ----- Message -----   From: Leticia Brown   Sent: 2024   9:13 AM CDT   To: *   Subject: Appointment Request                                Appointment Request From: Leticia Brown      With Provider: Joao Diehl [[facility]]      Preferred Date Range: Any      Preferred Times: Thursday Morning      Reason for visit: Its time for my CT Low Dose Lung Screening      Comments:   Lung cancer concern     Primary Coverage(Effective 2024)    Payer Plan Group Number Group Name Effective From Effective To   HUMANA MANAGED MEDICARE HU[employer]NA MEDICARE [employer] [id number]  2024      Patient Demographics    Address  [address] [place] [address] Phone  [phone] (Home)  [phone] (Mobile) *Preferred* E-mail Address  [email or web address]     # of No Show in Current Department:0     Future Appointments  2024 - 2025   Date Visit Type Department Provider    2024  9:30 AM [facility] - Outpatient Rehab Carine Fox, PT           2024  3:30 PM EP - PRIMARY CARE ([facility]) [facility] Joao Diehl, DO

## 2024-04-18 NOTE — TELEPHONE ENCOUNTER
Care Due:                  Date            Visit Type   Department     Provider  --------------------------------------------------------------------------------                                EP -                              PRIMARY      [facility]  Last Visit: 04-      CARE ([facility])   MEDICINE       Joao Diehl                              EP -                              PRIMARY      [facility] INTER[facility]  Next Visit: 05-      CARE ([facility])   MEDICINE       Joao Diehl                                                            Last  Test          Frequency    Reason                     Performed    Due Date  --------------------------------------------------------------------------------    Lipid Panel.  12 months..  atorvastatin.............  01-   01-    Vitamin D...  12 months..  ergocalciferol...........  05- 05-    [employer] Embedded Care Due Messages. Reference number: [id number].   4/18/2024 9:02:54 AM CDT

## 2024-04-18 NOTE — TELEPHONE ENCOUNTER
Refill Encounter    PCP Visits: Recent Visits  Date Type Provider Dept   04/09/24 Office Visit Weeks, Joao ALVARADO,  [facility]   01/09/24 Office Visit Weeks, Joao ALVARADO, DO [facility]   12/22/23 Appointment Weeks, Joao ALVARADO,  [facility]   08/14/23 Office Visit Weeks, Joao ALVARADO,  [facility]   05/10/23 Office Visit Sarah Gamble MD [facility]   Showing recent visits within past 360 days and meeting all other requirements  Future Appointments  Date Type Provider Dept   05/07/24 Appointment Weeks, Joao ALVARADO,  [facility]   Showing future appointments within next 720 days and meeting all other requirements     Last 3 Blood Pressure:   BP Readings from Last 3 Encounters:   04/09/24 (!) 160/71   01/09/24 (!) 143/89   12/26/23 (!) 176/79     Preferred Pharmacy:   [id number] - [place], [place] - [address] AT [facility] [address]  [address]  [facility] [address]  Phone: [phone] Fax: [phone]    [facility] [place], [place] - [address]  [address], [facility]  [facility] [address]  Phone: [phone] Fax: [phone]    Requested RX:  Requested Prescriptions     Pending Prescriptions Disp Refills    oxybutynin (DITROPAN-XL) 10 MG 24 hr tablet 90 tablet 3     Sig: Take 1 tablet (10 mg total) by mouth once daily.      RX Route: Normal

## 2024-04-19 RX ORDER — OXYBUTYNIN CHLORIDE 10 MG/1
10 TABLET, EXTENDED RELEASE ORAL DAILY
Qty: 90 TABLET | Refills: 3 | Status: CANCELLED | OUTPATIENT
Start: 2024-04-19

## 2024-04-19 NOTE — TELEPHONE ENCOUNTER
No care due was identified.  [facility] Embedded Care Due Messages. Reference number: [id number].   4/19/2024 9:19:21 AM CDT

## 2024-05-03 ENCOUNTER — CLINICAL SUPPORT (OUTPATIENT)
Dept: REHABILITATION | Facility: OTHER | Age: 69
End: 2024-05-03
Payer: MEDICARE

## 2024-05-03 DIAGNOSIS — R29.3 POSTURAL IMBALANCE: Primary | ICD-10-CM

## 2024-05-03 DIAGNOSIS — M62.81 MUSCLE WEAKNESS OF UPPER EXTREMITY: ICD-10-CM

## 2024-05-03 DIAGNOSIS — G25.89 SCAPULAR DYSKINESIS: ICD-10-CM

## 2024-05-03 PROCEDURE — 97112 NEUROMUSCULAR REEDUCATION: CPT | Mod: PN

## 2024-05-03 PROCEDURE — 97162 PT EVAL MOD COMPLEX 30 MIN: CPT | Mod: PN

## 2024-05-03 NOTE — PATIENT INSTRUCTIONS
PRONE RETRACTION        Lying face down with your arms by your side, slowly squeeze your shoulder blades downward and towards your spine.     Hold for  3  Seconds. Perform  20  reps    Copyright © 2024 [employer]    PRONE RETRACTION EXTENSION - PRONE I          Lying face down with your arms by your side, slowly move your arms upward towards the ceiling as you squeeze your shoulder blades downwards and towards your spine.     Hold for  3  Seconds. Perform 2 sets of  10  reps    Copyright © 2024 [employer]    PRONE W        Lying face down with your elbows bent and palms facing downward, slowly raise your arms up towards the ceiling as you squeeze your shoulder blades downward and towards your spine.     Hold for  3  Seconds. Perform 2 sets of  10  reps    Copyright © 2024 [employer]      PRONE Y        Lying face down with your arms stretched out upwards as shown, slowly move your arms upward towards the ceiling as you squeeze your shoulder blades downward and towards your spine.     Hold for  3  Seconds. Perform 2 sets of  10  reps    Copyright © 2024 [employer]      Prone Ts        Start Position: arms out to your sides (like an airplane wing) Palms facing down.    End position: squeeze your shoulder blades together and raise your arms up     Hold for  3  Seconds. Perform 2 sets of  10  Reps.    Copyright © 2024 [employer]

## 2024-05-03 NOTE — PLAN OF CARE
[facility]  Physical Therapy Initial Evaluation    Name: Leticia Brown  Clinic Number: [id number]    Therapy Diagnosis:   Encounter Diagnoses   Name Primary?    Postural imbalance Yes    Scapular dyskinesis     Muscle weakness of upper extremity      Physician: Joao Diehl DO    Physician Orders: PT Eval and Treat   Medical Diagnosis: S46.819A (ICD-10-CM) - Strain of trapezius muscle, unspecified laterality, initial encounter   Evaluation Date: 5/3/2024  Authorization Period Expiration: 4/9/2025  Plan of Care Certification Period: 6/28/2024  Visit # / Visits authorized: 1/ 1    Time In: 0745  Time Out: 0830  Total Billable Time separate from evaluation time: 10 minutes    Precautions: Standard and Diabetes      Subjective   Date of onset: between 3-6 months  History of current condition - Leticia reports: increased bilateral shoulder and upper trapezius pain. No known mechanism of injury         Past Medical History:   Diagnosis Date    Cataract     Diabetes mellitus     H. pylori infection 2017    [facility]    High cholesterol     Hypertension      Leticia Brown  has a past surgical history that includes Cholecystectomy (2016); Esophagogastroduodenoscopy (N/A, 07/21/2020); Breast biopsy (Right); Tubal ligation (1982); Colonoscopy (N/A, 04/11/2022); Cataract extraction w/  intraocular lens implant (Right, 05/12/2022); Cataract extraction w/  intraocular lens implant (Left, 06/16/2022); and Eye surgery (7/22).    Leticia has a current medication list which includes the following prescription(s): albuterol, amlodipine, aspirin, atorvastatin, blood sugar diagnostic, cetirizine, premarin, diclofenac sodium, ergocalciferol, fluticasone propionate, hydrochlorothiazide, lancets, losartan, meloxicam, methylprednisolone, mirtazapine, oxybutynin, pantoprazole, prevnar 20 (pf), and urea.    Review of patient's allergies indicates:  No Known  "Allergies     Falls: denies falls    Imaging: no recent imaging    Prior Therapy: years ago, same issue  Social History:  lives with their spouse  Occupation: serves food  Prior Level of Function: Independent with ambulation and activities of daily living   Current Level of Function: Independent with ambulation and activities of daily living     Pain:  Current 5/10, worst 9/10, best 2/10   Location: bilateral shoulder   Description: Aching  Aggravating Factors: reaching up, serving food at work.  Easing Factors: Tylenol, muscle relaxant    Radicular symptoms: occasional into right hand    Sleep is disturbed. Sleeping position: side, back, stomach    Patients goals: "No pain."    Objective     Postural examination in sitting:   - decreased lumbar lordosis  - forward head  - forward shoulders  - elevated left shoulder      Functional assessment:   - walking: Independent   - sit to stand: Independent   - sit to supine: Independent   - supine to sit: Independent   - supine to prone: Independent     Cervical AROM    flexion 45 degrees   extension 40 degrees   Right rotation 60 degrees   Left rotation 60 degrees   Right side bending 30 degrees   Left side bending 35 degrees *     *-with pain    Cervical Special Tests    Compression negative   Distraction negative   Alar Ligament Stress Test: negative   Sharp-Ag Test negative   Spurling's:    negative       UE MMT R L   C3 Cervical side bend 5/5 * 5/5 *   C4 Shoulder Shrug 5/5 * 5/5 *   Shoulder flexion 5/5 * 5/5 *   Shoulder abduction 5/5 * 5/5 *   Shoulder internal rotation  5/5 5/5   Shoulder external rotation  5/5 5/5   C5 Elbow flexion 5/5 5/5   C7 Elbow extension 5/5 5/5   C6 Wrist extension 5/5 5/5   Wrist flexion 5/5 5/5   Scapular protraction 5/5 5/5   Mid trapezius  3+/5 3+/5   Lower trapezius  3/5 3/5     *- with pain    Palpation: tenderness with joint assessment T2, T3, T4, and T7. Tenderness in bilateral upper trapezius,and bilateral levator " "scapulae      Intake Outcome Measure for FOTO Thoracic Survey    Therapist reviewed FOTO scores for Leticia Brown on 5/3/2024.   FOTO report - see Media section or FOTO account episode details.    Intake Score: 51%       TREATMENT   Treatment Time In: 0811  Treatment Time Out: 0821  Total Treatment time separate from Evaluation time: 10 minutes      Leticia participated in neuromuscular re-education activities to improve: Posture and muscular activation and training for 10 minutes. The following activities were included:    Prone scapular retractions 20 x 5"  Prone Ys x 10 repetitions   Prone Ws x 10 repetitions   Prone Ts x 10 repetitions   Prone Is x 10 repetitions       Home Exercises Provided and Patient Education Provided     Education provided:   - Discussed the role of the PTA on the Rehab Team. Discussed patient will be seen by a physical therapist minimally every 6th visit or every 30 days prior to being seen by PTA. Also discussed the use of the [facility] for communication.    Prone scapular retractions  Prone Ys  Prone Ws  Prone Ts  Prone Is    Written Home Exercises Provided: yes.  Exercises were reviewed and Leticia was able to demonstrate them prior to the end of the session.  Leticia demonstrated good  understanding of the education provided.     See Electronic Medical Record under Patient Instructions for exercises provided 5/3/2024.    Assessment   Leticia is a 68 y.o. female referred to outpatient Physical Therapy with a medical diagnosis of S46.819A (ICD-10-CM) - Strain of trapezius muscle, unspecified laterality, initial encounter . Patient presents with postural imbalance, periscapular weakness contributing to upper trapezius and bilateral shoulder pain affecting functional and work related activities. Will benefit from outpatient physical therapy for postural reeducation, periscapular strengthening, upper extremity strengthening and manual therapy to progress to below listed " goals. May be a candidate for dry needling.    Patient prognosis is Good.   Patient will benefit from skilled outpatient Physical Therapy to address the deficits stated above and in the chart below, provide pt/family education, and to maximize pt's level of independence.     Plan of care discussed with patient: Yes  Patient's spiritual, cultural and educational needs considered and patient is agreeable to the plan of care and goals as stated below:     Anticipated Barriers for therapy: none    Medical Necessity is demonstrated by the following:      Medical Necessity is demonstrated by the following  History  Co-morbidities and personal factors that may impact the plan of care [] LOW: no personal factors / co-morbidities  [x] MODERATE: 1-2 personal factors / co-morbidities  [] HIGH: 3+ personal factors / co-morbidities    Moderate / High Support Documentation:   Co-morbidities affecting plan of care: DM, HTN    Personal Factors:   no deficits     Examination  Body Structures and Functions, activity limitations and participation restrictions that may impact the plan of care [] LOW: addressing 1-2 elements  [x] MODERATE: 3+ elements  [] HIGH: 4+ elements (please support below)    Moderate / High Support Documentation: postural imbalance, muscular weakness upper extremities, pain with work related activities     Clinical Presentation [] LOW: stable  [x] MODERATE: Evolving  [] HIGH: Unstable     Decision Making/ Complexity Score: moderate         Goals:  Short Term Goals: 4 weeks   Independent with HEP.  Report decreased bilateral shoulder pain < or =  5/10 with activities of daily living such as  reaching up, serving food at work.  Increased manual muscle test for bilateral upper extremities by 1/2 muscle grade to promote proper scapular stabilization to decrease bilateral shoulder pain < or =  5/10 with activities of daily living such as  reaching up, serving food at work.    Long Term Goals: 8 weeks   Report  decreased bilateral shoulder pain < or =  3/10 with activities of daily living such as reaching up, serving food at work.  Increased manual muscle test  for bilateral upper extremities by 1 muscle grade to promote proper scapular stabilization to decrease bilateral shoulder pain < or =  3/10 with activities of daily living such as reaching up, serving food at work.      Plan   Certification Period/Plan of care expiration: 5/3/2024 to 6/28/2024.    Outpatient Physical Therapy 2 times weekly for 8 weeks to include the following interventions: Manual Therapy, Moist Heat/ Ice, Neuromuscular Re-ed, Patient Education, Therapeutic Activities, Therapeutic Exercise, and Dry Needling.     Rl Mathews, PT

## 2024-05-09 ENCOUNTER — DOCUMENTATION ONLY (OUTPATIENT)
Dept: REHABILITATION | Facility: OTHER | Age: 69
End: 2024-05-09
Payer: MEDICARE

## 2024-05-09 NOTE — PROGRESS NOTES
Patient was scheduled for a physical therapy treatment appointment at [facility] on 5/9/2024. Patient failed to appear for the appointment without prior notification today.     Phoebe Stoner III, PTA

## 2024-06-07 ENCOUNTER — TELEPHONE (OUTPATIENT)
Dept: INTERNAL MEDICINE | Facility: CLINIC | Age: 69
End: 2024-06-07
Payer: MEDICARE

## 2024-06-07 DIAGNOSIS — F17.210 LIGHT CIGARETTE SMOKER (1-9 CIGS/DAY): Primary | ICD-10-CM

## 2024-06-07 NOTE — TELEPHONE ENCOUNTER
Appointment Request From: Leticia Brown      With Provider: Joao Diehl [[facility]]      Preferred Date Range: Any date 6/11/2024 or later      Preferred Times: Wednesday Afternoon      Reason for visit: It's time for my ct lung screening      Comments:   Cancer   \\    Pended order for CT lung screening. Unsure how to fill it out.

## 2024-06-10 ENCOUNTER — PATIENT MESSAGE (OUTPATIENT)
Dept: INTERNAL MEDICINE | Facility: CLINIC | Age: 69
End: 2024-06-10
Payer: MEDICARE

## 2024-06-11 NOTE — TELEPHONE ENCOUNTER
Left voicemail to schedule the following below:    Orders Placed This Encounter     Active    CT Chest Lung Screening Low Dose Ordered On: 06/11/2024

## 2024-06-12 ENCOUNTER — OFFICE VISIT (OUTPATIENT)
Dept: INTERNAL MEDICINE | Facility: CLINIC | Age: 69
End: 2024-06-12
Payer: MEDICARE

## 2024-06-12 ENCOUNTER — HOSPITAL ENCOUNTER (OUTPATIENT)
Dept: RADIOLOGY | Facility: OTHER | Age: 69
Discharge: HOME | End: 2024-06-12
Attending: FAMILY MEDICINE
Payer: MEDICARE

## 2024-06-12 ENCOUNTER — TELEPHONE (OUTPATIENT)
Dept: INTERNAL MEDICINE | Facility: CLINIC | Age: 69
End: 2024-06-12

## 2024-06-12 ENCOUNTER — TELEPHONE (OUTPATIENT)
Dept: INTERNAL MEDICINE | Facility: CLINIC | Age: 69
End: 2024-06-12
Payer: MEDICARE

## 2024-06-12 VITALS
WEIGHT: 140.44 LBS | BODY MASS INDEX: 25.84 KG/M2 | HEIGHT: 62 IN | OXYGEN SATURATION: 95 % | SYSTOLIC BLOOD PRESSURE: 162 MMHG | DIASTOLIC BLOOD PRESSURE: 70 MMHG | HEART RATE: 60 BPM

## 2024-06-12 DIAGNOSIS — R09.81 SINUS CONGESTION: ICD-10-CM

## 2024-06-12 DIAGNOSIS — G47.00 INSOMNIA, UNSPECIFIED TYPE: ICD-10-CM

## 2024-06-12 DIAGNOSIS — E11.42 TYPE 2 DIABETES MELLITUS WITH DIABETIC POLYNEUROPATHY, WITHOUT LONG-TERM CURRENT USE OF INSULIN: Primary | ICD-10-CM

## 2024-06-12 DIAGNOSIS — F17.210 LIGHT CIGARETTE SMOKER (1-9 CIGS/DAY): ICD-10-CM

## 2024-06-12 PROCEDURE — 3078F DIAST BP <80 MM HG: CPT | Mod: HCNC,CPTII,S$GLB, | Performed by: FAMILY MEDICINE

## 2024-06-12 PROCEDURE — 3008F BODY MASS INDEX DOCD: CPT | Mod: HCNC,CPTII,S$GLB, | Performed by: FAMILY MEDICINE

## 2024-06-12 PROCEDURE — 1126F AMNT PAIN NOTED NONE PRSNT: CPT | Mod: HCNC,CPTII,S$GLB, | Performed by: FAMILY MEDICINE

## 2024-06-12 PROCEDURE — 3288F FALL RISK ASSESSMENT DOCD: CPT | Mod: HCNC,CPTII,S$GLB, | Performed by: FAMILY MEDICINE

## 2024-06-12 PROCEDURE — 71271 CT THORAX LUNG CANCER SCR C-: CPT | Mod: 26,HCNC,, | Performed by: RADIOLOGY

## 2024-06-12 PROCEDURE — 71271 CT THORAX LUNG CANCER SCR C-: CPT | Mod: TC,HCNC

## 2024-06-12 PROCEDURE — 1101F PT FALLS ASSESS-DOCD LE1/YR: CPT | Mod: HCNC,CPTII,S$GLB, | Performed by: FAMILY MEDICINE

## 2024-06-12 PROCEDURE — 99214 OFFICE O/P EST MOD 30 MIN: CPT | Mod: HCNC,S$GLB,, | Performed by: FAMILY MEDICINE

## 2024-06-12 PROCEDURE — 4010F ACE/ARB THERAPY RXD/TAKEN: CPT | Mod: HCNC,CPTII,S$GLB, | Performed by: FAMILY MEDICINE

## 2024-06-12 PROCEDURE — 99999 PR PBB SHADOW E&M-EST. PATIENT-LVL IV: CPT | Mod: PBBFAC,HCNC,, | Performed by: FAMILY MEDICINE

## 2024-06-12 PROCEDURE — 1159F MED LIST DOCD IN RCRD: CPT | Mod: HCNC,CPTII,S$GLB, | Performed by: FAMILY MEDICINE

## 2024-06-12 PROCEDURE — 3077F SYST BP >= 140 MM HG: CPT | Mod: HCNC,CPTII,S$GLB, | Performed by: FAMILY MEDICINE

## 2024-06-12 RX ORDER — OLMESARTAN MEDOXOMIL 40 MG/1
40 TABLET ORAL DAILY
Qty: 90 TABLET | Refills: 3 | Status: SHIPPED | OUTPATIENT
Start: 2024-06-12 | End: 2025-06-12

## 2024-06-12 RX ORDER — TRAZODONE HYDROCHLORIDE 50 MG/1
50 TABLET ORAL NIGHTLY
Qty: 30 TABLET | Refills: 11 | Status: SHIPPED | OUTPATIENT
Start: 2024-06-12 | End: 2025-06-12

## 2024-06-12 RX ORDER — AZELASTINE 1 MG/ML
1 SPRAY, METERED NASAL 2 TIMES DAILY
Qty: 30 ML | Refills: 0 | Status: SHIPPED | OUTPATIENT
Start: 2024-06-12 | End: 2025-06-12

## 2024-06-12 NOTE — TELEPHONE ENCOUNTER
----- Message from Keri Jorgensen sent at 6/12/2024  7:46 AM CDT -----  Name of Who is Calling:CAREN NASH [[id number]]                   What is the request in detail:PT has a bad cold and wants to know if she can come in today I could only find aug appt please assist                   Can the clinic reply by [facility]: No                   What Number to Call Back if not in [facility]: [phone]

## 2024-06-12 NOTE — PROGRESS NOTES
Subjective:       Patient ID: Leticia Brown is a 69 y.o. female.    Chief Complaint: Cough and Sinus Problem    Cough  Pertinent negatives include no chest pain, chills, fever, rash or shortness of breath.   Sinus Problem  Associated symptoms include coughing. Pertinent negatives include no chills or shortness of breath.     History of Present Illness  The patient presents for evaluation of cough.    The patient has been experiencing a persistent cough for several days, which she suspects may be indicative of an infection. The nasal discharge is characterized by green and thick discharge. Despite her smoking habit, the cough persists. She denies the presence of fever or facial pain. Her current treatment regimen includes NyQuil and Flonase nasal spray.    The patient's blood pressure has been consistently elevated. She is currently on amlodipine, hydrochlorothiazide, and losartan, both administered twice daily.   The patient is a smoker.      Tests to Keep You Healthy    Tobacco Cessation: NO      Social History     Social History Narrative    Not on file       Family History   Problem Relation Name Age of Onset    Arthritis Mother      Cancer Mother          lung    Heart disease Mother      No Known Problems Father      No Known Problems Sister      No Known Problems Brother      No Known Problems Daughter Tashi     No Known Problems Daughter Maria G     Anxiety disorder Daughter Juju     No Known Problems Son Segundo     No Known Problems Maternal Aunt      No Known Problems Maternal Uncle      No Known Problems Paternal Aunt      No Known Problems Paternal Uncle      No Known Problems Maternal Grandmother      No Known Problems Maternal Grandfather      No Known Problems Paternal Grandmother      No Known Problems Paternal Grandfather      Colon cancer Neg Hx      Rectal cancer Neg Hx      Stomach cancer Neg Hx      Breast cancer Neg Hx         Current Outpatient Medications:     albuterol (VENTOLIN  HFA) 90 mcg/actuation inhaler, Inhale 2 puffs into the lungs every 6 (six) hours as needed for Wheezing. Rescue, Disp: 18 g, Rfl: 0    amLODIPine (NORVASC) 5 MG tablet, Take 1 tablet (5 mg total) by mouth 2 (two) times daily., Disp: 180 tablet, Rfl: 0    aspirin (ECOTRIN) 81 MG EC tablet, Take 81 mg by mouth once daily., Disp: , Rfl:     atorvastatin (LIPITOR) 40 MG tablet, Take 1 tablet (40 mg total) by mouth once daily., Disp: 90 tablet, Rfl: 3    blood sugar diagnostic Strp, 1 each by Misc.(Non-Drug; Combo Route) route 4 (four) times daily., Disp: 400 each, Rfl: 2    cetirizine (ZYRTEC) 10 MG tablet, Take 1 tablet (10 mg total) by mouth once daily., Disp: , Rfl:     conjugated estrogens (PREMARIN) vaginal cream, 1 g with applicator or dime-sized amt with finger in vagina nightly x 2 weeks, then twice a week thereafter, Disp: 45 g, Rfl: 12    diclofenac sodium (VOLTAREN) 1 % Gel, Apply 2 g topically once daily., Disp: 100 g, Rfl: 1    ergocalciferol (ERGOCALCIFEROL) 50,000 unit Cap, Take 1 capsule (50,000 Units total) by mouth every 7 days., Disp: 12 capsule, Rfl: 3    fluticasone propionate (FLONASE) 50 mcg/actuation nasal spray, 1 spray (50 mcg total) by Each Nostril route once daily., Disp: 16 g, Rfl: 11    pantoprazole (PROTONIX) 40 MG tablet, Take 1 tablet (40 mg total) by mouth before breakfast., Disp: 90 tablet, Rfl: 0    urea 20 % Crea, Apply 1 application topically once daily. To dry skin on the feet., Disp: 75 g, Rfl: 10    azelastine (ASTELIN) 137 mcg (0.1 %) nasal spray, 1 spray (137 mcg total) by Nasal route 2 (two) times daily., Disp: 30 mL, Rfl: 0    hydroCHLOROthiazide (HYDRODIURIL) 12.5 MG Tab, Take 1 tablet (12.5 mg total) by mouth once daily. For blood pressure, Disp: 90 tablet, Rfl: 3    lancets Misc, 1 each by Misc.(Non-Drug; Combo Route) route 4 (four) times daily., Disp: 400 each, Rfl: 2    meloxicam (MOBIC) 15 MG tablet, Take 1 tablet (15 mg total) by mouth once daily., Disp: 14 tablet,  "Rfl: 0    olmesartan (BENICAR) 40 MG tablet, Take 1 tablet (40 mg total) by mouth once daily., Disp: 90 tablet, Rfl: 3    oxybutynin (DITROPAN-XL) 10 MG 24 hr tablet, Take 1 tablet (10 mg total) by mouth once daily., Disp: 90 tablet, Rfl: 3    pneumoc 20-arnold conj-dip cr,PF, (PREVNAR 20, PF,) 0.5 mL Syrg injection, Inject into the muscle., Disp: 0.5 mL, Rfl: 0    traZODone (DESYREL) 50 MG tablet, Take 1 tablet (50 mg total) by mouth every evening. For sleep, Disp: 30 tablet, Rfl: 11    Review of Systems   Constitutional:  Negative for chills and fever.   Respiratory:  Positive for cough. Negative for shortness of breath.    Cardiovascular:  Negative for chest pain.   Gastrointestinal:  Negative for abdominal pain.   Skin:  Negative for rash.   Neurological:  Negative for dizziness.   Psychiatric/Behavioral:  Positive for sleep disturbance.        Objective:   BP (!) 162/70 (BP Location: Left arm, Patient Position: Sitting)   Pulse 60   Ht 5' 2" (1.575 m)   Wt 63.7 kg (140 lb 6.9 oz)   SpO2 95%   BMI 25.69 kg/m²      Physical Exam  Constitutional:       General: She is not in acute distress.     Appearance: She is well-developed. She is not diaphoretic.   HENT:      Head: Normocephalic and atraumatic.      Nose: Nose normal.      Comments: Thick, yellow/green phlegm from blowing nose    Eyes:      General:         Right eye: No discharge.         Left eye: No discharge.      Conjunctiva/sclera: Conjunctivae normal.      Pupils: Pupils are equal, round, and reactive to light.   Neck:      Thyroid: No thyromegaly.   Cardiovascular:      Rate and Rhythm: Normal rate and regular rhythm.      Heart sounds: No murmur heard.  Pulmonary:      Effort: Pulmonary effort is normal. No respiratory distress.      Breath sounds: Normal breath sounds.   Abdominal:      General: There is no distension.      Palpations: Abdomen is soft.   Skin:     Findings: No rash.   Neurological:      Mental Status: She is alert and oriented to " person, place, and time.   Psychiatric:         Behavior: Behavior normal.         Physical Exam      @resultssec@  Assessment & Plan   Assessment & Plan  1. Cough  A CT scan has been ordered for monitoring purposes. A NeilMed sinus rinse has been recommended. Additionally, Astelin nasal spray has been prescribed for congestion.    2. Hypertension  Losartan has been discontinued and replaced with Olmesartan. Continue digital monitoring. Would increase HCTZ next.     3. Tobacco use. Complicates cough and bp control    Problem List Items Addressed This Visit          Endocrine    Type 2 diabetes mellitus with diabetic polyneuropathy, without long-term current use of insulin - Primary    Relevant Orders    Hemoglobin A1C    Lipid Panel     Other Visit Diagnoses       Sinus congestion                  Immunizations Administered on Date of Encounter - 6/12/2024       No immunizations on file.             No follow-ups on file.      Disclaimer:  This note may have been prepared using voice recognition software, it may have not been extensively proofed, as such there could be errors within the text such as sound alike errors.

## 2024-06-12 NOTE — TELEPHONE ENCOUNTER
Message sent yesterday from patient stating she needed to be seen today for a bad cold.  Patient in clinic now to see a provider.

## 2024-06-12 NOTE — TELEPHONE ENCOUNTER
----- Message from Joao Diehl DO sent at 6/12/2024  4:25 PM CDT -----  Diabetes well controlled. Keep up the good work!

## 2024-06-13 ENCOUNTER — PATIENT MESSAGE (OUTPATIENT)
Dept: INTERNAL MEDICINE | Facility: CLINIC | Age: 69
End: 2024-06-13
Payer: MEDICARE

## 2024-06-13 ENCOUNTER — TELEPHONE (OUTPATIENT)
Dept: INTERNAL MEDICINE | Facility: CLINIC | Age: 69
End: 2024-06-13
Payer: MEDICARE

## 2024-06-14 ENCOUNTER — TELEPHONE (OUTPATIENT)
Dept: INTERNAL MEDICINE | Facility: CLINIC | Age: 69
End: 2024-06-14
Payer: MEDICARE

## 2024-06-14 NOTE — TELEPHONE ENCOUNTER
----- Message from Libby Ordonez LPN sent at 6/13/2024  8:35 AM CDT -----    ----- Message -----  From: Sun Newman  Sent: 6/12/2024   4:47 PM CDT  To: Shanita MIGUEL Staff    Type : Patient Call          Who Called :  Carine Montes          Does the patient know what this is regarding?: Pt is returning a call from Carine to discuss test results; please advise              Would the patient rather a call back or a response via [facility]? Call                Best Call Back Number: [phone]            Additional Information:

## 2024-06-14 NOTE — TELEPHONE ENCOUNTER
T/C to patient. No answer.  Left message.  Sent [employer] message informing patient that her diabetes was well controlled.

## 2024-06-21 ENCOUNTER — PATIENT OUTREACH (OUTPATIENT)
Dept: ADMINISTRATIVE | Facility: HOSPITAL | Age: 69
End: 2024-06-21
Payer: MEDICARE

## 2024-06-21 NOTE — PROGRESS NOTES
Health Maintenance Due   Topic Date Due    Foot Exam  11/04/2022    Shingles Vaccine (2 of 2) 01/23/2024    Health Maintenance reviewed, updated and links triggered. Foot exam due and order place, left a   Voice mail for scheduling and a nurse blood pressure appt. ([facility]) 6/21/24  Portal message sent as second communication.

## 2024-06-28 ENCOUNTER — PATIENT MESSAGE (OUTPATIENT)
Dept: INTERNAL MEDICINE | Facility: CLINIC | Age: 69
End: 2024-06-28

## 2024-06-28 ENCOUNTER — OFFICE VISIT (OUTPATIENT)
Dept: INTERNAL MEDICINE | Facility: CLINIC | Age: 69
End: 2024-06-28
Payer: MEDICARE

## 2024-06-28 ENCOUNTER — HOSPITAL ENCOUNTER (OUTPATIENT)
Dept: RADIOLOGY | Facility: OTHER | Age: 69
Discharge: HOME | End: 2024-06-28
Payer: MEDICARE

## 2024-06-28 VITALS
BODY MASS INDEX: 27.54 KG/M2 | OXYGEN SATURATION: 100 % | WEIGHT: 150.56 LBS | DIASTOLIC BLOOD PRESSURE: 70 MMHG | HEART RATE: 72 BPM | SYSTOLIC BLOOD PRESSURE: 140 MMHG

## 2024-06-28 DIAGNOSIS — M79.601 RIGHT ARM PAIN: Primary | ICD-10-CM

## 2024-06-28 DIAGNOSIS — M79.601 RIGHT ARM PAIN: ICD-10-CM

## 2024-06-28 PROCEDURE — 99999 PR PBB SHADOW E&M-EST. PATIENT-LVL IV: CPT | Mod: PBBFAC,HCNC,,

## 2024-06-28 PROCEDURE — 73060 X-RAY EXAM OF HUMERUS: CPT | Mod: TC,HCNC,FY,RT

## 2024-06-28 PROCEDURE — 73060 X-RAY EXAM OF HUMERUS: CPT | Mod: 26,HCNC,RT, | Performed by: RADIOLOGY

## 2024-06-28 RX ORDER — IBUPROFEN 600 MG/1
600 TABLET ORAL 3 TIMES DAILY
Qty: 15 TABLET | Refills: 0 | Status: SHIPPED | OUTPATIENT
Start: 2024-06-28

## 2024-06-28 RX ORDER — METHOCARBAMOL 500 MG/1
500 TABLET, FILM COATED ORAL 3 TIMES DAILY PRN
Qty: 15 TABLET | Refills: 0 | Status: SHIPPED | OUTPATIENT
Start: 2024-06-28 | End: 2024-07-08

## 2024-06-28 NOTE — PROGRESS NOTES
CHIEF COMPLAINT     Chief Complaint   Patient presents with    Arm Pain     (R) arm pain       HPI     Leticia Brown is a 69 y.o. female who presents for R arm pain today.    PCP is Joao Diehl DO, patient is new to me. Pt reports aching pain of entire R arm x 1 week. No known injury. Reports that the pain is affecting her motor skills. 6/10. Winces when touched lightly. Denies swelling, redness, heat, numbness/tingling.    Past Medical History:  Past Medical History:   Diagnosis Date    Cataract     Diabetes mellitus     [facility]. pylori infection 2017    [facility]    High cholesterol     Hypertension        Home Medications:  Prior to Admission medications    Medication Sig Start Date End Date Taking? Authorizing Provider   albuterol (VENTOLIN HFA) 90 mcg/actuation inhaler Inhale 2 puffs into the lungs every 6 (six) hours as needed for Wheezing. Rescue 12/26/23  Yes Octavia Vargas, NP   amLODIPine (NORVASC) 5 MG tablet Take 1 tablet (5 mg total) by mouth 2 (two) times daily. 3/28/24 3/28/25 Yes Joao Diehl,    aspirin (ECOTRIN) 81 MG EC tablet Take 81 mg by mouth once daily.   Yes Provider, Historical   atorvastatin (LIPITOR) 40 MG tablet Take 1 tablet (40 mg total) by mouth once daily. 8/14/23  Yes Joao Diehl, DO   azelastine (ASTELIN) 137 mcg (0.1 %) nasal spray 1 spray (137 mcg total) by Nasal route 2 (two) times daily. 6/12/24 6/12/25 Yes Joao Diehl DO   blood sugar diagnostic Strp 1 each by Misc.(Non-Drug; Combo Route) route 4 (four) times daily. 8/18/23  Yes Joao Diehl, DO   cetirizine (ZYRTEC) 10 MG tablet Take 1 tablet (10 mg total) by mouth once daily. 1/9/24  Yes Joao Diehl, DO   ergocalciferol (ERGOCALCIFEROL) 50,000 unit Cap Take 1 capsule (50,000 Units total) by mouth every 7 days. 8/14/23  Yes Joao Diehl, DO   fluticasone propionate (FLONASE) 50 mcg/actuation nasal spray 1 spray (50 mcg total) by Each Nostril  route once daily. 3/24/22  Yes Sarah Gamble MD   hydroCHLOROthiazide (HYDRODIURIL) 12.5 MG Tab Take 1 tablet (12.5 mg total) by mouth once daily. For blood pressure 4/9/24 4/9/25 Yes Weeks, Joao ALVARADO, DO   lancets Misc 1 each by Misc.(Non-Drug; Combo Route) route 4 (four) times daily. 8/18/23  Yes Weeks, Joao ALVARADO,    olmesartan (BENICAR) 40 MG tablet Take 1 tablet (40 mg total) by mouth once daily. 6/12/24 6/12/25 Yes Weeks, Joao ALVARADO,    oxybutynin (DITROPAN-XL) 10 MG 24 hr tablet Take 1 tablet (10 mg total) by mouth once daily. 4/18/24  Yes Weeks, Joao ALVARADO,    pantoprazole (PROTONIX) 40 MG tablet Take 1 tablet (40 mg total) by mouth before breakfast. 8/14/23  Yes Weeks, Joao ALVARADO,    traZODone (DESYREL) 50 MG tablet Take 1 tablet (50 mg total) by mouth every evening. For sleep 6/12/24 6/12/25 Yes Weeks, Joao ALVARADO,    urea 20 % Crea Apply 1 application topically once daily. To dry skin on the feet. 11/4/21  Yes Mignon Trivedi DPM   meloxicam (MOBIC) 15 MG tablet Take 1 tablet (15 mg total) by mouth once daily. 4/9/24 6/28/24 Yes Weeks, Joao ALVARADO,    conjugated estrogens (PREMARIN) vaginal cream 1 g with applicator or dime-sized amt with finger in vagina nightly x 2 weeks, then twice a week thereafter  Patient not taking: Reported on 6/28/2024 11/15/22   Ricardo Mora, DO   diclofenac sodium (VOLTAREN) 1 % Gel Apply 2 g topically once daily.  Patient not taking: Reported on 6/28/2024 8/14/23   Weeks, Joao ALVARADO DO   ibuprofen (ADVIL,MOTRIN) 600 MG tablet Take 1 tablet (600 mg total) by mouth 3 (three) times daily. 6/28/24   Mohsen Ross, NP   methocarbamoL (ROBAXIN) 500 MG Tab Take 1 tablet (500 mg total) by mouth 3 (three) times daily as needed (muscle pain). 6/28/24 7/8/24  Mohsen Ross, NP   pneumoc 20-arnold conj-dip cr,PF, (PREVNAR 20, PF,) 0.5 mL Syrg injection Inject into the muscle.  Patient not taking: Reported on 6/28/2024 8/14/23   Tari Boggs, PharmD        Review of Systems:  Review of Systems   Constitutional:  Positive for activity change and unexpected weight change.   HENT:  Positive for trouble swallowing. Negative for hearing loss and rhinorrhea.    Eyes:  Negative for discharge and visual disturbance.   Respiratory:  Negative for chest tightness and wheezing.    Cardiovascular:  Negative for chest pain and palpitations.   Gastrointestinal:  Negative for blood in stool, constipation, diarrhea and vomiting.   Endocrine: Negative for polydipsia and polyuria.   Genitourinary:  Negative for difficulty urinating, dysuria, hematuria and menstrual problem.   Musculoskeletal:  Positive for arthralgias and neck pain. Negative for joint swelling.   Neurological:  Negative for weakness and headaches.   Psychiatric/Behavioral:  Negative for confusion and dysphoric mood.        Health Maintainence:   Immunizations:  Health Maintenance         Date Due Completion Date    Foot Exam 11/04/2022 11/4/2021    Shingles Vaccine (2 of 2) 01/23/2024 11/28/2023    COVID-19 Vaccine (5 - 2023-24 season) 11/28/2024 (Originally 9/1/2023) 5/18/2022    Diabetes Urine Screening 11/28/2024 11/28/2023    Hemoglobin A1c 12/12/2024 6/12/2024    Mammogram 01/09/2025 1/9/2024    Eye Exam 01/09/2025 1/9/2024    Override on 8/11/2022: Done    Override on 10/1/2020: Done    LDCT Lung Screen 06/12/2025 6/12/2024    Lipid Panel 06/12/2025 6/12/2024    DEXA Scan 10/25/2025 10/25/2022    TETANUS VACCINE 08/30/2029 8/30/2019    Colorectal Cancer Screening 04/11/2032 4/11/2022    Override on 3/8/2017: Done (repeat in 10 years)             PHYSICAL EXAM     BP (!) 140/70 (BP Location: Left arm, Patient Position: Sitting)   Pulse 72   Wt 68.3 kg (150 lb 9.2 oz)   SpO2 100%   BMI 27.54 kg/m²     Physical Exam  Vitals reviewed.   Constitutional:       Appearance: She is well-developed.   HENT:      Head: Normocephalic and atraumatic.   Eyes:      Conjunctiva/sclera: Conjunctivae normal.    Cardiovascular:      Rate and Rhythm: Normal rate.   Pulmonary:      Effort: Pulmonary effort is normal. No respiratory distress.   Musculoskeletal:      Comments: B strength equal. Pt unable to  lift arm above head.   Skin:     General: Skin is warm and dry.      Findings: No rash.   Neurological:      Mental Status: She is alert and oriented to person, place, and time.      Coordination: Coordination normal.   Psychiatric:         Behavior: Behavior normal.         LABS     Lab Results   Component Value Date    HGBA1C 5.6 06/12/2024     CMP  Sodium   Date Value Ref Range Status   01/09/2024 138 136 - 145 mmol/L Final     Potassium   Date Value Ref Range Status   01/09/2024 4.2 3.5 - 5.1 mmol/L Final     Chloride   Date Value Ref Range Status   01/09/2024 105 95 - 110 mmol/L Final     CO2   Date Value Ref Range Status   01/09/2024 20 (L) 23 - 29 mmol/L Final     Glucose   Date Value Ref Range Status   01/09/2024 78 70 - 110 mg/dL Final     BUN   Date Value Ref Range Status   01/09/2024 16 8 - 23 mg/dL Final     Creatinine   Date Value Ref Range Status   01/09/2024 0.8 0.5 - 1.4 mg/dL Final     Calcium   Date Value Ref Range Status   01/09/2024 9.4 8.7 - 10.5 mg/dL Final     Total Protein   Date Value Ref Range Status   01/09/2024 7.8 6.0 - 8.4 g/dL Final     Albumin   Date Value Ref Range Status   01/09/2024 3.7 3.5 - 5.2 g/dL Final     Total Bilirubin   Date Value Ref Range Status   01/09/2024 0.2 0.1 - 1.0 mg/dL Final     Comment:     For infants and newborns, interpretation of results should be based  on gestational age, weight and in agreement with clinical  observations.    Premature Infant recommended reference ranges:  Up to 24 hours.............<8.0 mg/dL  Up to 48 hours............<12.0 mg/dL  3-5 days..................<15.0 mg/dL  6-29 days.................<15.0 mg/dL       Alkaline Phosphatase   Date Value Ref Range Status   01/09/2024 96 55 - 135 U/L Final     AST   Date Value Ref Range Status    01/09/2024 19 10 - 40 U/L Final     ALT   Date Value Ref Range Status   01/09/2024 22 10 - 44 U/L Final     Anion Gap   Date Value Ref Range Status   01/09/2024 13 8 - 16 mmol/L Final     eGFR if    Date Value Ref Range Status   05/01/2022 >60 >60 mL/min/1.73 m^2 Final     eGFR if non    Date Value Ref Range Status   05/01/2022 >60 >60 mL/min/1.73 m^2 Final     Comment:     Calculation used to obtain the estimated glomerular filtration  rate (eGFR) is the CKD-EPI equation.        Lab Results   Component Value Date    WBC 11.54 01/09/2024    HGB 14.2 01/09/2024    HCT 43.7 01/09/2024    MCV 89 01/09/2024     01/09/2024     Lab Results   Component Value Date    CHOL 163 06/12/2024    CHOL 183 01/10/2023    CHOL 191 09/20/2022     Lab Results   Component Value Date    HDL 38 (L) 06/12/2024    HDL 46 01/10/2023    HDL 41 09/20/2022     Lab Results   Component Value Date    LDLCALC 111.4 06/12/2024    LDLCALC 124.0 01/10/2023    LDLCALC 128.6 09/20/2022     Lab Results   Component Value Date    TRIG 68 06/12/2024    TRIG 65 01/10/2023    TRIG 107 09/20/2022     Lab Results   Component Value Date    CHOLHDL 23.3 06/12/2024    CHOLHDL 25.1 01/10/2023    CHOLHDL 21.5 09/20/2022     Lab Results   Component Value Date    TSH 0.732 06/12/2024       ASSESSMENT/PLAN   1. Right arm pain  -     X-Ray Humerus 2 View Right; Future; Expected date: 06/28/2024  -     ibuprofen (ADVIL,MOTRIN) 600 MG tablet; Take 1 tablet (600 mg total) by mouth 3 (three) times daily.  Dispense: 15 tablet; Refill: 0  -     methocarbamoL (ROBAXIN) 500 MG Tab; Take 1 tablet (500 mg total) by mouth 3 (three) times daily as needed (muscle pain).  Dispense: 15 tablet; Refill: 0       Will approach conservatively with f/u through portal. If no improvement with meds will refer to ortho. Consider adding PT is some improvement with meds.      Mohsen Ross NP   Department of Internal Medicine - [facility]  10:17  AM

## 2024-07-04 ENCOUNTER — PATIENT MESSAGE (OUTPATIENT)
Dept: INTERNAL MEDICINE | Facility: CLINIC | Age: 69
End: 2024-07-04
Payer: MEDICARE

## 2024-07-05 DIAGNOSIS — M79.601 RIGHT ARM PAIN: Primary | ICD-10-CM

## 2024-07-22 ENCOUNTER — PATIENT MESSAGE (OUTPATIENT)
Dept: INTERNAL MEDICINE | Facility: CLINIC | Age: 69
End: 2024-07-22
Payer: MEDICARE

## 2024-07-22 DIAGNOSIS — M79.601 RIGHT ARM PAIN: Primary | ICD-10-CM

## 2024-07-25 ENCOUNTER — TELEPHONE (OUTPATIENT)
Dept: ORTHOPEDICS | Facility: CLINIC | Age: 69
End: 2024-07-25
Payer: MEDICARE

## 2024-07-26 ENCOUNTER — TELEPHONE (OUTPATIENT)
Dept: ORTHOPEDICS | Facility: CLINIC | Age: 69
End: 2024-07-26
Payer: MEDICARE

## 2024-07-26 NOTE — TELEPHONE ENCOUNTER
Attempted to reach pt to confirm upper arm/humerus pain and no shoulder involvement prior to appt c Dr. Santoyo .

## 2024-07-31 ENCOUNTER — TELEPHONE (OUTPATIENT)
Dept: ORTHOPEDICS | Facility: CLINIC | Age: 69
End: 2024-07-31
Payer: MEDICARE

## 2024-07-31 NOTE — TELEPHONE ENCOUNTER
LVM c pt. Attempting to confirm appt location & time c Dr. Santoyo  Requested a call back to the [facility] at [phone] with any questions, concerns or need for a different appointment time.

## 2024-08-06 ENCOUNTER — OFFICE VISIT (OUTPATIENT)
Dept: ORTHOPEDICS | Facility: CLINIC | Age: 69
End: 2024-08-06
Payer: MEDICARE

## 2024-08-06 DIAGNOSIS — M79.601 RIGHT ARM PAIN: ICD-10-CM

## 2024-08-06 DIAGNOSIS — M77.11 LATERAL EPICONDYLITIS, RIGHT ELBOW: Primary | ICD-10-CM

## 2024-08-06 PROCEDURE — 99204 OFFICE O/P NEW MOD 45 MIN: CPT | Mod: 25,HCNC,S$GLB, | Performed by: ORTHOPAEDIC SURGERY

## 2024-08-06 PROCEDURE — 20551 NJX 1 TENDON ORIGIN/INSJ: CPT | Mod: HCNC,S$GLB,, | Performed by: ORTHOPAEDIC SURGERY

## 2024-08-06 PROCEDURE — 3044F HG A1C LEVEL LT 7.0%: CPT | Mod: HCNC,CPTII,S$GLB, | Performed by: ORTHOPAEDIC SURGERY

## 2024-08-06 PROCEDURE — 99999 PR PBB SHADOW E&M-EST. PATIENT-LVL IV: CPT | Mod: PBBFAC,HCNC,, | Performed by: ORTHOPAEDIC SURGERY

## 2024-08-06 PROCEDURE — 3288F FALL RISK ASSESSMENT DOCD: CPT | Mod: HCNC,CPTII,S$GLB, | Performed by: ORTHOPAEDIC SURGERY

## 2024-08-06 PROCEDURE — 1159F MED LIST DOCD IN RCRD: CPT | Mod: HCNC,CPTII,S$GLB, | Performed by: ORTHOPAEDIC SURGERY

## 2024-08-06 PROCEDURE — 1125F AMNT PAIN NOTED PAIN PRSNT: CPT | Mod: HCNC,CPTII,S$GLB, | Performed by: ORTHOPAEDIC SURGERY

## 2024-08-06 PROCEDURE — 4010F ACE/ARB THERAPY RXD/TAKEN: CPT | Mod: HCNC,CPTII,S$GLB, | Performed by: ORTHOPAEDIC SURGERY

## 2024-08-06 PROCEDURE — 1101F PT FALLS ASSESS-DOCD LE1/YR: CPT | Mod: HCNC,CPTII,S$GLB, | Performed by: ORTHOPAEDIC SURGERY

## 2024-08-06 RX ORDER — TRIAMCINOLONE ACETONIDE 40 MG/ML
20 INJECTION, SUSPENSION INTRA-ARTICULAR; INTRAMUSCULAR
Status: DISCONTINUED | OUTPATIENT
Start: 2024-08-06 | End: 2024-08-06 | Stop reason: HOSPADM

## 2024-08-06 RX ADMIN — TRIAMCINOLONE ACETONIDE 20 MG: 40 INJECTION, SUSPENSION INTRA-ARTICULAR; INTRAMUSCULAR at 08:08

## 2024-08-09 ENCOUNTER — HOSPITAL ENCOUNTER (EMERGENCY)
Facility: OTHER | Age: 69
Discharge: HOME | End: 2024-08-09
Attending: EMERGENCY MEDICINE
Payer: MEDICARE

## 2024-08-09 VITALS
HEIGHT: 62 IN | SYSTOLIC BLOOD PRESSURE: 143 MMHG | TEMPERATURE: 98 F | WEIGHT: 140 LBS | HEART RATE: 57 BPM | BODY MASS INDEX: 25.76 KG/M2 | DIASTOLIC BLOOD PRESSURE: 65 MMHG | OXYGEN SATURATION: 100 % | RESPIRATION RATE: 16 BRPM

## 2024-08-09 DIAGNOSIS — R07.89 CHEST WALL PAIN: ICD-10-CM

## 2024-08-09 DIAGNOSIS — R07.9 LEFT-SIDED CHEST PAIN: ICD-10-CM

## 2024-08-09 DIAGNOSIS — R07.9 CHEST PAIN: Primary | ICD-10-CM

## 2024-08-09 LAB
ALBUMIN SERPL BCP-MCNC: 3.4 G/DL (ref 3.5–5.2)
ALP SERPL-CCNC: 74 U/L (ref 55–135)
ALT SERPL W/O P-5'-P-CCNC: 10 U/L (ref 10–44)
ANION GAP SERPL CALC-SCNC: 9 MMOL/L (ref 8–16)
APTT PPP: 30.2 SEC (ref 21–32)
AST SERPL-CCNC: 13 U/L (ref 10–40)
BASOPHILS # BLD AUTO: 0.05 K/UL (ref 0–0.2)
BASOPHILS NFR BLD: 0.4 % (ref 0–1.9)
BILIRUB SERPL-MCNC: 0.2 MG/DL (ref 0.1–1)
BNP SERPL-MCNC: 64 PG/ML (ref 0–99)
BUN SERPL-MCNC: 21 MG/DL (ref 8–23)
CALCIUM SERPL-MCNC: 9.3 MG/DL (ref 8.7–10.5)
CHLORIDE SERPL-SCNC: 109 MMOL/L (ref 95–110)
CO2 SERPL-SCNC: 22 MMOL/L (ref 23–29)
CREAT SERPL-MCNC: 0.9 MG/DL (ref 0.5–1.4)
CTP QC/QA: YES
CTP QC/QA: YES
D DIMER PPP IA.FEU-MCNC: 0.37 MG/L FEU
DIFFERENTIAL METHOD BLD: ABNORMAL
EOSINOPHIL # BLD AUTO: 0.2 K/UL (ref 0–0.5)
EOSINOPHIL NFR BLD: 1.2 % (ref 0–8)
ERYTHROCYTE [DISTWIDTH] IN BLOOD BY AUTOMATED COUNT: 14.1 % (ref 11.5–14.5)
EST. GFR  (NO RACE VARIABLE): >60 ML/MIN/1.73 M^2
GLUCOSE SERPL-MCNC: 94 MG/DL (ref 70–110)
HCT VFR BLD AUTO: 40.5 % (ref 37–48.5)
HGB BLD-MCNC: 13 G/DL (ref 12–16)
IMM GRANULOCYTES # BLD AUTO: 0.03 K/UL (ref 0–0.04)
IMM GRANULOCYTES NFR BLD AUTO: 0.2 % (ref 0–0.5)
INR PPP: 0.9 (ref 0.8–1.2)
LIPASE SERPL-CCNC: 39 U/L (ref 4–60)
LYMPHOCYTES # BLD AUTO: 6.3 K/UL (ref 1–4.8)
LYMPHOCYTES NFR BLD: 45.3 % (ref 18–48)
MAGNESIUM SERPL-MCNC: 2 MG/DL (ref 1.6–2.6)
MCH RBC QN AUTO: 28.7 PG (ref 27–31)
MCHC RBC AUTO-ENTMCNC: 32.1 G/DL (ref 32–36)
MCV RBC AUTO: 89 FL (ref 82–98)
MONOCYTES # BLD AUTO: 0.6 K/UL (ref 0.3–1)
MONOCYTES NFR BLD: 4.6 % (ref 4–15)
NEUTROPHILS # BLD AUTO: 6.7 K/UL (ref 1.8–7.7)
NEUTROPHILS NFR BLD: 48.3 % (ref 38–73)
NRBC BLD-RTO: 0 /100 WBC
OHS QRS DURATION: 82 MS
OHS QTC CALCULATION: 405 MS
PLATELET # BLD AUTO: 250 K/UL (ref 150–450)
PMV BLD AUTO: 9.7 FL (ref 9.2–12.9)
POC MOLECULAR INFLUENZA A AGN: NEGATIVE
POC MOLECULAR INFLUENZA B AGN: NEGATIVE
POTASSIUM SERPL-SCNC: 4.2 MMOL/L (ref 3.5–5.1)
PROT SERPL-MCNC: 6.7 G/DL (ref 6–8.4)
PROTHROMBIN TIME: 10.5 SEC (ref 9–12.5)
RBC # BLD AUTO: 4.53 M/UL (ref 4–5.4)
SARS-COV-2 RDRP RESP QL NAA+PROBE: NEGATIVE
SODIUM SERPL-SCNC: 140 MMOL/L (ref 136–145)
TROPONIN I SERPL DL<=0.01 NG/ML-MCNC: <0.006 NG/ML (ref 0–0.03)
TROPONIN I SERPL DL<=0.01 NG/ML-MCNC: <0.006 NG/ML (ref 0–0.03)
TSH SERPL DL<=0.005 MIU/L-ACNC: 2.03 UIU/ML (ref 0.4–4)
WBC # BLD AUTO: 13.92 K/UL (ref 3.9–12.7)

## 2024-08-09 PROCEDURE — 87635 SARS-COV-2 COVID-19 AMP PRB: CPT | Mod: HCNC | Performed by: EMERGENCY MEDICINE

## 2024-08-09 PROCEDURE — 85730 THROMBOPLASTIN TIME PARTIAL: CPT | Mod: HCNC | Performed by: EMERGENCY MEDICINE

## 2024-08-09 PROCEDURE — 99285 EMERGENCY DEPT VISIT HI MDM: CPT | Mod: 25,HCNC

## 2024-08-09 PROCEDURE — 83735 ASSAY OF MAGNESIUM: CPT | Mod: HCNC | Performed by: EMERGENCY MEDICINE

## 2024-08-09 PROCEDURE — 83880 ASSAY OF NATRIURETIC PEPTIDE: CPT | Mod: HCNC | Performed by: EMERGENCY MEDICINE

## 2024-08-09 PROCEDURE — 63600175 PHARM REV CODE 636 W HCPCS: Mod: HCNC | Performed by: EMERGENCY MEDICINE

## 2024-08-09 PROCEDURE — 93010 ELECTROCARDIOGRAM REPORT: CPT | Mod: HCNC,,, | Performed by: INTERNAL MEDICINE

## 2024-08-09 PROCEDURE — 96375 TX/PRO/DX INJ NEW DRUG ADDON: CPT | Mod: HCNC

## 2024-08-09 PROCEDURE — 84443 ASSAY THYROID STIM HORMONE: CPT | Mod: HCNC | Performed by: EMERGENCY MEDICINE

## 2024-08-09 PROCEDURE — 25000003 PHARM REV CODE 250: Mod: HCNC | Performed by: EMERGENCY MEDICINE

## 2024-08-09 PROCEDURE — 84484 ASSAY OF TROPONIN QUANT: CPT | Mod: HCNC | Performed by: EMERGENCY MEDICINE

## 2024-08-09 PROCEDURE — 96374 THER/PROPH/DIAG INJ IV PUSH: CPT | Mod: HCNC

## 2024-08-09 PROCEDURE — 80053 COMPREHEN METABOLIC PANEL: CPT | Mod: HCNC | Performed by: EMERGENCY MEDICINE

## 2024-08-09 PROCEDURE — 83690 ASSAY OF LIPASE: CPT | Mod: HCNC | Performed by: EMERGENCY MEDICINE

## 2024-08-09 PROCEDURE — 85379 FIBRIN DEGRADATION QUANT: CPT | Mod: HCNC | Performed by: EMERGENCY MEDICINE

## 2024-08-09 PROCEDURE — 93005 ELECTROCARDIOGRAM TRACING: CPT | Mod: HCNC

## 2024-08-09 PROCEDURE — 85610 PROTHROMBIN TIME: CPT | Mod: HCNC | Performed by: EMERGENCY MEDICINE

## 2024-08-09 PROCEDURE — 85025 COMPLETE CBC W/AUTO DIFF WBC: CPT | Mod: HCNC | Performed by: EMERGENCY MEDICINE

## 2024-08-09 RX ORDER — MORPHINE SULFATE 4 MG/ML
4 INJECTION, SOLUTION INTRAMUSCULAR; INTRAVENOUS
Status: COMPLETED | OUTPATIENT
Start: 2024-08-09 | End: 2024-08-09

## 2024-08-09 RX ORDER — LIDOCAINE HYDROCHLORIDE 20 MG/ML
15 SOLUTION OROPHARYNGEAL ONCE
Status: COMPLETED | OUTPATIENT
Start: 2024-08-09 | End: 2024-08-09

## 2024-08-09 RX ORDER — ALUMINUM HYDROXIDE, MAGNESIUM HYDROXIDE, AND SIMETHICONE 1200; 120; 1200 MG/30ML; MG/30ML; MG/30ML
30 SUSPENSION ORAL ONCE
Status: COMPLETED | OUTPATIENT
Start: 2024-08-09 | End: 2024-08-09

## 2024-08-09 RX ORDER — ONDANSETRON HYDROCHLORIDE 2 MG/ML
4 INJECTION, SOLUTION INTRAVENOUS
Status: COMPLETED | OUTPATIENT
Start: 2024-08-09 | End: 2024-08-09

## 2024-08-09 RX ORDER — ASPIRIN 325 MG
325 TABLET ORAL
Status: COMPLETED | OUTPATIENT
Start: 2024-08-09 | End: 2024-08-09

## 2024-08-09 RX ADMIN — ONDANSETRON 4 MG: 2 INJECTION INTRAMUSCULAR; INTRAVENOUS at 05:08

## 2024-08-09 RX ADMIN — LIDOCAINE HYDROCHLORIDE 15 ML: 20 SOLUTION ORAL at 07:08

## 2024-08-09 RX ADMIN — ASPIRIN 325 MG ORAL TABLET 325 MG: 325 PILL ORAL at 05:08

## 2024-08-09 RX ADMIN — ALUMINUM HYDROXIDE, MAGNESIUM HYDROXIDE, AND SIMETHICONE 30 ML: 200; 200; 20 SUSPENSION ORAL at 07:08

## 2024-08-09 RX ADMIN — MORPHINE SULFATE 4 MG: 4 INJECTION, SOLUTION INTRAMUSCULAR; INTRAVENOUS at 05:08

## 2024-08-09 NOTE — ED PROVIDER NOTES
"Encounter Date: 8/9/2024       History     Chief Complaint   Patient presents with    Chest Pain     Left chest pain x approx 30 minutes to 1 hr. "Woke me out of my sleep." Denies radiation or SOB. "Sharp and aching." +worsening with deep breathing. Denies recent travel.     69-year-old female smoker with HTN and DLD presents via  to [facility] ER with chest pain.  Patient reports acute onset of sharp nonradiating 9/10 left-sided chest pain around 4:00 a.m., waking her from sleep.  She had some pain yesterday (Thursday 8/8/24) during the day, but not nearly as severe.  Pain gradually subsided after ER arrival and is now a 5/10.  No meds prior to presentation.  No shortness of breath, nausea, or diaphoresis.  No rhinorrhea, cough, sneezing.  No sick contacts.    Patient has had prior ER presentations for chest pain, most recently 05/01/2022 and 12 18th 2022, which have been reassuring.    Stress echo 12/19/22  ·  Normal myocardial perfusion scan. There is no evidence of myocardial ischemia or infarction.  ·  The gated perfusion images showed an ejection fraction of 60% at rest. The gated perfusion images showed an ejection fraction of 74% post stress.  ·  The ECG portion of the study is negative for ischemia. Sensitivity is reduced secondary to the target heart rate not being achieved.  ·  The exercise capacity was average.    PMH: HTN, DLD, H pylori, cataract  PSH: cholecystectomy, hysterectomy, R breast biopsy, EGD/colonoscopy, cataract with IOL bilat, BTL    PMD is Dr. Joao Diehl.        Review of patient's allergies indicates:  No Known Allergies  Past Medical History:   Diagnosis Date    Cataract     Diabetes mellitus     H. pylori infection 2017    [facility] EGD 2017. test of eradication neg.    High cholesterol     Hypertension      Past Surgical History:   Procedure Laterality Date    BREAST BIOPSY Right     CATARACT EXTRACTION W/  INTRAOCULAR LENS IMPLANT Right 05/12/2022    Procedure: EXTRACTION, " CATARACT, WITH IOL INSERTION;  Surgeon: Destiny Carrera MD;  Location: [facility];  Service: Ophthalmology;  Laterality: Right;    CATARACT EXTRACTION W/  INTRAOCULAR LENS IMPLANT Left 06/16/2022    Procedure: EXTRACTION, CATARACT, WITH IOL INSERTION;  Surgeon: Destiny Carrera MD;  Location: [facility] OR;  Service: Ophthalmology;  Laterality: Left;    CHOLECYSTECTOMY  2016    COLONOSCOPY N/A 04/11/2022    Procedure: COLONOSCOPY;  Surgeon: Todd aCrrera MD;  Location: [facility];  Service: Endoscopy;  Laterality: N/A;  order created: referral  Fully vaccinated, prep instr emailed -ml    ESOPHAGOGASTRODUODENOSCOPY N/A 07/21/2020    Procedure: EGD (ESOPHAGOGASTRODUODENOSCOPY);  Surgeon: Rodrick Montes MD;  Location: [facility] (4TH FLR);  Service: Endoscopy;  Laterality: N/A;  3/30/20 - removed from 4/9/20, 3/30/20 & 3/31/20LM pt &  ph & sent email, request call back to reschedule June/July.  Pt called back, rescheduled 7/21/20 - pg  7/6/20 - 7/9/20- LM x 2 - waiting for cb to set up COVID appt. - ERW  COVID screening on    EYE SURGERY  7/22    TUBAL LIGATION  1982     Family History   Problem Relation Name Age of Onset    Arthritis Mother      Cancer Mother          lung    Heart disease Mother      No Known Problems Father      No Known Problems Sister      No Known Problems Brother      No Known Problems Daughter Tashi     No Known Problems Daughter Maria G     Anxiety disorder Daughter Juju     No Known Problems Son Segundo     No Known Problems Maternal Aunt      No Known Problems Maternal Uncle      No Known Problems Paternal Aunt      No Known Problems Paternal Uncle      No Known Problems Maternal Grandmother      No Known Problems Maternal Grandfather      No Known Problems Paternal Grandmother      No Known Problems Paternal Grandfather      Colon cancer Neg Hx      Rectal cancer Neg Hx      Stomach cancer Neg Hx      Breast cancer Neg Hx       Social History     Tobacco Use    Smoking status: Every Day      Current packs/day: 0.25     Average packs/day: 0.3 packs/day for 40.0 years (10.0 ttl pk-yrs)     Types: Cigarettes     Passive exposure: Current    Smokeless tobacco: Never   Substance Use Topics    Alcohol use: Yes     Alcohol/week: 2.0 standard drinks of alcohol     Types: 2 Glasses of wine per week    Drug use: No     Review of Systems   Constitutional:  Negative for fever.   HENT:  Negative for rhinorrhea and sore throat.    Eyes:  Negative for visual disturbance.   Respiratory:  Negative for cough and shortness of breath.    Cardiovascular:  Positive for chest pain. Negative for palpitations and leg swelling.   Gastrointestinal:  Negative for abdominal pain.   Genitourinary:  Negative for dysuria.   Musculoskeletal:  Negative for gait problem.   Skin:  Negative for rash.   Neurological:  Negative for syncope.       Physical Exam     Initial Vitals [08/09/24 0435]   BP Pulse Resp Temp SpO2   (!) 149/74 62 19 98 °F (36.7 °C) 99 %      MAP       --         Physical Exam    Nursing note and vitals reviewed.  Constitutional: She appears well-developed and well-nourished. She is not diaphoretic.   Awake, alert, nontoxic, speaking in complete sentences.   HENT:   Head: Normocephalic and atraumatic.   Mouth/Throat: Oropharynx is clear and moist.   Eyes: Conjunctivae and EOM are normal. Pupils are equal, round, and reactive to light.   Neck: Neck supple.   Normal range of motion.  Cardiovascular:  Normal rate, regular rhythm, normal heart sounds and intact distal pulses.           No murmur heard.  Pulmonary/Chest: Breath sounds normal. No respiratory distress. She has no wheezes. She has no rhonchi. She has no rales. She exhibits tenderness.   Tender to palpation over left inferior anterior chest wall underneath breast.  No skin changes.   Abdominal: Abdomen is soft. There is no abdominal tenderness.   Musculoskeletal:         General: No tenderness or edema. Normal range of motion.      Cervical back: Normal range of  motion and neck supple.     Neurological: She is alert and oriented to person, place, and time.   Moving all extremities.   Skin: Skin is warm and dry. No erythema. No pallor.   Psychiatric: She has a normal mood and affect.       ED Course   Procedures  Labs Reviewed   CBC W/ AUTO DIFFERENTIAL - Abnormal       Result Value    WBC 13.92 (*)     RBC 4.53      Hemoglobin 13.0      Hematocrit 40.5      MCV 89      MCH 28.7      MCHC 32.1      RDW 14.1      Platelets 250      MPV 9.7      Immature Granulocytes 0.2      Gran # (ANC) 6.7      Immature Grans (Abs) 0.03      Lymph # 6.3 (*)     Mono # 0.6      Eos # 0.2      Baso # 0.05      nRBC 0      Gran % 48.3      Lymph % 45.3      Mono % 4.6      Eosinophil % 1.2      Basophil % 0.4      Differential Method Automated     COMPREHENSIVE METABOLIC PANEL - Abnormal    Sodium 140      Potassium 4.2      Chloride 109      CO2 22 (*)     Glucose 94      BUN 21      Creatinine 0.9      Calcium 9.3      Total Protein 6.7      Albumin 3.4 (*)     Total Bilirubin 0.2      Alkaline Phosphatase 74      AST 13      ALT 10      eGFR >60      Anion Gap 9     B-TYPE NATRIURETIC PEPTIDE    BNP 64     D DIMER, QUANTITATIVE    D-Dimer 0.37     LIPASE    Lipase 39     MAGNESIUM    Magnesium 2.0     APTT    aPTT 30.2     PROTIME-INR    Prothrombin Time 10.5      INR 0.9     TROPONIN I    Troponin I <0.006     TSH    TSH 2.032     TROPONIN I   SARS-COV-2 RDRP GENE    POC Rapid COVID Negative       Acceptable Yes     POCT INFLUENZA A/B MOLECULAR    POC Molecular Influenza A Ag Negative      POC Molecular Influenza B Ag Negative       Acceptable Yes       EKG Readings: (Independently Interpreted)   04:35: Sinus bradycardia, HR 53. Normal axis. No ectopy. TWI in aVL, V2. No STEMI. Cw 12/18/22.       Imaging Results              X-Ray Chest AP Portable (Final result)  Result time 08/09/24 05:22:09      Final result by Chan Pantoja MD (08/09/24 05:22:09)                    Impression:      No acute findings.    No significant change from prior study.      Electronically signed by: Chan Pantoja MD  Date:    08/09/2024  Time:    05:22               Narrative:    EXAMINATION:  XR CHEST AP PORTABLE    CLINICAL HISTORY:  Chest pain, unspecified    TECHNIQUE:  Single frontal view of the chest was performed.    COMPARISON:  12/26/2023.    FINDINGS:  Heart and lungs  appear unchanged when allowing for differences in technique and positioning.                                       Medications   aspirin tablet 325 mg (325 mg Oral Given 8/9/24 0545)   morphine injection 4 mg (4 mg Intravenous Given 8/9/24 0548)   ondansetron injection 4 mg (4 mg Intravenous Given 8/9/24 0546)     Medical Decision Making  69-year-old female with hypertension and tobacco use now with chest pain.    Differential includes ACS, CHF, PE, arrhythmia, PTX, aortic dissection, PNA, musculoskeletal CP, GERD, anxiety, other. ED workup included EKG, CXR, labs with cardiacs/dimer.    EKG no STEMI.    Chest x-ray unremarkable.    Flu and COVID-19 negative.    Labs: WBC 13.92.  CBC, CMP, lipase, mag, BNP otherwise unremarkable.  Negative D-dimer is reassuring for no PE and no dissection.      Troponin negative x 1, collected at 04:49.    I ordered patient p.o. aspirin 325 mg and IV morphine 4 mg with IV Zofran 4 mg for chest pain.  On reassessment (6:47 AM), patient reports she is pain-free.    I discussed chest pain OBS with patient for HEART score 4.  However, she does not wish to stay in the hospital, despite risk of cardiac event.  I have ordered repeat troponin for 06:50 (2 hour trop).      If repeat troponin is negative, patient will be discharged to PCP follow-up.    Polly Cisneros  6:47 AM      Amount and/or Complexity of Data Reviewed  Labs: ordered.  Radiology: ordered.    Risk  OTC drugs.  Prescription drug management.      Additional MDM:   Heart Score:    History:          Slightly  suspicious.  ECG:             Normal  Age:               >65 years  Risk factors: >= 3 risk factors or history of atherosclerotic disease  Troponin:       Less than or equal to normal limit  Heart Score = 4                                     Clinical Impression:  Final diagnoses:  [R07.9] Chest pain (Primary)  [R07.9] Left-sided chest pain  [R07.89] Chest wall pain                 Polly Cisneros MD  08/09/24 0617

## 2024-08-09 NOTE — ED TRIAGE NOTES
"Leticia Jennifer Brown, an 69 y.o. female presents to the ED complaining of being woken up from left sided chest pain that she rates 9/10.      Chief Complaint   Patient presents with    Chest Pain     Left chest pain x approx 30 minutes to 1 hr. "Woke me out of my sleep." Denies radiation or SOB. "Sharp and aching." +worsening with deep breathing. Denies recent travel.     Review of patient's allergies indicates:  No Known Allergies  Past Medical History:   Diagnosis Date    Cataract     Diabetes mellitus     H. pylori infection 2017    [facility] EGD 2017. test of eradication neg.    High cholesterol     Hypertension        "

## 2024-08-12 ENCOUNTER — PATIENT OUTREACH (OUTPATIENT)
Dept: EMERGENCY MEDICINE | Facility: OTHER | Age: 69
End: 2024-08-12
Payer: MEDICARE

## 2024-08-12 ENCOUNTER — OFFICE VISIT (OUTPATIENT)
Dept: PAIN MEDICINE | Facility: CLINIC | Age: 69
End: 2024-08-12
Payer: MEDICARE

## 2024-08-12 VITALS
HEIGHT: 62 IN | SYSTOLIC BLOOD PRESSURE: 165 MMHG | WEIGHT: 138.88 LBS | TEMPERATURE: 98 F | OXYGEN SATURATION: 100 % | BODY MASS INDEX: 25.55 KG/M2 | HEART RATE: 58 BPM | DIASTOLIC BLOOD PRESSURE: 84 MMHG | RESPIRATION RATE: 18 BRPM

## 2024-08-12 DIAGNOSIS — M77.11 RIGHT LATERAL EPICONDYLITIS: Primary | ICD-10-CM

## 2024-08-12 DIAGNOSIS — M79.601 RIGHT ARM PAIN: ICD-10-CM

## 2024-08-12 PROCEDURE — 3044F HG A1C LEVEL LT 7.0%: CPT | Mod: HCNC,CPTII,S$GLB, | Performed by: ANESTHESIOLOGY

## 2024-08-12 PROCEDURE — 1160F RVW MEDS BY RX/DR IN RCRD: CPT | Mod: HCNC,CPTII,S$GLB, | Performed by: ANESTHESIOLOGY

## 2024-08-12 PROCEDURE — 3288F FALL RISK ASSESSMENT DOCD: CPT | Mod: HCNC,CPTII,S$GLB, | Performed by: ANESTHESIOLOGY

## 2024-08-12 PROCEDURE — 4010F ACE/ARB THERAPY RXD/TAKEN: CPT | Mod: HCNC,CPTII,S$GLB, | Performed by: ANESTHESIOLOGY

## 2024-08-12 PROCEDURE — 3077F SYST BP >= 140 MM HG: CPT | Mod: HCNC,CPTII,S$GLB, | Performed by: ANESTHESIOLOGY

## 2024-08-12 PROCEDURE — 1125F AMNT PAIN NOTED PAIN PRSNT: CPT | Mod: HCNC,CPTII,S$GLB, | Performed by: ANESTHESIOLOGY

## 2024-08-12 PROCEDURE — 3008F BODY MASS INDEX DOCD: CPT | Mod: HCNC,CPTII,S$GLB, | Performed by: ANESTHESIOLOGY

## 2024-08-12 PROCEDURE — 1159F MED LIST DOCD IN RCRD: CPT | Mod: HCNC,CPTII,S$GLB, | Performed by: ANESTHESIOLOGY

## 2024-08-12 PROCEDURE — 99204 OFFICE O/P NEW MOD 45 MIN: CPT | Mod: HCNC,GC,S$GLB, | Performed by: ANESTHESIOLOGY

## 2024-08-12 PROCEDURE — 3079F DIAST BP 80-89 MM HG: CPT | Mod: HCNC,CPTII,S$GLB, | Performed by: ANESTHESIOLOGY

## 2024-08-12 PROCEDURE — 99999 PR PBB SHADOW E&M-EST. PATIENT-LVL V: CPT | Mod: PBBFAC,HCNC,, | Performed by: ANESTHESIOLOGY

## 2024-08-12 PROCEDURE — 1101F PT FALLS ASSESS-DOCD LE1/YR: CPT | Mod: HCNC,CPTII,S$GLB, | Performed by: ANESTHESIOLOGY

## 2024-08-12 NOTE — PROGRESS NOTES
Patient declined assistance making a follow-up appointment with [facility] PCP at this time.

## 2024-08-12 NOTE — PROGRESS NOTES
Chronic Pain - New Consult    Referring Physician: Joao Diehl DO    Chief Complaint:   Chief Complaint   Patient presents with    Arm Pain     Right        SUBJECTIVE:    Leticia Brown presents to the clinic for the evaluation of right arm pain. The pain started over a month ago following no inciting event, patient came on gradually and symptoms have been worsening.The pain is located in the right elbow area and radiates to the entire arm.  The pain is described as aching and is rated as 2/10. The pain is rated with a score of  2/10 on the BEST day and a score of 10/10 on the WORST day.  Symptoms interfere with daily activity, sleeping, and work. The pain is exacerbated by Extension, Flexing, and Lifting.  The pain is mitigated by medications (motrin). The patient reports 5.5 hours of uninterrupted sleep per night.    Of note, patient had an appointment with Dr. Santoyo from orthopedics on 8/6/24 where she was diagnosed with right lateral epicondylitis and she received a right elbow steroid injection and a luisana strap which has significantly reduced her pain. She starts PT today.  Patient also presented to ED on 8/9 for chest pain, work up was reassuring. No chest pain or SOB  in clinic today.    Patient denies night fever/night sweats, urinary incontinence, bowel incontinence, and loss of sensations. Patient endorses a 30 lb unintentional weight loss over the last few months. Endorses weakness in right hand.     Physical Therapy/Home Exercise: yes      Pain Disability Index Review:      8/12/2024     2:00 PM   Last 3 PDI Scores   Pain Disability Index (PDI) 10       Pain Medications:    - Adjuvant Medications: Advil,Motrin ( Ibuprofen)     report:  Not applicable    Pain Procedures:   Right elbow injection on 8/6/2024    Imaging:   EXAMINATION:  XR HUMERUS 2 VIEW RIGHT     CLINICAL HISTORY:  Pain in right arm     TECHNIQUE:  AP and lateral views of the right humerus     COMPARISON:  None      FINDINGS:  No evidence of an acute fracture.  Humerus appears intact.  No radiopaque foreign bodies.     Impression:     No acute process.        Electronically signed by:Nicko Lopez MD  Date:                                            06/28/2024  Time:                                           10:32    Past Medical History:   Diagnosis Date    Cataract     Diabetes mellitus     H. pylori infection 2017    [facility] EGD 2017. test of eradication neg.    High cholesterol     Hypertension      Past Surgical History:   Procedure Laterality Date    BREAST BIOPSY Right     CATARACT EXTRACTION W/  INTRAOCULAR LENS IMPLANT Right 05/12/2022    Procedure: EXTRACTION, CATARACT, WITH IOL INSERTION;  Surgeon: Destiny Carrera MD;  Location: [facility] OR;  Service: Ophthalmology;  Laterality: Right;    CATARACT EXTRACTION W/  INTRAOCULAR LENS IMPLANT Left 06/16/2022    Procedure: EXTRACTION, CATARACT, WITH IOL INSERTION;  Surgeon: Destiny Carrera MD;  Location: [facility] OR;  Service: Ophthalmology;  Laterality: Left;    CHOLECYSTECTOMY  2016    COLONOSCOPY N/A 04/11/2022    Procedure: COLONOSCOPY;  Surgeon: Todd Carrera MD;  Location: [facility] ENDO;  Service: Endoscopy;  Laterality: N/A;  order created: referral  Fully vaccinated, prep instr emailed -ml    ESOPHAGOGASTRODUODENOSCOPY N/A 07/21/2020    Procedure: EGD (ESOPHAGOGASTRODUODENOSCOPY);  Surgeon: Rodrick Montes MD;  Location: [facility] (4TH FLR);  Service: Endoscopy;  Laterality: N/A;  3/30/20 - removed from 4/9/20, 3/30/20 & 3/31/20LM pt &  ph & sent email, request call back to reschedule June/July.  Pt called back, rescheduled 7/21/20 - pg  7/6/20 - 7/9/20- LM x 2 - waiting for cb to set up COVID appt. - [facility]  COVID screening on    EYE SURGERY  7/22    TUBAL LIGATION  1982     Social History     Socioeconomic History    Marital status:    Tobacco Use    Smoking status: Every Day     Current packs/day: 0.25     Average packs/day: 0.3 packs/day for 40.0 years (10.0 ttl  pk-yrs)     Types: Cigarettes     Passive exposure: Current    Smokeless tobacco: Never   Substance and Sexual Activity    Alcohol use: Yes     Alcohol/week: 2.0 standard drinks of alcohol     Types: 2 Glasses of wine per week    Drug use: No    Sexual activity: Not Currently     Partners: Male     Birth control/protection: Abstinence     Social Determinants of Health     Financial Resource Strain: Medium Risk (1/9/2024)    Overall Financial Resource Strain (CARDIA)     Difficulty of Paying Living Expenses: Somewhat hard   Food Insecurity: No Food Insecurity (1/9/2024)    Hunger Vital Sign     Worried About Running Out of Food in the Last Year: Never true     Ran Out of Food in the Last Year: Never true   Transportation Needs: No Transportation Needs (1/9/2024)    PRAPARE - Transportation     Lack of Transportation (Medical): No     Lack of Transportation (Non-Medical): No   Physical Activity: Inactive (1/9/2024)    Exercise Vital Sign     Days of Exercise per Week: 0 days     Minutes of Exercise per Session: 0 min   Stress: Stress Concern Present (1/9/2024)    [employer] - Occupational Stress Questionnaire     Feeling of Stress : Very much   Housing Stability: High Risk (1/9/2024)    Housing Stability Vital Sign     Unable to Pay for Housing in the Last Year: Yes     Number of Places Lived in the Last Year: 1     Unstable Housing in the Last Year: No     Family History   Problem Relation Name Age of Onset    Arthritis Mother      Cancer Mother          lung    Heart disease Mother      No Known Problems Father      No Known Problems Sister      No Known Problems Brother      No Known Problems Daughter Tashi     No Known Problems Daughter Maria G     Anxiety disorder Daughter Juju     No Known Problems Son Segundo     No Known Problems Maternal Aunt      No Known Problems Maternal Uncle      No Known Problems Paternal Aunt      No Known Problems Paternal Uncle      No Known Problems  Maternal Grandmother      No Known Problems Maternal Grandfather      No Known Problems Paternal Grandmother      No Known Problems Paternal Grandfather      Colon cancer Neg Hx      Rectal cancer Neg Hx      Stomach cancer Neg Hx      Breast cancer Neg Hx         Review of patient's allergies indicates:  No Known Allergies    Current Outpatient Medications   Medication Sig    albuterol (VENTOLIN HFA) 90 mcg/actuation inhaler Inhale 2 puffs into the lungs every 6 (six) hours as needed for Wheezing. Rescue    amLODIPine (NORVASC) 5 MG tablet Take 1 tablet (5 mg total) by mouth 2 (two) times daily.    aspirin (ECOTRIN) 81 MG EC tablet Take 81 mg by mouth once daily.    atorvastatin (LIPITOR) 40 MG tablet Take 1 tablet (40 mg total) by mouth once daily.    azelastine (ASTELIN) 137 mcg (0.1 %) nasal spray 1 spray (137 mcg total) by Nasal route 2 (two) times daily.    blood sugar diagnostic Strp 1 each by Misc.(Non-Drug; Combo Route) route 4 (four) times daily.    cetirizine (ZYRTEC) 10 MG tablet Take 1 tablet (10 mg total) by mouth once daily.    conjugated estrogens (PREMARIN) vaginal cream 1 g with applicator or dime-sized amt with finger in vagina nightly x 2 weeks, then twice a week thereafter    diclofenac sodium (VOLTAREN) 1 % Gel Apply 2 g topically once daily.    ergocalciferol (ERGOCALCIFEROL) 50,000 unit Cap Take 1 capsule (50,000 Units total) by mouth every 7 days.    fluticasone propionate (FLONASE) 50 mcg/actuation nasal spray 1 spray (50 mcg total) by Each Nostril route once daily.    hydroCHLOROthiazide (HYDRODIURIL) 12.5 MG Tab Take 1 tablet (12.5 mg total) by mouth once daily. For blood pressure    ibuprofen (ADVIL,MOTRIN) 600 MG tablet Take 1 tablet (600 mg total) by mouth 3 (three) times daily.    lancets Misc 1 each by Misc.(Non-Drug; Combo Route) route 4 (four) times daily.    olmesartan (BENICAR) 40 MG tablet Take 1 tablet (40 mg total) by mouth once daily.    oxybutynin (DITROPAN-XL) 10 MG 24 hr  "tablet Take 1 tablet (10 mg total) by mouth once daily.    pantoprazole (PROTONIX) 40 MG tablet Take 1 tablet (40 mg total) by mouth before breakfast.    pneumoc 20-arnold conj-dip cr,PF, (PREVNAR 20, PF,) 0.5 mL Syrg injection Inject into the muscle.    traZODone (DESYREL) 50 MG tablet Take 1 tablet (50 mg total) by mouth every evening. For sleep    urea 20 % Crea Apply 1 application topically once daily. To dry skin on the feet.     No current facility-administered medications for this visit.       REVIEW OF SYSTEMS:    GENERAL:  +weight loss, malaise or fevers.  HEENT:  Negative for frequent or significant headaches.  NECK:  Negative for lumps, goiter, pain and significant neck swelling.  RESPIRATORY:  Negative for cough, wheezing or shortness of breath.  CARDIOVASCULAR:  Negative for chest pain, leg swelling or palpitations.  GI:  Negative for abdominal discomfort, blood in stools or black stools or change in bowel habits.  MUSCULOSKELETAL:  See HPI.  SKIN:  Negative for lesions, rash, and itching.  PSYCH:  Negative for sleep disturbance, mood disorder and recent psychosocial stressors.  HEMATOLOGY/LYMPHOLOGY:  Negative for prolonged bleeding, bruising easily or swollen nodes.  NEURO:   No history of headaches, syncope, paralysis, seizures or tremors.  All other reviewed and negative other than HPI.    OBJECTIVE:    BP (!) 165/84   Pulse (!) 58   Temp 98 °F (36.7 °C)   Resp 18   Ht 5' 2" (1.575 m)   Wt 63 kg (138 lb 14.2 oz)   SpO2 100%   BMI 25.40 kg/m²     PHYSICAL EXAMINATION:    General appearance: Well appearing, in no acute distress, alert and oriented x3.  Psych:  Mood and affect appropriate.  Skin: Skin color, texture, turgor normal, no rashes or lesions, in both upper and lower body.  Head/face:  Normocephalic, atraumatic. No palpable lymph nodes.  Neck: No pain to palpation over the cervical paraspinous muscles. Spurling Negative. No pain with neck flexion, extension, or lateral flexion.   Cor: " RRR  Pulm: CTA  GI:  Soft and non-tender.  Back: Straight leg raising in the sitting and supine positions is negative to radicular pain. No pain to palpation over the spine or costovertebral angles. Normal range of motion without pain reproduction.  Extremities: Peripheral joint ROM is full and pain free without obvious instability or laxity in all four extremities. No deformities, edema, or skin discoloration. Good capillary refill.  Musculoskeletal: Shoulder, hip, sacroiliac and knee provocative maneuvers are negative. Bilateral upper and lower extremity strength is normal and symmetric.  No atrophy or tone abnormalities are noted. Right elbow not tender to palpation along joint line. Normal elbow ROM. No crepitus.   Neuro: Bilateral upper extremity coordination and muscle stretch reflexes are physiologic and symmetric.  No loss of sensation is noted. 5/5 strength in bilateral upper extremities and intrinsic muscles of hands. No muscle wasting noted.    Gait: normal.    ASSESSMENT: 69 y.o. year old female with right arm pain, consistent with right lateral epicondylitis.    1. Right lateral epicondylitis        2. Right arm pain  Ambulatory referral/consult to Pain Clinic            PLAN:     I have stressed the importance of physical activity and a home exercise plan to help with pain and improve health.  Counseled patient regarding the importance of attending physical therapy.   Continue using luisana strap as instructed by Dr. Hagen  - we will consider repeat lateral epicondyle injection with steroids vs PRP in the future.   RTC if pain returns      The above plan and management options were discussed at length with patient. Patient is in agreement with the above and verbalized understanding. It will be communicated with the referring physician via electronic record, fax, or mail.    Jaqueline Montes  08/12/2024      I spent a total of 30 minutes on the day of the visit.  This includes face to face time and  non-face to face time preparing to see the patient by reviewing previous labs/imaging, obtaining and/or reviewing separately obtained history, documenting clinical information in the electronic or other health record, independently interpreting results and communicating results to the patient/family/caregiver.    Brett Méndez

## 2024-08-12 NOTE — PROGRESS NOTES
Chronic Pain - New Consult    Referring Physician: Joao Dihel DO    Chief Complaint:   Chief Complaint   Patient presents with    Arm Pain     Right        SUBJECTIVE:    Leticia Brown presents to the clinic for the evaluation of right arm pain. The pain started over a month ago following no inciting event, patient came on gradually and symptoms have been worsening.The pain is located in the right elbow area and radiates to the entire arm.  The pain is described as aching and is rated as 2/10. The pain is rated with a score of  2/10 on the BEST day and a score of 10/10 on the WORST day.  Symptoms interfere with daily activity, sleeping, and work. The pain is exacerbated by Extension, Flexing, and Lifting.  The pain is mitigated by medications (motrin). The patient reports 5.5 hours of uninterrupted sleep per night.    Of note, patient had an appointment with Dr. Santoyo from orthopedics on 8/6/24 where she was diagnosed with right lateral epicondylitis and she received a right elbow steroid injection and a luisana strap which has significantly reduced her pain. She starts PT today.  Patient also presented to ED on 8/9 for chest pain, work up was reassuring. No chest pain or SOB  in clinic today.    Patient denies night fever/night sweats, urinary incontinence, bowel incontinence, and loss of sensations. Patient endorses a 30 lb unintentional weight loss over the last few months. Endorses weakness in right hand.     Physical Therapy/Home Exercise: yes      Pain Disability Index Review:      8/12/2024     2:00 PM   Last 3 PDI Scores   Pain Disability Index (PDI) 10       Pain Medications:    - Adjuvant Medications: Advil,Motrin ( Ibuprofen)     report:  Not applicable    Pain Procedures:   Right elbow injection on 8/6/2024    Imaging:   EXAMINATION:  XR HUMERUS 2 VIEW RIGHT     CLINICAL HISTORY:  Pain in right arm     TECHNIQUE:  AP and lateral views of the right humerus     COMPARISON:  None      FINDINGS:  No evidence of an acute fracture.  Humerus appears intact.  No radiopaque foreign bodies.     Impression:     No acute process.        Electronically signed by:Nicko Lopez MD  Date:                                            06/28/2024  Time:                                           10:32    Past Medical History:   Diagnosis Date    Cataract     Diabetes mellitus     H. pylori infection 2017    [facility] EGD 2017. test of eradication neg.    High cholesterol     Hypertension      Past Surgical History:   Procedure Laterality Date    BREAST BIOPSY Right     CATARACT EXTRACTION W/  INTRAOCULAR LENS IMPLANT Right 05/12/2022    Procedure: EXTRACTION, CATARACT, WITH IOL INSERTION;  Surgeon: Destiny Carrera MD;  Location: [facility] OR;  Service: Ophthalmology;  Laterality: Right;    CATARACT EXTRACTION W/  INTRAOCULAR LENS IMPLANT Left 06/16/2022    Procedure: EXTRACTION, CATARACT, WITH IOL INSERTION;  Surgeon: Destiny Carrera MD;  Location: [facility] OR;  Service: Ophthalmology;  Laterality: Left;    CHOLECYSTECTOMY  2016    COLONOSCOPY N/A 04/11/2022    Procedure: COLONOSCOPY;  Surgeon: Todd Carrera MD;  Location: [facility] ENDO;  Service: Endoscopy;  Laterality: N/A;  order created: referral  Fully vaccinated, prep instr emailed -ml    ESOPHAGOGASTRODUODENOSCOPY N/A 07/21/2020    Procedure: EGD (ESOPHAGOGASTRODUODENOSCOPY);  Surgeon: Rodrick Montes MD;  Location: [facility] (4TH FLR);  Service: Endoscopy;  Laterality: N/A;  3/30/20 - removed from 4/9/20, 3/30/20 & 3/31/20LM pt &  ph & sent email, request call back to reschedule June/July.  Pt called back, rescheduled 7/21/20 - pg  7/6/20 - 7/9/20- LM x 2 - waiting for cb to set up COVID appt. - ERW  COVID screening on    EYE SURGERY  7/22    TUBAL LIGATION  1982     Social History     Socioeconomic History    Marital status:    Tobacco Use    Smoking status: Every Day     Current packs/day: 0.25     Average packs/day: 0.3 packs/day for 40.0 years (10.0 ttl  pk-yrs)     Types: Cigarettes     Passive exposure: Current    Smokeless tobacco: Never   Substance and Sexual Activity    Alcohol use: Yes     Alcohol/week: 2.0 standard drinks of alcohol     Types: 2 Glasses of wine per week    Drug use: No    Sexual activity: Not Currently     Partners: Male     Birth control/protection: Abstinence     Social Determinants of Health     Financial Resource Strain: Medium Risk (1/9/2024)    Overall Financial Resource Strain (CARDIA)     Difficulty of Paying Living Expenses: Somewhat hard   Food Insecurity: No Food Insecurity (1/9/2024)    Hunger Vital Sign     Worried About Running Out of Food in the Last Year: Never true     Ran Out of Food in the Last Year: Never true   Transportation Needs: No Transportation Needs (1/9/2024)    PRAPARE - Transportation     Lack of Transportation (Medical): No     Lack of Transportation (Non-Medical): No   Physical Activity: Inactive (1/9/2024)    Exercise Vital Sign     Days of Exercise per Week: 0 days     Minutes of Exercise per Session: 0 min   Stress: Stress Concern Present (1/9/2024)    [employer] - Occupational Stress Questionnaire     Feeling of Stress : Very much   Housing Stability: High Risk (1/9/2024)    Housing Stability Vital Sign     Unable to Pay for Housing in the Last Year: Yes     Number of Places Lived in the Last Year: 1     Unstable Housing in the Last Year: No     Family History   Problem Relation Name Age of Onset    Arthritis Mother      Cancer Mother          lung    Heart disease Mother      No Known Problems Father      No Known Problems Sister      No Known Problems Brother      No Known Problems Daughter Tashi     No Known Problems Daughter Maria G     Anxiety disorder Daughter Juju     No Known Problems Son Segundo     No Known Problems Maternal Aunt      No Known Problems Maternal Uncle      No Known Problems Paternal Aunt      No Known Problems Paternal Uncle      No Known Problems  Maternal Grandmother      No Known Problems Maternal Grandfather      No Known Problems Paternal Grandmother      No Known Problems Paternal Grandfather      Colon cancer Neg Hx      Rectal cancer Neg Hx      Stomach cancer Neg Hx      Breast cancer Neg Hx         Review of patient's allergies indicates:  No Known Allergies    Current Outpatient Medications   Medication Sig    albuterol (VENTOLIN HFA) 90 mcg/actuation inhaler Inhale 2 puffs into the lungs every 6 (six) hours as needed for Wheezing. Rescue    amLODIPine (NORVASC) 5 MG tablet Take 1 tablet (5 mg total) by mouth 2 (two) times daily.    aspirin (ECOTRIN) 81 MG EC tablet Take 81 mg by mouth once daily.    atorvastatin (LIPITOR) 40 MG tablet Take 1 tablet (40 mg total) by mouth once daily.    azelastine (ASTELIN) 137 mcg (0.1 %) nasal spray 1 spray (137 mcg total) by Nasal route 2 (two) times daily.    blood sugar diagnostic Strp 1 each by Misc.(Non-Drug; Combo Route) route 4 (four) times daily.    cetirizine (ZYRTEC) 10 MG tablet Take 1 tablet (10 mg total) by mouth once daily.    conjugated estrogens (PREMARIN) vaginal cream 1 g with applicator or dime-sized amt with finger in vagina nightly x 2 weeks, then twice a week thereafter    diclofenac sodium (VOLTAREN) 1 % Gel Apply 2 g topically once daily.    ergocalciferol (ERGOCALCIFEROL) 50,000 unit Cap Take 1 capsule (50,000 Units total) by mouth every 7 days.    fluticasone propionate (FLONASE) 50 mcg/actuation nasal spray 1 spray (50 mcg total) by Each Nostril route once daily.    hydroCHLOROthiazide (HYDRODIURIL) 12.5 MG Tab Take 1 tablet (12.5 mg total) by mouth once daily. For blood pressure    ibuprofen (ADVIL,MOTRIN) 600 MG tablet Take 1 tablet (600 mg total) by mouth 3 (three) times daily.    lancets Misc 1 each by Misc.(Non-Drug; Combo Route) route 4 (four) times daily.    olmesartan (BENICAR) 40 MG tablet Take 1 tablet (40 mg total) by mouth once daily.    oxybutynin (DITROPAN-XL) 10 MG 24 hr  "tablet Take 1 tablet (10 mg total) by mouth once daily.    pantoprazole (PROTONIX) 40 MG tablet Take 1 tablet (40 mg total) by mouth before breakfast.    pneumoc 20-arnold conj-dip cr,PF, (PREVNAR 20, PF,) 0.5 mL Syrg injection Inject into the muscle.    traZODone (DESYREL) 50 MG tablet Take 1 tablet (50 mg total) by mouth every evening. For sleep    urea 20 % Crea Apply 1 application topically once daily. To dry skin on the feet.     No current facility-administered medications for this visit.       REVIEW OF SYSTEMS:    GENERAL:  +weight loss, malaise or fevers.  HEENT:  Negative for frequent or significant headaches.  NECK:  Negative for lumps, goiter, pain and significant neck swelling.  RESPIRATORY:  Negative for cough, wheezing or shortness of breath.  CARDIOVASCULAR:  Negative for chest pain, leg swelling or palpitations.  GI:  Negative for abdominal discomfort, blood in stools or black stools or change in bowel habits.  MUSCULOSKELETAL:  See HPI.  SKIN:  Negative for lesions, rash, and itching.  PSYCH:  Negative for sleep disturbance, mood disorder and recent psychosocial stressors.  HEMATOLOGY/LYMPHOLOGY:  Negative for prolonged bleeding, bruising easily or swollen nodes.  NEURO:   No history of headaches, syncope, paralysis, seizures or tremors.  All other reviewed and negative other than HPI.    OBJECTIVE:    BP (!) 165/84   Pulse (!) 58   Temp 98 °F (36.7 °C)   Resp 18   Ht 5' 2" (1.575 m)   Wt 63 kg (138 lb 14.2 oz)   SpO2 100%   BMI 25.40 kg/m²     PHYSICAL EXAMINATION:    General appearance: Well appearing, in no acute distress, alert and oriented x3.  Psych:  Mood and affect appropriate.  Skin: Skin color, texture, turgor normal, no rashes or lesions, in both upper and lower body.  Head/face:  Normocephalic, atraumatic. No palpable lymph nodes.  Neck: No pain to palpation over the cervical paraspinous muscles. Spurling Negative. No pain with neck flexion, extension, or lateral flexion.   Cor: " RRR  Pulm: CTA  GI:  Soft and non-tender.  Back: Straight leg raising in the sitting and supine positions is negative to radicular pain. No pain to palpation over the spine or costovertebral angles. Normal range of motion without pain reproduction.  Extremities: Peripheral joint ROM is full and pain free without obvious instability or laxity in all four extremities. No deformities, edema, or skin discoloration. Good capillary refill.  Musculoskeletal: Shoulder, hip, sacroiliac and knee provocative maneuvers are negative. Bilateral upper and lower extremity strength is normal and symmetric.  No atrophy or tone abnormalities are noted. Right elbow not tender to palpation along joint line. Normal elbow ROM. No crepitus.   Neuro: Bilateral upper extremity coordination and muscle stretch reflexes are physiologic and symmetric.  No loss of sensation is noted. 5/5 strength in bilateral upper extremities and intrinsic muscles of hands. No muscle wasting noted.    Gait: normal.    ASSESSMENT: 69 y.o. year old female with right arm pain, consistent with right lateral epicondylitis.    1. Right lateral epicondylitis        2. Right arm pain  Ambulatory referral/consult to Pain Clinic            PLAN:     I have stressed the importance of physical activity and a home exercise plan to help with pain and improve health.  Counseled patient regarding the importance of attending physical therapy.   Continue using luisana strap as instructed by Dr. Hagen  - we will consider repeat lateral epicondyle injection with steroids vs PRP in the future.   RTC if pain returns      The above plan and management options were discussed at length with patient. Patient is in agreement with the above and verbalized understanding. It will be communicated with the referring physician via electronic record, fax, or mail.    Jaqueline Montes  08/12/2024      I spent a total of 30 minutes on the day of the visit.  This includes face to face time and  non-face to face time preparing to see the patient by reviewing previous labs/imaging, obtaining and/or reviewing separately obtained history, documenting clinical information in the electronic or other health record, independently interpreting results and communicating results to the patient/family/caregiver.    Brett Méndez

## 2024-08-16 ENCOUNTER — PATIENT MESSAGE (OUTPATIENT)
Dept: INTERNAL MEDICINE | Facility: CLINIC | Age: 69
End: 2024-08-16

## 2024-08-16 ENCOUNTER — OFFICE VISIT (OUTPATIENT)
Dept: INTERNAL MEDICINE | Facility: CLINIC | Age: 69
End: 2024-08-16
Payer: MEDICARE

## 2024-08-16 VITALS
HEART RATE: 62 BPM | DIASTOLIC BLOOD PRESSURE: 72 MMHG | SYSTOLIC BLOOD PRESSURE: 150 MMHG | BODY MASS INDEX: 24.83 KG/M2 | WEIGHT: 134.94 LBS | HEIGHT: 62 IN | OXYGEN SATURATION: 99 %

## 2024-08-16 DIAGNOSIS — I10 ESSENTIAL HYPERTENSION: Primary | ICD-10-CM

## 2024-08-16 PROCEDURE — 99999 PR PBB SHADOW E&M-EST. PATIENT-LVL V: CPT | Mod: PBBFAC,HCNC,,

## 2024-08-16 RX ORDER — HYDROCHLOROTHIAZIDE 25 MG/1
25 TABLET ORAL DAILY
Qty: 90 TABLET | Refills: 3 | Status: SHIPPED | OUTPATIENT
Start: 2024-08-16 | End: 2025-08-16

## 2024-08-16 NOTE — PROGRESS NOTES
CHIEF COMPLAINT     Chief Complaint   Patient presents with    Follow-up       HPI     Leticia Brown is a 69 y.o. female who presents for ER f/u today.    PCP is Joao Diehl DO, patient is known to me. Pt was seen in ER for CP. Cardiac work up was negative and she was discharged home with f/u. She denies any more CP since ER visit. She is anxious about her health. She is a current smoker. On her LDCT there is a 0.3cm solid spot that does not meet criteria for further workup. She is also concerned because she feels like she is losing weight. She reports a decreased appetite d/t her health anxieties, which could be leading to a perpetual cycle. Discussed her LDCT findings and explained that at this time the scan does not show cancer, but we should continue with yearly screenings. Discussed smoking cessation tips and tricks. Reviewed other symptoms. No red flags for cancer noted. UTD colonoscopy 2 years ago was clear. No AUB or pelvic pain. No GI symptoms. No hemoptysis.     Past Medical History:  Past Medical History:   Diagnosis Date    Cataract     Diabetes mellitus     H. pylori infection 2017    [facility] EGD 2017. test of eradication neg.    High cholesterol     Hypertension        Home Medications:  Prior to Admission medications    Medication Sig Start Date End Date Taking? Authorizing Provider   albuterol (VENTOLIN HFA) 90 mcg/actuation inhaler Inhale 2 puffs into the lungs every 6 (six) hours as needed for Wheezing. Rescue 12/26/23  Yes Octavia Vargas, NP   amLODIPine (NORVASC) 5 MG tablet Take 1 tablet (5 mg total) by mouth 2 (two) times daily. 3/28/24 3/28/25 Yes Joao Diehl DO   aspirin (ECOTRIN) 81 MG EC tablet Take 81 mg by mouth once daily.   Yes Provider, Historical   atorvastatin (LIPITOR) 40 MG tablet Take 1 tablet (40 mg total) by mouth once daily. 8/14/23  Yes Joao Diehl DO   azelastine (ASTELIN) 137 mcg (0.1 %) nasal spray 1 spray (137 mcg total) by Nasal route 2  (two) times daily. 6/12/24 6/12/25 Yes Weeks, Joao ALVARADO, DO   blood sugar diagnostic Strp 1 each by Misc.(Non-Drug; Combo Route) route 4 (four) times daily. 8/18/23  Yes Weeks, Joao ALVARADO, DO   cetirizine (ZYRTEC) 10 MG tablet Take 1 tablet (10 mg total) by mouth once daily. 1/9/24  Yes Weeks, Joao ALVARADO, DO   diclofenac sodium (VOLTAREN) 1 % Gel Apply 2 g topically once daily. 8/14/23  Yes Weeks, Joao ALVARADO, DO   ergocalciferol (ERGOCALCIFEROL) 50,000 unit Cap Take 1 capsule (50,000 Units total) by mouth every 7 days. 8/14/23  Yes Weeks, Joao ALVARADO, DO   fluticasone propionate (FLONASE) 50 mcg/actuation nasal spray 1 spray (50 mcg total) by Each Nostril route once daily. 3/24/22  Yes Sarah Gamble MD   hydroCHLOROthiazide (HYDRODIURIL) 12.5 MG Tab Take 1 tablet (12.5 mg total) by mouth once daily. For blood pressure 4/9/24 4/9/25 Yes Weeks, Joao ALVARADO, DO   ibuprofen (ADVIL,MOTRIN) 600 MG tablet Take 1 tablet (600 mg total) by mouth 3 (three) times daily. 6/28/24  Yes Mohsen Ross, NP   lancets Misc 1 each by Misc.(Non-Drug; Combo Route) route 4 (four) times daily. 8/18/23  Yes Weeks, Joao ALVARADO,    olmesartan (BENICAR) 40 MG tablet Take 1 tablet (40 mg total) by mouth once daily. 6/12/24 6/12/25 Yes Weeks, Joao ALVARADO, DO   oxybutynin (DITROPAN-XL) 10 MG 24 hr tablet Take 1 tablet (10 mg total) by mouth once daily. 4/18/24  Yes Weeks, Joao ALVARADO, DO   pantoprazole (PROTONIX) 40 MG tablet Take 1 tablet (40 mg total) by mouth before breakfast. 8/14/23  Yes Weeks, Joao ALVARADO, DO   pneumoc 20-arnold conj-dip cr,PF, (PREVNAR 20, PF,) 0.5 mL Syrg injection Inject into the muscle. 8/14/23  Yes Tari Boggs, PharmD   traZODone (DESYREL) 50 MG tablet Take 1 tablet (50 mg total) by mouth every evening. For sleep 6/12/24 6/12/25 Yes Joao Diehl,    urea 20 % Crea Apply 1 application topically once daily. To dry skin on the feet. 11/4/21  Yes Mignon Trivedi DPM   conjugated estrogens (PREMARIN) vaginal  "cream 1 g with applicator or dime-sized amt with finger in vagina nightly x 2 weeks, then twice a week thereafter  Patient not taking: Reported on 8/16/2024 11/15/22   Ricardo Mora DO       Review of Systems:  Review of Systems   Constitutional:  Positive for activity change and unexpected weight change.   HENT:  Positive for trouble swallowing. Negative for hearing loss and rhinorrhea.    Eyes:  Negative for discharge and visual disturbance.   Respiratory:  Positive for chest tightness. Negative for wheezing.    Cardiovascular:  Positive for chest pain. Negative for palpitations.   Gastrointestinal:  Negative for blood in stool, constipation, diarrhea and vomiting.   Endocrine: Negative for polydipsia and polyuria.   Genitourinary:  Negative for difficulty urinating, dysuria, hematuria and menstrual problem.   Musculoskeletal:  Positive for arthralgias. Negative for joint swelling and neck pain.   Neurological:  Positive for weakness. Negative for headaches.   Psychiatric/Behavioral:  Negative for confusion and dysphoric mood.        Health Maintainence:   Immunizations:  Health Maintenance         Date Due Completion Date    Foot Exam 11/04/2022 11/4/2021    Shingles Vaccine (2 of 2) 01/23/2024 11/28/2023    COVID-19 Vaccine (5 - 2023-24 season) 11/28/2024 (Originally 9/1/2023) 5/18/2022    Influenza Vaccine (1) 09/01/2024 11/28/2023    Diabetes Urine Screening 11/28/2024 11/28/2023    Hemoglobin A1c 12/12/2024 6/12/2024    Mammogram 01/09/2025 1/9/2024    Eye Exam 01/09/2025 1/9/2024    Override on 8/11/2022: Done    Override on 10/1/2020: Done    LDCT Lung Screen 06/12/2025 6/12/2024    Lipid Panel 06/12/2025 6/12/2024    DEXA Scan 10/25/2025 10/25/2022    TETANUS VACCINE 08/30/2029 8/30/2019    Colorectal Cancer Screening 04/11/2032 4/11/2022    Override on 3/8/2017: Done (repeat in 10 years)             PHYSICAL EXAM     BP (!) 150/72   Pulse 62   Ht 5' 2" (1.575 m)   Wt 61.2 kg (134 lb 14.7 oz)  "  SpO2 99%   BMI 24.68 kg/m²     Physical Exam  Vitals reviewed.   Constitutional:       Appearance: She is well-developed.   HENT:      Head: Normocephalic and atraumatic.   Eyes:      Conjunctiva/sclera: Conjunctivae normal.   Cardiovascular:      Rate and Rhythm: Normal rate and regular rhythm.   Pulmonary:      Effort: Pulmonary effort is normal. No respiratory distress.      Breath sounds: Normal breath sounds.   Skin:     General: Skin is warm and dry.      Findings: No rash.   Neurological:      Mental Status: She is alert and oriented to person, place, and time.      Coordination: Coordination normal.   Psychiatric:         Behavior: Behavior normal.         LABS     Lab Results   Component Value Date    HGBA1C 5.6 06/12/2024     CMP  Sodium   Date Value Ref Range Status   08/09/2024 140 136 - 145 mmol/L Final     Potassium   Date Value Ref Range Status   08/09/2024 4.2 3.5 - 5.1 mmol/L Final     Chloride   Date Value Ref Range Status   08/09/2024 109 95 - 110 mmol/L Final     CO2   Date Value Ref Range Status   08/09/2024 22 (L) 23 - 29 mmol/L Final     Glucose   Date Value Ref Range Status   08/09/2024 94 70 - 110 mg/dL Final     BUN   Date Value Ref Range Status   08/09/2024 21 8 - 23 mg/dL Final     Creatinine   Date Value Ref Range Status   08/09/2024 0.9 0.5 - 1.4 mg/dL Final     Calcium   Date Value Ref Range Status   08/09/2024 9.3 8.7 - 10.5 mg/dL Final     Total Protein   Date Value Ref Range Status   08/09/2024 6.7 6.0 - 8.4 g/dL Final     Albumin   Date Value Ref Range Status   08/09/2024 3.4 (L) 3.5 - 5.2 g/dL Final     Total Bilirubin   Date Value Ref Range Status   08/09/2024 0.2 0.1 - 1.0 mg/dL Final     Comment:     For infants and newborns, interpretation of results should be based  on gestational age, weight and in agreement with clinical  observations.    Premature Infant recommended reference ranges:  Up to 24 hours.............<8.0 mg/dL  Up to 48 hours............<12.0 mg/dL  3-5  days..................<15.0 mg/dL  6-29 days.................<15.0 mg/dL       Alkaline Phosphatase   Date Value Ref Range Status   08/09/2024 74 55 - 135 U/L Final     AST   Date Value Ref Range Status   08/09/2024 13 10 - 40 U/L Final     ALT   Date Value Ref Range Status   08/09/2024 10 10 - 44 U/L Final     Anion Gap   Date Value Ref Range Status   08/09/2024 9 8 - 16 mmol/L Final     eGFR if    Date Value Ref Range Status   05/01/2022 >60 >60 mL/min/1.73 m^2 Final     eGFR if non    Date Value Ref Range Status   05/01/2022 >60 >60 mL/min/1.73 m^2 Final     Comment:     Calculation used to obtain the estimated glomerular filtration  rate (eGFR) is the CKD-EPI equation.        Lab Results   Component Value Date    WBC 13.92 (H) 08/09/2024    HGB 13.0 08/09/2024    HCT 40.5 08/09/2024    MCV 89 08/09/2024     08/09/2024     Lab Results   Component Value Date    CHOL 163 06/12/2024    CHOL 183 01/10/2023    CHOL 191 09/20/2022     Lab Results   Component Value Date    HDL 38 (L) 06/12/2024    HDL 46 01/10/2023    HDL 41 09/20/2022     Lab Results   Component Value Date    LDLCALC 111.4 06/12/2024    LDLCALC 124.0 01/10/2023    LDLCALC 128.6 09/20/2022     Lab Results   Component Value Date    TRIG 68 06/12/2024    TRIG 65 01/10/2023    TRIG 107 09/20/2022     Lab Results   Component Value Date    CHOLHDL 23.3 06/12/2024    CHOLHDL 25.1 01/10/2023    CHOLHDL 21.5 09/20/2022     Lab Results   Component Value Date    TSH 2.032 08/09/2024       ASSESSMENT/PLAN   1. Essential hypertension  -     hydroCHLOROthiazide (HYDRODIURIL) 25 MG tablet; Take 1 tablet (25 mg total) by mouth once daily. For blood pressure  Dispense: 90 tablet; Refill: 3             Mohsen Ross NP   Department of Internal Medicine - [facility]  10:21 AM    I spent a total of 36 minutes on the day of the visit.  This includes face to face time and non-face to face time preparing to see the patient (eg,  review of tests), obtaining and/or reviewing separately obtained history, documenting clinical information in the electronic or other health record, independently interpreting results and communicating results to the patient/family/caregiver, or care coordinator.

## 2024-08-18 DIAGNOSIS — I70.0 AORTIC ATHEROSCLEROSIS: ICD-10-CM

## 2024-08-18 NOTE — TELEPHONE ENCOUNTER
Care Due:                  Date            Visit Type   Department     Provider  --------------------------------------------------------------------------------                                MYCHART                              FOLLOWUP/OF  [facility] INTERNAL  Last Visit: 06-      FICE VISIT   MEDICINE       Joao Diehl  Next Visit: None Scheduled  None         None Found                                                            Last  Test          Frequency    Reason                     Performed    Due Date  --------------------------------------------------------------------------------    Vitamin D...  12 months..  ergocalciferol...........  05- 05-    [facility] Embedded Care Due Messages. Reference number: [id number].   8/18/2024 12:34:32 PM CDT

## 2024-08-19 RX ORDER — ATORVASTATIN CALCIUM 40 MG/1
40 TABLET, FILM COATED ORAL
Qty: 90 TABLET | Refills: 3 | Status: SHIPPED | OUTPATIENT
Start: 2024-08-19

## 2024-08-19 NOTE — TELEPHONE ENCOUNTER
Refill Encounter    PCP Visits: Recent Visits  Date Type Provider Dept   06/12/24 Office Visit Weeks, Joao ALVARADO,  [facility] Internal Medicine   04/09/24 Office Visit Weeks, Joao ALVARADO,  [facility] Internal Medicine   01/09/24 Office Visit Weeks, Joao ALVARADO, DO [facility] Internal Medicine   12/22/23 Appointment Weeks, Joao ALVARADO, DO [facility] Internal Medicine   Showing recent visits within past 360 days and meeting all other requirements  Future Appointments  No visits were found meeting these conditions.  Showing future appointments within next 720 days and meeting all other requirements     Last 3 Blood Pressure:   BP Readings from Last 3 Encounters:   08/16/24 (!) 150/72   08/12/24 (!) 165/84   08/09/24 (!) 143/65     Preferred Pharmacy:   [id number] - [facility] [address] AT [facility] [address]  [address]  [facility] [address]  Phone: [phone] Fax: [phone]    Requested RX:  Requested Prescriptions     Pending Prescriptions Disp Refills    atorvastatin (LIPITOR) 40 MG tablet [Pharmacy Med Name: ATORVASTATIN 40MG TABLETS] 90 tablet 3     Sig: TAKE 1 TABLET(40 MG) BY MOUTH EVERY DAY      RX Route: Normal

## 2024-08-19 NOTE — TELEPHONE ENCOUNTER
Refill Decision Note   Leticia Brown  is requesting a refill authorization.  Brief Assessment and Rationale for Refill:  Approve     Medication Therapy Plan:  ED visit for chest pain. No FOVS.  Will approve 90 with 3      Comments:     Note composed:1:50 PM 08/19/2024             Appointments     Last Visit   6/12/2024 Joao Diehl DO   Next Visit   Visit date not found Joao Diehl DO

## 2024-08-21 ENCOUNTER — PATIENT MESSAGE (OUTPATIENT)
Dept: ADMINISTRATIVE | Facility: OTHER | Age: 69
End: 2024-08-21
Payer: MEDICARE

## 2024-08-29 ENCOUNTER — OFFICE VISIT (OUTPATIENT)
Dept: URGENT CARE | Facility: CLINIC | Age: 69
End: 2024-08-29
Payer: MEDICARE

## 2024-08-29 VITALS
DIASTOLIC BLOOD PRESSURE: 80 MMHG | WEIGHT: 134.94 LBS | HEIGHT: 62 IN | HEART RATE: 63 BPM | SYSTOLIC BLOOD PRESSURE: 150 MMHG | RESPIRATION RATE: 18 BRPM | TEMPERATURE: 97 F | OXYGEN SATURATION: 98 % | BODY MASS INDEX: 24.83 KG/M2

## 2024-08-29 DIAGNOSIS — B96.89 BACTERIAL CONJUNCTIVITIS OF BOTH EYES: Primary | ICD-10-CM

## 2024-08-29 DIAGNOSIS — H10.9 BACTERIAL CONJUNCTIVITIS OF BOTH EYES: Primary | ICD-10-CM

## 2024-08-29 PROCEDURE — 99213 OFFICE O/P EST LOW 20 MIN: CPT | Mod: S$GLB,,, | Performed by: PHYSICIAN ASSISTANT

## 2024-08-29 RX ORDER — POLYMYXIN B SULFATE AND TRIMETHOPRIM 1; 10000 MG/ML; [USP'U]/ML
1 SOLUTION OPHTHALMIC EVERY 4 HOURS
Qty: 10 ML | Refills: 0 | Status: SHIPPED | OUTPATIENT
Start: 2024-08-29 | End: 2024-09-05

## 2024-08-29 NOTE — PATIENT INSTRUCTIONS
Patient teaching-infection is easily spread to unaffected eye and to others  Wash your hands frequently to decrease the risk of transmitting to others  Avoid touching, rubbing eyes  May apply warm compresses to affected eye prn for comfort  Contagious for 24-48 after therapy begins  Return in 24-48hours if no improvement, or if conditions worsens  contact lens wear can resume when the eye is white and has no discharge for 24 hours after the completion of antibiotic therapy.   Use eye drops as prescribed, you may use artificial over the counter eye drops if you start developing dry eyes however this needs to be spaced out from your antibiotic eye drops.   Make sure to change out your pillowcases after using the antibiotic eyedrops for 2 or 3 days so you do not reinfect herself.   Make a follow up appointment with your ophthalmologist can always cancel if her symptoms improve.  As we discussed should your symptoms worsen middle of night go to the ER for evaluation.

## 2024-08-29 NOTE — LETTER
After Visit Summary   7/19/2017    Cara Camarillo    MRN: [id number]           Patient Information     Date Of Birth          1938        Visit Information        Provider Department      7/19/2017 10:00 AM Demtera Craig [facility]        Today's Diagnoses     Type 2 diabetes mellitus with vascular disease (H)    -  1    Hyperlipidemia LDL goal <100        Dysuria           Follow-ups after your visit        Your next 10 appointments already scheduled     Jul 27, 2017  3:30 PM CDT   [facility] Kole [facility]onsalez [facility]   [facility]    [address] [place] [id number] [phone]           Bring a current list of meds and any records pertaining to this visit.  For Physicals, please bring immunization records and any forms needing to be filled out.  Please arrive 10 minutes early to complete paperwork.            Sep 20, 2017 10:00 AM CDT   TELEMEDICINE with Lissy Diana [facility] ([facility])    [address] [place] [id number] [phone]           Note: this is not an onsite visit; there is no need to come to the facility.              Who to contact     If you have questions or need follow up information about today's clinic visit or your schedule please contact [facility] directly at [phone].  Normal or non-critical lab and imaging results will be communicated to you by MyChart, letter or phone within 4 business days after the clinic has received the results. If you do not hear from us within 7 days, please contact the clinic through MyChart or phone. If you have a critical or abnormal lab result, we will notify you by phone as soon as possible.  Submit refill requests through [employer] or call your pharmacy and they will forward the refill request to us. Please allow 3 business days for your refill to be completed.             August 29, 2024      [facility]  [facility] [address]  Phone: [phone]  Fax: [phone]       Patient: Leticia Brown   YOB: 1955  Date of Visit: 08/29/2024    To Whom It May Concern:    Nisa Brown  was at [facility] on 08/29/2024. The patient may return to work/school on 08/31/2024 with no restrictions. If you have any questions or concerns, or if I can be of further assistance, please do not hesitate to contact me.    Sincerely,      Noemi Briceño PA-C      "Additional Information About Your Visit        MyChart Information     [email or web address] lets you send messages to your doctor, view your test results, renew your prescriptions, schedule appointments and more. To sign up, go to [email or web address] . Click on \"Log in\" on the left side of the screen, which will take you to the Welcome page. Then click on \"Sign up Now\" on the right side of the page.     You will be asked to enter the access code listed below, as well as some personal information. Please follow the directions to create your username and password.     Your access code is: [email or web address]  Expires: 2017 11:19 AM     Your access code will  in 90 days. If you need help or a new code, please call your [place] clinic or [phone].        Care EveryWhere ID     This is your Care EveryWhere ID. This could be used by other organizations to access your [facility] medical records  [id number]         Blood Pressure from Last 3 Encounters:   17 135/78   17 140/86   17 160/80    Weight from Last 3 Encounters:   17 220 lb (99.8 kg)   17 221 lb (100.2 kg)   17 214 lb (97.1 kg)              Today, you had the following     No orders found for display       Primary Care Provider Office Phone # Fax #    Ventura Corona Alvarez [phone]       [facility] [address] [facility] [[email or web address]] [address]  [facility] [address]        Equal Access to Services     TERRANCE TRUONG : Hadii aad ku hadasho Soomaali, waaxda luqadaha, qaybta kaalmada adeegyada, levi logan. So [facility] [phone].    ATENCIÓN: Si habla español, tiene a serrano disposición servicios gratuitos de asistencia lingüística. Llame al [phone].    We comply with applicable federal civil rights laws and [place] laws. We do not discriminate on the basis of race, color, national origin, age, disability sex, sexual orientation or gender identity.            Thank you!     Thank you " "for choosing [facility]  for your care. Our goal is always to provide you with excellent care. Hearing back from our patients is one way we can continue to improve our services. Please take a few minutes to complete the written survey that you may receive in the mail after your visit with us. Thank you!             Your Updated Medication List - Protect others around you: Learn how to safely use, store and throw away your medicines at www.disposemymeds.org.          This list is accurate as of: 7/19/17  2:42 PM.  Always use your most recent med list.                   Brand Name Dispense Instructions for use Diagnosis    acetaminophen 500 MG tablet    TYLENOL     Take 500-1,000 mg by mouth 3 times daily as needed for mild pain        amLODIPine 10 MG tablet    NORVASC    90 tablet    Take 1 tablet (10 mg) by mouth daily    Essential hypertension, benign       aspirin 81 MG tablet     100 tablet    Take 1 tablet (81 mg) by mouth daily    Type II or unspecified type diabetes mellitus without mention of complication, not stated as uncontrolled       calcium carbonate 1250 MG tablet    OS-MANJEET 500 mg [place]. Ca     Take 500 mg by mouth 2 times daily        insulin  UNIT/ML injection    NovoLIN N VIAL    10 mL    10 units before breakfast, 10 units before dinner    Type 2 diabetes mellitus with vascular disease (H)       insulin syringe-needle U-100 30G X 1/2\" 0.5 ML    BD insulin syringe ULTRAFINE    100 each    Use one syringe twice daily or as directed.    Type 2 diabetes mellitus with vascular disease (H)       losartan 50 MG tablet    COZAAR    180 tablet    Take 2 tablets (100 mg) by mouth daily    Essential hypertension with goal blood pressure less than 140/90       metFORMIN 500 MG 24 hr tablet    GLUCOPHAGE-XR    180 tablet    1000 mg in am and 500 mg in pm    Type 2 diabetes mellitus with vascular disease (H)       metoprolol 50 MG tablet    LOPRESSOR    270 tablet    TAKE ONE AND " ONE-HALF TABLETS BY MOUTH TWICE DAILY    Essential hypertension, benign       nitroGLYcerin 0.4 MG sublingual tablet    NITROSTAT    25 tablet    Place 1 tablet (0.4 mg) under the tongue every 5 minutes as needed for chest pain if you are still having symptoms after 3 doses (15 minutes) call 911.    Postsurgical percutaneous transluminal coronary angioplasty status, Status post coronary angioplasty       ONE TOUCH ULTRA test strip   Generic drug:  blood glucose monitoring     300 strip    USE TO TEST THREE TIMES DAILY    Type 2 diabetes, HbA1c goal < 7% (H)       order for DME     1 Device    Equipment being ordered: L wrist brace    Left wrist pain       pantoprazole 40 MG EC tablet    PROTONIX    90 tablet    TAKE 1 TABLET BY MOUTH ONCE DAILY 30 TO 60 MINUTES BEFORE A MEAL    Gastroesophageal reflux disease, esophagitis presence not specified       rosuvastatin 5 MG tablet    CRESTOR    12 tablet    Take 1 tablet (5 mg) by mouth once a week    Hyperlipidemia LDL goal <100       vitamin D 1000 UNITS capsule     30    1 CAPSULE DAILY    Vitamin D deficiency

## 2024-08-29 NOTE — PROGRESS NOTES
"Subjective:      Patient ID: Leticia Brown is a 69 y.o. female.    Vitals:  height is 5' 2" (1.575 m) and weight is 61.2 kg (134 lb 14.7 oz). Her oral temperature is 97.2 °F (36.2 °C). Her blood pressure is 150/80 (abnormal) and her pulse is 63. Her respiration is 18 and oxygen saturation is 98%.     Chief Complaint: Other Misc (My eyes are very irritated burning hurting running and very sensitive to light - Entered by patient)    Pt presents eye irritation. Sx include : watering, discharge, light sensitivity, pain, and burning. Sx began yesterday. Tx sx with dry eye drops w/o relief.  Patient of cataracts follows with Ophthalmology yearly. no trauma to the eye patient does not wear contacts or glasses, no exposure to pink eye    Eye Problem   Associated symptoms include blurred vision, an eye discharge, eye redness, itching and photophobia. Pertinent negatives include no double vision or fever. She has tried eye drops for the symptoms.     Constitution: Negative for chills and fever.   HENT:  Negative for congestion, postnasal drip and sore throat.    Cardiovascular:  Negative for chest pain.   Eyes:  Positive for eye discharge, eye itching, eye pain, eye redness, photophobia and blurred vision. Negative for eye trauma, foreign body in eye, vision loss, double vision and eyelid swelling.   Respiratory:  Negative for cough and shortness of breath.    Skin:  Negative for erythema.   Neurological:  Negative for dizziness and headaches.      Past Medical History:   Diagnosis Date    Cataract     Diabetes mellitus     H. pylori infection 2017    [facility] EGD 2017. test of eradication neg.    High cholesterol     Hypertension        Past Surgical History:   Procedure Laterality Date    BREAST BIOPSY Right     CATARACT EXTRACTION W/  INTRAOCULAR LENS IMPLANT Right 05/12/2022    Procedure: EXTRACTION, CATARACT, WITH IOL INSERTION;  Surgeon: Destiny Carrera MD;  Location: [facility];  Service: Ophthalmology;  " Laterality: Right;    CATARACT EXTRACTION W/  INTRAOCULAR LENS IMPLANT Left 06/16/2022    Procedure: EXTRACTION, CATARACT, WITH IOL INSERTION;  Surgeon: Destiny Carrera MD;  Location: [facility];  Service: Ophthalmology;  Laterality: Left;    CHOLECYSTECTOMY  2016    COLONOSCOPY N/A 04/11/2022    Procedure: COLONOSCOPY;  Surgeon: Todd Carrera MD;  Location: [facility];  Service: Endoscopy;  Laterality: N/A;  order created: referral  Fully vaccinated, prep instr emailed -ml    ESOPHAGOGASTRODUODENOSCOPY N/A 07/21/2020    Procedure: EGD (ESOPHAGOGASTRODUODENOSCOPY);  Surgeon: Rodrick Montes MD;  Location: [facility] ENDO (4TH FLR);  Service: Endoscopy;  Laterality: N/A;  3/30/20 - removed from 4/9/20, 3/30/20 & 3/31/20LM pt &  ph & sent email, request call back to reschedule June/July.  Pt called back, rescheduled 7/21/20 - pg  7/6/20 - 7/9/20- LM x 2 - waiting for cb to set up COVID appt. - ERW  COVID screening on    EYE SURGERY  7/22    TUBAL LIGATION  1982       Family History   Problem Relation Name Age of Onset    Arthritis Mother      Cancer Mother          lung    Heart disease Mother      No Known Problems Father      No Known Problems Sister      No Known Problems Brother      No Known Problems Daughter Tashi     No Known Problems Daughter Maria G     Anxiety disorder Daughter Juju     No Known Problems Son Segundo     No Known Problems Maternal Aunt      No Known Problems Maternal Uncle      No Known Problems Paternal Aunt      No Known Problems Paternal Uncle      No Known Problems Maternal Grandmother      No Known Problems Maternal Grandfather      No Known Problems Paternal Grandmother      No Known Problems Paternal Grandfather      Colon cancer Neg Hx      Rectal cancer Neg Hx      Stomach cancer Neg Hx      Breast cancer Neg Hx         Social History     Socioeconomic History    Marital status:    Tobacco Use    Smoking status: Every Day     Current packs/day: 0.25     Average packs/day: 0.3  packs/day for 40.0 years (10.0 ttl pk-yrs)     Types: Cigarettes     Passive exposure: Current    Smokeless tobacco: Never   Substance and Sexual Activity    Alcohol use: Yes     Alcohol/week: 2.0 standard drinks of alcohol     Types: 2 Glasses of wine per week    Drug use: No    Sexual activity: Not Currently     Partners: Male     Birth control/protection: Abstinence     Social Determinants of Health     Financial Resource Strain: Medium Risk (1/9/2024)    Overall Financial Resource Strain (CARDIA)     Difficulty of Paying Living Expenses: Somewhat hard   Food Insecurity: No Food Insecurity (1/9/2024)    Hunger Vital Sign     Worried About Running Out of Food in the Last Year: Never true     Ran Out of Food in the Last Year: Never true   Transportation Needs: No Transportation Needs (1/9/2024)    PRAPARE - Transportation     Lack of Transportation (Medical): No     Lack of Transportation (Non-Medical): No   Physical Activity: Inactive (1/9/2024)    Exercise Vital Sign     Days of Exercise per Week: 0 days     Minutes of Exercise per Session: 0 min   Stress: Stress Concern Present (1/9/2024)    [employer] [place] [employer] - Occupational Stress Questionnaire     Feeling of Stress : Very much   Housing Stability: High Risk (1/9/2024)    Housing Stability Vital Sign     Unable to Pay for Housing in the Last Year: Yes     Number of Places Lived in the Last Year: 1     Unstable Housing in the Last Year: No       Current Outpatient Medications   Medication Sig Dispense Refill    amLODIPine (NORVASC) 5 MG tablet Take 1 tablet (5 mg total) by mouth 2 (two) times daily. 180 tablet 0    aspirin (ECOTRIN) 81 MG EC tablet Take 81 mg by mouth once daily.      atorvastatin (LIPITOR) 40 MG tablet TAKE 1 TABLET(40 MG) BY MOUTH EVERY DAY 90 tablet 3    azelastine (ASTELIN) 137 mcg (0.1 %) nasal spray 1 spray (137 mcg total) by Nasal route 2 (two) times daily. 30 mL 0    cetirizine (ZYRTEC) 10 MG tablet Take 1 tablet  (10 mg total) by mouth once daily.      diclofenac sodium (VOLTAREN) 1 % Gel Apply 2 g topically once daily. 100 g 1    fluticasone propionate (FLONASE) 50 mcg/actuation nasal spray 1 spray (50 mcg total) by Each Nostril route once daily. 16 g 11    hydroCHLOROthiazide (HYDRODIURIL) 25 MG tablet Take 1 tablet (25 mg total) by mouth once daily. For blood pressure 90 tablet 3    olmesartan (BENICAR) 40 MG tablet Take 1 tablet (40 mg total) by mouth once daily. 90 tablet 3    oxybutynin (DITROPAN-XL) 10 MG 24 hr tablet Take 1 tablet (10 mg total) by mouth once daily. 90 tablet 3    pantoprazole (PROTONIX) 40 MG tablet Take 1 tablet (40 mg total) by mouth before breakfast. 90 tablet 0    traZODone (DESYREL) 50 MG tablet Take 1 tablet (50 mg total) by mouth every evening. For sleep 30 tablet 11    albuterol (VENTOLIN HFA) 90 mcg/actuation inhaler Inhale 2 puffs into the lungs every 6 (six) hours as needed for Wheezing. Rescue 18 g 0    blood sugar diagnostic Strp 1 each by Misc.(Non-Drug; Combo Route) route 4 (four) times daily. 400 each 2    ergocalciferol (ERGOCALCIFEROL) 50,000 unit Cap Take 1 capsule (50,000 Units total) by mouth every 7 days. 12 capsule 3    ibuprofen (ADVIL,MOTRIN) 600 MG tablet Take 1 tablet (600 mg total) by mouth 3 (three) times daily. 15 tablet 0    lancets Misc 1 each by Misc.(Non-Drug; Combo Route) route 4 (four) times daily. 400 each 2    pneumoc 20-arnold conj-dip cr,PF, (PREVNAR 20, PF,) 0.5 mL Syrg injection Inject into the muscle. 0.5 mL 0    polymyxin B sulf-trimethoprim (POLYTRIM) 10,000 unit- 1 mg/mL Drop Place 1 drop into both eyes every 4 (four) hours. for 7 days 10 mL 0     Current Facility-Administered Medications   Medication Dose Route Frequency Provider Last Rate Last Admin    fluorescein-benoxinate 0.25-0.4% ophthalmic solution 1 drop  1 drop Both Eyes 1 time in Clinic/HOD Noemi Briceño PA-C           Review of patient's allergies indicates:  No Known  Allergies    Objective:     Physical Exam   Constitutional:  Non-toxic appearance. She does not appear ill. No distress.   HENT:   Head: Normocephalic and atraumatic.   Eyes: Right eye visual fields normal and left eye visual fields normal. Lids are normal. Pupils are equal, round, and reactive to light. Lids are everted and swept, no foreign bodies found. No visual field deficit is present. Right eye exhibits no chemosis, no discharge, no exudate and no hordeolum. No foreign body present in the right eye. Left eye exhibits no chemosis, no discharge, no exudate and no hordeolum. No foreign body present in the left eye. Right conjunctiva is injected. Right conjunctiva has no hemorrhage. Left conjunctiva is injected. No scleral icterus. Right pupil is round, reactive and not sluggish. Left pupil is round, reactive and not sluggish.   Fundoscopic exam:       The right eye shows no arteriolar narrowing, no AV nicking, no exudate, no hemorrhage and no papilledema. The right eye shows red reflex present and venous pulsations present.        The left eye shows no arteriolar narrowing, no AV nicking, no exudate, no hemorrhage and no papilledema. The left eye shows red reflex present and venous pulsations present.   Slit lamp exam:       The right eye shows no corneal abrasion and no fluorescein uptake.        The left eye shows no corneal abrasion and no fluorescein uptake. Extraocular movement intact vision grossly intact gaze aligned appropriately periorbital hyperpigmentation     Comments: No cobblestoning   Pulmonary/Chest: Effort normal. No respiratory distress.   Abdominal: Normal appearance.   Neurological: She is alert.   Skin: Skin is warm, dry, not diaphoretic, not pale and no rash. No bruising and No erythema   Nursing note and vitals reviewed.    Vision Screening    Right eye Left eye Both eyes   Without correction 20/20 20/40 20/20   With correction          Assessment:     1. Bacterial conjunctivitis of both  eyes        Plan:       Bacterial conjunctivitis of both eyes  -     polymyxin B sulf-trimethoprim (POLYTRIM) 10,000 unit- 1 mg/mL Drop; Place 1 drop into both eyes every 4 (four) hours. for 7 days  Dispense: 10 mL; Refill: 0  -     fluorescein-benoxinate 0.25-0.4% ophthalmic solution 1 drop           Results reviewed  I have reviewed the patient chart and pertinent past imaging/labs.  Pt symptoms improved after eye drops administered  Patient Instructions   Patient teaching-infection is easily spread to unaffected eye and to others  Wash your hands frequently to decrease the risk of transmitting to others  Avoid touching, rubbing eyes  May apply warm compresses to affected eye prn for comfort  Contagious for 24-48 after therapy begins  Return in 24-48hours if no improvement, or if conditions worsens  contact lens wear can resume when the eye is white and has no discharge for 24 hours after the completion of antibiotic therapy.   Use eye drops as prescribed, you may use artificial over the counter eye drops if you start developing dry eyes however this needs to be spaced out from your antibiotic eye drops.   Make sure to change out your pillowcases after using the antibiotic eyedrops for 2 or 3 days so you do not reinfect herself.   Make a follow up appointment with your ophthalmologist can always cancel if her symptoms improve.  As we discussed should your symptoms worsen middle of night go to the ER for evaluation.

## 2024-08-30 RX ORDER — OLMESARTAN MEDOXOMIL 40 MG/1
40 TABLET ORAL
Qty: 90 TABLET | Refills: 3 | Status: SHIPPED | OUTPATIENT
Start: 2024-08-30

## 2024-08-30 NOTE — TELEPHONE ENCOUNTER
No care due was identified.  [facility] Embedded Care Due Messages. Reference number: [id number].   8/30/2024 9:47:43 AM CDT

## 2024-09-03 ENCOUNTER — PATIENT OUTREACH (OUTPATIENT)
Dept: ADMINISTRATIVE | Facility: HOSPITAL | Age: 69
End: 2024-09-03
Payer: MEDICARE

## 2024-09-03 DIAGNOSIS — E11.9 TYPE 2 DIABETES MELLITUS WITHOUT COMPLICATION, WITHOUT LONG-TERM CURRENT USE OF INSULIN: Primary | ICD-10-CM

## 2024-09-12 ENCOUNTER — TELEPHONE (OUTPATIENT)
Dept: ORTHOPEDICS | Facility: CLINIC | Age: 69
End: 2024-09-12
Payer: MEDICARE

## 2024-09-12 NOTE — TELEPHONE ENCOUNTER
LVM c pt. Attempting to confirm appt location & time c Dr. Santoyo Requested a call back to the [facility] at [phone] with any questions, concerns or need for a different appointment time.

## 2024-09-16 ENCOUNTER — NURSE TRIAGE (OUTPATIENT)
Dept: ADMINISTRATIVE | Facility: CLINIC | Age: 69
End: 2024-09-16
Payer: MEDICARE

## 2024-09-16 NOTE — TELEPHONE ENCOUNTER
Pt calling with c/o of dizziness since Saturday. Pt reports low blood pressure later in the day on Saturday so she went home and drank water and symptoms improved some. Pt reports dizziness happening again today and BP is 112/39 and Hr 75. Pt says anytime she is standing or doing stuff around her house she has to sit back down due to the dizziness. Pt baseline BP is usually high and she does mention she feels dehydrated.     Dispo is to go to ED/UCC now. Care advice given. Pt v/u.     Reason for Disposition   SEVERE dizziness (e.g., unable to stand, requires support to walk, feels like passing out now)    Additional Information   Negative: SEVERE difficulty breathing (e.g., struggling for each breath, speaks in single words)   Negative: Shock suspected (e.g., cold/pale/clammy skin, too weak to stand, low BP, rapid pulse)   Negative: Difficult to awaken or acting confused (e.g., disoriented, slurred speech)   Negative: Fainted, and still feels dizzy afterwards   Negative: Overdose (accidental or intentional) of medications   Negative: New neurologic deficit that is present now: * Weakness of the face, arm, or leg on one side of the body * Numbness of the face, arm, or leg on one side of the body * Loss of speech or garbled speech   Negative: Heart beating < 50 beats per minute OR > 140 beats per minute    Protocols used: Dizziness-A-OH

## 2024-09-17 ENCOUNTER — OFFICE VISIT (OUTPATIENT)
Dept: ORTHOPEDICS | Facility: CLINIC | Age: 69
End: 2024-09-17
Payer: MEDICARE

## 2024-09-17 VITALS — BODY MASS INDEX: 24.83 KG/M2 | HEIGHT: 62 IN | WEIGHT: 134.94 LBS

## 2024-09-17 DIAGNOSIS — M77.11 LATERAL EPICONDYLITIS, RIGHT ELBOW: Primary | ICD-10-CM

## 2024-09-17 PROCEDURE — 4010F ACE/ARB THERAPY RXD/TAKEN: CPT | Mod: HCNC,CPTII,S$GLB, | Performed by: ORTHOPAEDIC SURGERY

## 2024-09-17 PROCEDURE — 3008F BODY MASS INDEX DOCD: CPT | Mod: HCNC,CPTII,S$GLB, | Performed by: ORTHOPAEDIC SURGERY

## 2024-09-17 PROCEDURE — 99999 PR PBB SHADOW E&M-EST. PATIENT-LVL III: CPT | Mod: PBBFAC,HCNC,, | Performed by: ORTHOPAEDIC SURGERY

## 2024-09-17 PROCEDURE — 99213 OFFICE O/P EST LOW 20 MIN: CPT | Mod: HCNC,S$GLB,, | Performed by: ORTHOPAEDIC SURGERY

## 2024-09-17 PROCEDURE — 3044F HG A1C LEVEL LT 7.0%: CPT | Mod: HCNC,CPTII,S$GLB, | Performed by: ORTHOPAEDIC SURGERY

## 2024-09-17 PROCEDURE — 1159F MED LIST DOCD IN RCRD: CPT | Mod: HCNC,CPTII,S$GLB, | Performed by: ORTHOPAEDIC SURGERY

## 2024-09-17 PROCEDURE — 1101F PT FALLS ASSESS-DOCD LE1/YR: CPT | Mod: HCNC,CPTII,S$GLB, | Performed by: ORTHOPAEDIC SURGERY

## 2024-09-17 PROCEDURE — 1126F AMNT PAIN NOTED NONE PRSNT: CPT | Mod: HCNC,CPTII,S$GLB, | Performed by: ORTHOPAEDIC SURGERY

## 2024-09-17 PROCEDURE — 3288F FALL RISK ASSESSMENT DOCD: CPT | Mod: HCNC,CPTII,S$GLB, | Performed by: ORTHOPAEDIC SURGERY

## 2024-09-17 NOTE — TELEPHONE ENCOUNTER
Called to check on Ms. Brown. States she did not go to the ER, and on speaking with the 24 H nurse told to rest and increase fluid.  States she feels better today and  BP  today 112/35 . Given appointment with MsJamal Jaylan 09/20/24 1030 and told to go to ED if condition worsens.

## 2024-09-17 NOTE — PROGRESS NOTES
"[facility]  History & Physical  Orthopedics    SUBJECTIVE:      Chief Complaint:  Right upper arm pain    Referring Provider: No ref. provider found     Dr. Natanael Guerrero is the supervising physician for this encounter/patient    History of Present Illness:  Patient is a 69 y.o. right hand dominant female who presents today with complaints of  constant " achy" right upper arm pain which began a month ago with associated weakness which are affecting her ADLs. Tender to touch mostly right elbow.  She denies any numbness or tingling of right hand.  She denies any recent falls or previous falls.  She denies any previous surgery.     The patient is a .     The patient denies any fevers, chills, N/V, D/C and presents for evaluation.     HPI 9/17/24 Patient is here today for a f/u of her R elbow lateral epicondylitis. She received an injection last visit and a mac strap. She states the injection worked and she has no pain at all. She will begin PT 9/25  Pain is 0/10- she is vwery happy with her result    Past Medical History:   Diagnosis Date    Cataract     Diabetes mellitus     H. pylori infection 2017    [facility] EGD 2017. test of eradication neg.    High cholesterol     Hypertension      Past Surgical History:   Procedure Laterality Date    BREAST BIOPSY Right     CATARACT EXTRACTION W/  INTRAOCULAR LENS IMPLANT Right 05/12/2022    Procedure: EXTRACTION, CATARACT, WITH IOL INSERTION;  Surgeon: Destiny Carrera MD;  Location: [facility] OR;  Service: Ophthalmology;  Laterality: Right;    CATARACT EXTRACTION W/  INTRAOCULAR LENS IMPLANT Left 06/16/2022    Procedure: EXTRACTION, CATARACT, WITH IOL INSERTION;  Surgeon: Destiny Carrera MD;  Location: [facility] OR;  Service: Ophthalmology;  Laterality: Left;    CHOLECYSTECTOMY  2016    COLONOSCOPY N/A 04/11/2022    Procedure: COLONOSCOPY;  Surgeon: Todd Carrera MD;  Location: [facility];  Service: Endoscopy;  Laterality: N/A;  order created: referral  Fully " vaccinated, prep instr emailed -ml    ESOPHAGOGASTRODUODENOSCOPY N/A 07/21/2020    Procedure: EGD (ESOPHAGOGASTRODUODENOSCOPY);  Surgeon: Rodrick Montes MD;  Location: [facility] ([address] [place] [address]);  Service: Endoscopy;  Laterality: N/A;  3/30/20 - removed from 4/9/20, 3/30/20 & 3/31/20LM pt &  ph & sent email, request call back to reschedule June/July.  Pt called back, rescheduled 7/21/20 - pg  7/6/20 - 7/9/20- LM x 2 - waiting for cb to set up COVID appt. - [facility]  COVID screening on    EYE SURGERY  7/22    TUBAL LIGATION  1982     Review of patient's allergies indicates:  No Known Allergies  Social History     Social History Narrative    Not on file     Family History   Problem Relation Name Age of Onset    Arthritis Mother      Cancer Mother          lung    Heart disease Mother      No Known Problems Father      No Known Problems Sister      No Known Problems Brother      No Known Problems Daughter Tashi     No Known Problems Daughter Maria G     Anxiety disorder Daughter Juju     No Known Problems Son Segundo     No Known Problems Maternal Aunt      No Known Problems Maternal Uncle      No Known Problems Paternal Aunt      No Known Problems Paternal Uncle      No Known Problems Maternal Grandmother      No Known Problems Maternal Grandfather      No Known Problems Paternal Grandmother      No Known Problems Paternal Grandfather      Colon cancer Neg Hx      Rectal cancer Neg Hx      Stomach cancer Neg Hx      Breast cancer Neg Hx           Current Outpatient Medications:     albuterol (VENTOLIN HFA) 90 mcg/actuation inhaler, Inhale 2 puffs into the lungs every 6 (six) hours as needed for Wheezing. Rescue, Disp: 18 g, Rfl: 0    amLODIPine (NORVASC) 5 MG tablet, Take 1 tablet (5 mg total) by mouth 2 (two) times daily., Disp: 180 tablet, Rfl: 0    aspirin (ECOTRIN) 81 MG EC tablet, Take 81 mg by mouth once daily., Disp: , Rfl:     atorvastatin (LIPITOR) 40 MG tablet, TAKE 1 TABLET(40 MG) BY MOUTH EVERY DAY, Disp: 90  tablet, Rfl: 3    azelastine (ASTELIN) 137 mcg (0.1 %) nasal spray, 1 spray (137 mcg total) by Nasal route 2 (two) times daily., Disp: 30 mL, Rfl: 0    blood sugar diagnostic Strp, 1 each by Misc.(Non-Drug; Combo Route) route 4 (four) times daily., Disp: 400 each, Rfl: 2    cetirizine (ZYRTEC) 10 MG tablet, Take 1 tablet (10 mg total) by mouth once daily., Disp: , Rfl:     diclofenac sodium (VOLTAREN) 1 % Gel, Apply 2 g topically once daily., Disp: 100 g, Rfl: 1    ergocalciferol (ERGOCALCIFEROL) 50,000 unit Cap, Take 1 capsule (50,000 Units total) by mouth every 7 days., Disp: 12 capsule, Rfl: 3    fluticasone propionate (FLONASE) 50 mcg/actuation nasal spray, 1 spray (50 mcg total) by Each Nostril route once daily., Disp: 16 g, Rfl: 11    hydroCHLOROthiazide (HYDRODIURIL) 25 MG tablet, Take 1 tablet (25 mg total) by mouth once daily. For blood pressure, Disp: 90 tablet, Rfl: 3    ibuprofen (ADVIL,MOTRIN) 600 MG tablet, Take 1 tablet (600 mg total) by mouth 3 (three) times daily., Disp: 15 tablet, Rfl: 0    lancets Misc, 1 each by Misc.(Non-Drug; Combo Route) route 4 (four) times daily., Disp: 400 each, Rfl: 2    olmesartan (BENICAR) 40 MG tablet, TAKE 1 TABLET(40 MG) BY MOUTH DAILY, Disp: 90 tablet, Rfl: 3    oxybutynin (DITROPAN-XL) 10 MG 24 hr tablet, Take 1 tablet (10 mg total) by mouth once daily., Disp: 90 tablet, Rfl: 3    pantoprazole (PROTONIX) 40 MG tablet, Take 1 tablet (40 mg total) by mouth before breakfast., Disp: 90 tablet, Rfl: 0    pneumoc 20-arnold conj-dip cr,PF, (PREVNAR 20, PF,) 0.5 mL Syrg injection, Inject into the muscle., Disp: 0.5 mL, Rfl: 0    traZODone (DESYREL) 50 MG tablet, Take 1 tablet (50 mg total) by mouth every evening. For sleep, Disp: 30 tablet, Rfl: 11      Review of Systems:  Constitutional: no fever or chills  Eyes: no visual changes  ENT: no nasal congestion or sore throat  Respiratory: no cough or shortness of breath  Cardiovascular: no chest pain  Gastrointestinal: no nausea  "or vomiting, tolerating diet  Musculoskeletal: arthralgias, myalgias, pain, and soreness    OBJECTIVE:      Vital Signs (Most Recent):  Vitals:    09/17/24 0825   Weight: 61.2 kg (134 lb 14.7 oz)   Height: 5' 2" (1.575 m)     Body mass index is 24.68 kg/m².      Physical Exam:  Constitutional: The patient appears well-developed and well-nourished. No distress.   Skin: No lesions appreciated  Head: Normocephalic and atraumatic.   Nose: Nose normal.   Ears: No deformities seen  Eyes: Conjunctivae and EOM are normal.   Neck: No tracheal deviation present.   Cardiovascular: Normal rate and intact distal pulses.    Pulmonary/Chest: Effort normal. No respiratory distress.   Abdominal: There is no guarding.   Neurological: The patient is alert.   Psychiatric: The patient has a normal mood and affect.     Right Hand/Wrist Examination:    Observation/Inspection:  Swelling  none    Deformity  none  Discoloration  none     Scars   none    Atrophy  none  Tenderness right upper arm and right lateral epicondyle.  HAND/WRIST EXAMINATION:  Finkelstein's Test   Neg  WHAT Test    Neg  Snuff box tenderness   Neg  Salamanca's Test    Neg  Hook of Hamate Tenderness  Neg  CMC grind    Neg  Circumduction test   Neg    Neurovascular Exam:  Digits WWP, brisk CR < 3s throughout  NVI motor/LTS to M/R/U nerves, radial pulse 2+  Tinel's Test - Carpal Tunnel  Neg  Tinel's Test - Cubital Tunnel  Neg  Phalen's Test    Neg  Median Nerve Compression Test Neg    ROM hand full, painless    ROM wrist full, painless    ROM elbow full, pain with right arm extension and hand resistance and middle finger extension.     Abdomen not guarded  Respirations nonlabored  Perfusion intact    Diagnostic Results:     Imaging - I independently viewed the patient's imaging as well as the radiology report.   EMG - none     ASSESSMENT/PLAN:      There are no diagnoses linked to this encounter.     69 y.o. yo female with right lateral epicondylitis  Plan: RTC PRN     Should " the patient's symptoms worsen, persist, or fail to improve they should return for reevaluation and I would be happy to see them back anytime    Please do not hesitate to reach out to us via email, phone, or MyChart with any questions, concerns, or feedback.    Jeanne Roca ATC SMA

## 2024-09-20 ENCOUNTER — OFFICE VISIT (OUTPATIENT)
Dept: INTERNAL MEDICINE | Facility: CLINIC | Age: 69
End: 2024-09-20
Payer: MEDICARE

## 2024-09-20 VITALS
DIASTOLIC BLOOD PRESSURE: 60 MMHG | HEIGHT: 62 IN | SYSTOLIC BLOOD PRESSURE: 110 MMHG | BODY MASS INDEX: 26.01 KG/M2 | HEART RATE: 60 BPM | WEIGHT: 141.31 LBS | OXYGEN SATURATION: 100 %

## 2024-09-20 DIAGNOSIS — G47.00 INSOMNIA, UNSPECIFIED TYPE: ICD-10-CM

## 2024-09-20 DIAGNOSIS — I10 ESSENTIAL HYPERTENSION: Primary | ICD-10-CM

## 2024-09-20 DIAGNOSIS — E55.9 VITAMIN D DEFICIENCY: ICD-10-CM

## 2024-09-20 DIAGNOSIS — Z23 NEEDS FLU SHOT: ICD-10-CM

## 2024-09-20 PROCEDURE — 99999 PR PBB SHADOW E&M-EST. PATIENT-LVL IV: CPT | Mod: PBBFAC,HCNC,,

## 2024-09-20 RX ORDER — TRAZODONE HYDROCHLORIDE 50 MG/1
TABLET ORAL
Qty: 30 TABLET | Refills: 11 | Status: SHIPPED | OUTPATIENT
Start: 2024-09-20 | End: 2024-09-20

## 2024-09-20 RX ORDER — ERGOCALCIFEROL 1.25 MG/1
50000 CAPSULE ORAL
Qty: 12 CAPSULE | Refills: 3 | Status: SHIPPED | OUTPATIENT
Start: 2024-09-20

## 2024-09-20 RX ORDER — TRAZODONE HYDROCHLORIDE 50 MG/1
50 TABLET ORAL NIGHTLY
Qty: 30 TABLET | Refills: 11 | Status: CANCELLED | OUTPATIENT
Start: 2024-09-20 | End: 2025-09-20

## 2024-09-20 RX ORDER — TRAZODONE HYDROCHLORIDE 50 MG/1
TABLET ORAL
Qty: 30 TABLET | Refills: 11 | Status: SHIPPED | OUTPATIENT
Start: 2024-09-20

## 2024-09-20 RX ORDER — HYDROCHLOROTHIAZIDE 25 MG/1
12.5 TABLET ORAL DAILY
Qty: 45 TABLET | Refills: 3 | Status: SHIPPED | OUTPATIENT
Start: 2024-09-20 | End: 2025-09-20

## 2024-09-20 NOTE — ASSESSMENT & PLAN NOTE
-States she feels very drowsy into the morning on trazodone 50 mg  -Will try cutting dose in half, instructed to split tablets

## 2024-09-20 NOTE — PROGRESS NOTES
INTERNAL MEDICINE  [facility]    Reason for visit:   Chief Complaint   Patient presents with    Hypotension    Dizziness     HPI: Leticia Brown is a 69 y.o. female presenting today for hypotension and dizziness.    Patient is an established patient of PCP, Dr. Joao Diehl, . This patient is new to me.    She recently increased hydrochlorothiazide dose to 25 mg daily about one month ago. She also takes Olmesartan 40 mg daily and Amlodipine 5 mg daily. She had an episode on Saturday of dizziness with floaters. She went to the pharmacy and her BP reading was 112/39. When she got home she called 24 hour nurse who instructed her on slow position changes. Upon review of her digital medicine BP her readings have been low recently, with SBP occasionally in the 80s. Before increasing HCTZ her SBP was consistently elevated to the 150s. For safety will cut dose down to 12.5 mg daily and continue with other anti-hypertensive meds.       Review of Systems   Constitutional:  Negative for chills, fever and malaise/fatigue.   Eyes:  Negative for blurred vision and double vision.   Respiratory:  Negative for shortness of breath.    Cardiovascular:  Negative for chest pain, palpitations and leg swelling.   Neurological:  Positive for dizziness (intermittent). Negative for weakness and headaches.   Psychiatric/Behavioral:  Negative for substance abuse. The patient is not nervous/anxious and does not have insomnia (on trazodone 50 mg Q HS).        Social History     Social History Narrative    Not on file       ALLERGIES:   Review of patient's allergies indicates:  No Known Allergies    MEDS:   Current Outpatient Medications on File Prior to Visit   Medication Sig Dispense Refill Last Dose    albuterol (VENTOLIN HFA) 90 mcg/actuation inhaler Inhale 2 puffs into the lungs every 6 (six) hours as needed for Wheezing. Rescue 18 g 0 Taking    amLODIPine (NORVASC) 5 MG tablet Take 1 tablet (5 mg total)  by mouth 2 (two) times daily. 180 tablet 0 Taking    aspirin (ECOTRIN) 81 MG EC tablet Take 81 mg by mouth once daily.   Taking    atorvastatin (LIPITOR) 40 MG tablet TAKE 1 TABLET(40 MG) BY MOUTH EVERY DAY 90 tablet 3 Taking    azelastine (ASTELIN) 137 mcg (0.1 %) nasal spray 1 spray (137 mcg total) by Nasal route 2 (two) times daily. 30 mL 0 Taking    cetirizine (ZYRTEC) 10 MG tablet Take 1 tablet (10 mg total) by mouth once daily.   Taking    diclofenac sodium (VOLTAREN) 1 % Gel Apply 2 g topically once daily. 100 g 1 Taking    olmesartan (BENICAR) 40 MG tablet TAKE 1 TABLET(40 MG) BY MOUTH DAILY 90 tablet 3 Taking    oxybutynin (DITROPAN-XL) 10 MG 24 hr tablet Take 1 tablet (10 mg total) by mouth once daily. 90 tablet 3 Taking    [DISCONTINUED] ergocalciferol (ERGOCALCIFEROL) 50,000 unit Cap Take 1 capsule (50,000 Units total) by mouth every 7 days. 12 capsule 3 Taking    [DISCONTINUED] hydroCHLOROthiazide (HYDRODIURIL) 25 MG tablet Take 1 tablet (25 mg total) by mouth once daily. For blood pressure 90 tablet 3 Taking    [DISCONTINUED] traZODone (DESYREL) 50 MG tablet Take 1 tablet (50 mg total) by mouth every evening. For sleep 30 tablet 11 Taking    blood sugar diagnostic Strp 1 each by Misc.(Non-Drug; Combo Route) route 4 (four) times daily. 400 each 2     fluticasone propionate (FLONASE) 50 mcg/actuation nasal spray 1 spray (50 mcg total) by Each Nostril route once daily. 16 g 11     ibuprofen (ADVIL,MOTRIN) 600 MG tablet Take 1 tablet (600 mg total) by mouth 3 (three) times daily. (Patient not taking: Reported on 9/20/2024) 15 tablet 0 Not Taking    lancets Misc 1 each by Misc.(Non-Drug; Combo Route) route 4 (four) times daily. 400 each 2     pantoprazole (PROTONIX) 40 MG tablet Take 1 tablet (40 mg total) by mouth before breakfast. (Patient not taking: Reported on 9/20/2024) 90 tablet 0 Not Taking    pneumoc 20-arnold conj-dip cr,PF, (PREVNAR 20, PF,) 0.5 mL Syrg injection Inject into the muscle. 0.5 mL 0   "      Vital signs:   Vitals:    09/20/24 1038   BP: 110/60   BP Location: Left arm   Patient Position: Sitting   BP Method: Small (Manual)   Pulse: 60   SpO2: 100%   Weight: 64.1 kg (141 lb 5 oz)   Height: 5' 2" (1.575 m)     Body mass index is 25.85 kg/m².    PHYSICAL EXAM:     Physical Exam  Vitals and nursing note reviewed.   Constitutional:       Appearance: Normal appearance. She is not ill-appearing or diaphoretic.   HENT:      Head: Normocephalic and atraumatic.   Eyes:      Extraocular Movements: Extraocular movements intact.      Pupils: Pupils are equal, round, and reactive to light.   Cardiovascular:      Rate and Rhythm: Normal rate.   Pulmonary:      Effort: Pulmonary effort is normal. No respiratory distress.   Musculoskeletal:      Cervical back: Normal range of motion.   Skin:     General: Skin is dry.      Coloration: Skin is not pale.      Findings: No erythema or rash.   Neurological:      Mental Status: She is alert and oriented to person, place, and time.   Psychiatric:         Mood and Affect: Mood normal.         Behavior: Behavior normal.         Thought Content: Thought content normal.         Judgment: Judgment normal.         PERTINENT RESULTS:   No visits with results within 1 Week(s) from this visit.   Latest known visit with results is:   Admission on 08/09/2024, Discharged on 08/09/2024   Component Date Value Ref Range Status    POC Rapid COVID 08/09/2024 Negative  Negative Final     Acceptable 08/09/2024 Yes   Final    POC Molecular Influenza A Ag 08/09/2024 Negative  Negative Final    POC Molecular Influenza B Ag 08/09/2024 Negative  Negative Final     Acceptable 08/09/2024 Yes   Final    QRS Duration 08/09/2024 82  ms Final    [facility] QTC Calculation 08/09/2024 405  ms Final    WBC 08/09/2024 13.92 (H)  3.90 - 12.70 K/uL Final    RBC 08/09/2024 4.53  4.00 - 5.40 M/uL Final    Hemoglobin 08/09/2024 13.0  12.0 - 16.0 g/dL Final    Hematocrit 08/09/2024 40.5  " 37.0 - 48.5 % Final    MCV 08/09/2024 89  82 - 98 fL Final    MCH 08/09/2024 28.7  27.0 - 31.0 pg Final    MCHC 08/09/2024 32.1  32.0 - 36.0 g/dL Final    RDW 08/09/2024 14.1  11.5 - 14.5 % Final    Platelets 08/09/2024 250  150 - 450 K/uL Final    MPV 08/09/2024 9.7  9.2 - 12.9 fL Final    Immature Granulocytes 08/09/2024 0.2  0.0 - 0.5 % Final    Gran # (ANC) 08/09/2024 6.7  1.8 - 7.7 K/uL Final    Immature Grans (Abs) 08/09/2024 0.03  0.00 - 0.04 K/uL Final    Comment: Mild elevation in immature granulocytes is non specific and   can be seen in a variety of conditions including stress response,   acute inflammation, trauma and pregnancy. Correlation with other   laboratory and clinical findings is essential.      Lymph # 08/09/2024 6.3 (H)  1.0 - 4.8 K/uL Final    Mono # 08/09/2024 0.6  0.3 - 1.0 K/uL Final    Eos # 08/09/2024 0.2  0.0 - 0.5 K/uL Final    Baso # 08/09/2024 0.05  0.00 - 0.20 K/uL Final    nRBC 08/09/2024 0  0 /100 WBC Final    Gran % 08/09/2024 48.3  38.0 - 73.0 % Final    Lymph % 08/09/2024 45.3  18.0 - 48.0 % Final    Mono % 08/09/2024 4.6  4.0 - 15.0 % Final    Eosinophil % 08/09/2024 1.2  0.0 - 8.0 % Final    Basophil % 08/09/2024 0.4  0.0 - 1.9 % Final    Differential Method 08/09/2024 Automated   Final    Sodium 08/09/2024 140  136 - 145 mmol/L Final    Potassium 08/09/2024 4.2  3.5 - 5.1 mmol/L Final    Chloride 08/09/2024 109  95 - 110 mmol/L Final    CO2 08/09/2024 22 (L)  23 - 29 mmol/L Final    Glucose 08/09/2024 94  70 - 110 mg/dL Final    BUN 08/09/2024 21  8 - 23 mg/dL Final    Creatinine 08/09/2024 0.9  0.5 - 1.4 mg/dL Final    Calcium 08/09/2024 9.3  8.7 - 10.5 mg/dL Final    Total Protein 08/09/2024 6.7  6.0 - 8.4 g/dL Final    Albumin 08/09/2024 3.4 (L)  3.5 - 5.2 g/dL Final    Total Bilirubin 08/09/2024 0.2  0.1 - 1.0 mg/dL Final    Comment: For infants and newborns, interpretation of results should be based  on gestational age, weight and in agreement with  clinical  observations.    Premature Infant recommended reference ranges:  Up to 24 hours.............<8.0 mg/dL  Up to 48 hours............<12.0 mg/dL  3-5 days..................<15.0 mg/dL  6-29 days.................<15.0 mg/dL      Alkaline Phosphatase 08/09/2024 74  55 - 135 U/L Final    AST 08/09/2024 13  10 - 40 U/L Final    ALT 08/09/2024 10  10 - 44 U/L Final    eGFR 08/09/2024 >60  >60 mL/min/1.73 m^2 Final    Anion Gap 08/09/2024 9  8 - 16 mmol/L Final    BNP 08/09/2024 64  0 - 99 pg/mL Final    Values of less than 100 pg/ml are consistent with non-CHF populations.    D-Dimer 08/09/2024 0.37  <0.50 mg/L FEU Final    Comment: The quantitative D-dimer assay should be used as an aid in   the diagnosis of deep vein thrombosis and pulmonary embolism  in patients with the appropriate presentation and clinical  history. The upper limit of the reference interval and the clinical   cut off   point are identical. Causes of a positive (>0.50 mg/L FEU) D-Dimer   test  include, but are not limited to: DVT, PE, DIC, thrombolytic   therapy, anticoagulant therapy, recent surgery, trauma, or   pregnancy, disseminated malignancy, aortic aneurysm, cirrhosis,  and severe infection. False negative results may occur in   patients with distal DVT.  GONZALO^612^^42^  LOT^610^DDiSup^[id number]\DDiBuf^[id number]\DDiReag^[id number]      Lipase 08/09/2024 39  4 - 60 U/L Final    Magnesium 08/09/2024 2.0  1.6 - 2.6 mg/dL Final    aPTT 08/09/2024 30.2  21.0 - 32.0 sec Final    Comment: Refer to local heparin nomogram for intensity/dose specific   therapeutic   range.  LOT^050^APTT FSL^[id number]      Prothrombin Time 08/09/2024 10.5  9.0 - 12.5 sec Final    INR 08/09/2024 0.9  0.8 - 1.2 Final    Comment: Coumadin Therapy:  2.0 - 3.0 for INR for all indicators except mechanical heart valves  and antiphospholipid syndromes which should use 2.5 - 3.5.  LOT^040^PT Inn^[id number]      Troponin I 08/09/2024 <0.006  0.000 - 0.026 ng/mL Final    Comment: The  reference interval for Troponin I represents the 99th percentile   cutoff   for our facility and is consistent with 3rd generation assay   performance.      TSH 08/09/2024 2.032  0.400 - 4.000 uIU/mL Final    Troponin I 08/09/2024 <0.006  0.000 - 0.026 ng/mL Final    Comment: The reference interval for Troponin I represents the 99th percentile   cutoff   for our facility and is consistent with 3rd generation assay   performance.       ASSESSMENT/PLAN:    1. Essential hypertension  -     hydroCHLOROthiazide (HYDRODIURIL) 25 MG tablet; Take 0.5 tablets (12.5 mg total) by mouth once daily. For blood pressure  Dispense: 45 tablet; Refill: 3    2. Insomnia, unspecified type  Assessment & Plan:  -States she feels very drowsy into the morning on trazodone 50 mg  -Will try cutting dose in half, instructed to split tablets    Orders:  -     traZODone (DESYREL) 50 MG tablet; For sleep  Dispense: 30 tablet; Refill: 11    3. Vitamin D deficiency  Comments:  Persistently low. Restart ergocalciferol. DEXA normal; due for repeat.  Orders:  -     ergocalciferol (ERGOCALCIFEROL) 50,000 unit Cap; Take 1 capsule (50,000 Units total) by mouth every 7 days.  Dispense: 12 capsule; Refill: 3    4. Needs flu shot  -     influenza (adjuvanted) (Fluad) 45 mcg/0.5 mL IM vaccine (> or = 64 yo) 0.5 mL    Other orders  -     Discontinue: traZODone (DESYREL) 50 MG tablet; For sleep  Dispense: 30 tablet; Refill: 11    Health maintenance addressed: Podiatry  Vaccines recommended: Covid-19, shingles, receiving influenza today    Follow up in about 2 weeks (around 10/4/2024) for hypertension.     HEDY Werner   Internal Medicine

## 2024-09-20 NOTE — PATIENT INSTRUCTIONS
Vinnie Kelly,     If you are due for any health screening(s) below please notify me so we can arrange them to be ordered and scheduled. Most healthy patients at your age complete them, but you are free to accept or refuse.     If you can't do it, I'll definitely understand. If you can, I'd certainly appreciate it!    Tests to Keep You Healthy    Tobacco Cessation: NO      Were here to help you quit smoking     Our records indicated that you are still smoking. One of the best things you can do for your health is to stop smoking and we are here to help.     Talk with your provider about our Smoking Cessation Program and how we can support you on your journey.

## 2024-09-20 NOTE — PROGRESS NOTES
After obtaining consent, and per orders of Ms. Jaylan NP , injection of Fluad (Lot [id number],  2025 ) given by Soni Bran. Patient instructed to remain in clinic for 20 minutes afterwards, and to report any adverse reaction to me immediately.

## 2024-10-08 ENCOUNTER — OFFICE VISIT (OUTPATIENT)
Dept: OPTOMETRY | Facility: CLINIC | Age: 69
End: 2024-10-08
Payer: MEDICARE

## 2024-10-08 ENCOUNTER — OFFICE VISIT (OUTPATIENT)
Dept: INTERNAL MEDICINE | Facility: CLINIC | Age: 69
End: 2024-10-08
Payer: MEDICARE

## 2024-10-08 VITALS
HEART RATE: 53 BPM | BODY MASS INDEX: 26.01 KG/M2 | DIASTOLIC BLOOD PRESSURE: 58 MMHG | HEIGHT: 62 IN | OXYGEN SATURATION: 90 % | SYSTOLIC BLOOD PRESSURE: 125 MMHG | WEIGHT: 141.31 LBS

## 2024-10-08 DIAGNOSIS — E11.42 TYPE 2 DIABETES MELLITUS WITH DIABETIC POLYNEUROPATHY, WITHOUT LONG-TERM CURRENT USE OF INSULIN: ICD-10-CM

## 2024-10-08 DIAGNOSIS — R63.0 POOR APPETITE: Primary | ICD-10-CM

## 2024-10-08 DIAGNOSIS — H02.88B MEIBOMIAN GLAND DYSFUNCTION (MGD), BILATERAL, BOTH UPPER AND LOWER LIDS: ICD-10-CM

## 2024-10-08 DIAGNOSIS — I10 ESSENTIAL HYPERTENSION: ICD-10-CM

## 2024-10-08 DIAGNOSIS — H40.013 OAG (OPEN ANGLE GLAUCOMA) SUSPECT, LOW RISK, BILATERAL: ICD-10-CM

## 2024-10-08 DIAGNOSIS — E11.42 TYPE 2 DIABETES MELLITUS WITH DIABETIC POLYNEUROPATHY, WITHOUT LONG-TERM CURRENT USE OF INSULIN: Primary | ICD-10-CM

## 2024-10-08 DIAGNOSIS — H02.88A MEIBOMIAN GLAND DYSFUNCTION (MGD), BILATERAL, BOTH UPPER AND LOWER LIDS: ICD-10-CM

## 2024-10-08 PROCEDURE — 4010F ACE/ARB THERAPY RXD/TAKEN: CPT | Mod: HCNC,CPTII,S$GLB, | Performed by: OPTOMETRIST

## 2024-10-08 PROCEDURE — 92133 CPTRZD OPH DX IMG PST SGM ON: CPT | Mod: HCNC,S$GLB,, | Performed by: OPTOMETRIST

## 2024-10-08 PROCEDURE — 99999 PR PBB SHADOW E&M-EST. PATIENT-LVL III: CPT | Mod: PBBFAC,HCNC,, | Performed by: OPTOMETRIST

## 2024-10-08 PROCEDURE — 1101F PT FALLS ASSESS-DOCD LE1/YR: CPT | Mod: HCNC,CPTII,S$GLB, | Performed by: OPTOMETRIST

## 2024-10-08 PROCEDURE — 3044F HG A1C LEVEL LT 7.0%: CPT | Mod: HCNC,CPTII,S$GLB, | Performed by: OPTOMETRIST

## 2024-10-08 PROCEDURE — 99214 OFFICE O/P EST MOD 30 MIN: CPT | Mod: HCNC,S$GLB,, | Performed by: OPTOMETRIST

## 2024-10-08 PROCEDURE — 2023F DILAT RTA XM W/O RTNOPTHY: CPT | Mod: HCNC,CPTII,S$GLB, | Performed by: OPTOMETRIST

## 2024-10-08 PROCEDURE — 99999 PR PBB SHADOW E&M-EST. PATIENT-LVL IV: CPT | Mod: PBBFAC,HCNC,,

## 2024-10-08 PROCEDURE — 3288F FALL RISK ASSESSMENT DOCD: CPT | Mod: HCNC,CPTII,S$GLB, | Performed by: OPTOMETRIST

## 2024-10-08 PROCEDURE — 1159F MED LIST DOCD IN RCRD: CPT | Mod: HCNC,CPTII,S$GLB, | Performed by: OPTOMETRIST

## 2024-10-08 RX ORDER — MIRTAZAPINE 7.5 MG/1
7.5 TABLET, FILM COATED ORAL NIGHTLY
Qty: 30 TABLET | Refills: 0 | Status: SHIPPED | OUTPATIENT
Start: 2024-10-08 | End: 2024-11-07

## 2024-10-08 NOTE — PROGRESS NOTES
HPI    CAROLIN: 1/9/2024  Chief complaint (CC): patient states visual acuity has decreased at near   since last visit, mainly having some blurry vision and lots of squinting   Glasses? + readers   Contacts? -  H/o eye surgery, injections or laser: PCIOL OU  05/12/2022 IMPLANT: [id number] 22.5 OD   06/16/2022 IMPLANT: [id number] 22.5 OS   H/o eye injury: -  Known eye conditions? -  Family h/o eye conditions? -  Eye gtts? -      (-) Flashes (-)  Floaters (-) Mucous   (-)  Tearing (-) Itching (-) Burning   (-) Headaches (-) Eye Pain/discomfort (-) Irritation   (-)  Redness (-) Double vision (+++) Blurry vision    Diabetic? + PD  A1c? Hemoglobin A1C       Date                     Value               Ref Range             Status                06/12/2024               5.6                 4.0 - 5.6 %           Final                     Last edited by Kaushal Haines, OD on 10/8/2024  8:52 AM.            Assessment /Plan     For exam results, see Encounter Report.    Type 2 diabetes mellitus with diabetic polyneuropathy, without long-term current use of insulin  -     Ambulatory referral/consult to Optometry  -No retinopathy noted today.  Continued control with primary care physician and annual comprehensive eye exam.    Meibomian gland dysfunction (MGD), bilateral, both upper and lower lids  -Systane BID OU (samples)  -reviewed tears impact on VA    OAG (open angle glaucoma) suspect, low risk, bilateral  -     OCT, Optic Nerve - OU - Both Eyes  -     Jose Visual Field - OU - Extended - Both Eyes; Future  -Asymmetric CD's, stable   -OCT WNL OU    RTC 1 yr

## 2024-10-08 NOTE — PROGRESS NOTES
INTERNAL MEDICINE  [facility]    Reason for visit:   Chief Complaint   Patient presents with    Follow-up    Hypertension       HPI: Leticia Brown is a 69 y.o. female presenting today for hypertension follow-up.    Patient is an established patient of PCP, Dr. Joao Diehl DO. This patient is known to me.    She presents for evaluation of her recent blood pressures following a reduction to her hydrochlorothiazide from 25 mg to 12.5 mg daily due to various episodes of low BP with associated dizziness and visual changes. Since the dose adjustment her BP has been at goal and she denies episodes of dizziness/vision changes. She is satisfied with her current anti-hypertensive regimen which includes: Amlodipine 5 mg daily, Olmesartan 40 mg daily, and Hydrochlorothiazide 12.5 mg daily.     She also reports resolution of morning drowsiness following the reduction of her trazodone from 50 mg to 25 mg Q HS.     She expresses concern over her poor appetite. She reports having no interest in eating most days, sometimes only eating one meal/day, if anything. This meal typically consists of a grilled fish and vegetable of some sort. She inquires about an appetite stimulant to help with this. On review there has been no recent weight loss and most recent labs did not indicate malnutrition. Opted to avoid megace or dronabinol due to patient age.       Review of Systems   Constitutional:  Negative for chills, fever, malaise/fatigue and weight loss.   Eyes:  Negative for blurred vision and double vision.   Respiratory:  Negative for cough and shortness of breath.    Cardiovascular:  Negative for chest pain, palpitations and leg swelling.   Skin:  Negative for itching and rash.   Neurological:  Negative for dizziness, tingling, weakness and headaches.   Psychiatric/Behavioral:  Negative for suicidal ideas. The patient has insomnia (on trazodone 25 mg Q HS). The patient is not nervous/anxious.         Social History     Social History Narrative    Not on file       ALLERGIES:   Review of patient's allergies indicates:  No Known Allergies    MEDS:   Current Outpatient Medications on File Prior to Visit   Medication Sig Dispense Refill Last Dose/Taking    albuterol (VENTOLIN HFA) 90 mcg/actuation inhaler Inhale 2 puffs into the lungs every 6 (six) hours as needed for Wheezing. Rescue 18 g 0 Taking As Needed    amLODIPine (NORVASC) 5 MG tablet Take 1 tablet (5 mg total) by mouth 2 (two) times daily. 180 tablet 0 Taking    aspirin (ECOTRIN) 81 MG EC tablet Take 81 mg by mouth once daily.   Taking    atorvastatin (LIPITOR) 40 MG tablet TAKE 1 TABLET(40 MG) BY MOUTH EVERY DAY 90 tablet 3 Taking    azelastine (ASTELIN) 137 mcg (0.1 %) nasal spray 1 spray (137 mcg total) by Nasal route 2 (two) times daily. (Patient taking differently: 1 spray by Nasal route 2 (two) times daily as needed for Rhinitis.) 30 mL 0 Taking Differently    cetirizine (ZYRTEC) 10 MG tablet Take 1 tablet (10 mg total) by mouth once daily.   Taking    ergocalciferol (ERGOCALCIFEROL) 50,000 unit Cap Take 1 capsule (50,000 Units total) by mouth every 7 days. 12 capsule 3 Taking    fluticasone propionate (FLONASE) 50 mcg/actuation nasal spray 1 spray (50 mcg total) by Each Nostril route once daily. 16 g 11 Taking    hydroCHLOROthiazide (HYDRODIURIL) 25 MG tablet Take 0.5 tablets (12.5 mg total) by mouth once daily. For blood pressure 45 tablet 3 Taking    olmesartan (BENICAR) 40 MG tablet TAKE 1 TABLET(40 MG) BY MOUTH DAILY 90 tablet 3 Taking    oxybutynin (DITROPAN-XL) 10 MG 24 hr tablet Take 1 tablet (10 mg total) by mouth once daily. 90 tablet 3 Taking    traZODone (DESYREL) 50 MG tablet For sleep 30 tablet 11 Taking    blood sugar diagnostic Strp 1 each by Misc.(Non-Drug; Combo Route) route 4 (four) times daily. 400 each 2     diclofenac sodium (VOLTAREN) 1 % Gel Apply 2 g topically once daily. (Patient not taking: Reported on 10/8/2024) 100  "g 1 Not Taking    ibuprofen (ADVIL,MOTRIN) 600 MG tablet Take 1 tablet (600 mg total) by mouth 3 (three) times daily. (Patient not taking: Reported on 10/8/2024) 15 tablet 0 Not Taking    lancets Misc 1 each by Misc.(Non-Drug; Combo Route) route 4 (four) times daily. 400 each 2     pantoprazole (PROTONIX) 40 MG tablet Take 1 tablet (40 mg total) by mouth before breakfast. (Patient not taking: Reported on 10/8/2024) 90 tablet 0 Not Taking    pneumoc 20-arnold conj-dip cr,PF, (PREVNAR 20, PF,) 0.5 mL Syrg injection Inject into the muscle. 0.5 mL 0        Vital signs:   Vitals:    10/08/24 0918   BP: (!) 125/58   BP Location: Left arm   Patient Position: Sitting   Pulse: (!) 53   SpO2: (!) 90%   Weight: 64.1 kg (141 lb 5 oz)   Height: 5' 2" (1.575 m)     Body mass index is 25.85 kg/m².    PHYSICAL EXAM:     Physical Exam  Constitutional:       Appearance: Normal appearance. She is not ill-appearing or diaphoretic.   HENT:      Head: Normocephalic and atraumatic.   Eyes:      Extraocular Movements: Extraocular movements intact.      Conjunctiva/sclera: Conjunctivae normal.   Cardiovascular:      Rate and Rhythm: Normal rate.   Pulmonary:      Effort: Pulmonary effort is normal. No respiratory distress.   Musculoskeletal:      Cervical back: Normal range of motion.      Right lower leg: No edema.      Left lower leg: No edema.   Skin:     General: Skin is warm and dry.      Coloration: Skin is not pale.      Findings: No erythema or rash.   Neurological:      Mental Status: She is alert and oriented to person, place, and time.      Gait: Gait normal.   Psychiatric:         Mood and Affect: Mood normal.         Behavior: Behavior normal.         Thought Content: Thought content normal.         Judgment: Judgment normal.         PERTINENT RESULTS:   No visits with results within 1 Week(s) from this visit.   Latest known visit with results is:   Admission on 08/09/2024, Discharged on 08/09/2024   Component Date Value Ref Range " Status    POC Rapid COVID 08/09/2024 Negative  Negative Final     Acceptable 08/09/2024 Yes   Final    POC Molecular Influenza A Ag 08/09/2024 Negative  Negative Final    POC Molecular Influenza B Ag 08/09/2024 Negative  Negative Final     Acceptable 08/09/2024 Yes   Final    QRS Duration 08/09/2024 82  ms Final    [facility] QTC Calculation 08/09/2024 405  ms Final    WBC 08/09/2024 13.92 (H)  3.90 - 12.70 K/uL Final    RBC 08/09/2024 4.53  4.00 - 5.40 M/uL Final    Hemoglobin 08/09/2024 13.0  12.0 - 16.0 g/dL Final    Hematocrit 08/09/2024 40.5  37.0 - 48.5 % Final    MCV 08/09/2024 89  82 - 98 fL Final    MCH 08/09/2024 28.7  27.0 - 31.0 pg Final    MCHC 08/09/2024 32.1  32.0 - 36.0 g/dL Final    RDW 08/09/2024 14.1  11.5 - 14.5 % Final    Platelets 08/09/2024 250  150 - 450 K/uL Final    MPV 08/09/2024 9.7  9.2 - 12.9 fL Final    Immature Granulocytes 08/09/2024 0.2  0.0 - 0.5 % Final    Gran # (ANC) 08/09/2024 6.7  1.8 - 7.7 K/uL Final    Immature Grans (Abs) 08/09/2024 0.03  0.00 - 0.04 K/uL Final    Comment: Mild elevation in immature granulocytes is non specific and   can be seen in a variety of conditions including stress response,   acute inflammation, trauma and pregnancy. Correlation with other   laboratory and clinical findings is essential.      Lymph # 08/09/2024 6.3 (H)  1.0 - 4.8 K/uL Final    Mono # 08/09/2024 0.6  0.3 - 1.0 K/uL Final    Eos # 08/09/2024 0.2  0.0 - 0.5 K/uL Final    Baso # 08/09/2024 0.05  0.00 - 0.20 K/uL Final    nRBC 08/09/2024 0  0 /100 WBC Final    Gran % 08/09/2024 48.3  38.0 - 73.0 % Final    Lymph % 08/09/2024 45.3  18.0 - 48.0 % Final    Mono % 08/09/2024 4.6  4.0 - 15.0 % Final    Eosinophil % 08/09/2024 1.2  0.0 - 8.0 % Final    Basophil % 08/09/2024 0.4  0.0 - 1.9 % Final    Differential Method 08/09/2024 Automated   Final    Sodium 08/09/2024 140  136 - 145 mmol/L Final    Potassium 08/09/2024 4.2  3.5 - 5.1 mmol/L Final    Chloride 08/09/2024  109  95 - 110 mmol/L Final    CO2 08/09/2024 22 (L)  23 - 29 mmol/L Final    Glucose 08/09/2024 94  70 - 110 mg/dL Final    BUN 08/09/2024 21  8 - 23 mg/dL Final    Creatinine 08/09/2024 0.9  0.5 - 1.4 mg/dL Final    Calcium 08/09/2024 9.3  8.7 - 10.5 mg/dL Final    Total Protein 08/09/2024 6.7  6.0 - 8.4 g/dL Final    Albumin 08/09/2024 3.4 (L)  3.5 - 5.2 g/dL Final    Total Bilirubin 08/09/2024 0.2  0.1 - 1.0 mg/dL Final    Comment: For infants and newborns, interpretation of results should be based  on gestational age, weight and in agreement with clinical  observations.    Premature Infant recommended reference ranges:  Up to 24 hours.............<8.0 mg/dL  Up to 48 hours............<12.0 mg/dL  3-5 days..................<15.0 mg/dL  6-29 days.................<15.0 mg/dL      Alkaline Phosphatase 08/09/2024 74  55 - 135 U/L Final    AST 08/09/2024 13  10 - 40 U/L Final    ALT 08/09/2024 10  10 - 44 U/L Final    eGFR 08/09/2024 >60  >60 mL/min/1.73 m^2 Final    Anion Gap 08/09/2024 9  8 - 16 mmol/L Final    BNP 08/09/2024 64  0 - 99 pg/mL Final    Values of less than 100 pg/ml are consistent with non-CHF populations.    D-Dimer 08/09/2024 0.37  <0.50 mg/L FEU Final    Comment: The quantitative D-dimer assay should be used as an aid in   the diagnosis of deep vein thrombosis and pulmonary embolism  in patients with the appropriate presentation and clinical  history. The upper limit of the reference interval and the clinical   cut off   point are identical. Causes of a positive (>0.50 mg/L FEU) D-Dimer   test  include, but are not limited to: DVT, PE, DIC, thrombolytic   therapy, anticoagulant therapy, recent surgery, trauma, or   pregnancy, disseminated malignancy, aortic aneurysm, cirrhosis,  and severe infection. False negative results may occur in   patients with distal DVT.  GONZALO^612^^42^  LOT^610^DDiSup^[id number]\DDiBuf^[id number]\DDiReag^[id number]      Lipase 08/09/2024 39  4 - 60 U/L Final    Magnesium 08/09/2024 2.0   1.6 - 2.6 mg/dL Final    aPTT 08/09/2024 30.2  21.0 - 32.0 sec Final    Comment: Refer to local heparin nomogram for intensity/dose specific   therapeutic   range.  LOT^050^APTT FSL^[id number]      Prothrombin Time 08/09/2024 10.5  9.0 - 12.5 sec Final    INR 08/09/2024 0.9  0.8 - 1.2 Final    Comment: Coumadin Therapy:  2.0 - 3.0 for INR for all indicators except mechanical heart valves  and antiphospholipid syndromes which should use 2.5 - 3.5.  LOT^040^PT Inn^[id number]      Troponin I 08/09/2024 <0.006  0.000 - 0.026 ng/mL Final    Comment: The reference interval for Troponin I represents the 99th percentile   cutoff   for our facility and is consistent with 3rd generation assay   performance.      TSH 08/09/2024 2.032  0.400 - 4.000 uIU/mL Final    Troponin I 08/09/2024 <0.006  0.000 - 0.026 ng/mL Final    Comment: The reference interval for Troponin I represents the 99th percentile   cutoff   for our facility and is consistent with 3rd generation assay   performance.         ASSESSMENT/PLAN:    1. Poor appetite  -     mirtazapine (REMERON) 7.5 MG Tab; Take 1 tablet (7.5 mg total) by mouth every evening.  Dispense: 30 tablet; Refill: 0    2. Essential hypertension  Assessment & Plan:  BP currently at goal  Participating in digital medicine  Olmesartan 40, hctz, 12.5, amlodipine 5    Orders:  -     CBC W/ AUTO DIFFERENTIAL; Future; Expected date: 12/01/2024  -     Comprehensive Metabolic Panel; Future; Expected date: 12/01/2024    3. Type 2 diabetes mellitus with diabetic polyneuropathy, without long-term current use of insulin  Assessment & Plan:  Well controlled. Next A1C scheduled for 12/2024    Orders:  -     HEMOGLOBIN A1C; Future; Expected date: 12/01/2024      Health maintenance addressed: UTD  Vaccines recommended: Shingles, Covid-19    Follow up in about 2 months (around 12/8/2024) for bi-annual visit with PCP for diabetes with labs completed prior to visit.     HEDY Werner   Internal Medicine

## 2024-10-28 ENCOUNTER — PATIENT MESSAGE (OUTPATIENT)
Dept: INTERNAL MEDICINE | Facility: CLINIC | Age: 69
End: 2024-10-28
Payer: MEDICARE

## 2024-10-28 ENCOUNTER — PATIENT MESSAGE (OUTPATIENT)
Dept: ADMINISTRATIVE | Facility: HOSPITAL | Age: 69
End: 2024-10-28
Payer: MEDICARE

## 2024-10-28 DIAGNOSIS — I10 ESSENTIAL HYPERTENSION: ICD-10-CM

## 2024-10-28 RX ORDER — AMLODIPINE BESYLATE 5 MG/1
5 TABLET ORAL 2 TIMES DAILY
Qty: 180 TABLET | Refills: 3 | Status: SHIPPED | OUTPATIENT
Start: 2024-10-28 | End: 2025-10-28

## 2024-10-29 ENCOUNTER — PATIENT OUTREACH (OUTPATIENT)
Dept: ADMINISTRATIVE | Facility: HOSPITAL | Age: 69
End: 2024-10-29
Payer: MEDICARE

## 2024-10-29 DIAGNOSIS — Z12.31 ENCOUNTER FOR SCREENING MAMMOGRAM FOR MALIGNANT NEOPLASM OF BREAST: Primary | ICD-10-CM

## 2024-10-31 ENCOUNTER — PATIENT OUTREACH (OUTPATIENT)
Dept: ADMINISTRATIVE | Facility: HOSPITAL | Age: 69
End: 2024-10-31
Payer: MEDICARE

## 2024-11-04 DIAGNOSIS — I10 ESSENTIAL HYPERTENSION: ICD-10-CM

## 2024-11-04 RX ORDER — LOSARTAN POTASSIUM 50 MG/1
50 TABLET ORAL 2 TIMES DAILY
Qty: 180 TABLET | Refills: 3 | OUTPATIENT
Start: 2024-11-04

## 2024-11-04 NOTE — TELEPHONE ENCOUNTER
No care due was identified.  [facility] Embedded Care Due Messages. Reference number: [id number].   11/04/2024 5:51:30 AM CST

## 2024-12-09 ENCOUNTER — LAB VISIT (OUTPATIENT)
Dept: LAB | Facility: OTHER | Age: 69
End: 2024-12-09
Attending: FAMILY MEDICINE
Payer: MEDICARE

## 2024-12-09 DIAGNOSIS — E11.42 TYPE 2 DIABETES MELLITUS WITH DIABETIC POLYNEUROPATHY, WITHOUT LONG-TERM CURRENT USE OF INSULIN: ICD-10-CM

## 2024-12-09 DIAGNOSIS — I10 ESSENTIAL HYPERTENSION: ICD-10-CM

## 2024-12-09 LAB
ALBUMIN SERPL BCP-MCNC: 3.6 G/DL (ref 3.5–5.2)
ALP SERPL-CCNC: 73 U/L (ref 40–150)
ALT SERPL W/O P-5'-P-CCNC: 14 U/L (ref 10–44)
ANION GAP SERPL CALC-SCNC: 7 MMOL/L (ref 8–16)
AST SERPL-CCNC: 17 U/L (ref 10–40)
BASOPHILS # BLD AUTO: 0.07 K/UL (ref 0–0.2)
BASOPHILS NFR BLD: 0.6 % (ref 0–1.9)
BILIRUB SERPL-MCNC: 0.3 MG/DL (ref 0.1–1)
BUN SERPL-MCNC: 23 MG/DL (ref 8–23)
CALCIUM SERPL-MCNC: 9.6 MG/DL (ref 8.7–10.5)
CHLORIDE SERPL-SCNC: 110 MMOL/L (ref 95–110)
CO2 SERPL-SCNC: 23 MMOL/L (ref 23–29)
CREAT SERPL-MCNC: 0.8 MG/DL (ref 0.5–1.4)
DIFFERENTIAL METHOD BLD: ABNORMAL
EOSINOPHIL # BLD AUTO: 0.2 K/UL (ref 0–0.5)
EOSINOPHIL NFR BLD: 1.9 % (ref 0–8)
ERYTHROCYTE [DISTWIDTH] IN BLOOD BY AUTOMATED COUNT: 14.1 % (ref 11.5–14.5)
EST. GFR  (NO RACE VARIABLE): >60 ML/MIN/1.73 M^2
ESTIMATED AVG GLUCOSE: 114 MG/DL (ref 68–131)
GLUCOSE SERPL-MCNC: 96 MG/DL (ref 70–110)
HBA1C MFR BLD: 5.6 % (ref 4–5.6)
HCT VFR BLD AUTO: 34.1 % (ref 37–48.5)
HGB BLD-MCNC: 11.4 G/DL (ref 12–16)
IMM GRANULOCYTES # BLD AUTO: 0.03 K/UL (ref 0–0.04)
IMM GRANULOCYTES NFR BLD AUTO: 0.3 % (ref 0–0.5)
LYMPHOCYTES # BLD AUTO: 5.7 K/UL (ref 1–4.8)
LYMPHOCYTES NFR BLD: 52.7 % (ref 18–48)
MCH RBC QN AUTO: 30.2 PG (ref 27–31)
MCHC RBC AUTO-ENTMCNC: 33.4 G/DL (ref 32–36)
MCV RBC AUTO: 90 FL (ref 82–98)
MONOCYTES # BLD AUTO: 0.6 K/UL (ref 0.3–1)
MONOCYTES NFR BLD: 5.4 % (ref 4–15)
NEUTROPHILS # BLD AUTO: 4.2 K/UL (ref 1.8–7.7)
NEUTROPHILS NFR BLD: 39.1 % (ref 38–73)
NRBC BLD-RTO: 0 /100 WBC
PLATELET # BLD AUTO: 234 K/UL (ref 150–450)
PMV BLD AUTO: 9.8 FL (ref 9.2–12.9)
POTASSIUM SERPL-SCNC: 4.1 MMOL/L (ref 3.5–5.1)
PROT SERPL-MCNC: 6.8 G/DL (ref 6–8.4)
RBC # BLD AUTO: 3.78 M/UL (ref 4–5.4)
SODIUM SERPL-SCNC: 140 MMOL/L (ref 136–145)
WBC # BLD AUTO: 10.8 K/UL (ref 3.9–12.7)

## 2024-12-09 PROCEDURE — 36415 COLL VENOUS BLD VENIPUNCTURE: CPT | Mod: HCNC

## 2024-12-09 PROCEDURE — 85025 COMPLETE CBC W/AUTO DIFF WBC: CPT | Mod: HCNC

## 2024-12-09 PROCEDURE — 83036 HEMOGLOBIN GLYCOSYLATED A1C: CPT | Mod: HCNC

## 2024-12-09 PROCEDURE — 80053 COMPREHEN METABOLIC PANEL: CPT | Mod: HCNC

## 2024-12-11 ENCOUNTER — LAB VISIT (OUTPATIENT)
Dept: LAB | Facility: OTHER | Age: 69
End: 2024-12-11
Attending: FAMILY MEDICINE
Payer: MEDICARE

## 2024-12-11 ENCOUNTER — OFFICE VISIT (OUTPATIENT)
Dept: INTERNAL MEDICINE | Facility: CLINIC | Age: 69
End: 2024-12-11
Payer: MEDICARE

## 2024-12-11 ENCOUNTER — TELEPHONE (OUTPATIENT)
Dept: INTERNAL MEDICINE | Facility: CLINIC | Age: 69
End: 2024-12-11

## 2024-12-11 VITALS
HEART RATE: 59 BPM | HEIGHT: 62 IN | DIASTOLIC BLOOD PRESSURE: 56 MMHG | BODY MASS INDEX: 25.55 KG/M2 | SYSTOLIC BLOOD PRESSURE: 126 MMHG | OXYGEN SATURATION: 98 % | WEIGHT: 138.88 LBS

## 2024-12-11 DIAGNOSIS — M79.604 PAIN IN BOTH LOWER EXTREMITIES: ICD-10-CM

## 2024-12-11 DIAGNOSIS — M79.605 PAIN IN BOTH LOWER EXTREMITIES: ICD-10-CM

## 2024-12-11 DIAGNOSIS — D64.9 NORMOCYTIC ANEMIA: ICD-10-CM

## 2024-12-11 DIAGNOSIS — E11.9 TYPE 2 DIABETES MELLITUS WITHOUT COMPLICATION, WITHOUT LONG-TERM CURRENT USE OF INSULIN: ICD-10-CM

## 2024-12-11 DIAGNOSIS — R63.4 UNINTENTIONAL WEIGHT LOSS: ICD-10-CM

## 2024-12-11 DIAGNOSIS — R10.13 EPIGASTRIC PAIN: Primary | ICD-10-CM

## 2024-12-11 DIAGNOSIS — E55.9 VITAMIN D DEFICIENCY: ICD-10-CM

## 2024-12-11 LAB
CK SERPL-CCNC: 67 U/L (ref 20–180)
ESTIMATED AVG GLUCOSE: 117 MG/DL (ref 68–131)
HBA1C MFR BLD: 5.7 % (ref 4–5.6)
IRON SERPL-MCNC: 33 UG/DL (ref 30–160)
SATURATED IRON: 10 % (ref 20–50)
TOTAL IRON BINDING CAPACITY: 327 UG/DL (ref 250–450)
TRANSFERRIN SERPL-MCNC: 221 MG/DL (ref 200–375)

## 2024-12-11 PROCEDURE — 99214 OFFICE O/P EST MOD 30 MIN: CPT | Mod: HCNC,S$GLB,, | Performed by: FAMILY MEDICINE

## 2024-12-11 PROCEDURE — 83540 ASSAY OF IRON: CPT | Mod: HCNC | Performed by: FAMILY MEDICINE

## 2024-12-11 PROCEDURE — 4010F ACE/ARB THERAPY RXD/TAKEN: CPT | Mod: HCNC,CPTII,S$GLB, | Performed by: FAMILY MEDICINE

## 2024-12-11 PROCEDURE — 3066F NEPHROPATHY DOC TX: CPT | Mod: HCNC,CPTII,S$GLB, | Performed by: FAMILY MEDICINE

## 2024-12-11 PROCEDURE — 1159F MED LIST DOCD IN RCRD: CPT | Mod: HCNC,CPTII,S$GLB, | Performed by: FAMILY MEDICINE

## 2024-12-11 PROCEDURE — 3044F HG A1C LEVEL LT 7.0%: CPT | Mod: HCNC,CPTII,S$GLB, | Performed by: FAMILY MEDICINE

## 2024-12-11 PROCEDURE — 3288F FALL RISK ASSESSMENT DOCD: CPT | Mod: HCNC,CPTII,S$GLB, | Performed by: FAMILY MEDICINE

## 2024-12-11 PROCEDURE — 1101F PT FALLS ASSESS-DOCD LE1/YR: CPT | Mod: HCNC,CPTII,S$GLB, | Performed by: FAMILY MEDICINE

## 2024-12-11 PROCEDURE — 3008F BODY MASS INDEX DOCD: CPT | Mod: HCNC,CPTII,S$GLB, | Performed by: FAMILY MEDICINE

## 2024-12-11 PROCEDURE — 1126F AMNT PAIN NOTED NONE PRSNT: CPT | Mod: HCNC,CPTII,S$GLB, | Performed by: FAMILY MEDICINE

## 2024-12-11 PROCEDURE — 83036 HEMOGLOBIN GLYCOSYLATED A1C: CPT | Mod: HCNC | Performed by: FAMILY MEDICINE

## 2024-12-11 PROCEDURE — 3074F SYST BP LT 130 MM HG: CPT | Mod: HCNC,CPTII,S$GLB, | Performed by: FAMILY MEDICINE

## 2024-12-11 PROCEDURE — 3078F DIAST BP <80 MM HG: CPT | Mod: HCNC,CPTII,S$GLB, | Performed by: FAMILY MEDICINE

## 2024-12-11 PROCEDURE — 99999 PR PBB SHADOW E&M-EST. PATIENT-LVL IV: CPT | Mod: PBBFAC,HCNC,, | Performed by: FAMILY MEDICINE

## 2024-12-11 PROCEDURE — 3061F NEG MICROALBUMINURIA REV: CPT | Mod: HCNC,CPTII,S$GLB, | Performed by: FAMILY MEDICINE

## 2024-12-11 PROCEDURE — 82550 ASSAY OF CK (CPK): CPT | Mod: HCNC | Performed by: FAMILY MEDICINE

## 2024-12-11 RX ORDER — ERGOCALCIFEROL 1.25 MG/1
50000 CAPSULE ORAL
Qty: 12 CAPSULE | Refills: 3 | Status: SHIPPED | OUTPATIENT
Start: 2024-12-11

## 2024-12-11 NOTE — PROGRESS NOTES
"Subjective:       Patient ID: Leticia Brown is a 69 y.o. female.    Chief Complaint: Follow-up (2 mo HTN) and Results (Labs)    HPI  History of Present Illness    CHIEF COMPLAINT:  Leticia presents today for follow-up and concerns about weight loss and leg pain.    HYPERTENSION:  She is currently on multiple blood pressure medications, including Amlodipine twice daily, Hydrochlorothiazide, and Olmesartan. She uses a digital program to monitor blood pressure at home on most days. A couple of weeks ago, her blood pressure readings were good, but recent readings have shown slightly elevated systolic pressures with normal diastolic pressures.    WEIGHT LOSS AND APPETITE:  She reports significant weight loss, expressing concern about losing too much weight. Her current weight is 138 lbs, which she considers "kind of small." She states her clothes are "falling off" and that she can "almost feel [her] bone." She reports decreased appetite, often eating only daily, and experiences abdominal pains and pressure in the upper abdomen. These issues are associated with occasional bowel movement difficulties.    MUSCULOSKELETAL PAIN:  She reports thigh pain for the last two days, describing it as aching. She also experiences nighttime muscle spasms in her calves, severe enough to wake her from sleep. The muscle spasms have been occurring on and off for an unspecified duration. She denies any known precipitating events for the thigh pain.    PULMONARY:  She has a history of smoking. CT of the lungs revealed emphysema and a small spot measuring 0.3 cm. The spot is not currently concerning for cancer but requires annual follow-up imaging.    MEDICATIONS:  She takes trazodone at half dose for sleep, a vitamin D supplement, Amlodipine, Hydrochlorothiazide, and Olmesartan. She denies taking any iron supplements.    LAB RESULTS:  Hemoglobin level has decreased to 11.4 from 13.0 in August, indicating anemia. White blood cell " count is slightly elevated, but consistent with her baseline levels over the past few years.      ROS:  General: -fever, -chills, -fatigue, -weight gain, +weight loss, +loss of appetite  Eyes: -vision changes, -redness, -discharge  ENT: -ear pain, -nasal congestion, -sore throat  Cardiovascular: -chest pain, -palpitations, -lower extremity edema  Respiratory: -cough, -shortness of breath  Gastrointestinal: +abdominal pain, -nausea, -vomiting, -diarrhea, -constipation, -blood in stool, +change in bowel habits  Genitourinary: -dysuria, -hematuria, -frequency  Musculoskeletal: -joint pain, -muscle pain, +limb pain, +muscle spasms  Skin: -rash, -lesion  Neurological: -headache, -dizziness, -numbness, -tingling  Psychiatric: -anxiety, -depression, -sleep difficulty           Tests to Keep You Healthy    Tobacco Cessation: NO      Social History     Social History Narrative    Not on file       Family History   Problem Relation Name Age of Onset    Arthritis Mother      Cancer Mother          lung    Heart disease Mother      No Known Problems Father      No Known Problems Sister      No Known Problems Brother      No Known Problems Daughter Tashi     No Known Problems Daughter Maria G     Anxiety disorder Daughter Juju     No Known Problems Son Segundo     No Known Problems Maternal Aunt      No Known Problems Maternal Uncle      No Known Problems Paternal Aunt      No Known Problems Paternal Uncle      No Known Problems Maternal Grandmother      No Known Problems Maternal Grandfather      No Known Problems Paternal Grandmother      No Known Problems Paternal Grandfather      Colon cancer Neg Hx      Rectal cancer Neg Hx      Stomach cancer Neg Hx      Breast cancer Neg Hx         Current Outpatient Medications:     amLODIPine (NORVASC) 5 MG tablet, Take 1 tablet (5 mg total) by mouth 2 (two) times daily., Disp: 180 tablet, Rfl: 3    aspirin (ECOTRIN) 81 MG EC tablet, Take 81 mg by mouth once daily., Disp: , Rfl:      "atorvastatin (LIPITOR) 40 MG tablet, TAKE 1 TABLET(40 MG) BY MOUTH EVERY DAY, Disp: 90 tablet, Rfl: 3    blood sugar diagnostic Strp, 1 each by Misc.(Non-Drug; Combo Route) route 4 (four) times daily., Disp: 400 each, Rfl: 2    hydroCHLOROthiazide (HYDRODIURIL) 25 MG tablet, Take 0.5 tablets (12.5 mg total) by mouth once daily. For blood pressure, Disp: 45 tablet, Rfl: 3    lancets Misc, 1 each by Misc.(Non-Drug; Combo Route) route 4 (four) times daily., Disp: 400 each, Rfl: 2    olmesartan (BENICAR) 40 MG tablet, TAKE 1 TABLET(40 MG) BY MOUTH DAILY, Disp: 90 tablet, Rfl: 3    oxybutynin (DITROPAN-XL) 10 MG 24 hr tablet, Take 1 tablet (10 mg total) by mouth once daily., Disp: 90 tablet, Rfl: 3    albuterol (VENTOLIN HFA) 90 mcg/actuation inhaler, Inhale 2 puffs into the lungs every 6 (six) hours as needed for Wheezing. Rescue (Patient not taking: Reported on 12/11/2024), Disp: 18 g, Rfl: 0    ergocalciferol (ERGOCALCIFEROL) 50,000 unit Cap, Take 1 capsule (50,000 Units total) by mouth every 7 days., Disp: 12 capsule, Rfl: 3    fluticasone propionate (FLONASE) 50 mcg/actuation nasal spray, 1 spray (50 mcg total) by Each Nostril route once daily. (Patient not taking: Reported on 12/11/2024), Disp: 16 g, Rfl: 11    ibuprofen (ADVIL,MOTRIN) 600 MG tablet, Take 1 tablet (600 mg total) by mouth 3 (three) times daily. (Patient not taking: Reported on 9/20/2024), Disp: 15 tablet, Rfl: 0    mirtazapine (REMERON) 7.5 MG Tab, Take 1 tablet (7.5 mg total) by mouth every evening. (Patient not taking: Reported on 12/11/2024), Disp: 30 tablet, Rfl: 0    pneumoc 20-arnold conj-dip cr,PF, (PREVNAR 20, PF,) 0.5 mL Syrg injection, Inject into the muscle., Disp: 0.5 mL, Rfl: 0    traZODone (DESYREL) 50 MG tablet, For sleep (Patient not taking: Reported on 12/11/2024), Disp: 30 tablet, Rfl: 11    Review of Systems    Objective:   BP (!) 126/56 (BP Location: Left arm, Patient Position: Sitting)   Pulse (!) 59   Ht 5' 2" (1.575 m)   Wt 63 " kg (138 lb 14.2 oz)   SpO2 98%   BMI 25.40 kg/m²      Physical Exam    Physical Exam    Vitals: Weight: 138 lbs. Blood pressure: 126/56.  General: No acute distress. Well-developed. Well-nourished.  Eyes: EOMI. Sclerae anicteric.  HENT: Normocephalic. Atraumatic. Nares patent. Moist oral mucosa.  Ears: Bilateral TMs clear. Bilateral EACs clear.  Cardiovascular: Regular rate. Regular rhythm. No murmurs. No rubs. No gallops. Normal S1, S2.  Respiratory: Normal respiratory effort. Clear to auscultation bilaterally. No rales. No rhonchi. No wheezing.  Abdomen: Soft. Non-tender. Non-distended. Normoactive bowel sounds.  Musculoskeletal: No  obvious deformity.  Extremities: No lower extremity edema.  Neurological: Alert & oriented x3. No slurred speech. Normal gait.  Psychiatric: Normal mood. Normal affect. Good insight. Good judgment.  Skin: Warm. Dry. No rash.         @resultssec@  Assessment & Plan   Assessment & Plan    IMPRESSION:  - Evaluated blood pressure, noting fluctuations but overall acceptable range  - Assessed CBC results, identifying mild anemia with decreased hemoglobin (11.4 from 13.0)  - Considered potential causes of anemia, including iron or vitamin deficiency  - Evaluated slightly elevated white blood cell count and lymphocyte percentage, deemed within normal range for patient  - Assessed reported leg pain and muscle spasms  - Considered potential side effects of current medications, particularly hydrochlorothiazide  - Evaluated weight loss and decreased appetite  - Determined need for abdominal scan to rule out potential underlying causes of weight loss and anemia  - Reviewed previous lung CT results, noting 0.3 cm spot and emphysema, with plan for annual follow-up    HYPERTENSION:  - Discussed reasons for multiple blood pressure medications and their different mechanisms of action.  - Leticia to monitor blood pressure regularly.  - Decreased hydrochlorothiazide to half tablet daily.  - Continued  amlodipine twice daily.  - Continued olmesartan.    ANEMIA:  - Explained anemia and its potential causes.  - Iron level test ordered.    LUNG NODULE:  - Explained the significance of lung nodule size and appearance in cancer risk assessment.  - Continue annual lung CT screening in June.    VITAMIN D DEFICIENCY:  - Refilled vitamin D.    CANCER SCREENING:  - Educated on the challenges of early cancer detection and the importance of screening tests.    GENERAL HEALTH MONITORING:  - Leticia to report any new or worsening symptoms, particularly related to appetite or weight loss.  - Abdominal scan ordered.  - Lab test for muscle inflammation ordered.    FOLLOW-UP:  - Follow up after abdominal scan results are available.         Problem List Items Addressed This Visit          Endocrine    Vitamin D deficiency    Relevant Medications    ergocalciferol (ERGOCALCIFEROL) 50,000 unit Cap     Other Visit Diagnoses       Epigastric pain    -  Primary    Relevant Orders    CT Abdomen Pelvis With IV Contrast Routine Oral Contrast    Normocytic anemia        Relevant Orders    Iron and TIBC    Pain in both lower extremities        Relevant Orders    CK (Completed)    Unintentional weight loss        Relevant Orders    CT Abdomen Pelvis With IV Contrast Routine Oral Contrast              Immunizations Administered on Date of Encounter - 12/11/2024       No immunizations on file.             No follow-ups on file.    This note was generated with the assistance of ambient listening technology. Verbal consent was obtained by the patient and accompanying visitor(s) for the recording of patient appointment to facilitate this note. I attest to having reviewed and edited the generated note for accuracy, though some syntax or spelling errors may persist. Please contact the author of this note for any clarification.      Disclaimer:  This note may have been prepared using voice recognition software, it may have not been extensively  proofed, as such there could be errors within the text such as sound alike errors.

## 2025-01-03 ENCOUNTER — OFFICE VISIT (OUTPATIENT)
Dept: INTERNAL MEDICINE | Facility: CLINIC | Age: 70
End: 2025-01-03
Payer: MEDICARE

## 2025-01-03 VITALS
OXYGEN SATURATION: 100 % | WEIGHT: 139.13 LBS | BODY MASS INDEX: 25.6 KG/M2 | SYSTOLIC BLOOD PRESSURE: 130 MMHG | HEIGHT: 62 IN | DIASTOLIC BLOOD PRESSURE: 76 MMHG | HEART RATE: 58 BPM

## 2025-01-03 DIAGNOSIS — J06.9 UPPER RESPIRATORY TRACT INFECTION, UNSPECIFIED TYPE: Primary | ICD-10-CM

## 2025-01-03 LAB
CTP QC/QA: YES
CTP QC/QA: YES
FLUAV AG NPH QL: NEGATIVE
FLUBV AG NPH QL: NEGATIVE
SARS-COV-2 RDRP RESP QL NAA+PROBE: NEGATIVE

## 2025-01-03 PROCEDURE — 99999 PR PBB SHADOW E&M-EST. PATIENT-LVL III: CPT | Mod: PBBFAC,HCNC,,

## 2025-01-03 RX ORDER — BENZONATATE 200 MG/1
200 CAPSULE ORAL 3 TIMES DAILY PRN
Qty: 30 CAPSULE | Refills: 0 | Status: SHIPPED | OUTPATIENT
Start: 2025-01-03 | End: 2025-01-13

## 2025-01-03 RX ORDER — PROMETHAZINE HYDROCHLORIDE AND DEXTROMETHORPHAN HYDROBROMIDE 6.25; 15 MG/5ML; MG/5ML
5 SYRUP ORAL EVERY 8 HOURS PRN
Qty: 118 ML | Refills: 0 | Status: SHIPPED | OUTPATIENT
Start: 2025-01-03 | End: 2025-01-13

## 2025-01-03 NOTE — PROGRESS NOTES
CHIEF COMPLAINT     Chief Complaint   Patient presents with    Generalized Body Aches    Sore Throat    Nasal Congestion    Cough       HPI     Leticia Brown is a 69 y.o. female who presents for same day visit for generalized body aches, sore throat, congestion today.    PCP is Joao Diehl DO, patient is new to me. Pt consents to AI recording of visit for documentation purposes.     History of Present Illness    CHIEF COMPLAINT:  Patient presents today with intense body aches and feeling feverish for two days.    FLU-LIKE SYMPTOMS:  She reports experiencing intense body aches, feeling feverish, sore throat, and cough for approximately 2 days. She denies fevers or chills.            Home Medications:  Prior to Admission medications    Medication Sig Start Date End Date Taking? Authorizing Provider   albuterol (VENTOLIN HFA) 90 mcg/actuation inhaler Inhale 2 puffs into the lungs every 6 (six) hours as needed for Wheezing. Rescue  Patient not taking: Reported on 12/11/2024 12/26/23   Octavia Vargas, NP   amLODIPine (NORVASC) 5 MG tablet Take 1 tablet (5 mg total) by mouth 2 (two) times daily. 10/28/24 10/28/25  Joao Diehl,    aspirin (ECOTRIN) 81 MG EC tablet Take 81 mg by mouth once daily.    Provider, Historical   atorvastatin (LIPITOR) 40 MG tablet TAKE 1 TABLET(40 MG) BY MOUTH EVERY DAY 8/19/24   Joao Diehl,    blood sugar diagnostic Strp 1 each by Misc.(Non-Drug; Combo Route) route 4 (four) times daily. 8/18/23   Joao Diehl,    ergocalciferol (ERGOCALCIFEROL) 50,000 unit Cap Take 1 capsule (50,000 Units total) by mouth every 7 days. 12/11/24   Joao Diehl,    fluticasone propionate (FLONASE) 50 mcg/actuation nasal spray 1 spray (50 mcg total) by Each Nostril route once daily.  Patient not taking: Reported on 12/11/2024 3/24/22   Sarah Gamble MD   hydroCHLOROthiazide (HYDRODIURIL) 25 MG tablet Take 0.5 tablets (12.5 mg total) by mouth once daily. For  "blood pressure 9/20/24 9/20/25  Zulay Tian FNP-C   ibuprofen (ADVIL,MOTRIN) 600 MG tablet Take 1 tablet (600 mg total) by mouth 3 (three) times daily.  Patient not taking: Reported on 9/20/2024 6/28/24   Mohsen Ross, NP   lancets Misc 1 each by Misc.(Non-Drug; Combo Route) route 4 (four) times daily. 8/18/23   Weeks, Joao ALVARADO,    mirtazapine (REMERON) 7.5 MG Tab Take 1 tablet (7.5 mg total) by mouth every evening.  Patient not taking: Reported on 12/11/2024 10/8/24 11/7/24  Zulay Tian FNP-C   olmesartan (BENICAR) 40 MG tablet TAKE 1 TABLET(40 MG) BY MOUTH DAILY 8/30/24   Weeks, Joao ALVARADO,    oxybutynin (DITROPAN-XL) 10 MG 24 hr tablet Take 1 tablet (10 mg total) by mouth once daily. 4/18/24   Weeks, Joao ALVARADO,    pneumoc 20-arnold conj-dip cr,PF, (PREVNAR 20, PF,) 0.5 mL Syrg injection Inject into the muscle. 8/14/23   Tari Boggs, PharmD   traZODone (DESYREL) 50 MG tablet For sleep  Patient not taking: Reported on 12/11/2024 9/20/24   Zulay Tian FNP-C         Health Maintainence:   Immunizations:  Health Maintenance         Date Due Completion Date    Foot Exam 11/04/2022 11/4/2021    Shingles Vaccine (2 of 2) 01/23/2024 11/28/2023    COVID-19 Vaccine (5 - 2024-25 season) 09/01/2024 5/18/2022    Mammogram 01/09/2025 1/9/2024    Hemoglobin A1c 06/11/2025 12/11/2024    LDCT Lung Screen 06/12/2025 6/12/2024    Lipid Panel 06/12/2025 6/12/2024    Eye Exam 10/08/2025 10/8/2024    Override on 8/11/2022: Done    Override on 10/1/2020: Done    DEXA Scan 10/25/2025 10/25/2022    Diabetes Urine Screening 12/09/2025 12/9/2024    TETANUS VACCINE 08/30/2029 8/30/2019    Colorectal Cancer Screening 04/11/2032 4/11/2022    Override on 3/8/2017: Done (repeat in 10 years)             PHYSICAL EXAM     /76 (BP Location: Left arm, Patient Position: Sitting)   Pulse (!) 58   Ht 5' 2" (1.575 m)   Wt 63.1 kg (139 lb 1.8 oz)   SpO2 100%   BMI 25.44 kg/m²     Physical " Exam  Constitutional:       General: She is not in acute distress.     Appearance: Normal appearance. She is not toxic-appearing.   HENT:      Head: Normocephalic and atraumatic.      Right Ear: External ear normal.      Left Ear: External ear normal.      Nose: Congestion and rhinorrhea present.      Mouth/Throat:      Mouth: Mucous membranes are moist.   Eyes:      Extraocular Movements: Extraocular movements intact.   Cardiovascular:      Rate and Rhythm: Normal rate and regular rhythm.      Pulses: Normal pulses.      Heart sounds: Normal heart sounds.   Pulmonary:      Effort: Pulmonary effort is normal. No respiratory distress.   Abdominal:      General: Abdomen is flat.      Palpations: Abdomen is soft.      Tenderness: There is no abdominal tenderness.   Musculoskeletal:      Cervical back: Normal range of motion and neck supple.   Skin:     General: Skin is warm.      Findings: No bruising or erythema.   Neurological:      General: No focal deficit present.      Mental Status: She is alert.   Psychiatric:         Mood and Affect: Mood normal.     LABS     Lab Results   Component Value Date    HGBA1C 5.7 (H) 12/11/2024     CMP  Sodium   Date Value Ref Range Status   12/09/2024 140 136 - 145 mmol/L Final     Potassium   Date Value Ref Range Status   12/09/2024 4.1 3.5 - 5.1 mmol/L Final     Chloride   Date Value Ref Range Status   12/09/2024 110 95 - 110 mmol/L Final     CO2   Date Value Ref Range Status   12/09/2024 23 23 - 29 mmol/L Final     Glucose   Date Value Ref Range Status   12/09/2024 96 70 - 110 mg/dL Final     BUN   Date Value Ref Range Status   12/09/2024 23 8 - 23 mg/dL Final     Creatinine   Date Value Ref Range Status   12/09/2024 0.8 0.5 - 1.4 mg/dL Final     Calcium   Date Value Ref Range Status   12/09/2024 9.6 8.7 - 10.5 mg/dL Final     Total Protein   Date Value Ref Range Status   12/09/2024 6.8 6.0 - 8.4 g/dL Final     Albumin   Date Value Ref Range Status   12/09/2024 3.6 3.5 - 5.2 g/dL  Final     Total Bilirubin   Date Value Ref Range Status   12/09/2024 0.3 0.1 - 1.0 mg/dL Final     Comment:     For infants and newborns, interpretation of results should be based  on gestational age, weight and in agreement with clinical  observations.    Premature Infant recommended reference ranges:  Up to 24 hours.............<8.0 mg/dL  Up to 48 hours............<12.0 mg/dL  3-5 days..................<15.0 mg/dL  6-29 days.................<15.0 mg/dL       Alkaline Phosphatase   Date Value Ref Range Status   12/09/2024 73 40 - 150 U/L Final     AST   Date Value Ref Range Status   12/09/2024 17 10 - 40 U/L Final     ALT   Date Value Ref Range Status   12/09/2024 14 10 - 44 U/L Final     Anion Gap   Date Value Ref Range Status   12/09/2024 7 (L) 8 - 16 mmol/L Final     eGFR if    Date Value Ref Range Status   05/01/2022 >60 >60 mL/min/1.73 m^2 Final     eGFR if non    Date Value Ref Range Status   05/01/2022 >60 >60 mL/min/1.73 m^2 Final     Comment:     Calculation used to obtain the estimated glomerular filtration  rate (eGFR) is the CKD-EPI equation.        Lab Results   Component Value Date    WBC 10.80 12/09/2024    HGB 11.4 (L) 12/09/2024    HCT 34.1 (L) 12/09/2024    MCV 90 12/09/2024     12/09/2024     Lab Results   Component Value Date    CHOL 163 06/12/2024    CHOL 183 01/10/2023    CHOL 191 09/20/2022     Lab Results   Component Value Date    HDL 38 (L) 06/12/2024    HDL 46 01/10/2023    HDL 41 09/20/2022     Lab Results   Component Value Date    LDLCALC 111.4 06/12/2024    LDLCALC 124.0 01/10/2023    LDLCALC 128.6 09/20/2022     Lab Results   Component Value Date    TRIG 68 06/12/2024    TRIG 65 01/10/2023    TRIG 107 09/20/2022     Lab Results   Component Value Date    CHOLHDL 23.3 06/12/2024    CHOLHDL 25.1 01/10/2023    CHOLHDL 21.5 09/20/2022     Lab Results   Component Value Date    TSH 2.032 08/09/2024       ASSESSMENT/PLAN   Assessment & Plan    Suspected  viral infection based on short time course and flu-like symptoms  Considered possibility of bacterial infection if symptoms persist or worsen over the next 7 days  Determined muscle pain likely related to coughing, not a separate issue    PLAN SUMMARY:  Ordered tests for flu and COVID-19, negative  Prescribed tesalon pearls for cough suppression  Prescribed prescription-strength decongestant  Recommend supportive care and maintaining hydration  Follow-up if symptoms persist or worsen after 7 days  Contact office if developing productive cough or experiencing continued fevers and chills    EMPHYSEMA:  Noted patient's reports of cough, hoarseness, and associated muscle pain.  Suspected viral infection causing flu-like symptoms.  Ordered tests for flu and COVID-19.  Prescribed tesalon pearls for cough suppression and a prescription-strength decongestant.  Recommend supportive care, maintaining hydration, and advised follow-up if symptoms persist or worsen.           Leticia was seen today for generalized body aches, sore throat, nasal congestion and cough.    Diagnoses and all orders for this visit:    Upper respiratory tract infection, unspecified type  -     benzonatate (TESSALON) 200 MG capsule; Take 1 capsule (200 mg total) by mouth 3 (three) times daily as needed for Cough.  -     promethazine-dextromethorphan (PROMETHAZINE-DM) 6.25-15 mg/5 mL Syrp; Take 5 mLs by mouth every 8 (eight) hours as needed (cough).  -     POCT COVID-19 Rapid Screening  -     POCT Influenza A/B Rapid Antigen      Follow up LOGAN Vora MD   Department of Internal Medicine - [facility]  8:17 AM

## 2025-01-09 ENCOUNTER — HOSPITAL ENCOUNTER (OUTPATIENT)
Dept: RADIOLOGY | Facility: OTHER | Age: 70
Discharge: HOME | End: 2025-01-09
Attending: FAMILY MEDICINE
Payer: MEDICARE

## 2025-01-09 DIAGNOSIS — Z12.31 ENCOUNTER FOR SCREENING MAMMOGRAM FOR MALIGNANT NEOPLASM OF BREAST: ICD-10-CM

## 2025-01-09 PROCEDURE — 77063 BREAST TOMOSYNTHESIS BI: CPT | Mod: 26,HCNC,, | Performed by: RADIOLOGY

## 2025-01-09 PROCEDURE — 77063 BREAST TOMOSYNTHESIS BI: CPT | Mod: TC,HCNC

## 2025-01-09 PROCEDURE — 77067 SCR MAMMO BI INCL CAD: CPT | Mod: 26,HCNC,, | Performed by: RADIOLOGY

## 2025-03-12 ENCOUNTER — OFFICE VISIT (OUTPATIENT)
Dept: INTERNAL MEDICINE | Facility: CLINIC | Age: 70
End: 2025-03-12
Payer: MEDICARE

## 2025-03-12 ENCOUNTER — HOSPITAL ENCOUNTER (OUTPATIENT)
Dept: RADIOLOGY | Facility: OTHER | Age: 70
Discharge: HOME | End: 2025-03-12
Payer: MEDICARE

## 2025-03-12 VITALS
BODY MASS INDEX: 25.12 KG/M2 | WEIGHT: 137.38 LBS | DIASTOLIC BLOOD PRESSURE: 74 MMHG | SYSTOLIC BLOOD PRESSURE: 160 MMHG | OXYGEN SATURATION: 98 % | HEART RATE: 54 BPM

## 2025-03-12 DIAGNOSIS — E11.42 TYPE 2 DIABETES MELLITUS WITH DIABETIC POLYNEUROPATHY, WITHOUT LONG-TERM CURRENT USE OF INSULIN: ICD-10-CM

## 2025-03-12 DIAGNOSIS — R63.4 WEIGHT LOSS: Primary | ICD-10-CM

## 2025-03-12 DIAGNOSIS — I70.0 AORTIC ATHEROSCLEROSIS: ICD-10-CM

## 2025-03-12 DIAGNOSIS — R63.4 WEIGHT LOSS: ICD-10-CM

## 2025-03-12 DIAGNOSIS — E78.2 MIXED HYPERLIPIDEMIA: ICD-10-CM

## 2025-03-12 PROCEDURE — 3078F DIAST BP <80 MM HG: CPT | Mod: HCNC,CPTII,S$GLB,

## 2025-03-12 PROCEDURE — 1159F MED LIST DOCD IN RCRD: CPT | Mod: HCNC,CPTII,S$GLB,

## 2025-03-12 PROCEDURE — 3008F BODY MASS INDEX DOCD: CPT | Mod: HCNC,CPTII,S$GLB,

## 2025-03-12 PROCEDURE — 1101F PT FALLS ASSESS-DOCD LE1/YR: CPT | Mod: HCNC,CPTII,S$GLB,

## 2025-03-12 PROCEDURE — 99999 PR PBB SHADOW E&M-EST. PATIENT-LVL IV: CPT | Mod: PBBFAC,HCNC,,

## 2025-03-12 PROCEDURE — 71046 X-RAY EXAM CHEST 2 VIEWS: CPT | Mod: 26,HCNC,, | Performed by: INTERNAL MEDICINE

## 2025-03-12 PROCEDURE — 3288F FALL RISK ASSESSMENT DOCD: CPT | Mod: HCNC,CPTII,S$GLB,

## 2025-03-12 PROCEDURE — 99214 OFFICE O/P EST MOD 30 MIN: CPT | Mod: HCNC,S$GLB,,

## 2025-03-12 PROCEDURE — 1126F AMNT PAIN NOTED NONE PRSNT: CPT | Mod: HCNC,CPTII,S$GLB,

## 2025-03-12 PROCEDURE — 3077F SYST BP >= 140 MM HG: CPT | Mod: HCNC,CPTII,S$GLB,

## 2025-03-12 PROCEDURE — 3072F LOW RISK FOR RETINOPATHY: CPT | Mod: HCNC,CPTII,S$GLB,

## 2025-03-12 PROCEDURE — 71046 X-RAY EXAM CHEST 2 VIEWS: CPT | Mod: TC,HCNC,FY

## 2025-03-12 NOTE — PROGRESS NOTES
CHIEF COMPLAINT     Chief Complaint   Patient presents with    Annual Exam    Weight Loss       HPI     Leticia Brown is a 69 y.o. female who presents for xxx today.    PCP is Joao Diehl DO, patient is known to me. This note was generated with the assistance of ambient listening technology. Verbal consent was obtained by the patient and accompanying visitor(s) for the recording of patient appointment to facilitate this note. I attest to having reviewed and edited the generated note for accuracy, though some syntax or spelling errors may persist. Please contact the author of this note for any clarification.     History of Present Illness    CHIEF COMPLAINT:  Patient presents today for a wellness visit and concerns about weight loss.    WEIGHT LOSS AND NUTRITION:  She reports significant weight loss with clothes becoming loose. She has poor appetite with decreased food intake and is supplementing with Premier protein. She expresses concern about cancer being the underlying cause of her weight loss.    SLEEP:  She experiences frequent leg cramps at night that wake her from sleep.    GI:  She reports regular bowel movements without blood in stool. Colonoscopy was negative for polyps.    MEDICATIONS:  She reports intolerance to Metazol due to excessive drowsiness.    PAST MEDICAL TESTS:  CT from last year was unremarkable. Blood work was completed last month.      ROS:  General: -fever, -chills, -fatigue, -weight gain, +weight loss, +loss of appetite  Eyes: -vision changes, -redness, -discharge  ENT: -ear pain, -nasal congestion, -sore throat  Cardiovascular: -chest pain, -palpitations, -lower extremity edema  Respiratory: -cough, -shortness of breath  Gastrointestinal: -abdominal pain, -nausea, -vomiting, -diarrhea, -constipation, -blood in stool  Genitourinary: -dysuria, -hematuria, -frequency  Musculoskeletal: -joint pain, -muscle pain, +muscle cramps  Skin: -rash, -lesion  Neurological:  -headache, -dizziness, -numbness, -tingling  Psychiatric: -anxiety, -depression, -sleep difficulty          Past Medical History:  Past Medical History:   Diagnosis Date    Allergy     Cataract     Diabetes mellitus     H. pylori infection 2017    [facility] EGD 2017. test of eradication neg.    High cholesterol     Hypertension        Home Medications:  Prior to Admission medications    Medication Sig Start Date End Date Taking? Authorizing Provider   albuterol (VENTOLIN HFA) 90 mcg/actuation inhaler Inhale 2 puffs into the lungs every 6 (six) hours as needed for Wheezing. Rescue 12/26/23  Yes Octavia Vargas, NP   amLODIPine (NORVASC) 5 MG tablet Take 1 tablet (5 mg total) by mouth 2 (two) times daily. 10/28/24 10/28/25 Yes Shanita, Joao ALVARADO, DO   aspirin (ECOTRIN) 81 MG EC tablet Take 81 mg by mouth once daily.   Yes Provider, Historical   atorvastatin (LIPITOR) 40 MG tablet TAKE 1 TABLET(40 MG) BY MOUTH EVERY DAY 8/19/24  Yes Weeks, Joao ALVARADO, DO   blood sugar diagnostic Strp 1 each by Misc.(Non-Drug; Combo Route) route 4 (four) times daily. 8/18/23  Yes Shanita, Joao ALVARADO, DO   ergocalciferol (ERGOCALCIFEROL) 50,000 unit Cap Take 1 capsule (50,000 Units total) by mouth every 7 days. 12/11/24  Yes Weeks, Joao ALVARADO, DO   hydroCHLOROthiazide (HYDRODIURIL) 25 MG tablet Take 0.5 tablets (12.5 mg total) by mouth once daily. For blood pressure 9/20/24 9/20/25 Yes Zulay Tian, FNP-C   olmesartan (BENICAR) 40 MG tablet TAKE 1 TABLET(40 MG) BY MOUTH DAILY 8/30/24  Yes Weeks, Joao ALVARADO, DO   oxybutynin (DITROPAN-XL) 10 MG 24 hr tablet Take 1 tablet (10 mg total) by mouth once daily. 4/18/24  Yes Weeks, Joao ALVARADO, DO   pneumoc 20-arnold conj-dip cr,PF, (PREVNAR 20, PF,) 0.5 mL Syrg injection Inject into the muscle. 8/14/23  Yes Tari Boggs, PharmD   traZODone (DESYREL) 50 MG tablet For sleep 9/20/24  Yes Zulay Tian, FNP-C   fluticasone propionate (FLONASE) 50 mcg/actuation nasal spray 1 spray (50 mcg total) by Each  Nostril route once daily.  Patient not taking: Reported on 12/11/2024 3/24/22   Sarah Gamble MD   ibuprofen (ADVIL,MOTRIN) 600 MG tablet Take 1 tablet (600 mg total) by mouth 3 (three) times daily.  Patient not taking: Reported on 9/20/2024 6/28/24   Mohsen Ross, NP   lancets Misc 1 each by Misc.(Non-Drug; Combo Route) route 4 (four) times daily. 8/18/23   Joao Diehl DO   mirtazapine (REMERON) 7.5 MG Tab Take 1 tablet (7.5 mg total) by mouth every evening.  Patient not taking: Reported on 12/11/2024 10/8/24 11/7/24  Zulay Tian, FNP-C       Review of Systems:  Review of Systems   Constitutional:  Positive for activity change and unexpected weight change.   HENT:  Positive for trouble swallowing. Negative for hearing loss and rhinorrhea.    Eyes:  Negative for discharge and visual disturbance.   Respiratory:  Negative for chest tightness and wheezing.    Cardiovascular:  Positive for chest pain. Negative for palpitations.   Gastrointestinal:  Negative for blood in stool, constipation, diarrhea and vomiting.   Endocrine: Negative for polydipsia and polyuria.   Genitourinary:  Negative for difficulty urinating, dysuria, hematuria and menstrual problem.   Musculoskeletal:  Positive for arthralgias, joint swelling and neck pain.   Neurological:  Negative for weakness and headaches.   Psychiatric/Behavioral:  Positive for dysphoric mood. Negative for confusion.        Health Maintainence:   Immunizations:  Health Maintenance         Date Due Completion Date    Foot Exam 11/04/2022 11/4/2021    Shingles Vaccine (2 of 2) 01/23/2024 11/28/2023    COVID-19 Vaccine (5 - 2024-25 season) 09/01/2024 5/18/2022    Hemoglobin A1c 06/11/2025 12/11/2024    LDCT Lung Screen 06/12/2025 6/12/2024    Lipid Panel 06/12/2025 6/12/2024    Diabetic Eye Exam 10/08/2025 10/8/2024    Override on 8/11/2022: Done    Override on 10/1/2020: Done    DEXA Scan 10/25/2025 10/25/2022    Diabetes Urine Screening 12/09/2025  12/9/2024    Mammogram 01/09/2026 1/9/2025    TETANUS VACCINE 08/30/2029 8/30/2019    Colorectal Cancer Screening 04/11/2032 4/11/2022    Override on 3/8/2017: Done (repeat in 10 years)             PHYSICAL EXAM     BP (!) 160/74   Pulse (!) 54   Wt 62.3 kg (137 lb 5.6 oz)   SpO2 98%   BMI 25.12 kg/m²     Physical Exam  Vitals reviewed.   Constitutional:       Appearance: She is well-developed.   HENT:      Head: Normocephalic and atraumatic.   Eyes:      Conjunctiva/sclera: Conjunctivae normal.   Cardiovascular:      Rate and Rhythm: Normal rate.   Pulmonary:      Effort: Pulmonary effort is normal. No respiratory distress.   Skin:     General: Skin is warm and dry.      Findings: No rash.   Neurological:      Mental Status: She is alert and oriented to person, place, and time.      Coordination: Coordination normal.   Psychiatric:         Behavior: Behavior normal.           ASSESSMENT/PLAN   Assessment & Plan    IMPRESSION:  - Concerning weight loss noted over past few years  - Poor appetite identified as likely contributing factor  - Recent colonoscopy ruled out colon cancer; no polyps noted  - CBC slightly low on recent labs  - Considering thyroid dysfunction as potential cause for weight loss  - Cancer lower on differential given lack of other symptoms  - Evaluating appetite stimulant options, considering potential interactions  - Reviewed recent CT; no signs of cancer    TYPE 2 DIABETES MELLITUS WITH DIABETIC POLYNEUROPATHY, WITHOUT LONG-TERM CURRENT USE OF INSULIN:  - Ordered lipid panel and evaluated recent A1C test results, which were within normal range.  - Monitored patient's report of leg cramps, a potential symptom of diabetic polyneuropathy.  - Recommend magnesium and vitamin K2 supplements as potential remedies for leg cramps.    CENTRILOBULAR EMPHYSEMA:  - Ordered chest XR to check for any changes and scheduled follow up in June for low-dose CT screening.  - Patient denied any respiratory issues  or hemoptysis when inquired.    ANOREXIA AND WEIGHT LOSS:  - Monitored patient's report of poor appetite and significant weight loss over the past 2 years, noting small portions consumed and occasional meal skipping.  - Discussed potential causes of weight loss, including cancer, and planned to rule out various conditions.  - Ordered lab work, including CBC and TSH.  - Evaluated previous CBC results, which were slightly low.  - Planned to investigate appetite stimulants for the patient.  - Continued Premier nutritional supplement and discussed potential additional supplements for increased caloric intake.    NUTRITIONAL DEFICIENCY:  - Discussed potential additional nutritional supplements to increase caloric intake and address deficiencies.    LEG CRAMPS:  - Monitored patient's report of leg cramps, particularly during sleep, acknowledging their common occurrence with aging.    MEDICATION ALLERGY/INTOLERANCE:  - Noted patient's report of inability to take Metazol due to drowsiness.    FOLLOW-UP AND GENERAL INSTRUCTIONS:  - Scheduled follow-up after reviewing appetite stimulant options and potential interactions.  - Explained rationale for ordered tests and next steps in diagnostic process.  - Instructed patient to contact the office for lab and chest XR results.          Leticia was seen today for annual exam and weight loss.    Diagnoses and all orders for this visit:    Weight loss  -     CBC Auto Differential; Future  -     Lipid Panel; Future  -     TSH; Future  -     X-Ray Chest PA And Lateral; Future    Type 2 diabetes mellitus with diabetic polyneuropathy, without long-term current use of insulin    Aortic atherosclerosis    Mixed hyperlipidemia  -     Lipid Panel; Future          Mohsen Ross NP   Department of Internal Medicine - [facility]  10:06 AM

## 2025-03-14 ENCOUNTER — RESULTS FOLLOW-UP (OUTPATIENT)
Dept: INTERNAL MEDICINE | Facility: CLINIC | Age: 70
End: 2025-03-14

## 2025-03-17 ENCOUNTER — PATIENT MESSAGE (OUTPATIENT)
Dept: INTERNAL MEDICINE | Facility: CLINIC | Age: 70
End: 2025-03-17
Payer: MEDICARE

## 2025-03-26 DIAGNOSIS — F33.0 MILD EPISODE OF RECURRENT MAJOR DEPRESSIVE DISORDER: ICD-10-CM

## 2025-03-26 DIAGNOSIS — R63.0 DECREASED APPETITE: ICD-10-CM

## 2025-03-26 RX ORDER — MIRTAZAPINE 15 MG/1
15 TABLET, FILM COATED ORAL
Qty: 30 TABLET | Refills: 11 | OUTPATIENT
Start: 2025-03-26

## 2025-03-26 NOTE — TELEPHONE ENCOUNTER
Refill Decision Note   Leticia Brown  is requesting a refill authorization.  Brief Assessment and Rationale for Refill:  Quick Discontinue     Medication Therapy Plan: The original prescription was discontinued on 6/12/2024 by Joao Diehl DO.      Comments:     Note composed:8:16 AM 03/26/2025

## 2025-03-26 NOTE — TELEPHONE ENCOUNTER
Care Due:                  Date            Visit Type   Department     Provider  --------------------------------------------------------------------------------                                EP -                              PRIMARY      [facility] INTERNAL  Last Visit: 12-      CARE ([facility])   MEDICINE       Joao Diehl  Next Visit: None Scheduled  None         None Found                                                            Last  Test          Frequency    Reason                     Performed    Due Date  --------------------------------------------------------------------------------    Vitamin D...  12 months..  ergocalciferol...........  05- 05-    [facility] Embedded Care Due Messages. Reference number: [id number].   3/26/2025 5:42:50 AM CDT

## 2025-04-12 NOTE — TELEPHONE ENCOUNTER
No care due was identified.  [facility] Embedded Care Due Messages. Reference number: [id number].   4/12/2025 3:56:58 PM CDT

## 2025-04-15 ENCOUNTER — HOSPITAL ENCOUNTER (EMERGENCY)
Facility: OTHER | Age: 70
Discharge: HOME | End: 2025-04-15
Attending: EMERGENCY MEDICINE
Payer: MEDICARE

## 2025-04-15 ENCOUNTER — PATIENT MESSAGE (OUTPATIENT)
Dept: INTERNAL MEDICINE | Facility: CLINIC | Age: 70
End: 2025-04-15
Payer: MEDICARE

## 2025-04-15 VITALS
DIASTOLIC BLOOD PRESSURE: 76 MMHG | BODY MASS INDEX: 25.95 KG/M2 | OXYGEN SATURATION: 100 % | WEIGHT: 141 LBS | RESPIRATION RATE: 18 BRPM | HEART RATE: 65 BPM | TEMPERATURE: 98 F | SYSTOLIC BLOOD PRESSURE: 185 MMHG | HEIGHT: 62 IN

## 2025-04-15 DIAGNOSIS — H10.9 CONJUNCTIVITIS OF BOTH EYES, UNSPECIFIED CONJUNCTIVITIS TYPE: Primary | ICD-10-CM

## 2025-04-15 PROCEDURE — 99283 EMERGENCY DEPT VISIT LOW MDM: CPT | Mod: HCNC

## 2025-04-15 PROCEDURE — 25000003 PHARM REV CODE 250: Mod: HCNC | Performed by: EMERGENCY MEDICINE

## 2025-04-15 RX ORDER — POLYMYXIN B SULFATE AND TRIMETHOPRIM 1; 10000 MG/ML; [USP'U]/ML
1 SOLUTION OPHTHALMIC EVERY 4 HOURS
Qty: 10 ML | Refills: 0 | Status: SHIPPED | OUTPATIENT
Start: 2025-04-15

## 2025-04-15 RX ORDER — IBUPROFEN 600 MG/1
600 TABLET ORAL
Status: COMPLETED | OUTPATIENT
Start: 2025-04-15 | End: 2025-04-15

## 2025-04-15 RX ORDER — PROPARACAINE HYDROCHLORIDE 5 MG/ML
1 SOLUTION/ DROPS OPHTHALMIC
Status: COMPLETED | OUTPATIENT
Start: 2025-04-15 | End: 2025-04-15

## 2025-04-15 RX ORDER — OXYBUTYNIN CHLORIDE 10 MG/1
10 TABLET, EXTENDED RELEASE ORAL DAILY
Qty: 90 TABLET | Refills: 3 | Status: SHIPPED | OUTPATIENT
Start: 2025-04-15 | End: 2025-04-16

## 2025-04-15 RX ADMIN — PROPARACAINE HYDROCHLORIDE 1 DROP: 5 SOLUTION/ DROPS OPHTHALMIC at 10:04

## 2025-04-15 RX ADMIN — FLUORESCEIN SODIUM 1 EACH: 1 STRIP OPHTHALMIC at 10:04

## 2025-04-15 RX ADMIN — IBUPROFEN 600 MG: 600 TABLET, FILM COATED ORAL at 10:04

## 2025-04-15 NOTE — ED PROVIDER NOTES
Encounter Date: 4/15/2025       History     Chief Complaint   Patient presents with    Eye Problem     Bilateral eye redness, drainage, and light sensitivity since yesterday. Also reports headache behind her eyes.     Seen by physician at 10:15AM:    Patient is a 69-year-old female who presents to the emergency department with bilateral eye redness, pain, discharge and photophobia.  Patient states that yesterday she had some redness to her eyes, but no other symptoms.  She placed Visine drops in her eye a couple times.  However upon waking up this morning, she had drainage, crusting, along with increased redness, pain, and photophobia.  She also has blurry vision.  She denies any foreign body sensation or itching.  She denies wearing contacts.  She had cataract surgery a couple years ago.  She denies any trauma.        Review of patient's allergies indicates:  No Known Allergies  Past Medical History:   Diagnosis Date    Allergy     Cataract     Diabetes mellitus     H. pylori infection 2017    [facility] EGD 2017. test of eradication neg.    High cholesterol     Hypertension      Past Surgical History:   Procedure Laterality Date    BREAST BIOPSY Right     CATARACT EXTRACTION W/  INTRAOCULAR LENS IMPLANT Right 05/12/2022    Procedure: EXTRACTION, CATARACT, WITH IOL INSERTION;  Surgeon: Destiny Carrera MD;  Location: [facility];  Service: Ophthalmology;  Laterality: Right;    CATARACT EXTRACTION W/  INTRAOCULAR LENS IMPLANT Left 06/16/2022    Procedure: EXTRACTION, CATARACT, WITH IOL INSERTION;  Surgeon: Destiny Carrera MD;  Location: [facility];  Service: Ophthalmology;  Laterality: Left;    CHOLECYSTECTOMY  2016    COLONOSCOPY N/A 04/11/2022    Procedure: COLONOSCOPY;  Surgeon: Todd Carrera MD;  Location: [facility];  Service: Endoscopy;  Laterality: N/A;  order created: referral  Fully vaccinated, prep instr emailed -ml    ESOPHAGOGASTRODUODENOSCOPY N/A 07/21/2020    Procedure: EGD (ESOPHAGOGASTRODUODENOSCOPY);  Surgeon:  Rodrick Montes MD;  Location: [facility] (4TH FLR);  Service: Endoscopy;  Laterality: N/A;  3/30/20 - removed from 4/9/20, 3/30/20 & 3/31/20LM pt &  ph & sent email, request call back to reschedule June/July.  Pt called back, rescheduled 7/21/20 - pg  7/6/20 - 7/9/20- LM x 2 - waiting for cb to set up COVID appt. - ERW  COVID screening on    EYE SURGERY  7/22    TUBAL LIGATION  1982     Family History   Problem Relation Name Age of Onset    Arthritis Mother      Cancer Mother          lung    Heart disease Mother      No Known Problems Father      No Known Problems Sister      No Known Problems Brother      No Known Problems Daughter Tashi     No Known Problems Daughter Maria G     Anxiety disorder Daughter Juju     No Known Problems Son Segundo     No Known Problems Maternal Aunt      No Known Problems Maternal Uncle      No Known Problems Paternal Aunt      No Known Problems Paternal Uncle      No Known Problems Maternal Grandmother      No Known Problems Maternal Grandfather      No Known Problems Paternal Grandmother      No Known Problems Paternal Grandfather      Colon cancer Neg Hx      Rectal cancer Neg Hx      Stomach cancer Neg Hx      Breast cancer Neg Hx       Social History[1]  Review of Systems   Constitutional:  Negative for chills and fever.   Eyes:  Positive for photophobia, pain, discharge, redness and visual disturbance.   Musculoskeletal:  Negative for back pain and neck pain.   Skin:  Negative for color change and rash.   Neurological:  Negative for dizziness and headaches.       Physical Exam     Initial Vitals [04/15/25 0917]   BP Pulse Resp Temp SpO2   (!) 185/76 65 18 97.7 °F (36.5 °C) 100 %      MAP       --         Physical Exam    Nursing note and vitals reviewed.  Constitutional: She appears well-developed and well-nourished.   HENT:   Head: Normocephalic and atraumatic.   Eyes: Conjunctivae are normal.   Diffusely injected with mild upper lid swelling bilaterally.    Right IOP:  17  Left IOP: 12    Extra ocular movements intact bilaterally.  Pupils are 3 mm and reactive bilaterally.  No hypopyon or hyphema noted.  No evidence of epithelial defect or corneal abrasion or evidence of increased fluorescein uptake bilaterally.  Crusting and drainage noted   Pulmonary/Chest: No respiratory distress.   Musculoskeletal:         General: Normal range of motion.     Neurological: She is alert and oriented to person, place, and time.   Ambulatory with steady gait   Skin: Skin is warm and dry. Capillary refill takes less than 2 seconds.         ED Course   Procedures  Labs Reviewed - No data to display       Imaging Results    None          Medications   fluorescein ophthalmic strip 1 each (1 each Both Eyes Given 4/15/25 1004)   proparacaine 0.5 % ophthalmic solution 1 drop (1 drop Both Eyes Given 4/15/25 1004)   ibuprofen tablet 600 mg (600 mg Oral Given 4/15/25 1033)     Medical Decision Making  10:15AM:  Patient is a 69-year-old female who presents to the emergency department with bilateral eye pain, redness, crusting and drainage.  Patient appears well, nontoxic.  Will plan for fluorescein staining along with IOP and visual acuity measures.  Will continue to follow and reassess.    Risk  Prescription drug management.    10:51 AM:  Patient doing well, remains stable.  She feels much better after the tetracaine and her visual acuity is within normal limits.  Her IOP is also within normal limits.  I did not appreciate any evidence of corneal pathology and I suspect she likely has bilateral conjunctivitis.  Will plan for antibiotic drops along with follow up with the ophthalmologist.  I do not feel that further work up in the ED is indicated at this time.  I updated pt regarding results and I counseled pt regarding supportive care measures.  I have discussed with the pt ED return warnings and need for close PCP f/u.  Pt agreeable to plan and all questions answered.  I feel that pt is stable for  discharge and management as an outpatient and no further intervention is needed at this time.  Pt is comfortable returning to the ED if needed.  Will DC home in stable condition.                                        Clinical Impression:  Final diagnoses:  [H10.9] Conjunctivitis of both eyes, unspecified conjunctivitis type (Primary)          ED Disposition Condition    Discharge Stable          ED Prescriptions       Medication Sig Dispense Start Date End Date Auth. Provider    polymyxin B sulf-trimethoprim (POLYTRIM) 10,000 unit- 1 mg/mL Drop Place 1 drop into both eyes every 4 (four) hours. 10 mL 4/15/2025 -- Aby Moore MD          Follow-up Information       Follow up With Specialties Details Why Contact Info Additional Information    [facility] - [address] [place] [address] Ophthalmology   [address] [facility] [address] [phone] Please arrive on the 10th floor for check-in.               [1]   Social History  Tobacco Use    Smoking status: Every Day     Current packs/day: 0.25     Average packs/day: 0.3 packs/day for 40.0 years (10.0 ttl pk-yrs)     Types: Cigarettes     Passive exposure: Current    Smokeless tobacco: Never   Substance Use Topics    Alcohol use: Yes     Alcohol/week: 2.0 standard drinks of alcohol     Types: 2 Glasses of wine per week    Drug use: No        Aby Moore MD  04/15/25 9177

## 2025-04-15 NOTE — ED TRIAGE NOTES
Pt reports to ED with bilateral eye redness, tearing, light sensitivity, and blurriness. Pt can barely open her eyes on arrival. Also c/o headache behind her eyes.

## 2025-04-15 NOTE — TELEPHONE ENCOUNTER
.Refill Encounter    PCP Visits: Recent Visits  Date Type Provider Dept   03/12/25 Office Visit Mohsen Ross, NP [facility] Internal Medicine   01/03/25 Office Visit Ander Vora MD [facility] Internal Medicine   12/11/24 Office Visit Joao Diehl,  [facility] Internal Medicine   10/08/24 Office Visit Zulay Tian FNP-C [facility] Internal Medicine   09/20/24 Office Visit Zulay Tian FNP-C [facility] Internal Medicine   08/16/24 Office Visit Mohsen Ross, NP [facility] Internal Medicine   06/28/24 Office Visit Mohsen Ross, NP [facility] Internal Medicine   06/12/24 Office Visit Joao Diehl,  [facility] Internal [facility]   Showing recent visits within past 360 days and meeting all other requirements  Future Appointments  No visits were found meeting these conditions.  Showing future appointments within next 720 days and meeting all other requirements      Last 3 Blood Pressure:   BP Readings from Last 3 Encounters:   04/15/25 (!) 185/76   03/12/25 (!) 160/74   01/03/25 130/76     Preferred Pharmacy:   [facility] [place] [address]  Phone: [phone] Fax: [phone]    Requested RX:  Requested Prescriptions     Pending Prescriptions Disp Refills    oxybutynin (DITROPAN-XL) 10 MG 24 hr tablet 90 tablet 3     Sig: Take 1 tablet (10 mg total) by mouth once daily.      RX Route: Normal

## 2025-04-16 ENCOUNTER — PATIENT MESSAGE (OUTPATIENT)
Dept: INTERNAL MEDICINE | Facility: CLINIC | Age: 70
End: 2025-04-16
Payer: MEDICARE

## 2025-04-16 ENCOUNTER — PATIENT OUTREACH (OUTPATIENT)
Facility: OTHER | Age: 70
End: 2025-04-16
Payer: MEDICARE

## 2025-04-16 DIAGNOSIS — I10 ESSENTIAL HYPERTENSION: ICD-10-CM

## 2025-04-16 DIAGNOSIS — E55.9 VITAMIN D DEFICIENCY: ICD-10-CM

## 2025-04-16 DIAGNOSIS — J00 ACUTE NASOPHARYNGITIS: ICD-10-CM

## 2025-04-16 DIAGNOSIS — R09.89 CHEST CONGESTION: ICD-10-CM

## 2025-04-16 DIAGNOSIS — I70.0 AORTIC ATHEROSCLEROSIS: ICD-10-CM

## 2025-04-16 DIAGNOSIS — R05.1 ACUTE COUGH: ICD-10-CM

## 2025-04-16 DIAGNOSIS — R09.89 ABNORMAL LUNG SOUNDS: ICD-10-CM

## 2025-04-16 RX ORDER — OXYBUTYNIN CHLORIDE 10 MG/1
10 TABLET, EXTENDED RELEASE ORAL DAILY
Qty: 90 TABLET | Refills: 3 | Status: SHIPPED | OUTPATIENT
Start: 2025-04-16

## 2025-04-16 RX ORDER — HYDROCHLOROTHIAZIDE 25 MG/1
25 TABLET ORAL DAILY
Start: 2025-04-16 | End: 2026-04-16

## 2025-04-16 RX ORDER — ALBUTEROL SULFATE 90 UG/1
2 INHALANT RESPIRATORY (INHALATION) EVERY 6 HOURS PRN
Qty: 18 G | Refills: 0 | Status: SHIPPED | OUTPATIENT
Start: 2025-04-16

## 2025-04-16 RX ORDER — ATORVASTATIN CALCIUM 40 MG/1
40 TABLET, FILM COATED ORAL DAILY
Qty: 90 TABLET | Refills: 3 | Status: SHIPPED | OUTPATIENT
Start: 2025-04-16

## 2025-04-16 RX ORDER — OLMESARTAN MEDOXOMIL 40 MG/1
40 TABLET ORAL DAILY
Qty: 90 TABLET | Refills: 3 | Status: SHIPPED | OUTPATIENT
Start: 2025-04-16

## 2025-04-16 RX ORDER — FLUTICASONE PROPIONATE 50 MCG
1 SPRAY, SUSPENSION (ML) NASAL DAILY
Qty: 16 G | Refills: 11 | Status: SHIPPED | OUTPATIENT
Start: 2025-04-16

## 2025-04-16 RX ORDER — ERGOCALCIFEROL 1.25 MG/1
50000 CAPSULE ORAL
Qty: 12 CAPSULE | Refills: 3 | Status: SHIPPED | OUTPATIENT
Start: 2025-04-16

## 2025-04-16 NOTE — TELEPHONE ENCOUNTER
No care due was identified.  [facility] Embedded Care Due Messages. Reference number: [id number].   4/16/2025 9:05:39 AM CDT

## 2025-04-16 NOTE — TELEPHONE ENCOUNTER
"  Called and spoke to Ms. Brown Discussed which meds are already at [facility] and which need to be added that are listed at [facility].  Her BP was "165/74 on yesterday". She also wonders why the HCTZ prescription was denied. LOV 03/12/25 with Mr. Ross        From Shanique Yoder i see where my refill was denied, I still have 3 refills before 08/16/25, or did you stop it, please updated all meds so I'll know  This encounter is not signed. The conversation may still be on        From Shanique Diehl can you forward all my meds to hospital pharmacy so that I can know if I need to refill any.Thank you   "

## 2025-05-15 ENCOUNTER — PATIENT OUTREACH (OUTPATIENT)
Dept: ADMINISTRATIVE | Facility: HOSPITAL | Age: 70
End: 2025-05-15
Payer: MEDICARE

## 2025-05-15 DIAGNOSIS — I10 ESSENTIAL HYPERTENSION: ICD-10-CM

## 2025-05-15 NOTE — TELEPHONE ENCOUNTER
Care Due:                  Date            Visit Type   Department     Provider  --------------------------------------------------------------------------------                                EP -                              PRIMARY      [facility] INTERNAL  Last Visit: 12-      CARE ([facility])   MEDICINE       Joao Diehl  Next Visit: None Scheduled  None         None Found                                                            Last  Test          Frequency    Reason                     Performed    Due Date  --------------------------------------------------------------------------------    Vitamin D...  12 months..  ergocalciferol...........  Not Found    Overdue    [employer] Embedded Care Due Messages. Reference number: [id number].   5/15/2025 1:24:28 PM CDT

## 2025-05-15 NOTE — TELEPHONE ENCOUNTER
Refill Encounter    PCP Visits: Recent Visits  Date Type Provider Dept   03/12/25 Office Visit Mohsen Ross, NP [facility] Internal Medicine   01/03/25 Office Visit Ander Vora MD [facility] Internal Medicine   12/11/24 Office Visit Joao Diehl,  [facility] Internal Medicine   10/08/24 Office Visit Zulay Tian, HEDY [facility] Internal Medicine   09/20/24 Office Visit Zulay Tian FNP-SHANNA [facility] Internal Medicine   08/16/24 Office Visit Mohsen Ross, NP [facility] Internal Medicine   06/28/24 Office Visit Mohsen Ross, NP [facility] Internal Medicine   06/12/24 Office Visit Joao Diehl,  [facility] Internal [facility]   Showing recent visits within past 360 days and meeting all other requirements  Future Appointments  No visits were found meeting these conditions.  Showing future appointments within next 720 days and meeting all other requirements      Last 3 Blood Pressure:   BP Readings from Last 3 Encounters:   04/15/25 (!) 185/76   03/12/25 (!) 160/74   01/03/25 130/76     Preferred Pharmacy:   [facility] [place] [address]  Phone: [phone] Fax: [phone]    Requested RX:  Requested Prescriptions     Pending Prescriptions Disp Refills    hydroCHLOROthiazide (HYDRODIURIL) 25 MG tablet 30 tablet 11     Sig: Take 1 tablet (25 mg total) by mouth once daily.      RX Route: Normal

## 2025-05-16 RX ORDER — HYDROCHLOROTHIAZIDE 25 MG/1
25 TABLET ORAL DAILY
Qty: 30 TABLET | Refills: 11 | Status: SHIPPED | OUTPATIENT
Start: 2025-05-16 | End: 2026-05-16

## 2025-06-04 ENCOUNTER — OFFICE VISIT (OUTPATIENT)
Dept: INTERNAL MEDICINE | Facility: CLINIC | Age: 70
End: 2025-06-04
Payer: MEDICARE

## 2025-06-04 ENCOUNTER — LAB VISIT (OUTPATIENT)
Dept: LAB | Facility: OTHER | Age: 70
End: 2025-06-04
Payer: MEDICARE

## 2025-06-04 VITALS
SYSTOLIC BLOOD PRESSURE: 114 MMHG | OXYGEN SATURATION: 100 % | WEIGHT: 136 LBS | BODY MASS INDEX: 24.88 KG/M2 | HEART RATE: 56 BPM | DIASTOLIC BLOOD PRESSURE: 70 MMHG

## 2025-06-04 DIAGNOSIS — D64.9 ANEMIA, UNSPECIFIED TYPE: ICD-10-CM

## 2025-06-04 DIAGNOSIS — M77.11 LATERAL EPICONDYLITIS OF RIGHT ELBOW: ICD-10-CM

## 2025-06-04 DIAGNOSIS — R41.9 UNSPECIFIED SYMPTOMS AND SIGNS INVOLVING COGNITIVE FUNCTIONS AND AWARENESS: ICD-10-CM

## 2025-06-04 DIAGNOSIS — E55.9 VITAMIN D DEFICIENCY: ICD-10-CM

## 2025-06-04 DIAGNOSIS — M19.041 OSTEOARTHRITIS OF BOTH HANDS, UNSPECIFIED OSTEOARTHRITIS TYPE: ICD-10-CM

## 2025-06-04 DIAGNOSIS — M19.042 OSTEOARTHRITIS OF BOTH HANDS, UNSPECIFIED OSTEOARTHRITIS TYPE: ICD-10-CM

## 2025-06-04 DIAGNOSIS — L84 CORN OF FOOT: Primary | ICD-10-CM

## 2025-06-04 DIAGNOSIS — E11.42 TYPE 2 DIABETES MELLITUS WITH DIABETIC POLYNEUROPATHY, WITHOUT LONG-TERM CURRENT USE OF INSULIN: ICD-10-CM

## 2025-06-04 LAB
ABSOLUTE EOSINOPHIL (OHS): 0.19 K/UL
ABSOLUTE MONOCYTE (OHS): 0.69 K/UL (ref 0.3–1)
ABSOLUTE NEUTROPHIL COUNT (OHS): 5.14 K/UL (ref 1.8–7.7)
BASOPHILS # BLD AUTO: 0.06 K/UL
BASOPHILS NFR BLD AUTO: 0.6 %
EAG (OHS): 120 MG/DL (ref 68–131)
ERYTHROCYTE [DISTWIDTH] IN BLOOD BY AUTOMATED COUNT: 14.6 % (ref 11.5–14.5)
FOLATE SERPL-MCNC: 9.4 NG/ML (ref 4–24)
HBA1C MFR BLD: 5.8 % (ref 4–5.6)
HCT VFR BLD AUTO: 39.3 % (ref 37–48.5)
HGB BLD-MCNC: 12.6 GM/DL (ref 12–16)
IMM GRANULOCYTES # BLD AUTO: 0.02 K/UL (ref 0–0.04)
IMM GRANULOCYTES NFR BLD AUTO: 0.2 % (ref 0–0.5)
LYMPHOCYTES # BLD AUTO: 3.68 K/UL (ref 1–4.8)
MCH RBC QN AUTO: 29.2 PG (ref 27–31)
MCHC RBC AUTO-ENTMCNC: 32.1 G/DL (ref 32–36)
MCV RBC AUTO: 91 FL (ref 82–98)
NUCLEATED RBC (/100WBC) (OHS): 0 /100 WBC
PLATELET # BLD AUTO: 279 K/UL (ref 150–450)
PMV BLD AUTO: 9.9 FL (ref 9.2–12.9)
RBC # BLD AUTO: 4.32 M/UL (ref 4–5.4)
RELATIVE EOSINOPHIL (OHS): 1.9 %
RELATIVE LYMPHOCYTE (OHS): 37.6 % (ref 18–48)
RELATIVE MONOCYTE (OHS): 7.1 % (ref 4–15)
RELATIVE NEUTROPHIL (OHS): 52.6 % (ref 38–73)
VIT B12 SERPL-MCNC: 343 PG/ML (ref 210–950)
WBC # BLD AUTO: 9.78 K/UL (ref 3.9–12.7)

## 2025-06-04 PROCEDURE — 4010F ACE/ARB THERAPY RXD/TAKEN: CPT | Mod: CPTII,S$GLB,,

## 2025-06-04 PROCEDURE — 3078F DIAST BP <80 MM HG: CPT | Mod: CPTII,S$GLB,,

## 2025-06-04 PROCEDURE — 99999 PR PBB SHADOW E&M-EST. PATIENT-LVL IV: CPT | Mod: PBBFAC,,,

## 2025-06-04 PROCEDURE — 82746 ASSAY OF FOLIC ACID SERUM: CPT

## 2025-06-04 PROCEDURE — 83036 HEMOGLOBIN GLYCOSYLATED A1C: CPT

## 2025-06-04 PROCEDURE — 3072F LOW RISK FOR RETINOPATHY: CPT | Mod: CPTII,S$GLB,,

## 2025-06-04 PROCEDURE — 82607 VITAMIN B-12: CPT

## 2025-06-04 PROCEDURE — 1101F PT FALLS ASSESS-DOCD LE1/YR: CPT | Mod: CPTII,S$GLB,,

## 2025-06-04 PROCEDURE — 99214 OFFICE O/P EST MOD 30 MIN: CPT | Mod: S$GLB,,,

## 2025-06-04 PROCEDURE — 3288F FALL RISK ASSESSMENT DOCD: CPT | Mod: CPTII,S$GLB,,

## 2025-06-04 PROCEDURE — 3008F BODY MASS INDEX DOCD: CPT | Mod: CPTII,S$GLB,,

## 2025-06-04 PROCEDURE — 36415 COLL VENOUS BLD VENIPUNCTURE: CPT

## 2025-06-04 PROCEDURE — 3074F SYST BP LT 130 MM HG: CPT | Mod: CPTII,S$GLB,,

## 2025-06-04 PROCEDURE — 1125F AMNT PAIN NOTED PAIN PRSNT: CPT | Mod: CPTII,S$GLB,,

## 2025-06-04 PROCEDURE — 85025 COMPLETE CBC W/AUTO DIFF WBC: CPT

## 2025-06-04 PROCEDURE — 1159F MED LIST DOCD IN RCRD: CPT | Mod: CPTII,S$GLB,,

## 2025-06-04 RX ORDER — IBUPROFEN 600 MG/1
600 TABLET, FILM COATED ORAL 2 TIMES DAILY
Qty: 14 TABLET | Refills: 0 | Status: SHIPPED | OUTPATIENT
Start: 2025-06-04

## 2025-06-04 RX ORDER — METHOCARBAMOL 500 MG/1
500 TABLET, FILM COATED ORAL NIGHTLY
Qty: 14 TABLET | Refills: 0 | Status: SHIPPED | OUTPATIENT
Start: 2025-06-04 | End: 2025-06-18

## 2025-06-04 RX ORDER — DICLOFENAC SODIUM 10 MG/G
2 GEL TOPICAL DAILY
Qty: 100 G | Refills: 1 | Status: SHIPPED | OUTPATIENT
Start: 2025-06-04

## 2025-06-09 ENCOUNTER — TELEPHONE (OUTPATIENT)
Dept: PULMONOLOGY | Facility: CLINIC | Age: 70
End: 2025-06-09
Payer: MEDICARE

## 2025-06-09 ENCOUNTER — RESULTS FOLLOW-UP (OUTPATIENT)
Dept: INTERNAL MEDICINE | Facility: CLINIC | Age: 70
End: 2025-06-09

## 2025-06-09 ENCOUNTER — PATIENT MESSAGE (OUTPATIENT)
Dept: INTERNAL MEDICINE | Facility: CLINIC | Age: 70
End: 2025-06-09
Payer: MEDICARE

## 2025-06-09 DIAGNOSIS — F17.210 LIGHT CIGARETTE SMOKER (1-9 CIGS/DAY): ICD-10-CM

## 2025-06-11 ENCOUNTER — TELEPHONE (OUTPATIENT)
Dept: INTERNAL MEDICINE | Facility: CLINIC | Age: 70
End: 2025-06-11
Payer: MEDICARE

## 2025-06-11 NOTE — TELEPHONE ENCOUNTER
----- Message from Mohsen Ross NP sent at 6/9/2025 12:47 PM CDT -----  Please contact the patient and let them know that their results were fine. Thank you.  ----- Message -----  From: Lab, Background User  Sent: 6/4/2025   9:23 AM CDT  To: Mohsen Ross NP

## 2025-06-16 ENCOUNTER — PATIENT MESSAGE (OUTPATIENT)
Dept: INTERNAL MEDICINE | Facility: CLINIC | Age: 70
End: 2025-06-16
Payer: MEDICARE

## 2025-06-17 ENCOUNTER — OFFICE VISIT (OUTPATIENT)
Dept: PODIATRY | Facility: CLINIC | Age: 70
End: 2025-06-17
Payer: MEDICARE

## 2025-06-17 VITALS
BODY MASS INDEX: 25.03 KG/M2 | HEIGHT: 62 IN | WEIGHT: 136 LBS | DIASTOLIC BLOOD PRESSURE: 72 MMHG | SYSTOLIC BLOOD PRESSURE: 128 MMHG | HEART RATE: 70 BPM

## 2025-06-17 DIAGNOSIS — M24.573 EQUINUS CONTRACTURE OF ANKLE: ICD-10-CM

## 2025-06-17 DIAGNOSIS — L84 CORN OF FOOT: ICD-10-CM

## 2025-06-17 DIAGNOSIS — M77.9 CAPSULITIS: ICD-10-CM

## 2025-06-17 DIAGNOSIS — E11.42 TYPE 2 DIABETES MELLITUS WITH DIABETIC POLYNEUROPATHY, UNSPECIFIED WHETHER LONG TERM INSULIN USE: Primary | ICD-10-CM

## 2025-06-17 PROCEDURE — 1159F MED LIST DOCD IN RCRD: CPT | Mod: CPTII,S$GLB,, | Performed by: PODIATRIST

## 2025-06-17 PROCEDURE — 3008F BODY MASS INDEX DOCD: CPT | Mod: CPTII,S$GLB,, | Performed by: PODIATRIST

## 2025-06-17 PROCEDURE — 29540 STRAPPING ANKLE &/FOOT: CPT | Mod: 50,S$GLB,, | Performed by: PODIATRIST

## 2025-06-17 PROCEDURE — 4010F ACE/ARB THERAPY RXD/TAKEN: CPT | Mod: CPTII,S$GLB,, | Performed by: PODIATRIST

## 2025-06-17 PROCEDURE — 3288F FALL RISK ASSESSMENT DOCD: CPT | Mod: CPTII,S$GLB,, | Performed by: PODIATRIST

## 2025-06-17 PROCEDURE — 1101F PT FALLS ASSESS-DOCD LE1/YR: CPT | Mod: CPTII,S$GLB,, | Performed by: PODIATRIST

## 2025-06-17 PROCEDURE — 1125F AMNT PAIN NOTED PAIN PRSNT: CPT | Mod: CPTII,S$GLB,, | Performed by: PODIATRIST

## 2025-06-17 PROCEDURE — 3074F SYST BP LT 130 MM HG: CPT | Mod: CPTII,S$GLB,, | Performed by: PODIATRIST

## 2025-06-17 PROCEDURE — 3044F HG A1C LEVEL LT 7.0%: CPT | Mod: CPTII,S$GLB,, | Performed by: PODIATRIST

## 2025-06-17 PROCEDURE — 99204 OFFICE O/P NEW MOD 45 MIN: CPT | Mod: 25,S$GLB,, | Performed by: PODIATRIST

## 2025-06-17 PROCEDURE — 3072F LOW RISK FOR RETINOPATHY: CPT | Mod: CPTII,S$GLB,, | Performed by: PODIATRIST

## 2025-06-17 PROCEDURE — 99999 PR PBB SHADOW E&M-EST. PATIENT-LVL IV: CPT | Mod: PBBFAC,HCNC,, | Performed by: PODIATRIST

## 2025-06-17 PROCEDURE — 3078F DIAST BP <80 MM HG: CPT | Mod: CPTII,S$GLB,, | Performed by: PODIATRIST

## 2025-06-17 PROCEDURE — 1160F RVW MEDS BY RX/DR IN RCRD: CPT | Mod: CPTII,S$GLB,, | Performed by: PODIATRIST

## 2025-06-17 RX ORDER — UREA 40 %
CREAM (GRAM) TOPICAL DAILY
Qty: 85 G | Refills: 11 | Status: SHIPPED | OUTPATIENT
Start: 2025-06-17

## 2025-06-17 RX ORDER — CICLOPIROX 80 MG/ML
SOLUTION TOPICAL NIGHTLY
Qty: 6.6 ML | Refills: 11 | Status: SHIPPED | OUTPATIENT
Start: 2025-06-17

## 2025-06-17 NOTE — PROGRESS NOTES
Subjective:      Patient ID: Leticia Brown is a 70 y.o. female.    Chief Complaint: Callouses    Diabetes, increased risk amputation needing evaluation/management/optomization of foot care.    Cc2  pain and callus in the bottom of the forefoot right and left.  Longstanding chronic condition.  This exacerbations been gradual onset, worsening over the past month or 2.  Aggravated by increased weight-bearing prolonged standing some shoes.  No prior medical treatment.  Self-treatment with pedicures and maintenance at home helps some.      Cc3  thick discolored misshapen toenails all toes.  Gradual onset, worsening over the past few years.  No particular aggravating factor.  No prior medical treatment.  No self-treatment.  Denies trauma and surgery both feet.    Chief Complaint   Patient presents with    Callouses         Review of Systems   Constitutional: Negative for chills, diaphoresis, fever, malaise/fatigue and night sweats.   Cardiovascular:  Negative for claudication, cyanosis, leg swelling and syncope.   Skin:  Positive for nail changes and suspicious lesions. Negative for color change, dry skin, rash and unusual hair distribution.   Musculoskeletal:  Negative for falls, joint pain, joint swelling, muscle cramps, muscle weakness and stiffness.   Gastrointestinal:  Negative for constipation, diarrhea, nausea and vomiting.   Neurological:  Positive for sensory change. Negative for brief paralysis, disturbances in coordination, focal weakness, numbness, paresthesias and tremors.           Objective:      Physical Exam  Constitutional:       General: She is not in acute distress.     Appearance: She is well-developed. She is not diaphoretic.   Cardiovascular:      Pulses:           Popliteal pulses are 2+ on the right side and 2+ on the left side.        Dorsalis pedis pulses are 2+ on the right side and 2+ on the left side.        Posterior tibial pulses are 2+ on the right side and 2+ on the left side.       Comments: Capillary refill 3 seconds all toes/distal feet, all toes/both feet warm to touch.      Negative lymphadenopathy bilateral popliteal fossa and tarsal tunnel.      Negavie lower extremity edema bilateral.    Musculoskeletal:      Right ankle: No swelling, deformity, ecchymosis or lacerations. Normal range of motion. Normal pulse.      Right Achilles Tendon: Normal. No defects. Stoner's test negative.      Comments: Pain to palpation inferior mtpj 2, 3 bilateral until a lesser extent 5 right without evidence of trauma or infection.    Ankle dorsiflexion decreased at <10 degrees bilateral with moderate increase with knee flexion bilateral.    Otherwise, Normal angle, base, station of gait. All ten toes without clubbing, cyanosis, or signs of ischemia.  No pain to palpation bilateral lower extremities.  Range of motion, stability, muscle strength, and muscle tone normal bilateral feet and legs.      Lymphadenopathy:      Lower Body: No right inguinal adenopathy. No left inguinal adenopathy.      Comments: Negative lymphadenopathy bilateral popliteal fossa and tarsal tunnel.    Negative lymphangitic streaking bilateral feet/ankles/legs.   Skin:     General: Skin is warm and dry.      Capillary Refill: Capillary refill takes 2 to 3 seconds.      Coloration: Skin is not pale.      Findings: No abrasion, bruising, burn, ecchymosis, erythema, laceration, lesion or rash.      Nails: There is no clubbing.      Comments: Focal hyperkeratotic lesion consisting entirely of hyperkeratotic tissue without open skin, drainage, pus, fluctuance, malodor, or signs of infection plantar MTPJ 2 3 right and left to a lesser extent 5 right.      Otherwise, Skin is normal age and health appropriate color, turgor, texture, and temperature bilateral lower extremities without ulceration, hyperpigmentation, discoloration, masses nodules or cords palpated.  No ecchymosis, erythema, edema, or cardinal signs of infection bilateral  lower extremities.    Toenails 1st, 2nd, 3rd, 4th, 5th  bilateral are hypertrophic thickened 2-3 mm, dystrophic, discolored tanish brown with tan, gray crumbly subungual debris.  Tender to distal nail plate pressure, without periungual skin abnormality of each.         Neurological:      Mental Status: She is alert and oriented to person, place, and time.      Sensory: No sensory deficit.      Motor: No tremor, atrophy or abnormal muscle tone.      Gait: Gait normal.      Deep Tendon Reflexes:      Reflex Scores:       Patellar reflexes are 2+ on the right side and 2+ on the left side.       Achilles reflexes are 2+ on the right side and 2+ on the left side.     Comments: Decreased/absent vibratory sensation bilateral feet to 128Hz tuning fork.     Psychiatric:         Behavior: Behavior is cooperative.           Assessment:       Encounter Diagnoses   Name Primary?    [place] of foot     Type 2 diabetes mellitus with diabetic polyneuropathy, unspecified whether long term insulin use Yes    Capsulitis     Equinus contracture of ankle          Plan:       Leticia was seen today for [facility].    Diagnoses and all orders for this visit:    Type 2 diabetes mellitus with diabetic polyneuropathy, unspecified whether long term insulin use  -     DIABETIC SHOES FOR HOME USE  -     X-Ray Foot Complete Bilateral; Future    [place] of foot  -     Ambulatory referral/consult to Podiatry  -     DIABETIC SHOES FOR HOME USE  -     X-Ray Foot Complete Bilateral; Future    Capsulitis  -     DIABETIC SHOES FOR HOME USE  -     X-Ray Foot Complete Bilateral; Future    Equinus contracture of ankle  -     DIABETIC SHOES FOR HOME USE  -     X-Ray Foot Complete Bilateral; Future    Other orders  -     urea (CARMOL) 40 % Crea; Apply topically once daily.      I counseled the patient on her conditions, their implications and medical management.        The patient has received literature on basic diabetic foot care.  Patient will inspect feet  daily, wear protective shoe gear when ambulatory, and apply moisturizer to skin as needed to maintain elasticity and help prevent ulceration.    Inspect feet multiple times daily for signs of occurrence/recurrence ulceration.    Discussed conservative treatment with shoes of adequate dimensions, material, and style to alleviate symptoms and delay or prevent surgical intervention.    Urea, xrays, DM shoes, Penlac    Periodic self maintenance with pumice stone or similar to help control that is callus thickness and symptoms.    I applied a plantar rest strapping to the patient's right and left foot to offload symptomatic area, support the arch, and relieve pain.           Follow up in about 1 month (around 7/17/2025).

## 2025-06-20 ENCOUNTER — HOSPITAL ENCOUNTER (OUTPATIENT)
Dept: RADIOLOGY | Facility: OTHER | Age: 70
Discharge: HOME | End: 2025-06-20
Attending: FAMILY MEDICINE
Payer: MEDICARE

## 2025-06-20 ENCOUNTER — HOSPITAL ENCOUNTER (OUTPATIENT)
Dept: RADIOLOGY | Facility: OTHER | Age: 70
Discharge: HOME | End: 2025-06-20
Attending: PODIATRIST
Payer: MEDICARE

## 2025-06-20 DIAGNOSIS — M24.573 EQUINUS CONTRACTURE OF ANKLE: ICD-10-CM

## 2025-06-20 DIAGNOSIS — E11.42 TYPE 2 DIABETES MELLITUS WITH DIABETIC POLYNEUROPATHY, UNSPECIFIED WHETHER LONG TERM INSULIN USE: ICD-10-CM

## 2025-06-20 DIAGNOSIS — F17.210 LIGHT CIGARETTE SMOKER (1-9 CIGS/DAY): ICD-10-CM

## 2025-06-20 DIAGNOSIS — L84 CORN OF FOOT: ICD-10-CM

## 2025-06-20 DIAGNOSIS — M77.9 CAPSULITIS: ICD-10-CM

## 2025-06-20 PROCEDURE — 73630 X-RAY EXAM OF FOOT: CPT | Mod: TC,50,FY

## 2025-06-20 PROCEDURE — 71271 CT THORAX LUNG CANCER SCR C-: CPT | Mod: 26,,, | Performed by: INTERNAL MEDICINE

## 2025-06-20 PROCEDURE — 73630 X-RAY EXAM OF FOOT: CPT | Mod: 26,50,, | Performed by: INTERNAL MEDICINE

## 2025-06-20 PROCEDURE — 71271 CT THORAX LUNG CANCER SCR C-: CPT | Mod: TC

## 2025-06-22 ENCOUNTER — RESULTS FOLLOW-UP (OUTPATIENT)
Dept: INTERNAL MEDICINE | Facility: CLINIC | Age: 70
End: 2025-06-22

## 2025-07-15 ENCOUNTER — PATIENT MESSAGE (OUTPATIENT)
Dept: ORTHOPEDICS | Facility: CLINIC | Age: 70
End: 2025-07-15
Payer: MEDICARE

## 2025-07-15 DIAGNOSIS — M77.11 LATERAL EPICONDYLITIS, RIGHT ELBOW: Primary | ICD-10-CM

## 2025-07-17 ENCOUNTER — OFFICE VISIT (OUTPATIENT)
Dept: ORTHOPEDICS | Facility: CLINIC | Age: 70
End: 2025-07-17
Payer: MEDICARE

## 2025-07-17 ENCOUNTER — HOSPITAL ENCOUNTER (OUTPATIENT)
Dept: RADIOLOGY | Facility: OTHER | Age: 70
Discharge: HOME | End: 2025-07-17
Attending: ORTHOPAEDIC SURGERY
Payer: MEDICARE

## 2025-07-17 VITALS — BODY MASS INDEX: 25.03 KG/M2 | WEIGHT: 136 LBS | HEIGHT: 62 IN

## 2025-07-17 DIAGNOSIS — M77.11 LATERAL EPICONDYLITIS, RIGHT ELBOW: ICD-10-CM

## 2025-07-17 DIAGNOSIS — R20.2 NUMBNESS AND TINGLING IN BOTH HANDS: ICD-10-CM

## 2025-07-17 DIAGNOSIS — R20.0 NUMBNESS AND TINGLING IN BOTH HANDS: ICD-10-CM

## 2025-07-17 DIAGNOSIS — M77.11 LATERAL EPICONDYLITIS OF RIGHT ELBOW: Primary | ICD-10-CM

## 2025-07-17 PROCEDURE — 3008F BODY MASS INDEX DOCD: CPT | Mod: CPTII,HCNC,S$GLB, | Performed by: ORTHOPAEDIC SURGERY

## 2025-07-17 PROCEDURE — 4010F ACE/ARB THERAPY RXD/TAKEN: CPT | Mod: CPTII,HCNC,S$GLB, | Performed by: ORTHOPAEDIC SURGERY

## 2025-07-17 PROCEDURE — 73080 X-RAY EXAM OF ELBOW: CPT | Mod: TC,HCNC,RT

## 2025-07-17 PROCEDURE — 99999 PR PBB SHADOW E&M-EST. PATIENT-LVL III: CPT | Mod: PBBFAC,HCNC,, | Performed by: ORTHOPAEDIC SURGERY

## 2025-07-17 PROCEDURE — 1159F MED LIST DOCD IN RCRD: CPT | Mod: CPTII,HCNC,S$GLB, | Performed by: ORTHOPAEDIC SURGERY

## 2025-07-17 PROCEDURE — 3044F HG A1C LEVEL LT 7.0%: CPT | Mod: CPTII,HCNC,S$GLB, | Performed by: ORTHOPAEDIC SURGERY

## 2025-07-17 PROCEDURE — 20551 NJX 1 TENDON ORIGIN/INSJ: CPT | Mod: HCNC,RT,S$GLB, | Performed by: ORTHOPAEDIC SURGERY

## 2025-07-17 PROCEDURE — 99215 OFFICE O/P EST HI 40 MIN: CPT | Mod: HCNC,25,S$GLB, | Performed by: ORTHOPAEDIC SURGERY

## 2025-07-17 PROCEDURE — 1125F AMNT PAIN NOTED PAIN PRSNT: CPT | Mod: CPTII,HCNC,S$GLB, | Performed by: ORTHOPAEDIC SURGERY

## 2025-07-17 PROCEDURE — 73080 X-RAY EXAM OF ELBOW: CPT | Mod: 26,HCNC,RT, | Performed by: RADIOLOGY

## 2025-07-17 RX ORDER — MELOXICAM 15 MG/1
15 TABLET ORAL DAILY
Qty: 30 TABLET | Refills: 0 | Status: SHIPPED | OUTPATIENT
Start: 2025-07-17 | End: 2025-08-24

## 2025-07-17 RX ADMIN — TRIAMCINOLONE ACETONIDE 20 MG: 40 INJECTION, SUSPENSION INTRA-ARTICULAR; INTRAMUSCULAR at 09:07

## 2025-07-17 NOTE — PROGRESS NOTES
"Lupis Ugalde, [facility] [place]  Orthopedic Hand and Upper Extremity  Subjective:       Patient ID: Leticia Bronw is a 70 y.o. female.    Chief Complaint: Follow-up and Pain of the Right Elbow    Interim HPI on 07/17/2025  Patient returns today for a follow up evaluation of right elbow pain. Their pain is rated as a 7/10 in severity and currently takes no medications for pain relief. Patient has not initiated Occupational Therapy within the last year.  Currently works as a cook, noting repetitive motions on the right, her dominant hand.  She notes numbness and tingling of the bilateral forearms and hands; denies any focal weakness.    She has a history of diabetes but has never been formally diagnosed with diabetic neuropathy.     Previous HPI on 09/17/2024 copied below for continuity:  Patient is a 69 y.o. right hand dominant female who presents today with complaints of  constant " achy" right upper arm pain which began a month ago with associated weakness which are affecting her ADLs. Tender to touch mostly right elbow.  She denies any numbness or tingling of right hand.  She denies any recent falls or previous falls.  She denies any previous surgery.      The patient is a .   The patient denies any fevers, chills, N/V, D/C and presents for evaluation.    HPI 9/17/24 Patient is here today for a f/u of her R elbow lateral epicondylitis. She received an injection last visit and a mac strap. She states the injection worked and she has no pain at all. She will begin PT 9/25  Pain is 0/10- she is very happy with her result    Review of Systems    The remainder of a 14-point ROS has been performed and is otherwise negative.      Objective:        Vitals:    07/17/25 0854   Weight: 61.7 kg (136 lb 0.4 oz)   Height: 5' 2" (1.575 m)   PainSc:   7      Physical Exam  General Exam:  Constitutional:  Well developed, well nourished, Black or  female, in no acute distress  Eyes: "    Normal position and movement, follows examiner appropriately, pupils round and symmetric  Ears:    Hearing intact to conversational voice  Mouth:   Normal mucosal color without swelling or bleeding present; normal dentition for age  Psychiatric:    Normal mood, appropriate affect; alert and oriented to person, place, and time    Upper Extremity Exam:   Inspection:  Skin normal, intact, without incisional scar  Palpation:   Tenderness to palpation about the right extensor mass and lateral epicondyle ; no bony step-offs  Range of Motion: Relatively-full, moderately painful ROM  Neurologic:       Sensorimotor:  Radial         Median           Ulnar         RUE:  5     5      5 SILT of ulnar, median, and radial nerves     LUE:  5     5      5 SILT of ulnar, median, and radial nerves  Other Tests   2+ radial, ulnar pulses, < 2 second capillary refill; negative cubital and carpal tunnel testing at the bilateral wrists and elbows.       Physical Exam    MSK: Elbow - Right: Tenderness in lateral epicondyle. Pain with finger extension against resistance.,     Imaging:  I have personally reviewed the 3+ right elbow views XR obtained on 07/17/2025 at [facility] and agree with the radiologist's interpretation is copied below:    X-Ray Elbow Complete Right 07/17/2025  Joint space preserved.  Dorsal olecranon enthesophyte.  No fracture or effusion.    Electronically signed by: Fredy Stone Jr    Prior imaging interpretation copied below for continuity:  X-Ray Wrist Complete Bilateral 10/04/2019  Left: No fractures location bone destruction seen.  There is minimal DJD.  Right: No fracture dislocation bone destruction seen.  There is minimal DJD.    Electronically signed by: Roman Corbett MD    X-Ray Hand 3 View Bilateral 10/04/2019  Right: No fracture dislocation bone destruction seen.  There is minimal DJD.  Left: No fracture dislocation bone destruction seen.  There is minimal DJD.    Electronically signed  by: Roman Corbett MD  Assessment:   Leticia is a 70 y.o. female with focal right elbow pain and associated numbness and tingling in the setting of lateral epicondylitis of the right elbow in addition to/versus bilateral carpal and cubital tunnel syndrome.    1. Lateral epicondylitis of right elbow  Tendon Origin: R elbow    Ambulatory Referral/Consult to Occupational Therapy    meloxicam (MOBIC) 15 MG tablet      2. Numbness and tingling in both hands  EMG W/ ULTRASOUND AND NERVE CONDUCTION TEST 2 Extremities    Ambulatory Referral/Consult to Occupational Therapy    meloxicam (MOBIC) 15 MG tablet        Plan:       I had a lengthy discussion with Leticia about symptoms, clinical examination, and imaging findings. They present today endorsing focal right elbow/upper extremity pain. Their physical exam is significant for focal tenderness to palpation of the lateral epicondyle and extensor mass pain is reproducible with active resistance during extension of the right wrist and fingertips. Recent imaging demonstrates no acute findings or significant interval changes compared to prior.     I discussed the treatment options moving forward including conservative modalities versus injection versus surgery. In my opinion, an injection would entail corticosteroid injection of the tendon sheath at the right lateral epicondyle, while surgery would potentially entail right tenosynovectomy. After a long discussion, they elect to proceed with injection intervention.    Tendon Origin: R elbow    Date/Time: 7/17/2025 9:00 AM    Performed by: Niyah Hagen MD  Authorized by: Niyah Hagen MD    Consent Done?:  Yes (Verbal)  Timeout: prior to procedure the correct patient, procedure, and site was verified    Indications:  Pain  Timeout: prior to procedure the correct patient, procedure, and site was verified    Location:  Elbow  Site:  R elbow  Prep: patient was prepped and draped in usual sterile fashion     Ultrasonic Guidance for Needle Placement?: No    Needle size:  22 G  Medications:  20 mg triamcinolone acetonide 40 mg/mL    In the interim, I recommend initiating a course of conservative treatment and they elect to proceed. Additionally, I recommend bilateral upper extremity EMG/nerve conduction studies for further evaluation of bilateral upper extremity numbness and tingling.  Additionally, I have recommended a right MARC strap braces. Patient was appropriately measured and counseled on proper usage and donning/doffing.  I also provided them with a home exercise program and referred the patient to formal Occupational Therapy for strengthening and flexibility of the right elbow and extensor muscles with targeted local modalities as indicated.    I prescribed a short course of meloxicam (Mobic) in a scheduled fashion for the inflammatory pattern. GI precautions were discussed; patient verbalized understanding. I also recommend they maximize acetaminophen (Tylenol) in a scheduled fashion, not to exceed 3,000 mg daily) and trialing a topical anti-inflammatory agent, such as diclofenac (Voltaren) gel and/or topical CBD oil.     We discussed the red flag symptoms that should prompt an urgent phone call and/or presentation to emergency department, including but not limited to, new motor weakness, new or worsening pain, or changes to sensation.    The patient's pathophysiology was explained in detail with reference to x-rays, models, other visual aids as appropriate.   I conducted a thorough review of previous records available via [facility]MR for optimal care coordination.    Follow Up: After EMG/NCS of BUE with Niyah Hagen MD   Imaging needed prior to next visit: No additional imaging necessary unless otherwise indicated.     All questions and concerns were addressed during this visit, and the patient expressed understanding and agreement with our plan. They are encouraged to call and/or return to clinic at any  time if issues arise.    Lupis Ugalde [facility] [place]  Hand & Upper Extremity Orthopedics  [facility]  07/17/2025 at 12:14 PM    This note was generated with the assistance of ambient listening technology. Verbal consent was obtained by the patient and accompanying visitor(s) for the recording of patient appointment to facilitate this note. I attest to having reviewed and edited the generated note for accuracy, though some syntax or spelling errors may persist. Please contact the author of this note for any clarification.    Future Appointments   Date Time Provider Department Center   8/25/2025 10:00 AM NEUROLOGY, [facility]   9/2/2025  8:30 AM Niyah Hagen MD [facility]

## 2025-07-17 NOTE — PROCEDURES
Tendon Origin: R elbow    Date/Time: 7/17/2025 9:00 AM    Performed by: Niyah Hagen MD  Authorized by: Niyah Hagen MD    Consent Done?:  Yes (Verbal)  Timeout: prior to procedure the correct patient, procedure, and site was verified    Indications:  Pain  Timeout: prior to procedure the correct patient, procedure, and site was verified    Location:  Elbow  Site:  R elbow  Prep: patient was prepped and draped in usual sterile fashion    Ultrasonic Guidance for Needle Placement?: No    Needle size:  22 G  Medications:  20 mg triamcinolone acetonide 40 mg/mL

## 2025-07-21 RX ORDER — TRIAMCINOLONE ACETONIDE 40 MG/ML
20 INJECTION, SUSPENSION INTRA-ARTICULAR; INTRAMUSCULAR
Status: DISCONTINUED | OUTPATIENT
Start: 2025-07-17 | End: 2025-07-21 | Stop reason: HOSPADM

## 2025-07-21 NOTE — PROGRESS NOTES
Patient ID: eLticia Brown is a 70 y.o. female.    Chief Complaint: Follow-up and Pain of the Right Elbow    History of Present Illness    CHIEF COMPLAINT:  Right elbow pain    HPI:  Ms. Brown presents with new onset of bilateral carpal tunnel and cubital tunnel syndromes. She reports elbow pain, indicating the affected area. Pain is characterized as severe. She denies any numbness or tingling. She is right-hand dominant and engages in cooking, lifting, and other activities. This medical assistant previously treated her hand. She was seen last year and had a Depo-Provera injection since then.    She denies pain when certain movements are performed and denies history of diabetic neuropathy.    PREVIOUS TREATMENTS:  Ms. Brown received a Depo-Provera injection last year.    WORK STATUS:  Ms. Brown performs activities involving repetitive motions, such as lifting, which is causing pain in her elbow area.      ROS:  General: denies fever, denies chills, denies fatigue, denies weight gain, denies weight loss  Eyes: denies vision changes, denies redness, denies discharge  ENT: denies ear pain, denies nasal congestion, denies sore throat  Cardiovascular: denies chest pain, denies palpitations, denies lower extremity edema  Respiratory: denies cough, denies shortness of breath  Gastrointestinal: denies abdominal pain, denies nausea, denies vomiting, denies diarrhea, denies constipation, denies blood in stool  Genitourinary: denies dysuria, denies hematuria, denies frequency  Musculoskeletal: reports joint pain, denies muscle pain, reports limb pain  Skin: denies rash, denies lesion  Neurological: denies headache, denies dizziness, denies numbness, denies tingling  Psychiatric: denies anxiety, denies depression, denies sleep difficulty         Physical Exam    MSK: Elbow - Right: Tenderness in lateral epicondyle. Pain with finger extension against resistance.         Assessment & Plan     Right lateral epicondyle  steroid injection administered today.   Potential skin whitening at injection site due to steroid.   Use elbow brace continuously.   Referred to PT for tennis elbow.              No follow-ups on file.    This note was generated with the assistance of ambient listening technology. Verbal consent was obtained by the patient and accompanying visitor(s) for the recording of patient appointment to facilitate this note. I attest to having reviewed and edited the generated note for accuracy, though some syntax or spelling errors may persist. Please contact the author of this note for any clarification.

## 2025-08-04 ENCOUNTER — CLINICAL SUPPORT (OUTPATIENT)
Dept: REHABILITATION | Facility: HOSPITAL | Age: 70
End: 2025-08-04
Payer: MEDICARE

## 2025-08-04 DIAGNOSIS — R20.0 NUMBNESS AND TINGLING IN BOTH HANDS: ICD-10-CM

## 2025-08-04 DIAGNOSIS — R29.898 DECREASED GRIP STRENGTH OF RIGHT HAND: ICD-10-CM

## 2025-08-04 DIAGNOSIS — M77.11 LATERAL EPICONDYLITIS OF RIGHT ELBOW: ICD-10-CM

## 2025-08-04 DIAGNOSIS — R20.2 NUMBNESS AND TINGLING IN BOTH HANDS: ICD-10-CM

## 2025-08-04 DIAGNOSIS — M25.521 ELBOW PAIN, RIGHT: Primary | ICD-10-CM

## 2025-08-04 PROCEDURE — 97110 THERAPEUTIC EXERCISES: CPT | Mod: HCNC

## 2025-08-04 PROCEDURE — 97165 OT EVAL LOW COMPLEX 30 MIN: CPT | Mod: HCNC

## 2025-08-04 NOTE — PROGRESS NOTES
Outpatient Rehab    Occupational Therapy Evaluation    Patient Name: Leticia Brown  MRN: [id number]  YOB: 1955  Encounter Date: 8/4/2025    Therapy Diagnosis:   Encounter Diagnoses   Name Primary?    Lateral epicondylitis of right elbow     Numbness and tingling in both hands     Elbow pain, right Yes    Decreased  strength of right hand      Physician: Lupis Ugalde    Physician Orders: Eval and Treat  Medical Diagnosis: Lateral epicondylitis of right elbow  Numbness and tingling in both hands  Surgical Diagnosis: Not applicable for this Episode   Surgical Date: Not applicable for this Episode  Days Since Last Surgery: Not applicable for this Episode    Visit # / Visits Authorized: 1 / 1  Insurance Authorization Period: 7/17/2025 to 7/17/2026  Date of Evaluation: 8/4/2025  Plan of Care Certification: 8/4/2025 to 9/9/25     Time In: 0745   Time Out: 0830  Total Time (in minutes): 45   Total Billable Time (in minutes):  45    Intake Outcome Measure for FOTO Survey    Therapist reviewed FOTO scores for Leticia Brown on 8/4/2025.   FOTO report - see Media section or FOTO account episode details.     Intake Score (%): Not applicable for this Episode    Precautions:       Subjective   History of Present Illness  Leticia is a 70 y.o. female who reports to occupational therapy with a chief concern of elbow pain, r.     The patient reports a medical diagnosis of M77.11 (ICD-10-CM) - Lateral epicondylitis of right elbow  R20.0,R20.2 (ICD-10-CM) - Numbness and tingling in both hands.      Diagnostic tests related to this condition: X-ray.       Dominant Hand: Right  History of Present Condition/Illness: Patient is a 69 yo female who presents with R tennis elbow pain; she reports her pain has diminished since receiving injection on 7/17/25.  She reports she was having pain with cooking, including chopping, stirring, cutting which she does for work.  She reports she does drop  things, has some CT symptoms and reports decreased strength. Pain prior to injection was reported a 10/10, 0/10 following injection.     Activities of Daily Living  Social history was obtained from Patient.          Patient Responsibilities: Meal prep, Laundry, Shopping, Personal ADL, Home management, Health management        Pain     Patient reports a current pain level of 0/10. Pain at best is reported as 0/10. Pain at worst is reported as 8/10.   Location: R elbow lateral  Clinical Progression (since onset): Stable  Pain Qualities: Dull, Aching, Radiating, Throbbing  Pain-Relieving Factors: Activity modification, Rest, Other (Comment)  Other Pain-Relieving Factors: Injection received 7/17/25  Pain-Aggravating Factors: Holding objects, Movement, Other (Comment)  Other Pain-Aggravating Factors: cutting, sitrring, chopping, cooking         Living Arrangements  Living Situation  Living Arrangements: Alone        Employment  Does the patient's condition impact their ability to work?: Yes  Employment Status: Employed full-time  Works as a cook, has difficulty with chopping, cutting and stirring; doesn't do much lifting.       Past Medical History/Physical Systems Review:   Leticia Brown  has a past medical history of Allergy, Cataract, Diabetes mellitus, H. pylori infection, High cholesterol, and Hypertension.    Leticia Brown  has a past surgical history that includes Cholecystectomy (2016); Esophagogastroduodenoscopy (N/A, 07/21/2020); Breast biopsy (Right); Tubal ligation (1982); Colonoscopy (N/A, 04/11/2022); Cataract extraction w/  intraocular lens implant (Right, 05/12/2022); Cataract extraction w/  intraocular lens implant (Left, 06/16/2022); and Eye surgery (7/22).    Leticia has a current medication list which includes the following prescription(s): amlodipine, aspirin, atorvastatin, ergocalciferol, hydrochlorothiazide, meloxicam, olmesartan, oxybutynin, prevnar 20 (pf), and  "trazodone.    Review of patient's allergies indicates:  No Known Allergies     Objective         Right  Strength  Right Hand Dynamometer Position: 2  Elbow Position Forearm Position Trial 1 (lbs) Trial 2  (lbs) Trial 3  (lbs) Average  (lbs) Pain   Flexed Neutral 18 18 18 18         Left  Strength  Left Hand Dynamometer Position: 2  Elbow Position Forearm Position Trial 1 (lbs) Trial 2 (lbs) Trial 3 (lbs) Average (lbs) Pain   Flexed Neutral 29 29 29 29         Right  Strength - Alternate Positions  Right Hand Dynamometer Position: 2  Elbow Position Forearm Position Trial 1 (lbs) Trial 2 (lbs) Trial 3 (lbs) Average (lbs) Pain   Extended Neutral 20 20 20 20         Left  Strength - Alternate Positions  Left Hand Dynamometer Position: 2  Elbow Position Forearm Position Trial 1 (lbs) Trial 2 (lbs) Trial 3 (lbs) Average (lbs) Pain   Extended Neutral 29 29 29 29            Treatment:  Therapeutic Exercise  TE 1: Tennis elbow stretches in 4 positions: wrist flexed, neutral, elbow flexed; wrist flexed , pronated, elbow flexed; wrist flexed, neutral, elbow extended adn wrist flexed, pronated and elbow extended x 10 reps 3x daily  TE 2: manual massage to elbow - across, along muscle belly and CW/CCW x 5 min 1-2 x daily  TE 3: modality use including MH prior to activity, ice massage at night for pain/inflammation; avoid aggravating activities  TE 4: tennis elbow brace with activity, cooking as tolerated.  TE 5: Mixed putty for gross , composite digit extension "donut" with 1/2 putty, key, 2 and 3 pt pinch x 10 reps each, provided putty for HEP.    Time Entry(in minutes):  OT Evaluation (Low) Time Entry: 30  Therapeutic Exercise Time Entry: 20    Assessment & Plan   Assessment  Leticia presents with a condition of Low complexity.   Presentation of Symptoms: Stable  Will Comorbidities Impact Care: No     IADL Limitations: Meal preparation and cleanup  Work Limitations: Job performance  Leisure Limitations: " Leisure participation  Functional Limitations: Activity tolerance, Carrying objects, Gross motor coordination, Pain with ADLs/IADLs            Patient Goal for Therapy (OT): Pain relief and to regain strength in right hand/arm  Prognosis: Excellent    Assessment Details: Patient would benefit from skilled OT services for  and wrist strengthening, extensor strengthening and modalities for pain/symptom management and education on prevention.     Plan  From an occupational therapy perspective, the patient would benefit from: Skilled Rehab Services    Planned therapy interventions include: Therapeutic exercise, Therapeutic activities, Manual therapy, and ADLs/IADLs.    Planned modalities to include: Cryotherapy (cold pack), Paraffin bath, and Ultrasound.        Visit Frequency: 1 times Per Week for 8 Weeks.       This plan was discussed with Patient.   Discussion participants: Agreed Upon Plan of Care    Plan details: Will initiate OT 1x week for 8 weeks in pursuit of OT goals listed below.     The patient's spiritual, cultural, and educational needs were considered, and the patient is agreeable to the plan of care and goals.           Goals:   Active       STGs       1. Patient to be IND with HEP & modalities for pain management         Start:  08/04/25    Expected End:  09/29/25            2. Increase  strength 2-8 lbs. For work/leisure/ADL activities          Start:  08/04/25    Expected End:  09/29/25            3.  Patient to return to cooking, chopping, baking with pain of 3 or less by discharge        Start:  08/04/25    Expected End:  09/29/25                Zainab Morocho OT, CHT

## 2025-08-04 NOTE — PATIENT INSTRUCTIONS
"[facility]  OCCUPATIONAL THERAPY  HOME EXERCISE PROGRAM     Complete the following strengthening program 1x/day.   10 REPS 3X WEEK        "DONUT"          2 pt thumb and index   3 pt thumb, index and middle     _    Biscuit shape - key pinch - thumb and side of index                             HOLLI Crowley, CHT  Certified Hand Therapist  Occupational Therapist       "

## 2025-08-15 ENCOUNTER — PATIENT MESSAGE (OUTPATIENT)
Dept: ADMINISTRATIVE | Facility: OTHER | Age: 70
End: 2025-08-15
Payer: MEDICARE

## 2025-08-18 ENCOUNTER — CLINICAL SUPPORT (OUTPATIENT)
Dept: REHABILITATION | Facility: HOSPITAL | Age: 70
End: 2025-08-18
Payer: MEDICARE

## 2025-08-18 DIAGNOSIS — R29.898 DECREASED GRIP STRENGTH OF RIGHT HAND: ICD-10-CM

## 2025-08-18 DIAGNOSIS — M25.521 ELBOW PAIN, RIGHT: Primary | ICD-10-CM

## 2025-08-18 PROCEDURE — 97110 THERAPEUTIC EXERCISES: CPT | Mod: HCNC

## 2025-08-18 PROCEDURE — 97035 APP MDLTY 1+ULTRASOUND EA 15: CPT | Mod: HCNC

## 2025-08-18 PROCEDURE — 97140 MANUAL THERAPY 1/> REGIONS: CPT | Mod: HCNC

## 2025-08-24 ENCOUNTER — PATIENT MESSAGE (OUTPATIENT)
Dept: INTERNAL MEDICINE | Facility: CLINIC | Age: 70
End: 2025-08-24
Payer: MEDICARE

## 2025-08-28 ENCOUNTER — TELEPHONE (OUTPATIENT)
Dept: ORTHOPEDICS | Facility: CLINIC | Age: 70
End: 2025-08-28
Payer: MEDICARE

## (undated) DEVICE — GLOVE BIOGEL SKINSENSE PI 7.5

## (undated) DEVICE — Device

## (undated) DEVICE — GLASSES EYE PROTECTIVE

## (undated) DEVICE — SOL PVP-I SCRUB 7.5% 4OZ

## (undated) DEVICE — SYR SLIP TIP 1CC

## (undated) DEVICE — SOL BETADINE 5%